# Patient Record
Sex: FEMALE | Race: WHITE | NOT HISPANIC OR LATINO | Employment: OTHER | URBAN - METROPOLITAN AREA
[De-identification: names, ages, dates, MRNs, and addresses within clinical notes are randomized per-mention and may not be internally consistent; named-entity substitution may affect disease eponyms.]

---

## 2017-02-08 ENCOUNTER — HOSPITAL ENCOUNTER (OUTPATIENT)
Dept: LAB | Facility: MEDICAL CENTER | Age: 67
End: 2017-02-08
Attending: SURGERY
Payer: COMMERCIAL

## 2017-02-08 LAB
T4 FREE SERPL-MCNC: 0.91 NG/DL (ref 0.53–1.43)
TSH SERPL DL<=0.005 MIU/L-ACNC: 5.9 UIU/ML (ref 0.3–3.7)

## 2017-02-08 PROCEDURE — 84443 ASSAY THYROID STIM HORMONE: CPT

## 2017-02-08 PROCEDURE — 84439 ASSAY OF FREE THYROXINE: CPT

## 2017-02-08 PROCEDURE — 36415 COLL VENOUS BLD VENIPUNCTURE: CPT

## 2017-04-12 ENCOUNTER — HOSPITAL ENCOUNTER (OUTPATIENT)
Dept: LAB | Facility: MEDICAL CENTER | Age: 67
End: 2017-04-12
Attending: SURGERY
Payer: COMMERCIAL

## 2017-04-12 PROCEDURE — 36415 COLL VENOUS BLD VENIPUNCTURE: CPT

## 2017-04-12 PROCEDURE — 84443 ASSAY THYROID STIM HORMONE: CPT

## 2017-04-12 PROCEDURE — 84439 ASSAY OF FREE THYROXINE: CPT

## 2017-04-13 LAB
T4 FREE SERPL-MCNC: 1.09 NG/DL (ref 0.53–1.43)
TSH SERPL DL<=0.005 MIU/L-ACNC: 2.42 UIU/ML (ref 0.3–3.7)

## 2017-04-28 ENCOUNTER — OFFICE VISIT (OUTPATIENT)
Dept: MEDICAL GROUP | Facility: LAB | Age: 67
End: 2017-04-28
Payer: COMMERCIAL

## 2017-04-28 VITALS
WEIGHT: 135 LBS | OXYGEN SATURATION: 95 % | HEIGHT: 63 IN | BODY MASS INDEX: 23.92 KG/M2 | TEMPERATURE: 97.5 F | RESPIRATION RATE: 12 BRPM | HEART RATE: 58 BPM | DIASTOLIC BLOOD PRESSURE: 80 MMHG | SYSTOLIC BLOOD PRESSURE: 132 MMHG

## 2017-04-28 DIAGNOSIS — E65 LOCALIZED DEPOSITS OF FAT: ICD-10-CM

## 2017-04-28 DIAGNOSIS — I10 ESSENTIAL HYPERTENSION: ICD-10-CM

## 2017-04-28 DIAGNOSIS — N63.10 BREAST MASS, RIGHT: ICD-10-CM

## 2017-04-28 DIAGNOSIS — Z82.49 FAMILY HISTORY OF CORONARY ARTERY DISEASE: ICD-10-CM

## 2017-04-28 DIAGNOSIS — Z80.0 FAMILY HISTORY OF PANCREATIC CANCER: ICD-10-CM

## 2017-04-28 DIAGNOSIS — K63.5 COLON POLYP: ICD-10-CM

## 2017-04-28 DIAGNOSIS — E89.0 POSTOPERATIVE HYPOTHYROIDISM: ICD-10-CM

## 2017-04-28 DIAGNOSIS — Z91.09 ENVIRONMENTAL ALLERGIES: ICD-10-CM

## 2017-04-28 DIAGNOSIS — Z87.891 HISTORY OF TOBACCO USE DISORDER: ICD-10-CM

## 2017-04-28 PROCEDURE — 99203 OFFICE O/P NEW LOW 30 MIN: CPT | Performed by: FAMILY MEDICINE

## 2017-04-28 RX ORDER — HYDROCHLOROTHIAZIDE 25 MG/1
25 TABLET ORAL DAILY
COMMUNITY
End: 2019-02-25 | Stop reason: SDUPTHER

## 2017-04-28 RX ORDER — DIPHENHYDRAMINE HCL 25 MG
25 TABLET ORAL EVERY 6 HOURS PRN
COMMUNITY
End: 2020-11-03

## 2017-04-28 RX ORDER — LEVOTHYROXINE SODIUM 112 UG/1
112 TABLET ORAL
COMMUNITY
End: 2021-03-08

## 2017-04-28 ASSESSMENT — ENCOUNTER SYMPTOMS
HEMOPTYSIS: 0
DIARRHEA: 0
LOSS OF CONSCIOUSNESS: 0
DIZZINESS: 0
WHEEZING: 0
COUGH: 0
DOUBLE VISION: 0
ROS GI COMMENTS: HEMORRHOIDS
HEADACHES: 0
HEARTBURN: 0

## 2017-04-28 ASSESSMENT — PATIENT HEALTH QUESTIONNAIRE - PHQ9: CLINICAL INTERPRETATION OF PHQ2 SCORE: 1

## 2017-04-28 NOTE — PROGRESS NOTES
Subjective:      Ailin Good is a 66 y.o. female who presents with Establish [facility]    Chief Complaint   Patient presents with   • Establish Care     right side of breast small lump with pain / lump on both sides of eyes             HPI    Hypothyroidism   This is a chronic problem. She is taking levoxyl 112 mcg daily. She had a total thyroidectomy 6/2016. She states that she had an enlarged thyroid gland.  She sees Dr Eugene     Hypertension.  His current problem. She is taking HCTZ 25 mg daily and has been on this medication for about 3 years. Does check her blood pressure at home with a wrist cuff and her blood pressure initially runs about 130/75-80. No headaches, no chest pain . Reports she had labs done not too long ago and has them at home    Right sided breast mass   She has a breast lump on the right side. Hard to find but found it when she was in the shower. Has been there for about a month .   Hurts when she pushes on it . States that she did have a normal mammogram done earlier this year.    History of tobacco use  She has smoked 1 pack per 3 days from the ages of 20 to 62. She quit about 4 years ago     Social history:   Retired      [employer] with a company    HCM:   Last gyn exam was 2 years ago and normal   Last mammo was beginning of this year at [facility]   Colon cancer screening . Colonoscopy with 2 years ago and had a polyp removed     New patient questionnaire reviewed and scanned into media     Patient Active Problem List    Diagnosis Date Noted   • Essential hypertension 04/28/2017   • Postoperative hypothyroidism 04/28/2017   • Family history of coronary artery disease 04/28/2017   • Family history of pancreatic cancer 04/28/2017   • History of tobacco use disorder 04/28/2017   • Colon polyp 04/28/2017   • Environmental allergies 04/28/2017         Family History   Problem Relation Age of Onset   • Heart Disease Mother    • Cancer Father      pancreatic cancer       Social  "History     Social History   • Marital Status: Single     Spouse Name: N/A   • Number of Children: N/A   • Years of Education: N/A     Occupational History   • Not on file.     Social History Main Topics   • Smoking status: Former Smoker -- 0.25 packs/day     Types: Cigarettes     Quit date: 01/28/2013   • Smokeless tobacco: Never Used   • Alcohol Use: 0.6 oz/week     1 Glasses of wine per week      Comment: wine nightly    • Drug Use: No   • Sexual Activity: No     Other Topics Concern   • Not on file     Social History Narrative   • No narrative on file         Current outpatient prescriptions:   •  levothyroxine (LEVOXYL) 112 MCG Tab, Take 112 mcg by mouth Every morning on an empty stomach., Disp: , Rfl:   •  hydrochlorothiazide (HYDRODIURIL) 25 MG Tab, Take 25 mg by mouth every day., Disp: , Rfl:   •  diphenhydrAMINE (BENADRYL) 25 MG Tab, Take 25 mg by mouth every 6 hours as needed for Sleep., Disp: , Rfl:         Review of Systems   HENT: Negative for nosebleeds.         Sneezing, chronic sinus trouble    Eyes: Negative for double vision.   Respiratory: Negative for cough, hemoptysis and wheezing.    Cardiovascular: Negative for chest pain and leg swelling.   Gastrointestinal: Negative for heartburn and diarrhea.        Hemorrhoids    Genitourinary:        Loss of urine   Frequent urination   Nocturia    Skin: Negative for rash.        Skin is dryer, some hair loss    Neurological: Negative for dizziness, loss of consciousness and headaches.   Endo/Heme/Allergies: Positive for environmental allergies.          Objective:     /80 mmHg  Pulse 58  Temp(Src) 36.4 °C (97.5 °F)  Resp 12  Ht 1.6 m (5' 2.99\")  Wt 61.236 kg (135 lb)  BMI 23.92 kg/m2  SpO2 95%     Physical Exam   Constitutional: She appears well-developed and well-nourished. She is active and cooperative.  Non-toxic appearance. She does not have a sickly appearance. No distress.   HENT:   Head: Normocephalic and atraumatic.   Eyes: " Conjunctivae and EOM are normal.       Small fatty deposits    Cardiovascular: Normal rate, regular rhythm and normal heart sounds.        Pulmonary/Chest: Effort normal and breath sounds normal. No tachypnea. No respiratory distress.   Right breast mass in the upper outer quadrant as documented    Neurological: She is alert. She displays no tremor. She displays no seizure activity. Coordination and gait normal.   Skin: Skin is warm and dry. She is not diaphoretic.   Psychiatric: She has a normal mood and affect. Her speech is normal and behavior is normal.               Assessment/Plan:     1. Breast mass, right  Diagnostic mammogram, ultrasound ordered urgently.  - MA-DIAGNOSTIC MAMMO W/CAD-BILAT; Future  - US-BREAST COMPLETE-RIGHT; Future    2. Essential hypertension  Controlled. No change in medication    3. Postoperative hypothyroidism  Followed by specialist    4. Colon polyp  Followed by specialist    5. History of tobacco use disorder  Patient is currently not smoking    6. Environmental allergies  Stable    7. Family history of coronary artery disease  Will check labs at next appointment    8. Family history of pancreatic cancer    9. Localized deposits of fat  Referral for removal  - REFERRAL TO OPHTHALMOLOGIC PLASTIC SURGERY      She is to follow up in 2-3 weeks after imaging  She will also bring her most recent labs and mammogram report  We will get her colonoscopy report as well

## 2017-04-28 NOTE — MR AVS SNAPSHOT
"Ailin Good   2017 10:40 AM   Office Visit   MRN: [id number]    Department:  [facility]   Dept Phone:  [phone]    Description:  Female : 1950   Provider:  Lidna Hernández M.D.           Reason for Visit     Establish Care right side of breast small lump with pain / lump on both sides of eyes      Allergies as of 2017     No Known Allergies      You were diagnosed with     Breast mass, right   [142751]       Essential hypertension   [9251365]       Postoperative hypothyroidism   [727200]       Colon polyp   [566384]       History of tobacco use disorder   [5560005]       Environmental allergies   [322897]       Family history of coronary artery disease   [682975]       Family history of pancreatic cancer   [348434]         Vital Signs     Blood Pressure Pulse Temperature Respirations Height Weight    132/80 mmHg 58 36.4 °C (97.5 °F) 12 1.6 m (5' 2.99\") 61.236 kg (135 lb)    Body Mass Index Oxygen Saturation Smoking Status             23.92 kg/m2 95% Former Smoker         Basic Information     Date Of Birth Sex Race Ethnicity Preferred Language    1950 Female White Non- English      Your appointments     May 16, 2017  9:40 AM   Established Patient with Linda Hernández M.D.   [facility] (--)    [address] [address] [id number] [phone]           You will be receiving a confirmation call a few days before your appointment from our automated call confirmation system.              Problem List              ICD-10-CM Priority Class Noted - Resolved    Essential hypertension I10   2017 - Present    Postoperative hypothyroidism E89.0   2017 - Present    Family history of coronary artery disease Z82.49   2017 - Present    Family history of pancreatic cancer Z80.0   2017 - Present    History of tobacco use disorder Z87.891   2017 - Present    Colon polyp K63.5   2017 - Present    Environmental " allergies Z91.09   4/28/2017 - Present      Health Maintenance     Patient has no pending health maintenance at this time      Current Immunizations     No immunizations on file.      Below and/or attached are the medications your provider expects you to take. Review all of your home medications and newly ordered medications with your provider and/or pharmacist. Follow medication instructions as directed by your provider and/or pharmacist. Please keep your medication list with you and share with your provider. Update the information when medications are discontinued, doses are changed, or new medications (including over-the-counter products) are added; and carry medication information at all times in the event of emergency situations     Allergies:  No Known Allergies          Medications  Valid as of: April 28, 2017 - 11:18 AM    Generic Name Brand Name Tablet Size Instructions for use    DiphenhydrAMINE HCl (Tab) BENADRYL 25 MG Take 25 mg by mouth every 6 hours as needed for Sleep.        HydroCHLOROthiazide (Tab) HYDRODIURIL 25 MG Take 25 mg by mouth every day.        Levothyroxine Sodium (Tab) SYNTHROID 112 MCG Take 112 mcg by mouth Every morning on an empty stomach.        .                 Medicines prescribed today were sent to:     None      Medication refill instructions:       If your prescription bottle indicates you have medication refills left, it is not necessary to call your provider’s office. Please contact your pharmacy and they will refill your medication.    If your prescription bottle indicates you do not have any refills left, you may request refills at any time through one of the following ways: The online [employer] system (except Urgent Care), by calling your provider’s office, or by asking your pharmacy to contact your provider’s office with a refill request. Medication refills are processed only during regular business hours and may not be available until the next business day. Your provider  may request additional information or to have a follow-up visit with you prior to refilling your medication.   *Please Note: Medication refills are assigned a new Rx number when refilled electronically. Your pharmacy may indicate that no refills were authorized even though a new prescription for the same medication is available at the pharmacy. Please request the medicine by name with the pharmacy before contacting your provider for a refill.        Your To Do List     Future Labs/Procedures Complete By Expires    MA-DIAGNOSTIC MAMMO W/CAD-BILAT  As directed 4/28/2018    US-BREAST COMPLETE-RIGHT  As directed 4/28/2018         [employer] Access Code: Activation code not generated  Current [employer] Status: Active

## 2017-05-03 ENCOUNTER — HOSPITAL ENCOUNTER (OUTPATIENT)
Dept: RADIOLOGY | Facility: MEDICAL CENTER | Age: 67
End: 2017-05-03

## 2017-05-19 ENCOUNTER — HOSPITAL ENCOUNTER (OUTPATIENT)
Dept: RADIOLOGY | Facility: MEDICAL CENTER | Age: 67
End: 2017-05-19
Attending: FAMILY MEDICINE
Payer: COMMERCIAL

## 2017-05-19 DIAGNOSIS — N63.0 BREAST MASS: ICD-10-CM

## 2017-05-19 PROCEDURE — 76642 ULTRASOUND BREAST LIMITED: CPT | Mod: RT

## 2017-05-19 PROCEDURE — G0279 TOMOSYNTHESIS, MAMMO: HCPCS | Mod: RT

## 2017-05-22 ENCOUNTER — APPOINTMENT (OUTPATIENT)
Dept: MEDICAL GROUP | Facility: LAB | Age: 67
End: 2017-05-22
Payer: COMMERCIAL

## 2018-03-08 ENCOUNTER — TELEPHONE (OUTPATIENT)
Dept: MEDICAL GROUP | Facility: LAB | Age: 68
End: 2018-03-08

## 2018-03-08 ENCOUNTER — PATIENT OUTREACH (OUTPATIENT)
Dept: HEALTH INFORMATION MANAGEMENT | Facility: OTHER | Age: 68
End: 2018-03-08

## 2018-03-08 DIAGNOSIS — Z78.0 MENOPAUSE: ICD-10-CM

## 2018-03-09 NOTE — PROGRESS NOTES
1. Attempt #: Final    2. HealthConnect Verified: yes    3. Verify PCP: yes    4. Care Team Updated:       •   DME Company (gait device, O2, CPAP, etc.): NO       •   Other Specialists (eye doctor, derm, GYN, cardiology, endo, etc): YES    5.  Reviewed/Updated the following with patient:       •   Communication Preference Obtained? YES       •   Preferred Pharmacy? YES       •   Preferred Lab? YES       •   Family History (document living status of immediate family members and if + hx of cancer, diabetes, hypertension, hyperlipidemia, heart attack, stroke) YES. Was Abstract Encounter opened and chart updated? YES    6. [employer] Activation: already active    7. [employer]: yes    8. Annual Wellness Visit Scheduling  Scheduling Status:Scheduled      9. Care Gap Scheduling (Attempt to Schedule EACH Overdue Care Gap!)     Health Maintenance Due   Topic Date Due   • IMM DTaP/Tdap/Td Vaccine (1 - Tdap) 07/15/1971   • PAP SMEAR  07/15/1973   • IMM ZOSTER VACCINE  07/15/2012   • BONE DENSITY  07/15/2017   • IMM PNEUMOCOCCAL 65+ (ADULT) LOW/MEDIUM RISK SERIES (1 of 2 - PCV13) 07/15/2017   • IMM INFLUENZA (1) 09/01/2017        Scheduled patient for Annual Wellness Visit/ Pap Smear/ Dexa    10. Patient was advised: “This is a free wellness visit. The provider will screen for medical conditions to help you stay healthy. If you have other concerns to address you may be asked to discuss these at a separate visit or there may be an additional fee.”     11. Patient was informed to arrive 15 min prior to their scheduled appointment and bring in their medication bottles.

## 2018-03-19 ENCOUNTER — TELEPHONE (OUTPATIENT)
Dept: MEDICAL GROUP | Facility: LAB | Age: 68
End: 2018-03-19

## 2018-03-23 ENCOUNTER — HOSPITAL ENCOUNTER (OUTPATIENT)
Dept: RADIOLOGY | Facility: MEDICAL CENTER | Age: 68
End: 2018-03-23
Attending: FAMILY MEDICINE
Payer: MEDICARE

## 2018-03-23 DIAGNOSIS — Z78.0 MENOPAUSE: ICD-10-CM

## 2018-03-23 PROCEDURE — 77080 DXA BONE DENSITY AXIAL: CPT

## 2018-04-06 ENCOUNTER — HOSPITAL ENCOUNTER (OUTPATIENT)
Dept: RADIOLOGY | Facility: MEDICAL CENTER | Age: 68
End: 2018-04-06
Attending: FAMILY MEDICINE
Payer: MEDICARE

## 2018-04-06 ENCOUNTER — HOSPITAL ENCOUNTER (OUTPATIENT)
Dept: LAB | Facility: MEDICAL CENTER | Age: 68
End: 2018-04-06
Attending: FAMILY MEDICINE
Payer: MEDICARE

## 2018-04-06 ENCOUNTER — OFFICE VISIT (OUTPATIENT)
Dept: MEDICAL GROUP | Facility: LAB | Age: 68
End: 2018-04-06
Payer: MEDICARE

## 2018-04-06 VITALS
SYSTOLIC BLOOD PRESSURE: 102 MMHG | TEMPERATURE: 97.9 F | OXYGEN SATURATION: 100 % | WEIGHT: 139.8 LBS | HEIGHT: 63 IN | DIASTOLIC BLOOD PRESSURE: 78 MMHG | BODY MASS INDEX: 24.77 KG/M2 | RESPIRATION RATE: 12 BRPM | HEART RATE: 54 BPM

## 2018-04-06 DIAGNOSIS — M54.2 NECK PAIN, CHRONIC: ICD-10-CM

## 2018-04-06 DIAGNOSIS — Z82.49 FAMILY HISTORY OF CORONARY ARTERY DISEASE: ICD-10-CM

## 2018-04-06 DIAGNOSIS — G89.29 NECK PAIN, CHRONIC: ICD-10-CM

## 2018-04-06 DIAGNOSIS — Z80.0 FAMILY HISTORY OF PANCREATIC CANCER: ICD-10-CM

## 2018-04-06 DIAGNOSIS — N39.41 URGE INCONTINENCE OF URINE: ICD-10-CM

## 2018-04-06 DIAGNOSIS — K63.5 POLYP OF COLON, UNSPECIFIED PART OF COLON, UNSPECIFIED TYPE: ICD-10-CM

## 2018-04-06 DIAGNOSIS — Z00.00 ROUTINE GENERAL MEDICAL EXAMINATION AT A HEALTH CARE FACILITY: ICD-10-CM

## 2018-04-06 DIAGNOSIS — E89.0 POSTOPERATIVE HYPOTHYROIDISM: ICD-10-CM

## 2018-04-06 DIAGNOSIS — Z87.891 HISTORY OF TOBACCO USE DISORDER: ICD-10-CM

## 2018-04-06 DIAGNOSIS — I10 ESSENTIAL HYPERTENSION: ICD-10-CM

## 2018-04-06 DIAGNOSIS — Z91.09 ENVIRONMENTAL ALLERGIES: ICD-10-CM

## 2018-04-06 LAB
T4 FREE SERPL-MCNC: 1.28 NG/DL (ref 0.53–1.43)
TSH SERPL DL<=0.005 MIU/L-ACNC: 0.9 UIU/ML (ref 0.38–5.33)

## 2018-04-06 PROCEDURE — 84439 ASSAY OF FREE THYROXINE: CPT

## 2018-04-06 PROCEDURE — 99999 PR NO CHARGE: CPT | Performed by: FAMILY MEDICINE

## 2018-04-06 PROCEDURE — 36415 COLL VENOUS BLD VENIPUNCTURE: CPT

## 2018-04-06 PROCEDURE — G0438 PPPS, INITIAL VISIT: HCPCS | Performed by: FAMILY MEDICINE

## 2018-04-06 PROCEDURE — 72040 X-RAY EXAM NECK SPINE 2-3 VW: CPT

## 2018-04-06 PROCEDURE — 84443 ASSAY THYROID STIM HORMONE: CPT

## 2018-04-06 RX ORDER — FLUTICASONE PROPIONATE 50 MCG
1 SPRAY, SUSPENSION (ML) NASAL 2 TIMES DAILY
Qty: 16 G | Refills: 1 | Status: SHIPPED | OUTPATIENT
Start: 2018-04-06 | End: 2018-05-01 | Stop reason: SDUPTHER

## 2018-04-06 RX ORDER — INFLUENZA A VIRUS A/MICHIGAN/45/2015 X-275 (H1N1) ANTIGEN (FORMALDEHYDE INACTIVATED), INFLUENZA A VIRUS A/SINGAPORE/INFIMH-16-0019/2016 IVR-186 (H3N2) ANTIGEN (FORMALDEHYDE INACTIVATED), AND INFLUENZA B VIRUS B/MARYLAND/15/2016 BX-69A (A B/COLORADO/6/2017-LIKE VIRUS) ANTIGEN (FORMALDEHYDE INACTIVATED) 60; 60; 60 UG/.5ML; UG/.5ML; UG/.5ML
INJECTION, SUSPENSION INTRAMUSCULAR
Refills: 0 | COMMUNITY
Start: 2018-01-16 | End: 2018-04-06

## 2018-04-06 RX ORDER — PNEUMOCOCCAL 13-VALENT CONJUGATE VACCINE 2.2; 2.2; 2.2; 2.2; 2.2; 4.4; 2.2; 2.2; 2.2; 2.2; 2.2; 2.2; 2.2 UG/.5ML; UG/.5ML; UG/.5ML; UG/.5ML; UG/.5ML; UG/.5ML; UG/.5ML; UG/.5ML; UG/.5ML; UG/.5ML; UG/.5ML; UG/.5ML; UG/.5ML
INJECTION, SUSPENSION INTRAMUSCULAR
Refills: 0 | COMMUNITY
Start: 2018-01-16 | End: 2018-04-06

## 2018-04-06 ASSESSMENT — PATIENT HEALTH QUESTIONNAIRE - PHQ9: CLINICAL INTERPRETATION OF PHQ2 SCORE: 0

## 2018-04-06 ASSESSMENT — ACTIVITIES OF DAILY LIVING (ADL): BATHING_REQUIRES_ASSISTANCE: 0

## 2018-04-06 NOTE — LETTER
[facility]  Linda Hernández M.D.  [address]  [place] [address]  Fax: [phone]   Authorization for Release/Disclosure of   Protected Health Information   Name: EWNDY AUGUSTE : 1950 SSN: [id number]   Address: [address] [place] [address]  [place] [address] Phone:    [phone] (home)    I authorize the entity listed below to release/disclose the PHI below to:   [facility]/Linda Hernández M.D. and Linda Hernández M.D.   Provider or Entity Name:  Jamila Mathews M.D.   Address   City, [facility]   Phone:      Fax:  [phone]   Reason for request: continuity of care   Information to be released:    [  ] LAST COLONOSCOPY,  including any PATH REPORT and follow-up  [  ] LAST FIT/COLOGUARD RESULT [  ] LAST DEXA  [  ] LAST MAMMOGRAM  [ XX ] LAST PAP  [  ] LAST LABS [  ] RETINA EXAM REPORT  [  ] IMMUNIZATION RECORDS  [  ] Release all info      [  ] Check here and initial the line next to each item to release ALL health information INCLUDING  _____ Care and treatment for drug and / or alcohol abuse  _____ HIV testing, infection status, or AIDS  _____ Genetic Testing    DATES OF SERVICE OR TIME PERIOD TO BE DISCLOSED: _____________  I understand and acknowledge that:  * This Authorization may be revoked at any time by you in writing, except if your health information has already been used or disclosed.  * Your health information that will be used or disclosed as a result of you signing this authorization could be re-disclosed by the recipient. If this occurs, your re-disclosed health information may no longer be protected by State or Federal laws.  * You may refuse to sign this Authorization. Your refusal will not affect your ability to obtain treatment.  * This Authorization becomes effective upon signing and will  on (date) __________.      If no date is indicated, this Authorization will  one (1) year from the signature date.    Name: Wendy Auguste    Signature:   Date:     2018            PLEASE FAX REQUESTED RECORDS BACK TO: [phone]

## 2018-04-06 NOTE — PROGRESS NOTES
Chief Complaint   Patient presents with   • Annual Wellness Visit         HPI:  Ailin is a 67 y.o. here for Medicare Annual Wellness Visit    Urinary incontinence. Happens when she coughs and sneezes and also when she walks. She was tried on medication in the past that made her urine orange and that did not help. This is really frustrating for her      Left sided neck pain. Fell last year and hit her left neck neck on concrete . She would like to have an x-ray of her cervical spine.    Patient Active Problem List    Diagnosis Date Noted   • Essential hypertension  Controlled on medication  04/28/2017   • Postoperative hypothyroidism  Patient is due for labs. Reports that her surgeon retired.  04/28/2017   • Family history of coronary artery disease  No chest pain  04/28/2017   • Family history of pancreatic cancer 04/28/2017   • History of tobacco use disorder  Still not smoking  04/28/2017   • Colon polyp  Due next year  04/28/2017   • Environmental allergies  She is takigng benadryl and this helps. Sx are dizziness, pressure in the face, in the maxillary sinuses . She's requesting a Kenalog shot.  04/28/2017   • Localized deposits of fat 04/28/2017         Current Outpatient Prescriptions:   •  levothyroxine (LEVOXYL) 112 MCG Tab, Take 112 mcg by mouth Every morning on an empty stomach., Disp: , Rfl:   •  hydrochlorothiazide (HYDRODIURIL) 25 MG Tab, Take 25 mg by mouth every day., Disp: , Rfl:   •  diphenhydrAMINE (BENADRYL) 25 MG Tab, Take 25 mg by mouth every 6 hours as needed for Sleep., Disp: , Rfl:        Patient is taking medications as noted in medication list.  Current supplements as per medication list.     Allergies: Patient has no known allergies.    Current social contact/activities: bowling,playing on computers, traveling   Patient's perception of their health: good    Is patient current with immunizations? No, due for TDAP and ZOSTAVAX (Shingles). Patient is interested in receiving NONE  today.    She  reports that she quit smoking about 5 years ago. Her smoking use included Cigarettes. She smoked 0.25 packs per day. She has never used smokeless tobacco. She reports that she drinks about 0.6 oz of alcohol per week . She reports that she does not use drugs.  Counseling given: Yes        DPA/Advanced directive: Patient has Advanced Directive, but it is not on file. Instructed to bring in a copy to scan into their chart.    ROS:    Gait: Uses no assistive device   Ostomy: no   Other tubes: no   Amputations: no   Chronic oxygen use no   Last eye exam 12/2017   Wears hearing aids: no   : Reports urinary leakage during the last 6 months that has interfered a lot with their daily activities or sleep.        Depression Screening    Little interest or pleasure in doing things?  0 - not at all  Feeling down, depressed, or hopeless? 0 - not at all  Patient Health Questionnaire Score: 0    If depressive symptoms identified deferred to follow up visit unless specifically addressed in assessment and plan.    Interpretation of PHQ-9 Total Score   Score Severity   1-4 No Depression   5-9 Mild Depression   10-14 Moderate Depression   15-19 Moderately Severe Depression   20-27 Severe Depression    Screening for Cognitive Impairment    Three Minute Recall (apple, watch, sophia)  3/3    Draw clock face with all 12 numbers set to the hand to show 10 minutes past 11 o'clock   5/5  If cognitive concerns identified, deferred for follow up unless specifically addressed in assessment and plan.    Fall Risk Assessment    Has the patient had two or more falls in the last year or any fall with injury in the last year?  No  If fall risk identified, deferred for follow up unless specifically addressed in assessment and plan.    Safety Assessment    Throw rugs on floor.  Yes  Handrails on all stairs.  Yes  Good lighting in all hallways.  Yes  Difficulty hearing.  No  Patient counseled about all safety risks that were  identified.    Functional Assessment ADLs    Are there any barriers preventing you from cooking for yourself or meeting nutritional needs?  No.    Are there any barriers preventing you from driving safely or obtaining transportation?  No.    Are there any barriers preventing you from using a telephone or calling for help?  No.    Are there any barriers preventing you from shopping?  No.    Are there any barriers preventing you from taking care of your own finances?  No.    Are there any barriers preventing you from managing your medications?  No.    Are there any barriers preventing you from showering, bathing or dressing yourself?  No.    Are you currently engaging any exercise or physical activity?  Yes.  Walking  Everyday 3 miles.    Health Maintenance Summary                Annual Wellness Visit Overdue 1950     IMM DTaP/Tdap/Td Vaccine Overdue 7/15/1971     PAP SMEAR Overdue 7/15/1973     IMM ZOSTER VACCINE Overdue 7/15/2012     MAMMOGRAM Next Due 5/19/2018      Done 5/19/2017 MA-MAMMO DIAGNOSTIC UNILAT W/TOMOSYNTHESIS W/CAD     Patient has more history with this topic...    COLONOSCOPY Next Due 6/4/2020      Done 6/4/2015 REFERRAL TO GI FOR COLONOSCOPY    IMM PNEUMOCOCCAL 65+ (ADULT) LOW/MEDIUM RISK SERIES Next Due 6/7/2021      Done 1/16/2018 Imm Admin: Pneumococcal Conjugate Vaccine (Prevnar/PCV-13)     Patient has more history with this topic...    BONE DENSITY Next Due 3/23/2023      Done 3/23/2018 DS-BONE DENSITY STUDY (DEXA)     Patient has more history with this topic...          Patient Care Team:  Linda Hernández M.D. as PCP - General (Family Medicine)  Farooq Pham M.D. as Consulting Physician (Dermatology)  Dr. Cholo Colbert DDS as Medical Home Care Manager (Dentistry)  [facility] as Consulting Physician (Optometry)    Social History   Substance Use Topics   • Smoking status: Former Smoker     Packs/day: 0.25     Types: Cigarettes     Quit date: 1/28/2013   • Smokeless tobacco:  "Never Used   • Alcohol use 0.6 oz/week     1 Glasses of wine per week      Comment: wine nightly      Family History   Problem Relation Age of Onset   • Heart Disease Mother    • Diabetes Mother    • Hypertension Mother    • Cancer Father      pancreatic cancer   • Stroke Father    • Hypertension Father    • Heart Disease Sister    • Hypertension Sister    • Hyperlipidemia Sister      She  has no past medical history on file.   No past surgical history on file.        Exam:     Blood pressure 102/78, pulse (!) 54, temperature 36.6 °C (97.9 °F), resp. rate 12, height 1.6 m (5' 3\"), weight 63.4 kg (139 lb 12.8 oz), SpO2 100 %. Body mass index is 24.76 kg/m².    Hearing good.    Dentition fair  Alert, oriented in no acute distress.  Eye contact is good, speech goal directed, affect calm      3/23/2018 10:40 AM    HISTORY/REASON FOR EXAM:  Postmenopausal, family history of osteoporosis    TECHNIQUE/EXAM DESCRIPTION AND NUMBER OF VIEWS:  Dual x-ray bone densitometry was performed from the L1 through L4 levels and from the proximal left femur utilizing the GE Prodigy unit.    COMPARISON:   Bone densitometry scan 11/2/2007    FINDINGS:  The mean bone mineral density for the lumbar spine is 1.124 g/cm2, which corresponds to a T score of -0.5 and a Z score of 1.3.    The proximal left femur has a mean bone mineral density of 0.991 g/cm2, with a T score of -0.1 and a Z score of 1.2.    When compared with the most recent study dated 11/2/2007, there has been a 1.8% increase in the bone mineral density of the lumbar spine and a 2.9% increase in the bone mineral density of the left femur.   Impression       According to the World Health Organization classification, bone mineral density of this patient is within normal limits.        10-year Probability of Fracture:  Major Osteoporotic     7.5%  Hip     0.4%  Population      [place] ()    Based on left femur neck BMD         Assessment and Plan. The following treatment and " monitoring plan is recommended:      1. Routine general medical examination at a health care facility  Reviewed depression screening, screening for cognitive impairment, timed up and go test, safety assessment and functional assessment ADLs  - WI INITIAL ANNUAL WELLNESS VISIT-INCLUDES PPPS    2. Urge incontinence of urine  Discussed with patient. Referral to gynecology  - REFERRAL TO OB/GYN  - WI INITIAL ANNUAL WELLNESS VISIT-INCLUDES PPPS    3. Essential hypertension  Controlled on medication. No change in medication  - WI INITIAL ANNUAL WELLNESS VISIT-INCLUDES PPPS    4. Postoperative hypothyroidism  Check labs  - TSH; Future  - FREE THYROXINE; Future  - WI INITIAL ANNUAL WELLNESS VISIT-INCLUDES PPPS    5. Family history of coronary artery disease  Stable  - WI INITIAL ANNUAL WELLNESS VISIT-INCLUDES PPPS    6. Family history of pancreatic cancer  Stable  - WI INITIAL ANNUAL WELLNESS VISIT-INCLUDES PPPS    7. History of tobacco use disorder  Currently not smoking  - WI INITIAL ANNUAL WELLNESS VISIT-INCLUDES PPPS    8. Polyp of colon, unspecified part of colon, unspecified type  Colonoscopy up-to-date  - WI INITIAL ANNUAL WELLNESS VISIT-INCLUDES PPPS    9. Environmental allergies  Uncontrolled. Trial of Flonase.  - fluticasone (FLONASE) 50 MCG/ACT nasal spray; Spray 1 Spray in nose 2 times a day.  Dispense: 16 g; Refill: 1  - WI INITIAL ANNUAL WELLNESS VISIT-INCLUDES PPPS    10. Neck pain, chronic  Uncontrolled. Check x-ray of the neck  - DX-CERVICAL SPINE-2 OR 3 VIEWS; Future  - WI INITIAL ANNUAL WELLNESS VISIT-INCLUDES PPPS      Services suggested: No services needed at this time  Health Care Screening: Age-appropriate preventive services recommended by USPTF and ACIP covered by Medicare were discussed today. Services ordered if indicated and agreed upon by the patient.  Referrals offered: PT/OT/Nutrition counseling/Behavioral Health/Smoking cessation as per orders if indicated.    Discussion today about general  wellness and lifestyle habits:    · Prevent falls and reduce trip hazards; Cautioned about securing or removing rugs.  · Have a working fire alarm and carbon monoxide detector;   · Engage in regular physical activity and social activities       Follow-up: No Follow-up on file.

## 2018-04-10 ENCOUNTER — TELEPHONE (OUTPATIENT)
Dept: MEDICAL GROUP | Facility: LAB | Age: 68
End: 2018-04-10

## 2018-04-10 NOTE — TELEPHONE ENCOUNTER
1. Caller Name: Ailin                                         Call Back Number: [phone]       Patient approves a detailed voicemail message: yes    Pt is questioning her labs. She would to know what normal is for her for her TSH? She was looking at her last year results and they seem to be very different. Also she would like to know if she needs to change the dose of her medication.  Please advise

## 2018-05-01 DIAGNOSIS — Z91.09 ENVIRONMENTAL ALLERGIES: ICD-10-CM

## 2018-05-01 RX ORDER — FLUTICASONE PROPIONATE 50 MCG
1 SPRAY, SUSPENSION (ML) NASAL 2 TIMES DAILY
Qty: 16 G | Refills: 1 | Status: SHIPPED | OUTPATIENT
Start: 2018-05-01 | End: 2018-05-02 | Stop reason: SDUPTHER

## 2018-05-02 DIAGNOSIS — Z91.09 ENVIRONMENTAL ALLERGIES: ICD-10-CM

## 2018-05-02 RX ORDER — FLUTICASONE PROPIONATE 50 MCG
1 SPRAY, SUSPENSION (ML) NASAL 2 TIMES DAILY
Qty: 1 BOTTLE | Refills: 1 | Status: SHIPPED | OUTPATIENT
Start: 2018-05-02 | End: 2019-10-28

## 2018-05-02 NOTE — TELEPHONE ENCOUNTER
90 day request  Was the patient seen in the last year in this department? Yes     Does patient have an active prescription for medications requested? No     Received Request Via: Pharmacy

## 2018-05-03 ENCOUNTER — HOSPITAL ENCOUNTER (OUTPATIENT)
Dept: RADIOLOGY | Facility: MEDICAL CENTER | Age: 68
End: 2018-05-03
Attending: FAMILY MEDICINE
Payer: MEDICARE

## 2018-05-03 DIAGNOSIS — Z12.31 VISIT FOR SCREENING MAMMOGRAM: ICD-10-CM

## 2018-05-03 PROCEDURE — 77063 BREAST TOMOSYNTHESIS BI: CPT

## 2018-06-13 ENCOUNTER — HOSPITAL ENCOUNTER (OUTPATIENT)
Dept: LAB | Facility: MEDICAL CENTER | Age: 68
End: 2018-06-13
Attending: INTERNAL MEDICINE
Payer: MEDICARE

## 2018-06-13 LAB
T4 FREE SERPL-MCNC: 0.93 NG/DL (ref 0.53–1.43)
TSH SERPL DL<=0.005 MIU/L-ACNC: 4.01 UIU/ML (ref 0.38–5.33)

## 2018-06-13 PROCEDURE — 84443 ASSAY THYROID STIM HORMONE: CPT

## 2018-06-13 PROCEDURE — 84439 ASSAY OF FREE THYROXINE: CPT

## 2018-06-13 PROCEDURE — 36415 COLL VENOUS BLD VENIPUNCTURE: CPT

## 2018-06-20 ENCOUNTER — GYNECOLOGY VISIT (OUTPATIENT)
Dept: OBGYN | Facility: MEDICAL CENTER | Age: 68
End: 2018-06-20
Payer: MEDICARE

## 2018-06-20 VITALS
HEIGHT: 63 IN | SYSTOLIC BLOOD PRESSURE: 105 MMHG | BODY MASS INDEX: 23.92 KG/M2 | WEIGHT: 135 LBS | DIASTOLIC BLOOD PRESSURE: 70 MMHG

## 2018-06-20 DIAGNOSIS — I10 HYPERTENSION, UNSPECIFIED TYPE: ICD-10-CM

## 2018-06-20 DIAGNOSIS — N39.46 MIXED STRESS AND URGE URINARY INCONTINENCE: ICD-10-CM

## 2018-06-20 DIAGNOSIS — Z01.419 WELL WOMAN EXAM WITH ROUTINE GYNECOLOGICAL EXAM: ICD-10-CM

## 2018-06-20 PROCEDURE — 99387 INIT PM E/M NEW PAT 65+ YRS: CPT | Performed by: OBSTETRICS & GYNECOLOGY

## 2018-06-21 NOTE — PROGRESS NOTES
Annual Gynecologic Exam        [facility] Comments:   67 year old postmenopause presents for well woman exam. No LMP recorded.  No abnormal vaginal bleeding, no pelvic pains  Not sexually active  Main problem is her incontinence of urine - both with sneezing, coughing, plain walking. (+) urgency and nocturia -to voids multiple times at night  No dysuria  No constipations  Non smoker  No breast/no ovarian cancer in the family    Review of Systems   Pertinent positives documented in HPI and all other systems reviewed & are negative    All PMH, PSH, allergies, social history and FH reviewed and updated today:  History reviewed. No pertinent past medical history.  History reviewed. No pertinent surgical history.  Patient has no known allergies.  Social History     Social History   • Marital status: Single     Spouse name: N/A   • Number of children: N/A   • Years of education: N/A     Social History Main Topics   • Smoking status: Former Smoker     Packs/day: 0.25     Types: Cigarettes     Quit date: 1/28/2013   • Smokeless tobacco: Never Used   • Alcohol use 0.6 oz/week     1 Glasses of wine per week      Comment: wine nightly    • Drug use: No   • Sexual activity: No     Other Topics Concern   • Not on file     Social History Narrative   • No narrative on file     Family History   Problem Relation Age of Onset   • Heart Disease Mother    • Diabetes Mother    • Hypertension Mother    • Cancer Father      pancreatic cancer   • Stroke Father    • Hypertension Father    • Heart Disease Sister    • Hypertension Sister    • Hyperlipidemia Sister      Medications:   Current Outpatient Prescriptions Ordered in [facility]   Medication Sig Dispense Refill   • fluticasone (FLONASE) 50 MCG/ACT nasal spray Spray 1 Spray in nose 2 times a day. 1 Bottle 1   • levothyroxine (LEVOXYL) 112 MCG Tab Take 112 mcg by mouth Every morning on an empty stomach.     • hydrochlorothiazide (HYDRODIURIL) 25 MG Tab Take 25 mg by mouth every day.     •  "diphenhydrAMINE (BENADRYL) 25 MG Tab Take 25 mg by mouth every 6 hours as needed for Sleep.       No current Epic-ordered facility-administered medications on file.           Objective:   Vital measurements:  Blood pressure 105/70, height 1.6 m (5' 3\"), weight 61.2 kg (135 lb), not currently breastfeeding.  Body mass index is 23.91 kg/m². (Goal BM I>18 <25)    Physical Exam   Nursing note and vitals reviewed.  Constitutional: She is oriented to person, place, and time. She appears well-developed and well-nourished. No distress.     HEENT:   Head: Normocephalic and atraumatic.   Right Ear: External ear normal.   Left Ear: External ear normal.   Nose: Nose normal.   Eyes: Conjunctivae and EOM are normal. Pupils are equal, round, and reactive to light. No scleral icterus.     Neck: Normal range of motion. Neck supple. No tracheal deviation present. No thyromegaly present.     Pulmonary/Chest: Effort normal and breath sounds normal. No respiratory distress. She has no wheezes. She has no rales. She exhibits no tenderness.     Cardiovascular: Regular, rate and rhythm. No JVD.    Abdominal: Soft. Bowel sounds are normal. She exhibits no distension and no mass. No tenderness. She has no rebound and no guarding.     Breast:  Symmetrical, normal consistency without masses., No dimpling or skin changes, Normal nipples without discharge, negative, No masses    Genitourinary:  Pelvic exam was performed with patient supine. Atrophic, no cystocele no rectocele  No leakage of urine with valsalva  External genitalia with no abnormal pigmentation, labial fusion,rash, tenderness, lesion or injury to the labia bilaterally.  Vagina is dry with no lesions, foul discharge, erythema, tenderness or bleeding. No foreign body around the vagina or signs of injury.   Cervix exhibits no motion tenderness, no discharge and no friability.   Uterus is not deviated, not enlarged, not fixed and not tender.  Right adnexum displays no mass, no " tenderness and no fullness. Left adnexum displays no mass, no tenderness and no fullness.     Musculoskeletal: Normal range of motion. She exhibits no edema and no tenderness.     Lymphadenopathy: She has no cervical adenopathy.     Neurological: She is alert and oriented to person, place, and time. She exhibits normal muscle tone.     Skin: Skin is warm and dry. No rash noted. She is not diaphoretic. No erythema. No pallor.     Psychiatric: She has a normal mood and affect. Her behavior is normal. Judgment and thought content normal.   Assessment:     1. Well woman exam with routine gynecological exam  REFERRAL TO FAMILY PRACTICE   2. Mixed stress and urge urinary incontinence - on diuretic  REFERRAL TO FAMILY PRACTICE   3. Hypertension, unspecified type  REFERRAL TO FAMILY PRACTICE     Plan:   Physical exam performed  Monthly SBE.  Counseling: breast self exam, mammography screening, use and side effects of HRT, menopause, osteoporosis and adequate intake of calcium and vitamin D  Pt on HCTZ - review of her meds done. She will address her UI in respect to her taking HCTZ with her PCP if it can be switched to another anti-hypertensive. She would establish with a PCP since her PCP retired.   Discussed anti-cholinergic trial use will hold until pt sees her PCP to address above. She agreed with plan  Encourage exercise and proper diet.  Mammograms annually.  See medications and orders placed in encounter report.

## 2018-07-06 ENCOUNTER — HOSPITAL ENCOUNTER (OUTPATIENT)
Dept: LAB | Facility: MEDICAL CENTER | Age: 68
End: 2018-07-06
Attending: SPECIALIST
Payer: MEDICARE

## 2018-07-06 LAB
ANION GAP SERPL CALC-SCNC: 9 MMOL/L (ref 0–11.9)
BUN SERPL-MCNC: 27 MG/DL (ref 8–22)
CALCIUM SERPL-MCNC: 9.1 MG/DL (ref 8.5–10.5)
CHLORIDE SERPL-SCNC: 103 MMOL/L (ref 96–112)
CO2 SERPL-SCNC: 27 MMOL/L (ref 20–33)
CREAT SERPL-MCNC: 0.96 MG/DL (ref 0.5–1.4)
GLUCOSE SERPL-MCNC: 94 MG/DL (ref 65–99)
POTASSIUM SERPL-SCNC: 4.1 MMOL/L (ref 3.6–5.5)
SODIUM SERPL-SCNC: 139 MMOL/L (ref 135–145)

## 2018-07-06 PROCEDURE — 36415 COLL VENOUS BLD VENIPUNCTURE: CPT

## 2018-07-06 PROCEDURE — 80048 BASIC METABOLIC PNL TOTAL CA: CPT

## 2018-08-28 ENCOUNTER — APPOINTMENT (OUTPATIENT)
Dept: MEDICAL GROUP | Facility: MEDICAL CENTER | Age: 68
End: 2018-08-28
Payer: MEDICARE

## 2018-09-14 ENCOUNTER — HOSPITAL ENCOUNTER (OUTPATIENT)
Dept: LAB | Facility: MEDICAL CENTER | Age: 68
End: 2018-09-14
Attending: PHYSICIAN ASSISTANT
Payer: MEDICARE

## 2018-09-14 PROCEDURE — 36415 COLL VENOUS BLD VENIPUNCTURE: CPT

## 2018-09-14 PROCEDURE — 84439 ASSAY OF FREE THYROXINE: CPT

## 2018-09-14 PROCEDURE — 84443 ASSAY THYROID STIM HORMONE: CPT

## 2018-09-15 LAB
T4 FREE SERPL-MCNC: 0.91 NG/DL (ref 0.53–1.43)
TSH SERPL DL<=0.005 MIU/L-ACNC: 5.35 UIU/ML (ref 0.38–5.33)

## 2018-09-24 ENCOUNTER — HOSPITAL ENCOUNTER (OUTPATIENT)
Dept: LAB | Facility: MEDICAL CENTER | Age: 68
End: 2018-09-24
Attending: OPHTHALMOLOGY
Payer: MEDICARE

## 2018-09-24 LAB
BUN SERPL-MCNC: 27 MG/DL (ref 8–22)
CREAT SERPL-MCNC: 0.79 MG/DL (ref 0.5–1.4)

## 2018-09-24 PROCEDURE — 82565 ASSAY OF CREATININE: CPT

## 2018-09-24 PROCEDURE — 84520 ASSAY OF UREA NITROGEN: CPT

## 2018-09-24 PROCEDURE — 36415 COLL VENOUS BLD VENIPUNCTURE: CPT

## 2018-09-25 ENCOUNTER — HOSPITAL ENCOUNTER (OUTPATIENT)
Dept: RADIOLOGY | Facility: MEDICAL CENTER | Age: 68
End: 2018-09-25
Attending: OPHTHALMOLOGY
Payer: MEDICARE

## 2018-09-25 DIAGNOSIS — H04.163 PROLAPSE OF LACRIMAL GLAND, BILATERAL: ICD-10-CM

## 2018-09-25 PROCEDURE — 700117 HCHG RX CONTRAST REV CODE 255: Performed by: OPHTHALMOLOGY

## 2018-09-25 PROCEDURE — 70481 CT ORBIT/EAR/FOSSA W/DYE: CPT

## 2018-09-25 RX ADMIN — IOHEXOL 100 ML: 350 INJECTION, SOLUTION INTRAVENOUS at 08:15

## 2018-10-25 ENCOUNTER — HOSPITAL ENCOUNTER (OUTPATIENT)
Dept: LAB | Facility: MEDICAL CENTER | Age: 68
End: 2018-10-25
Attending: PHYSICIAN ASSISTANT
Payer: MEDICARE

## 2018-10-25 LAB
T4 FREE SERPL-MCNC: 1.03 NG/DL (ref 0.53–1.43)
TSH SERPL DL<=0.005 MIU/L-ACNC: 4.96 UIU/ML (ref 0.38–5.33)

## 2018-10-25 PROCEDURE — 84439 ASSAY OF FREE THYROXINE: CPT

## 2018-10-25 PROCEDURE — 84443 ASSAY THYROID STIM HORMONE: CPT

## 2018-10-25 PROCEDURE — 36415 COLL VENOUS BLD VENIPUNCTURE: CPT

## 2019-01-30 ENCOUNTER — HOSPITAL ENCOUNTER (OUTPATIENT)
Dept: LAB | Facility: MEDICAL CENTER | Age: 69
End: 2019-01-30
Attending: PHYSICIAN ASSISTANT
Payer: MEDICARE

## 2019-01-30 LAB
T4 FREE SERPL-MCNC: 1.17 NG/DL (ref 0.53–1.43)
TSH SERPL DL<=0.005 MIU/L-ACNC: 0.76 UIU/ML (ref 0.38–5.33)

## 2019-01-30 PROCEDURE — 36415 COLL VENOUS BLD VENIPUNCTURE: CPT

## 2019-01-30 PROCEDURE — 84443 ASSAY THYROID STIM HORMONE: CPT

## 2019-01-30 PROCEDURE — 84439 ASSAY OF FREE THYROXINE: CPT

## 2019-02-19 ENCOUNTER — TELEPHONE (OUTPATIENT)
Dept: MEDICAL GROUP | Facility: MEDICAL CENTER | Age: 69
End: 2019-02-19

## 2019-02-20 ENCOUNTER — OFFICE VISIT (OUTPATIENT)
Dept: MEDICAL GROUP | Facility: MEDICAL CENTER | Age: 69
End: 2019-02-20
Payer: MEDICARE

## 2019-02-20 VITALS
OXYGEN SATURATION: 97 % | BODY MASS INDEX: 24.61 KG/M2 | SYSTOLIC BLOOD PRESSURE: 126 MMHG | TEMPERATURE: 97.8 F | HEART RATE: 52 BPM | DIASTOLIC BLOOD PRESSURE: 84 MMHG | WEIGHT: 138.89 LBS | HEIGHT: 63 IN

## 2019-02-20 DIAGNOSIS — M19.049 LOCALIZED OSTEOARTHRITIS OF HAND, UNSPECIFIED LATERALITY: ICD-10-CM

## 2019-02-20 DIAGNOSIS — M25.50 ARTHRALGIA, UNSPECIFIED JOINT: ICD-10-CM

## 2019-02-20 DIAGNOSIS — M54.50 LUMBOSACRAL PAIN: ICD-10-CM

## 2019-02-20 DIAGNOSIS — R32 INCONTINENCE IN FEMALE: ICD-10-CM

## 2019-02-20 DIAGNOSIS — E89.0 POSTOPERATIVE HYPOTHYROIDISM: ICD-10-CM

## 2019-02-20 DIAGNOSIS — Z00.00 HEALTH CARE MAINTENANCE: ICD-10-CM

## 2019-02-20 DIAGNOSIS — Z91.09 ENVIRONMENTAL ALLERGIES: ICD-10-CM

## 2019-02-20 DIAGNOSIS — I10 ESSENTIAL HYPERTENSION: ICD-10-CM

## 2019-02-20 PROBLEM — K63.5 COLON POLYP: Status: RESOLVED | Noted: 2017-04-28 | Resolved: 2019-02-20

## 2019-02-20 PROBLEM — K63.5 POLYP OF COLON: Status: ACTIVE | Noted: 2019-02-20

## 2019-02-20 PROCEDURE — 99214 OFFICE O/P EST MOD 30 MIN: CPT | Performed by: INTERNAL MEDICINE

## 2019-02-20 RX ORDER — TRIAMCINOLONE ACETONIDE 40 MG/ML
40 INJECTION, SUSPENSION INTRA-ARTICULAR; INTRAMUSCULAR ONCE
Status: COMPLETED | OUTPATIENT
Start: 2019-02-20 | End: 2019-02-20

## 2019-02-20 RX ADMIN — TRIAMCINOLONE ACETONIDE 40 MG: 40 INJECTION, SUSPENSION INTRA-ARTICULAR; INTRAMUSCULAR at 10:05

## 2019-02-20 ASSESSMENT — PATIENT HEALTH QUESTIONNAIRE - PHQ9: CLINICAL INTERPRETATION OF PHQ2 SCORE: 0

## 2019-02-20 NOTE — PROGRESS NOTES
Annual Health Assessment Questions:    1.  Are you currently engaging in any exercise or physical activity? Yes    2.  How would you describe your mood or emotional well-being today? good    3.  Have you had any falls in the last year? No    4.  Have you noticed any problems with your balance or had difficulty walking? No    5.  In the last six months have you experienced any leakage of urine? Yes    6. DPA/Advanced Directive: Patient does not have an Advanced Directive.  A packet and workshop information was given on Advanced Directives.

## 2019-02-20 NOTE — PROGRESS NOTES
CHIEF COMPLAINT  Chief Complaint   Patient presents with   • Establish Care     Thyroid     HPI  Patient is a 68 y.o. female patient who presents today for the following     Hypertension  Meds: HCTZ 25 mg daily, taking as prescribed.   Measuring BP at home: no.  Denies:  -  headaches, vision problems, tinnitus.                 -  chest pain/pressure, palpitations, irregular heart beats, exertional, dyspnea, peripheral edema.      - medication side effect: unusual fatigue, slow heartbeat, foot/leg swelling, cough.  Low salt diet: Advised  Diet: Healthy  Exercise: Some  BMI: 24  FH of HTN: Multiple    Postoperative hypothyroidism due to nodules  Levothyroxine, 112 mcg, taking daily early in am, before any po intake.  No temperature intolerance. No change in weight,  hair/skin quality, BMs.   No tremors, weakness.  No peripheral swelling.  No mood changes.  FH: unknown    Incontinence  The patient c/o intermittent uncontrolled urine leakage.  She does not he have stress or urge incontinence.  She was not evaluated by urology.    Environmental allergies  The patient has history of allergies throughout the year, controlled with OTC antihistamine and Flonase.   No current nasal congestion sneezing or cough.    DJD/Arthralgia, LBP  The patient has history of osteoarthritis in multiple joints, mostly in hands, and spine.  She has been treated by a chiropractor frequently, who diagnosed her with spinal enthesopathy and rheumatoid arthritis.  There are no imaging or labs for those diagnosis.  She received in the past steroid injection for joint pains that she requested today.    Reviewed PMH, PSH, FH, SH, ALL, HCM/IMM, no changes  Reviewed MEDS, no changes    Patient Active Problem List    Diagnosis Date Noted   • Polyp of colon 02/20/2019   • Essential hypertension 04/28/2017   • Postoperative hypothyroidism 04/28/2017   • Family history of coronary artery disease 04/28/2017   • Family history of pancreatic cancer 04/28/2017    • History of tobacco use disorder 2017   • Environmental allergies 2017   • Localized deposits of fat 2017     CURRENT MEDICATIONS  Current Outpatient Prescriptions   Medication Sig Dispense Refill   • levothyroxine (LEVOXYL) 112 MCG Tab Take 112 mcg by mouth Every morning on an empty stomach.     • hydrochlorothiazide (HYDRODIURIL) 25 MG Tab Take 25 mg by mouth every day.     • fluticasone (FLONASE) 50 MCG/ACT nasal spray Spray 1 Spray in nose 2 times a day. 1 Bottle 1   • diphenhydrAMINE (BENADRYL) 25 MG Tab Take 25 mg by mouth every 6 hours as needed for Sleep.       No current facility-administered medications for this visit.      ALLERGIES  Allergies: Patient has no known allergies.  PAST MEDICAL HISTORY  Past Medical History:   Diagnosis Date   • Hypertension    • Postoperative hypothyroidism      SURGICAL HISTORY  She  has no past surgical history on file.  SOCIAL HISTORY  Social History   Substance Use Topics   • Smoking status: Former Smoker     Packs/day: 0.25     Types: Cigarettes     Quit date: 2013   • Smokeless tobacco: Never Used   • Alcohol use 0.6 oz/week     1 Glasses of wine per week      Comment: wine nightly      Social History     Social History Narrative   • No narrative on file     FAMILY HISTORY  Family History   Problem Relation Age of Onset   • Heart Disease Mother    • Diabetes Mother    • Hypertension Mother    • Cancer Father         pancreatic cancer   • Stroke Father    • Hypertension Father    • Heart Disease Sister    • Hypertension Sister    • Hyperlipidemia Sister      Family Status   Relation Status   • Mo    • Fa    • Sis Alive   • MGMo    • MGFa    • PGMo    • PGFa        ROS   Constitutional: Negative for fever, chills.  HENT: Negative for congestion, sore throat.  Eyes: Negative for blurred vision.   Respiratory: Negative for cough, shortness of breath.  Cardiovascular: Negative for chest pain,  "palpitations. And per HPI.  Gastrointestinal: Negative for heartburn, abdominal pain.   Genitourinary: Negative for dysuria. And per HPI.  Musculoskeletal: As above.  Skin: Negative for rash and itching.   Neuro: Negative for dizziness, weakness and headaches.   Endo/Heme/Allergies: Does not bruise/bleed easily. And per HPI.  Psychiatric/Behavioral: Negative for depression.    PHYSICAL EXAM   Blood pressure 126/84, pulse (!) 52, temperature 36.6 °C (97.8 °F), temperature source Temporal, height 1.6 m (5' 3\"), weight 63 kg (138 lb 14.2 oz), SpO2 97 %, not currently breastfeeding. Body mass index is 24.6 kg/m².  General:  NAD, well appearing  HEENT:   NC/AT, PERRLA, EOMI, TMs are clear. Oropharyngeal mucosa is pink,  without lesions;  no cervical / supraclavicular  lymphadenopathy, no thyromegaly.    Cardiovascular: RRR.   No m/r/g. No carotid bruits .      Lungs:   CTAB, no w/r/r, no respiratory distress.  Abdomen: Soft, NT/ND + BS; no suprapubic tenderness; no masses or hepatosplenomegaly.  Extremities:  2+ DP and radial pulses bilaterally.  No c/c/e.   Skin:  Warm, dry.  No erythema. No rash.   Neurologic: Alert & oriented x 3. CN II-XII grossly intact.  No focal deficits.  Psychiatric:  Affect normal, mood normal, judgment normal.    LABS     Labs are reviewed and discussed with a patient  No results found for: CHOLSTRLTOT, LDL, HDL, TRIGLYCERIDE    Lab Results   Component Value Date/Time    SODIUM 139 07/06/2018 10:47 AM    POTASSIUM 4.1 07/06/2018 10:47 AM    CHLORIDE 103 07/06/2018 10:47 AM    CO2 27 07/06/2018 10:47 AM    GLUCOSE 94 07/06/2018 10:47 AM    BUN 27 (H) 09/24/2018 09:10 AM    CREATININE 0.79 09/24/2018 09:10 AM     Lab Results   Component Value Date/Time    FREET4 1.17 01/30/2019 09:07 AM    FREET4 1.03 10/25/2018 12:41 PM     IMAGING     None    ASSESMENT AND PLAN        1. Essential hypertension  Controlled, continue current treatment  - Comp Metabolic Panel; Future    2. Postoperative " hypothyroidism  Stable, controlled, continue current dose of levothyroxine and endocrinology follow-up    3. Incontinence in female  Referred to urology for possible urodynamics  - REFERRAL TO UROLOGY    4. Environmental allergies  Controlled, continue current treatment    5. Lumbosacral pain  Kenalog injections worked well in the past, which she requested today  - triamcinolone acetonide (KENALOG-40) injection 40 mg; 1 mL by Intramuscular route Once.    6. Localized osteoarthritis of hand, unspecified laterality  May use Aleve OTC as needed up to 3 days a week    7. Arthralgia, unspecified joint  Rule out inflammatory arthritis  - CBC WITH DIFFERENTIAL; Future  - WESTERGREN SED RATE; Future  - CRP QUANTITIVE (NON-CARDIAC); Future    8. Health care maintenance  Advised immunizations x2    Counseling:   - Smoking:  Nonsmoker    Followup: Return in about 2 months (around 4/20/2019), or if symptoms worsen or fail to improve.    All questions are answered.    Please note that this dictation was created using voice recognition software, and that there might be errors of cyrus and possibly content.

## 2019-02-20 NOTE — TELEPHONE ENCOUNTER
ESTABLISHED PATIENT PRE-VISIT PLANNING     Patient was contacted to complete PVP.     Note: Patient will not be contacted if there is no indication to call.     1.  Reviewed notes from the last few office visits within the medical group: Yes    2.  If any orders were placed at last visit or intended to be done for this visit (i.e. 6 mos follow-up), do we have Results/Consult Notes?        •  Labs - Labs were not ordered at last office visit.   Note: If patient appointment is for lab review and patient did not complete labs, check with provider if OK to reschedule patient until labs completed.       •  Imaging - Imaging was not ordered at last office visit.       •  Referrals - No referrals were ordered at last office visit.    3. Is this appointment scheduled as a Hospital Follow-Up? No    4.  Immunizations were updated in Animated Speech using WebIZ?: Yes       •  Web Iz Recommendations: TDAP and SHINGRIX (Shingles)    5.  Patient is due for the following Health Maintenance Topics:   Health Maintenance Due   Topic Date Due   • IMM DTaP/Tdap/Td Vaccine (1 - Tdap) 07/[birth year]/1971   • IMM ZOSTER VACCINES (1 of 2) 07/15/2002   • PAP SMEAR  11/10/2018       6. Orders for overdue Health Maintenance topics pended in Pre-Charting? NO    7.  AHA (MDX) form printed for Provider? YES    8.  Patient was informed to arrive 15 min prior to their scheduled appointment and bring in their medication bottles.

## 2019-02-25 RX ORDER — HYDROCHLOROTHIAZIDE 25 MG/1
25 TABLET ORAL DAILY
Qty: 90 TAB | Refills: 0 | Status: SHIPPED | OUTPATIENT
Start: 2019-02-25 | End: 2019-05-23 | Stop reason: SDUPTHER

## 2019-04-23 ENCOUNTER — APPOINTMENT (OUTPATIENT)
Dept: MEDICAL GROUP | Facility: MEDICAL CENTER | Age: 69
End: 2019-04-23
Payer: MEDICARE

## 2019-04-29 ENCOUNTER — HOSPITAL ENCOUNTER (OUTPATIENT)
Dept: LAB | Facility: MEDICAL CENTER | Age: 69
End: 2019-04-29
Attending: INTERNAL MEDICINE
Payer: MEDICARE

## 2019-04-29 PROCEDURE — 84439 ASSAY OF FREE THYROXINE: CPT

## 2019-04-29 PROCEDURE — 84443 ASSAY THYROID STIM HORMONE: CPT

## 2019-04-29 PROCEDURE — 36415 COLL VENOUS BLD VENIPUNCTURE: CPT

## 2019-04-30 LAB
T4 FREE SERPL-MCNC: 1.16 NG/DL (ref 0.53–1.43)
TSH SERPL DL<=0.005 MIU/L-ACNC: 0.81 UIU/ML (ref 0.38–5.33)

## 2019-05-06 ENCOUNTER — HOSPITAL ENCOUNTER (OUTPATIENT)
Dept: RADIOLOGY | Facility: MEDICAL CENTER | Age: 69
End: 2019-05-06
Attending: INTERNAL MEDICINE
Payer: MEDICARE

## 2019-05-06 DIAGNOSIS — Z12.31 VISIT FOR SCREENING MAMMOGRAM: ICD-10-CM

## 2019-05-06 PROCEDURE — 77063 BREAST TOMOSYNTHESIS BI: CPT

## 2019-05-23 RX ORDER — HYDROCHLOROTHIAZIDE 25 MG/1
TABLET ORAL
Qty: 90 TAB | Refills: 0 | Status: SHIPPED | OUTPATIENT
Start: 2019-05-23 | End: 2019-08-23 | Stop reason: SDUPTHER

## 2019-05-23 NOTE — TELEPHONE ENCOUNTER
Was the patient seen in the last year in this department? Yes 02/20/2019    Does patient have an active prescription for medications requested? Yes    Received Request Via: Pharmacy     HYDROCHLOROTHIAZIDE 25 MG TAB

## 2019-08-26 RX ORDER — HYDROCHLOROTHIAZIDE 25 MG/1
TABLET ORAL
Qty: 15 TAB | Refills: 0 | Status: SHIPPED | OUTPATIENT
Start: 2019-08-26 | End: 2019-10-01 | Stop reason: SDUPTHER

## 2019-09-16 ENCOUNTER — OFFICE VISIT (OUTPATIENT)
Dept: MEDICAL GROUP | Age: 69
End: 2019-09-16
Payer: MEDICARE

## 2019-09-16 VITALS
WEIGHT: 135.6 LBS | DIASTOLIC BLOOD PRESSURE: 60 MMHG | HEART RATE: 44 BPM | TEMPERATURE: 96.4 F | HEIGHT: 62 IN | SYSTOLIC BLOOD PRESSURE: 100 MMHG | BODY MASS INDEX: 24.95 KG/M2 | OXYGEN SATURATION: 96 %

## 2019-09-16 DIAGNOSIS — Z91.09 ENVIRONMENTAL ALLERGIES: ICD-10-CM

## 2019-09-16 DIAGNOSIS — Z23 NEED FOR VACCINATION: ICD-10-CM

## 2019-09-16 DIAGNOSIS — N32.81 OVERACTIVE BLADDER: ICD-10-CM

## 2019-09-16 DIAGNOSIS — L72.9 CYST OF SKIN: ICD-10-CM

## 2019-09-16 DIAGNOSIS — E89.0 POSTOPERATIVE HYPOTHYROIDISM: ICD-10-CM

## 2019-09-16 DIAGNOSIS — M19.071 ARTHRITIS OF BOTH FEET: ICD-10-CM

## 2019-09-16 DIAGNOSIS — I10 ESSENTIAL HYPERTENSION: ICD-10-CM

## 2019-09-16 DIAGNOSIS — M19.072 ARTHRITIS OF BOTH FEET: ICD-10-CM

## 2019-09-16 DIAGNOSIS — Z76.89 ESTABLISHING CARE WITH NEW DOCTOR, ENCOUNTER FOR: ICD-10-CM

## 2019-09-16 PROCEDURE — 90662 IIV NO PRSV INCREASED AG IM: CPT | Performed by: PHYSICIAN ASSISTANT

## 2019-09-16 PROCEDURE — 90715 TDAP VACCINE 7 YRS/> IM: CPT | Performed by: PHYSICIAN ASSISTANT

## 2019-09-16 PROCEDURE — 90472 IMMUNIZATION ADMIN EACH ADD: CPT | Performed by: PHYSICIAN ASSISTANT

## 2019-09-16 PROCEDURE — 99214 OFFICE O/P EST MOD 30 MIN: CPT | Mod: 25 | Performed by: PHYSICIAN ASSISTANT

## 2019-09-16 PROCEDURE — G0008 ADMIN INFLUENZA VIRUS VAC: HCPCS | Performed by: PHYSICIAN ASSISTANT

## 2019-09-16 RX ORDER — CEPHALEXIN 500 MG/1
CAPSULE ORAL
COMMUNITY
Start: 2019-07-10 | End: 2019-09-16

## 2019-09-16 RX ORDER — LEVOTHYROXINE SODIUM 0.12 MG/1
TABLET ORAL
COMMUNITY
Start: 2019-08-17 | End: 2020-01-07

## 2019-09-16 RX ORDER — OXYBUTYNIN CHLORIDE 10 MG/1
TABLET, EXTENDED RELEASE ORAL
COMMUNITY
Start: 2019-07-24 | End: 2020-11-03

## 2019-09-16 RX ORDER — MELOXICAM 7.5 MG/1
7.5 TABLET ORAL DAILY
Qty: 30 TAB | Refills: 2 | Status: SHIPPED | OUTPATIENT
Start: 2019-09-16 | End: 2019-10-16

## 2019-09-16 SDOH — HEALTH STABILITY: MENTAL HEALTH: HOW MANY STANDARD DRINKS CONTAINING ALCOHOL DO YOU HAVE ON A TYPICAL DAY?: 1 OR 2

## 2019-09-16 SDOH — HEALTH STABILITY: MENTAL HEALTH: HOW OFTEN DO YOU HAVE A DRINK CONTAINING ALCOHOL?: 4 OR MORE TIMES A WEEK

## 2019-09-16 NOTE — ASSESSMENT & PLAN NOTE
She is currently followed by endocrinology.  She is alternating between 125 MCG's and 112 MCG's every other day.

## 2019-09-16 NOTE — ASSESSMENT & PLAN NOTE
She is currently on 10 mg of oxybutynin.  She is followed by urology.  She states that the oxybutynin works fairly well for her, approximately 90% of the time, which she is pleased with.  Denies dysuria, hematuria.

## 2019-09-16 NOTE — PROGRESS NOTES
cc: Establish care, arthritis of feet, cyst.    Subjective:     DOMI Good is a 69 y.o. female presenting to [facility].  It is been about 6 months since she has been seen by primary care provider.  She is currently retired.  She walks about 6 miles a day.  She is up-to-date on her colonoscopy.    Arthritis of feet  -She has been having bilateral foot pain for the past few years.  She does have bunions.  She states that her right foot bothers her more than her left foot.  She states that it is in her right big toe, and then radiates along the plantar portion of her foot back to her heel.  She does have some left toe pain as well, but it is not as bad.  She has tried many different variations of shoes.  She does not take any over-the-counter anti-inflammatories.  She has been followed by podiatry, with recommendations of proper shoe wear.  She did have an cortisone injection once, which helped for a little bit.  She is looking for other possible treatment options.  She would like to be referred to orthopedics.  Denies joint redness, swelling, or warmth.    Cyst  -The past few years she has had a small cyst on the anterior portion of her right leg.  She states that it has gotten slightly larger in size and is starting to become painful.  She is wondering if this can be removed.  She is currently followed by dermatology for dermatitis of bilateral arms.  Denies surrounding erythema, edema, warmth, drainage.    Postoperative hypothyroidism  She is currently followed by endocrinology.  She is alternating between 125 MCG's and 112 MCG's every other day.    Essential hypertension  She is currently on 25 mg of hydrochlorthiazide.  She states that she intermittently monitors her blood pressure at home and it normally ranges in the mid 130s systolic.  States that her heart rate is usually low.  Denies dizziness, chest pain, palpitations, shortness of breath, lower extremity edema.    Environmental allergies  She has  "fairly moderate environmental allergies.  She was previously receiving allergy injections and was followed by an allergist.  However, she feels that as she is gotten older she is not nearly as sensitive.  She will take Benadryl if needed.    Overactive bladder  She is currently on 10 mg of oxybutynin.  She is followed by urology.  She states that the oxybutynin works fairly well for her, approximately 90% of the time, which she is pleased with.  Denies dysuria, hematuria.      Review of systems:  See above.       Current Outpatient Medications:   •  levothyroxine (SYNTHROID) 125 MCG Tab, , Disp: , Rfl:   •  oxybutynin SR (DITROPAN-XL) 10 MG CR tablet, , Disp: , Rfl:   •  Zoster Vac Recomb Adjuvanted (SHINGRIX) 50 MCG/0.5ML Recon Susp, 0.5 mL by Intramuscular route Once for 1 dose., Disp: 0.5 mL, Rfl: 0  •  meloxicam (MOBIC) 7.5 MG Tab, Take 1 Tab by mouth every day for 30 days., Disp: 30 Tab, Rfl: 2  •  hydroCHLOROthiazide (HYDRODIURIL) 25 MG Tab, TAKE 1 TABLET BY MOUTH EVERY DAY, Disp: 15 Tab, Rfl: 0  •  fluticasone (FLONASE) 50 MCG/ACT nasal spray, Spray 1 Spray in nose 2 times a day., Disp: 1 Bottle, Rfl: 1  •  levothyroxine (LEVOXYL) 112 MCG Tab, Take 112 mcg by mouth Every morning on an empty stomach., Disp: , Rfl:   •  diphenhydrAMINE (BENADRYL) 25 MG Tab, Take 25 mg by mouth every 6 hours as needed for Sleep., Disp: , Rfl:     Allergies, past medical history, past surgical history, family history, social history reviewed and updated    Objective:     Vitals: /60 (BP Location: Left arm, Patient Position: Sitting, BP Cuff Size: Adult)   Pulse (!) 44   Temp (!) 35.8 °C (96.4 °F) (Temporal)   Ht 1.577 m (5' 2.1\")   Wt 61.5 kg (135 lb 9.6 oz)   LMP  (LMP Unknown)   SpO2 96%   Breastfeeding? No   BMI 24.72 kg/m²   General: Alert, pleasant, NAD  HEENT: Normocephalic. Neck supple.  No thyromegaly or masses palpated. No cervical or supraclavicular lymphadenopathy. No carotid bruits   Heart: Regular rate " and rhythm.  S1 and S2 normal.  No murmurs appreciated.  Respiratory: Normal respiratory effort.  Clear to auscultation bilaterally.  Skin: Warm, dry, no rashes.  Extremities: No leg edema.  Small mobile cyst on the proximal portion of the tibia, just inferior to the knee.  No surrounding erythema or edema.  Nontender.  Radial and pedal pulses 2+ symmetric.  Foot: Bilateral bunions, nontender, no erythema.  No tenderness of the right great toe.  Mildly tender along the plantar fascia and at the insertion of the right heel.  Psych:  Affect/mood is normal, judgement is good, memory is intact, grooming is appropriate.    Assessment/Plan:     Ailin was seen today for establish care, arthritis and nodule.    Diagnoses and all orders for this visit:    Arthritis of both feet         -Discussed exam findings with patient.  Will obtain x-rays of bilateral feet.  Will place referral to orthopedics per request of patient, they may consider cortisone injections.  Did advised proper shoe wear.  Will start on trial of meloxicam that she can use intermittently.  Advised to not take any other NSAIDs while taking.   -     REFERRAL TO ORTHOPEDICS  -     DX-FOOT-2- RIGHT; Future  -     DX-FOOT-2- LEFT; Future  -     meloxicam (MOBIC) 7.5 MG Tab; Take 1 Tab by mouth every day for 30 days.    Cyst of skin  -She is already followed by dermatology.  Advised to follow-up with them for removal.  If they are unwilling to remove it we will consider referral to general surgery, as it is starting to become painful.    Essential hypertension  -Blood pressure is on the lower end of normal today.  She states at home her blood pressure is normally in the mid 130s systolic.  Advised to bring her BP monitor and to check for accuracy.  Will review blood pressure log at next office visit, if she remains low may considering tapering down hydrochlorothiazide.  -     Comp Metabolic Panel; Future  -     Lipid Profile; Future  -     CBC WITH DIFFERENTIAL;  Future    Environmental allergies  -She is doing well with Benadryl.  Continue as needed.  -     CBC WITH DIFFERENTIAL; Future    Postoperative hypothyroidism  -Currently followed by endocrine.  Follow-up as recommended    Overactive bladder  -Stable.  Continue with oxybutynin.  Follow-up with urology as recommended    Need for vaccination  -Immunization given in clinic today  -     INFLUENZA VACCINE, HIGH DOSE (65+ ONLY)  -     Tdap Vaccine =>6YO IM  -     Zoster Vac Recomb Adjuvanted (SHINGRIX) 50 MCG/0.5ML Recon Susp; 0.5 mL by Intramuscular route Once for 1 dose.    Establishing care with new doctor, encounter for  -We will request old records and review when received      Return in about 6 weeks (around 10/28/2019) for AWV/review  BP log.

## 2019-09-16 NOTE — ASSESSMENT & PLAN NOTE
She is currently on 25 mg of hydrochlorthiazide.  She states that she intermittently monitors her blood pressure at home and it normally ranges in the mid 130s systolic.  States that her heart rate is usually low.  Denies dizziness, chest pain, palpitations, shortness of breath, lower extremity edema.

## 2019-09-16 NOTE — ASSESSMENT & PLAN NOTE
She has fairly moderate environmental allergies.  She was previously receiving allergy injections and was followed by an allergist.  However, she feels that as she is gotten older she is not nearly as sensitive.  She will take Benadryl if needed.

## 2019-09-20 ENCOUNTER — HOSPITAL ENCOUNTER (OUTPATIENT)
Dept: RADIOLOGY | Facility: MEDICAL CENTER | Age: 69
End: 2019-09-20
Attending: PHYSICIAN ASSISTANT
Payer: MEDICARE

## 2019-09-20 DIAGNOSIS — M19.071 ARTHRITIS OF BOTH FEET: ICD-10-CM

## 2019-09-20 DIAGNOSIS — M19.072 ARTHRITIS OF BOTH FEET: ICD-10-CM

## 2019-09-20 PROCEDURE — 73620 X-RAY EXAM OF FOOT: CPT | Mod: RT

## 2019-10-04 ENCOUNTER — OFFICE VISIT (OUTPATIENT)
Dept: DERMATOLOGY | Facility: IMAGING CENTER | Age: 69
End: 2019-10-04
Payer: MEDICARE

## 2019-10-04 VITALS — HEIGHT: 63 IN | BODY MASS INDEX: 23.39 KG/M2 | WEIGHT: 132 LBS

## 2019-10-04 DIAGNOSIS — Z12.83 SKIN CANCER SCREENING: ICD-10-CM

## 2019-10-04 DIAGNOSIS — L82.1 SEBORRHEIC KERATOSES: ICD-10-CM

## 2019-10-04 DIAGNOSIS — L81.4 LENTIGINES: ICD-10-CM

## 2019-10-04 DIAGNOSIS — D17.20 LIPOMA OF EXTREMITY: ICD-10-CM

## 2019-10-04 DIAGNOSIS — R20.2 PARESTHESIA OF SKIN: ICD-10-CM

## 2019-10-04 PROCEDURE — 99203 OFFICE O/P NEW LOW 30 MIN: CPT | Performed by: DERMATOLOGY

## 2019-10-04 RX ORDER — MOMETASONE FUROATE 1 MG/G
OINTMENT TOPICAL DAILY
COMMUNITY
End: 2019-10-28

## 2019-10-04 NOTE — PROGRESS NOTES
DERMATOLOGY NOTE  NEW VISIT     10/04/19  Ailin Good  1950  MRN: [id number]     Chief complaint: Establish Care and Skin Lesion       Ailin Good is a 69 y.o. female who presents for evaluation of multiple skin lesions to check for skin cancer.  No previous skin exams    HPI/location:  Both forearms with bumps under the skin and itching  Time present: 3 years  Painful lesion: No  Itching lesion: Yes  Enlarging lesion: Yes  Anything make it better or worse? No, has tried mometasone oint without relief  Notes she noticed the bumps more after she had thyroid surgery and then lost a lot of weight.  Also reports hx of fall onto the neck and subsequent imaging that showed some compression of disks in the neck.    HPI/location: right lower leg with bump below the knee   Time present: several months  Painful lesion: No  Itching lesion: No  Enlarging lesion: No  Anything make it better or worse? non    History of skin cancer: No  History of precancers/actinic keratoses: No  History of biopsies:No  History of blistering/severe sunburns:No  Family history of skin cancer Yes, Sister, type unknown  Family history of atypical moles:Yes, Details:     Past Medical History:   Diagnosis Date   • Allergy    • Arthritis    • Hypertension    • Postoperative hypothyroidism         Family History   Problem Relation Age of Onset   • Heart Disease Mother    • Diabetes Mother    • Hypertension Mother    • Cancer Father         pancreatic cancer   • Stroke Father    • Hypertension Father    • Heart Disease Sister    • Hypertension Sister    • Hyperlipidemia Sister         Social History     Socioeconomic History   • Marital status: Single     Spouse name: Not on file   • Number of children: Not on file   • Years of education: Not on file   • Highest education level: Not on file   Occupational History   • Not on file   Social Needs   • Financial resource strain: Not on file   • Food insecurity:     Worry: Not on file     Inability: Not on  file   • Transportation needs:     Medical: Not on file     Non-medical: Not on file   Tobacco Use   • Smoking status: Former Smoker     Packs/day: 0.25     Types: Cigarettes     Last attempt to quit: 2013     Years since quittin.6   • Smokeless tobacco: Never Used   Substance and Sexual Activity   • Alcohol use: Yes     Alcohol/week: 0.6 oz     Types: 1 Glasses of wine per week     Frequency: 4 or more times a week     Drinks per session: 1 or 2     Comment: wine nightly    • Drug use: No   • Sexual activity: Not Currently     Partners: Male   Lifestyle   • Physical activity:     Days per week: Not on file     Minutes per session: Not on file   • Stress: Not on file   Relationships   • Social connections:     Talks on phone: Not on file     Gets together: Not on file     Attends Jain service: Not on file     Active member of club or organization: Not on file     Attends meetings of clubs or organizations: Not on file     Relationship status: Not on file   • Intimate partner violence:     Fear of current or ex partner: Not on file     Emotionally abused: Not on file     Physically abused: Not on file     Forced sexual activity: Not on file   Other Topics Concern   • Not on file   Social History Narrative   • Not on file        Allergies   Allergen Reactions   • Chocolate    • Dust Mite Extract    • Other Environmental      Sun exposure   • Pollen Extract    • Seasonal      Every tree        MEDICATIONS:  Medications relevant to specialty reviewed.    Current Outpatient Medications:   •  mometasone (ELOCON) 0.1 % Ointment, Apply  to affected area(s) every day., Disp: , Rfl:   •  hydroCHLOROthiazide (HYDRODIURIL) 25 MG Tab, Take 1 Tab by mouth every day for 30 days., Disp: 30 Tab, Rfl: 3  •  levothyroxine (SYNTHROID) 125 MCG Tab, , Disp: , Rfl:   •  oxybutynin SR (DITROPAN-XL) 10 MG CR tablet, , Disp: , Rfl:   •  levothyroxine (LEVOXYL) 112 MCG Tab, Take 112 mcg by mouth Every morning on an empty  "stomach., Disp: , Rfl:   •  diphenhydrAMINE (BENADRYL) 25 MG Tab, Take 25 mg by mouth every 6 hours as needed for Sleep., Disp: , Rfl:   •  meloxicam (MOBIC) 7.5 MG Tab, Take 1 Tab by mouth every day for 30 days., Disp: 30 Tab, Rfl: 2  •  fluticasone (FLONASE) 50 MCG/ACT nasal spray, Spray 1 Spray in nose 2 times a day. (Patient not taking: Reported on 10/4/2019), Disp: 1 Bottle, Rfl: 1     REVIEW OF SYSTEMS:   Positive for skin (see HPI)  Negative for fevers and chills  All other systems reviewed and are negative.     EXAM:    Ht 1.6 m (5' 3\")   Wt 59.9 kg (132 lb)   LMP  (LMP Unknown)   BMI 23.38 kg/m²   Constitutional: Well-developed, well-nourished, and in no distress.   HENT:   Head: normocephalic and atraumatic.  Right Ear: External ear normal.   Left Ear: External ear normal.   Nose: Nose normal.   Mouth/Throat: Oropharynx is clear and moist.   Eyes: Conjunctivae and lids are normal.   Neck: Normal range of motion. Neck supple.   Pulmonary/Chest: Effort normal.   Neurological: Alert and oriented to person, place, and time.     A total body skin exam was performed including the scalp, hair, face, eyelids, nose, lips, oral cavity, ears, neck, back, buttocks, chest, abdomen, bilateral upper and lower extremities including hands, feet, digits and nails.  The genital exam was deferred to her gynecologist.  Notable findings on exam today listed below. Remaining above-listed examined areas within normal limits / negative for rashes or lesions.  - right lower leg below knee with soft mobile subcutaneous nodule  - bilateral distal arms with sun damage, lentigines, no active inflammation or erythema  - bilateral distal arms with few small soft mobile subcutaneous nodules c/w lipomas   -sun exposed skin of trunk and b/l upper and lower extremities with scattered clinically benign light brown reticulated macules all of which were morphologically similar and none of which were suspicious for skin cancer today on " exam  -trunk and extremities with scattered brown-grey, waxy, hyperkeratotic papules and plaques     IMPRESSION / PLAN:    1. Lipoma of extremity  - right lower leg and b/l arms  -nature of the diagnosis was discussed in detail  -clinically benign today on exam, reassurance was given  -continue to monitor for any changes, pt to call if any changes occur    2. Paresthesia of skin (brachioradial pruritus)  - no active inflammation or rash of skin and hx of disk compression in neck  - educated patient about diagnosis and 2 theories that likely contribute to symptoms including solar damage to nerves and cervical spine nerve compression  - discussed management options and expectations of treatment  - start trial Sarna lotion prn itching  - if no improvement, trial of capsaicin cream, then can consider gabapentin or acupuncture    3. Lentigines  - Discussed benign nature of lesions, related to sun exposure  - Discussed sun protection    4. Seborrheic keratoses  -nature of the diagnosis was discussed in detail  -clinically benign today on exam, reassurance was given  -continue to monitor for any changes, pt to call if any changes occur    5. Skin cancer screening  Skin cancer education  - discussed importance of sun protective clothing, eyewear  - discussed importance of daily use of broad spectrum sunscreen with SPF 30 or greater, as well as need for reapplication ~every 2 hours when exposed to UVR  - discussed importance of regular self-exams, ideally once per month, every 12 months exams in clinic  - ABCDE's of melanoma discussed  - patient to bring any new or concerning lesions to my attention     Return to clinic in: Return in about 1 year (around 10/4/2020). and as needed for any new or changing skin lesions.    Haley Decker M.D.

## 2019-10-22 ENCOUNTER — HOSPITAL ENCOUNTER (OUTPATIENT)
Dept: LAB | Facility: MEDICAL CENTER | Age: 69
End: 2019-10-22
Attending: PHYSICIAN ASSISTANT
Payer: MEDICARE

## 2019-10-22 DIAGNOSIS — Z91.09 ENVIRONMENTAL ALLERGIES: ICD-10-CM

## 2019-10-22 DIAGNOSIS — I10 ESSENTIAL HYPERTENSION: ICD-10-CM

## 2019-10-22 LAB
ALBUMIN SERPL BCP-MCNC: 3.9 G/DL (ref 3.2–4.9)
ALBUMIN/GLOB SERPL: 1.3 G/DL
ALP SERPL-CCNC: 44 U/L (ref 30–99)
ALT SERPL-CCNC: 40 U/L (ref 2–50)
ANION GAP SERPL CALC-SCNC: 6 MMOL/L (ref 0–11.9)
AST SERPL-CCNC: 29 U/L (ref 12–45)
BASOPHILS # BLD AUTO: 0.6 % (ref 0–1.8)
BASOPHILS # BLD: 0.03 K/UL (ref 0–0.12)
BILIRUB SERPL-MCNC: 0.4 MG/DL (ref 0.1–1.5)
BUN SERPL-MCNC: 25 MG/DL (ref 8–22)
CALCIUM SERPL-MCNC: 8.5 MG/DL (ref 8.5–10.5)
CHLORIDE SERPL-SCNC: 107 MMOL/L (ref 96–112)
CHOLEST SERPL-MCNC: 204 MG/DL (ref 100–199)
CO2 SERPL-SCNC: 30 MMOL/L (ref 20–33)
CREAT SERPL-MCNC: 0.86 MG/DL (ref 0.5–1.4)
EOSINOPHIL # BLD AUTO: 0.12 K/UL (ref 0–0.51)
EOSINOPHIL NFR BLD: 2.4 % (ref 0–6.9)
ERYTHROCYTE [DISTWIDTH] IN BLOOD BY AUTOMATED COUNT: 49.4 FL (ref 35.9–50)
FASTING STATUS PATIENT QL REPORTED: NORMAL
GLOBULIN SER CALC-MCNC: 2.9 G/DL (ref 1.9–3.5)
GLUCOSE SERPL-MCNC: 77 MG/DL (ref 65–99)
HCT VFR BLD AUTO: 44.3 % (ref 37–47)
HDLC SERPL-MCNC: 86 MG/DL
HGB BLD-MCNC: 14.2 G/DL (ref 12–16)
IMM GRANULOCYTES # BLD AUTO: 0.01 K/UL (ref 0–0.11)
IMM GRANULOCYTES NFR BLD AUTO: 0.2 % (ref 0–0.9)
LDLC SERPL CALC-MCNC: 108 MG/DL
LYMPHOCYTES # BLD AUTO: 2.21 K/UL (ref 1–4.8)
LYMPHOCYTES NFR BLD: 43.5 % (ref 22–41)
MCH RBC QN AUTO: 30.6 PG (ref 27–33)
MCHC RBC AUTO-ENTMCNC: 32.1 G/DL (ref 33.6–35)
MCV RBC AUTO: 95.5 FL (ref 81.4–97.8)
MONOCYTES # BLD AUTO: 0.47 K/UL (ref 0–0.85)
MONOCYTES NFR BLD AUTO: 9.3 % (ref 0–13.4)
NEUTROPHILS # BLD AUTO: 2.24 K/UL (ref 2–7.15)
NEUTROPHILS NFR BLD: 44 % (ref 44–72)
NRBC # BLD AUTO: 0 K/UL
NRBC BLD-RTO: 0 /100 WBC
PLATELET # BLD AUTO: 578 K/UL (ref 164–446)
PMV BLD AUTO: 9.4 FL (ref 9–12.9)
POTASSIUM SERPL-SCNC: 4.4 MMOL/L (ref 3.6–5.5)
PROT SERPL-MCNC: 6.8 G/DL (ref 6–8.2)
RBC # BLD AUTO: 4.64 M/UL (ref 4.2–5.4)
SODIUM SERPL-SCNC: 143 MMOL/L (ref 135–145)
T4 FREE SERPL-MCNC: 1.01 NG/DL (ref 0.53–1.43)
TRIGL SERPL-MCNC: 49 MG/DL (ref 0–149)
TSH SERPL DL<=0.005 MIU/L-ACNC: 9.02 UIU/ML (ref 0.38–5.33)
WBC # BLD AUTO: 5.1 K/UL (ref 4.8–10.8)

## 2019-10-22 PROCEDURE — 80053 COMPREHEN METABOLIC PANEL: CPT

## 2019-10-22 PROCEDURE — 84439 ASSAY OF FREE THYROXINE: CPT

## 2019-10-22 PROCEDURE — 80061 LIPID PANEL: CPT

## 2019-10-22 PROCEDURE — 84443 ASSAY THYROID STIM HORMONE: CPT

## 2019-10-22 PROCEDURE — 85025 COMPLETE CBC W/AUTO DIFF WBC: CPT

## 2019-10-22 PROCEDURE — 36415 COLL VENOUS BLD VENIPUNCTURE: CPT

## 2019-10-28 ENCOUNTER — OFFICE VISIT (OUTPATIENT)
Dept: MEDICAL GROUP | Age: 69
End: 2019-10-28
Payer: MEDICARE

## 2019-10-28 VITALS
HEIGHT: 63 IN | HEART RATE: 46 BPM | BODY MASS INDEX: 24.38 KG/M2 | TEMPERATURE: 97 F | WEIGHT: 137.6 LBS | SYSTOLIC BLOOD PRESSURE: 148 MMHG | OXYGEN SATURATION: 93 % | DIASTOLIC BLOOD PRESSURE: 78 MMHG

## 2019-10-28 DIAGNOSIS — J01.00 ACUTE MAXILLARY SINUSITIS, RECURRENCE NOT SPECIFIED: ICD-10-CM

## 2019-10-28 DIAGNOSIS — R79.89 ELEVATED PLATELET COUNT: ICD-10-CM

## 2019-10-28 DIAGNOSIS — I10 ESSENTIAL HYPERTENSION: ICD-10-CM

## 2019-10-28 PROCEDURE — 99214 OFFICE O/P EST MOD 30 MIN: CPT | Performed by: PHYSICIAN ASSISTANT

## 2019-10-28 RX ORDER — AMOXICILLIN 875 MG/1
875 TABLET, COATED ORAL 2 TIMES DAILY
Qty: 14 TAB | Refills: 0 | Status: SHIPPED
Start: 2019-10-28 | End: 2019-11-04

## 2019-10-28 NOTE — PROGRESS NOTES
cc: Blood pressure review, lab review, sinus pressure    Subjective:     HPI    Essential hypertension  Ailin presents for follow-up blood pressure.  Last office visit her blood pressure was on the lower end of normal.  She has been monitoring her BP at home.  They are mostly ranging in the mid 130s systolic.  She does have her blood pressure cuff today.  In comparison to manual blood pressure reading her home blood pressure cuff  is about 20 points higher.  She denies dizziness, chest pain, palpitations, shortness of breath, lower extremity edema, or syncope.    Sinus pressure  For the past 1 to 2 weeks she has had continued sinus pressure with associated nasal congestion.  She has been using nasal sprays and saline rinses, which does help with the sinus pressure intermittently.  Denies ear pain, rhinorrhea, sore throat, cough, wheezing, fever, chills, or sweats.    Review of systems:  See above.       Current Outpatient Medications:   •  amoxicillin (AMOXIL) 875 MG tablet, Take 1 Tab by mouth 2 times a day for 7 days., Disp: 14 Tab, Rfl: 0  •  hydroCHLOROthiazide (HYDRODIURIL) 25 MG Tab, Take 1 Tab by mouth every day for 30 days., Disp: 30 Tab, Rfl: 3  •  levothyroxine (SYNTHROID) 125 MCG Tab, , Disp: , Rfl:   •  oxybutynin SR (DITROPAN-XL) 10 MG CR tablet, , Disp: , Rfl:   •  levothyroxine (LEVOXYL) 112 MCG Tab, Take 112 mcg by mouth Every morning on an empty stomach., Disp: , Rfl:   •  diphenhydrAMINE (BENADRYL) 25 MG Tab, Take 25 mg by mouth every 6 hours as needed for Sleep., Disp: , Rfl:     Allergies, past medical history, past surgical history, family history, social history reviewed and updated  Results for AILIN AUGUSTE (MRN [id number]) as of 10/28/2019 09:16   Ref. Range 10/22/2019 07:21   WBC Latest Ref Range: 4.8 - 10.8 K/uL 5.1   RBC Latest Ref Range: 4.20 - 5.40 M/uL 4.64   Hemoglobin Latest Ref Range: 12.0 - 16.0 g/dL 14.2   Hematocrit Latest Ref Range: 37.0 - 47.0 % 44.3   MCV Latest Ref Range: 81.4 -  97.8 fL 95.5   MCH Latest Ref Range: 27.0 - 33.0 pg 30.6   MCHC Latest Ref Range: 33.6 - 35.0 g/dL 32.1 (L)   RDW Latest Ref Range: 35.9 - 50.0 fL 49.4   Platelet Count Latest Ref Range: 164 - 446 K/uL 578 (H)   MPV Latest Ref Range: 9.0 - 12.9 fL 9.4   Neutrophils-Polys Latest Ref Range: 44.00 - 72.00 % 44.00   Neutrophils (Absolute) Latest Ref Range: 2.00 - 7.15 K/uL 2.24   Lymphocytes Latest Ref Range: 22.00 - 41.00 % 43.50 (H)   Lymphs (Absolute) Latest Ref Range: 1.00 - 4.80 K/uL 2.21   Monocytes Latest Ref Range: 0.00 - 13.40 % 9.30   Monos (Absolute) Latest Ref Range: 0.00 - 0.85 K/uL 0.47   Eosinophils Latest Ref Range: 0.00 - 6.90 % 2.40   Eos (Absolute) Latest Ref Range: 0.00 - 0.51 K/uL 0.12   Basophils Latest Ref Range: 0.00 - 1.80 % 0.60   Baso (Absolute) Latest Ref Range: 0.00 - 0.12 K/uL 0.03   Immature Granulocytes Latest Ref Range: 0.00 - 0.90 % 0.20   Immature Granulocytes (abs) Latest Ref Range: 0.00 - 0.11 K/uL 0.01   Nucleated RBC Latest Units: /100 WBC 0.00   NRBC (Absolute) Latest Units: K/uL 0.00   Sodium Latest Ref Range: 135 - 145 mmol/L 143   Potassium Latest Ref Range: 3.6 - 5.5 mmol/L 4.4   Chloride Latest Ref Range: 96 - 112 mmol/L 107   Co2 Latest Ref Range: 20 - 33 mmol/L 30   Anion Gap Latest Ref Range: 0.0 - 11.9  6.0   Glucose Latest Ref Range: 65 - 99 mg/dL 77   Bun Latest Ref Range: 8 - 22 mg/dL 25 (H)   Creatinine Latest Ref Range: 0.50 - 1.40 mg/dL 0.86   GFR If  Latest Ref Range: >60 mL/min/1.73 m 2 >60   GFR If Non  Latest Ref Range: >60 mL/min/1.73 m 2 >60   Calcium Latest Ref Range: 8.5 - 10.5 mg/dL 8.5   AST(SGOT) Latest Ref Range: 12 - 45 U/L 29   ALT(SGPT) Latest Ref Range: 2 - 50 U/L 40   Alkaline Phosphatase Latest Ref Range: 30 - 99 U/L 44   Total Bilirubin Latest Ref Range: 0.1 - 1.5 mg/dL 0.4   Albumin Latest Ref Range: 3.2 - 4.9 g/dL 3.9   Total Protein Latest Ref Range: 6.0 - 8.2 g/dL 6.8   Globulin Latest Ref Range: 1.9 - 3.5 g/dL  "2.9   A-G Ratio Latest Units: g/dL 1.3   Fasting Status Unknown Fasting   Cholesterol,Tot Latest Ref Range: 100 - 199 mg/dL 204 (H)   Triglycerides Latest Ref Range: 0 - 149 mg/dL 49   HDL Latest Ref Range: >=40 mg/dL 86   LDL Latest Ref Range: <100 mg/dL 108 (H)     Objective:     Vitals: /78 (BP Location: Right arm, Patient Position: Sitting, BP Cuff Size: Adult)   Pulse (!) 46   Temp 36.1 °C (97 °F) (Temporal)   Ht 1.588 m (5' 2.5\")   Wt 62.4 kg (137 lb 9.6 oz)   LMP  (LMP Unknown)   SpO2 93%   Breastfeeding? No   BMI 24.77 kg/m²   General: Alert, pleasant, NAD  HEENT: Normocephalic. Neck supple.  TMs: Gray, air-fluid level b/l.  Mildly erythematous posterior pharynx.  No tonsillar enlargement or exudate.  Uvula midline.  Mild maxillary sinus  tenderness.  No thyromegaly or masses palpated. No cervical or supraclavicular lymphadenopathy. No carotid bruits   Heart: Regular rate and rhythm.  S1 and S2 normal.  No murmurs appreciated.  Respiratory: Normal respiratory effort.  Clear to auscultation bilaterally.  Skin: Warm, dry, no rashes.  Extremities: No leg edema.  Radial pulses 2+ symmetric  Psych:  Affect/mood is normal, judgement is good, memory is intact, grooming is appropriate.    Assessment/Plan:     Ailin was seen today for follow-up, hypertension and nasal congestion.    Diagnoses and all orders for this visit:    Essential hypertension  -Blood pressure reading are mildly elevated in clinic today.  Her blood pressure cuff is not accurate.  Advised to get a new BP cuff.  Continue to monitor blood pressures at home.  We will continue her on 25 mg of hydrochlorothiazide.  We will follow-up at next office visit to review blood pressure log.  If her BP is consistently greater than 140 systolic then follow-up sooner.    Acute maxillary sinusitis, recurrence not specified  -Discussed exam findings with patient.  She does have some improvement with nasal saline sprays and nasal rinses.  Would like " her to continue to do those in addition to Claritin for the next 3 to 5 days.  Advised if she has worsening or no improvement then she can start the antibiotics.  Follow-up if no improvement.  Prescription printed and given in clinic today  -     amoxicillin (AMOXIL) 875 MG tablet; Take 1 Tab by mouth 2 times a day for 7 days.    Elevated platelet count  -Review of CBC her platelet count is mildly elevated.  She has no history of clotting disorders.  We will monitor and repeat CBC in 6 weeks.  Further treatment as needed pending result  -     CBC WITH DIFFERENTIAL; Future        Return in about 2 months (around 12/28/2019) for AWV.

## 2019-10-28 NOTE — ASSESSMENT & PLAN NOTE
Ailin presents for follow-up blood pressure.  Last office visit her blood pressure was on the lower end of normal.  She has been monitoring her BP at home.  They are mostly ranging in the mid 130s systolic.  She does have her blood pressure cuff today.  In comparison to manual blood pressure reading her home blood pressure cuff  is about 20 points higher.  She denies dizziness, chest pain, palpitations, shortness of breath, lower extremity edema, or syncope.

## 2019-12-13 NOTE — PROGRESS NOTES
1. Caller Name: Ailin Good                                           Call Back Number: [phone] (home)         Patient approves a detailed voicemail message: N\A    left message regarding AWV, asked pt to please shanae back at [phone]

## 2019-12-24 ENCOUNTER — HOSPITAL ENCOUNTER (OUTPATIENT)
Dept: LAB | Facility: MEDICAL CENTER | Age: 69
End: 2019-12-24
Attending: PHYSICIAN ASSISTANT
Payer: MEDICARE

## 2019-12-24 LAB
T4 FREE SERPL-MCNC: 1.58 NG/DL (ref 0.53–1.43)
TSH SERPL DL<=0.005 MIU/L-ACNC: 0.43 UIU/ML (ref 0.38–5.33)

## 2019-12-24 PROCEDURE — 84439 ASSAY OF FREE THYROXINE: CPT

## 2019-12-24 PROCEDURE — 36415 COLL VENOUS BLD VENIPUNCTURE: CPT

## 2019-12-24 PROCEDURE — 84443 ASSAY THYROID STIM HORMONE: CPT

## 2019-12-30 ENCOUNTER — HOSPITAL ENCOUNTER (OUTPATIENT)
Dept: LAB | Facility: MEDICAL CENTER | Age: 69
End: 2019-12-30
Attending: PHYSICIAN ASSISTANT
Payer: MEDICARE

## 2019-12-30 DIAGNOSIS — R79.89 ELEVATED PLATELET COUNT: ICD-10-CM

## 2019-12-30 LAB
BASOPHILS # BLD AUTO: 1 % (ref 0–1.8)
BASOPHILS # BLD: 0.05 K/UL (ref 0–0.12)
EOSINOPHIL # BLD AUTO: 0.09 K/UL (ref 0–0.51)
EOSINOPHIL NFR BLD: 1.8 % (ref 0–6.9)
ERYTHROCYTE [DISTWIDTH] IN BLOOD BY AUTOMATED COUNT: 48.9 FL (ref 35.9–50)
HCT VFR BLD AUTO: 42.7 % (ref 37–47)
HGB BLD-MCNC: 13.7 G/DL (ref 12–16)
IMM GRANULOCYTES # BLD AUTO: 0.01 K/UL (ref 0–0.11)
IMM GRANULOCYTES NFR BLD AUTO: 0.2 % (ref 0–0.9)
LYMPHOCYTES # BLD AUTO: 1.66 K/UL (ref 1–4.8)
LYMPHOCYTES NFR BLD: 33 % (ref 22–41)
MCH RBC QN AUTO: 30.7 PG (ref 27–33)
MCHC RBC AUTO-ENTMCNC: 32.1 G/DL (ref 33.6–35)
MCV RBC AUTO: 95.7 FL (ref 81.4–97.8)
MONOCYTES # BLD AUTO: 0.6 K/UL (ref 0–0.85)
MONOCYTES NFR BLD AUTO: 11.9 % (ref 0–13.4)
NEUTROPHILS # BLD AUTO: 2.62 K/UL (ref 2–7.15)
NEUTROPHILS NFR BLD: 52.1 % (ref 44–72)
NRBC # BLD AUTO: 0 K/UL
NRBC BLD-RTO: 0 /100 WBC
PLATELET # BLD AUTO: 548 K/UL (ref 164–446)
PMV BLD AUTO: 9.7 FL (ref 9–12.9)
RBC # BLD AUTO: 4.46 M/UL (ref 4.2–5.4)
WBC # BLD AUTO: 5 K/UL (ref 4.8–10.8)

## 2019-12-30 PROCEDURE — 36415 COLL VENOUS BLD VENIPUNCTURE: CPT

## 2019-12-30 PROCEDURE — 85025 COMPLETE CBC W/AUTO DIFF WBC: CPT

## 2020-01-07 ENCOUNTER — OFFICE VISIT (OUTPATIENT)
Dept: MEDICAL GROUP | Age: 70
End: 2020-01-07
Payer: MEDICARE

## 2020-01-07 VITALS
SYSTOLIC BLOOD PRESSURE: 128 MMHG | TEMPERATURE: 97.2 F | HEART RATE: 55 BPM | OXYGEN SATURATION: 96 % | DIASTOLIC BLOOD PRESSURE: 70 MMHG | BODY MASS INDEX: 25.23 KG/M2 | WEIGHT: 142.4 LBS | HEIGHT: 63 IN

## 2020-01-07 DIAGNOSIS — D75.839 THROMBOCYTOSIS: ICD-10-CM

## 2020-01-07 DIAGNOSIS — Z11.59 NEED FOR HEPATITIS C SCREENING TEST: ICD-10-CM

## 2020-01-07 DIAGNOSIS — I10 ESSENTIAL HYPERTENSION: ICD-10-CM

## 2020-01-07 PROBLEM — M19.90 ARTHRITIS: Status: ACTIVE | Noted: 2020-01-07

## 2020-01-07 PROCEDURE — 99213 OFFICE O/P EST LOW 20 MIN: CPT | Performed by: PHYSICIAN ASSISTANT

## 2020-01-07 PROCEDURE — 8041 PR SCP AHA: Performed by: PHYSICIAN ASSISTANT

## 2020-01-07 RX ORDER — OXYBUTYNIN CHLORIDE 10 MG/1
TABLET, EXTENDED RELEASE ORAL
COMMUNITY
End: 2020-01-07

## 2020-01-07 RX ORDER — MOMETASONE FUROATE 1 MG/G
OINTMENT TOPICAL
COMMUNITY
End: 2020-01-07

## 2020-01-07 RX ORDER — HYDROCHLOROTHIAZIDE 25 MG/1
TABLET ORAL
COMMUNITY
End: 2020-01-07

## 2020-01-07 RX ORDER — LEVOTHYROXINE SODIUM 112 UG/1
TABLET ORAL
COMMUNITY
End: 2020-01-07

## 2020-01-07 RX ORDER — HYDROCHLOROTHIAZIDE 25 MG/1
25 TABLET ORAL DAILY
Qty: 90 TAB | Refills: 3 | Status: SHIPPED | OUTPATIENT
Start: 2020-01-07 | End: 2020-11-03 | Stop reason: SDUPTHER

## 2020-01-07 RX ORDER — ONDANSETRON 4 MG/1
TABLET, ORALLY DISINTEGRATING ORAL
COMMUNITY
End: 2020-01-07

## 2020-01-07 ASSESSMENT — PATIENT HEALTH QUESTIONNAIRE - PHQ9: CLINICAL INTERPRETATION OF PHQ2 SCORE: 0

## 2020-01-07 NOTE — PROGRESS NOTES
cc: Follow-up hypertension and lab review    Subjective:     HPI    Essential hypertension  Ailin presents for follow-up blood pressure.  She has not been monitoring her blood pressures at home.  Blood pressure is well controlled today.  Denies dizziness, chest pain, palpitations, shortness of breath, lower extremity edema    -She also had labs done and would like to review this today.  We have been trending her CBC as her platelet counts have been elevated.  In further discussions he states that her mom had multiple blood clots in her lower extremities.  She is unsure if she was diagnosed with a clotting disorder.  Denies lower extremity edema, calf pain, claudication symptoms, pruritus, easy bleeding or bruising.  Results for AILIN AUGUSTE (MRN [id number]) as of 1/7/2020 11:26   Ref. Range 10/22/2019 07:21  12/30/2019 10:08   WBC Latest Ref Range: 4.8 - 10.8 K/uL 5.1  5.0   RBC Latest Ref Range: 4.20 - 5.40 M/uL 4.64  4.46   Hemoglobin Latest Ref Range: 12.0 - 16.0 g/dL 14.2  13.7   Hematocrit Latest Ref Range: 37.0 - 47.0 % 44.3  42.7   MCV Latest Ref Range: 81.4 - 97.8 fL 95.5  95.7   MCH Latest Ref Range: 27.0 - 33.0 pg 30.6  30.7   MCHC Latest Ref Range: 33.6 - 35.0 g/dL 32.1 (L)  32.1 (L)   RDW Latest Ref Range: 35.9 - 50.0 fL 49.4  48.9   Platelet Count Latest Ref Range: 164 - 446 K/uL 578 (H)  548 (H)   MPV Latest Ref Range: 9.0 - 12.9 fL 9.4  9.7   Neutrophils-Polys Latest Ref Range: 44.00 - 72.00 % 44.00  52.10   Neutrophils (Absolute) Latest Ref Range: 2.00 - 7.15 K/uL 2.24  2.62   Lymphocytes Latest Ref Range: 22.00 - 41.00 % 43.50 (H)  33.00   Lymphs (Absolute) Latest Ref Range: 1.00 - 4.80 K/uL 2.21  1.66   Monocytes Latest Ref Range: 0.00 - 13.40 % 9.30  11.90   Monos (Absolute) Latest Ref Range: 0.00 - 0.85 K/uL 0.47  0.60   Eosinophils Latest Ref Range: 0.00 - 6.90 % 2.40  1.80   Eos (Absolute) Latest Ref Range: 0.00 - 0.51 K/uL 0.12  0.09   Basophils Latest Ref Range: 0.00 - 1.80 % 0.60  1.00   Taiwo  "(Absolute) Latest Ref Range: 0.00 - 0.12 K/uL 0.03  0.05   Immature Granulocytes Latest Ref Range: 0.00 - 0.90 % 0.20  0.20   Immature Granulocytes (abs) Latest Ref Range: 0.00 - 0.11 K/uL 0.01  0.01   Nucleated RBC Latest Units: /100 WBC 0.00  0.00   NRBC (Absolute) Latest Units: K/uL 0.00  0.00     Review of systems:  See above.       Current Outpatient Medications:   •  hydroCHLOROthiazide (HYDRODIURIL) 25 MG Tab, Take 1 Tab by mouth every day for 90 days., Disp: 90 Tab, Rfl: 3  •  oxybutynin SR (DITROPAN-XL) 10 MG CR tablet, , Disp: , Rfl:   •  levothyroxine (LEVOXYL) 112 MCG Tab, Take 112 mcg by mouth Every morning on an empty stomach., Disp: , Rfl:   •  diphenhydrAMINE (BENADRYL) 25 MG Tab, Take 25 mg by mouth every 6 hours as needed for Sleep., Disp: , Rfl:     Allergies, past medical history, past surgical history, family history, social history reviewed and updated    Objective:     Vitals: /70 (BP Location: Left arm, Patient Position: Sitting, BP Cuff Size: Adult)   Pulse (!) 55   Temp 36.2 °C (97.2 °F) (Temporal)   Ht 1.588 m (5' 2.5\")   Wt 64.6 kg (142 lb 6.4 oz)   LMP  (LMP Unknown)   SpO2 96%   Breastfeeding? No   BMI 25.63 kg/m²   General: Alert, pleasant, NAD  HEENT: Normocephalic. Neck supple.  No thyromegaly or masses palpated. No cervical or supraclavicular lymphadenopathy. No carotid bruits   Heart: Regular rate and rhythm.  S1 and S2 normal.  No murmurs appreciated.  Respiratory: Normal respiratory effort.  Clear to auscultation bilaterally.  Skin: Warm, dry, no rashes.  Extremities: No leg edema.  Radial pulses 2+ symmetric  Psych:  Affect/mood is normal, judgement is good, memory is intact, grooming is appropriate.    Assessment/Plan:     Ailin was seen today for medication refill and other.    Diagnoses and all orders for this visit:    Essential hypertension  -She has not been monitoring her blood pressures at home.  Blood pressures have been well controlled in the clinic.  We " will continue her on 25 mg of hydrochlorothiazide.  Advised intermittent BP checks.  -     hydroCHLOROthiazide (HYDRODIURIL) 25 MG Tab; Take 1 Tab by mouth every day for 90 days.    Thrombocytosis (HCC)  -Her platelet count has slightly improved, but is still elevated.  With her mom's history of blood clots will place referral to hematology for further evaluation.  -     FERRITIN; Future  -     IRON/TOTAL IRON BIND; Future  -     Prothrombin Time; Future  -     CBC WITH DIFFERENTIAL; Future  -     REFERRAL TO HEMATOLOGY ONCOLOGY Referral to? [facility]    Need for hepatitis C screening test  -     HCV Scrn ( [id number] [employer]); Future    -AHA form filled out and completed during office visit    Return in about 3 months (around 2020) for AWV.

## 2020-01-07 NOTE — ASSESSMENT & PLAN NOTE
Ailin presents for follow-up blood pressure.  She has not been monitoring her blood pressures at home.  Blood pressure is well controlled today.  Denies dizziness, chest pain, palpitations, shortness of breath, lower extremity edema

## 2020-01-10 ENCOUNTER — HOSPITAL ENCOUNTER (OUTPATIENT)
Dept: LAB | Facility: MEDICAL CENTER | Age: 70
End: 2020-01-10
Attending: PHYSICIAN ASSISTANT
Payer: MEDICARE

## 2020-01-10 DIAGNOSIS — D75.839 THROMBOCYTOSIS: ICD-10-CM

## 2020-01-10 DIAGNOSIS — Z11.59 NEED FOR HEPATITIS C SCREENING TEST: ICD-10-CM

## 2020-01-10 LAB
FERRITIN SERPL-MCNC: 38 NG/ML (ref 10–291)
INR PPP: 1 (ref 0.87–1.13)
IRON SATN MFR SERPL: 31 % (ref 15–55)
IRON SERPL-MCNC: 105 UG/DL (ref 40–170)
PROTHROMBIN TIME: 13.4 SEC (ref 12–14.6)
TIBC SERPL-MCNC: 344 UG/DL (ref 250–450)

## 2020-01-10 PROCEDURE — 85610 PROTHROMBIN TIME: CPT

## 2020-01-10 PROCEDURE — 83550 IRON BINDING TEST: CPT

## 2020-01-10 PROCEDURE — 83540 ASSAY OF IRON: CPT

## 2020-01-10 PROCEDURE — 36415 COLL VENOUS BLD VENIPUNCTURE: CPT

## 2020-01-10 PROCEDURE — 82728 ASSAY OF FERRITIN: CPT

## 2020-01-10 PROCEDURE — G0472 HEP C SCREEN HIGH RISK/OTHER: HCPCS

## 2020-01-11 LAB — HCV AB S/CO SERPL IA: NEGATIVE

## 2020-01-13 ENCOUNTER — OFFICE VISIT (OUTPATIENT)
Dept: HEMATOLOGY ONCOLOGY | Facility: MEDICAL CENTER | Age: 70
End: 2020-01-13
Payer: MEDICARE

## 2020-01-13 ENCOUNTER — HOSPITAL ENCOUNTER (OUTPATIENT)
Dept: LAB | Facility: MEDICAL CENTER | Age: 70
End: 2020-01-13
Attending: INTERNAL MEDICINE
Payer: MEDICARE

## 2020-01-13 ENCOUNTER — PATIENT OUTREACH (OUTPATIENT)
Dept: HEALTH INFORMATION MANAGEMENT | Facility: OTHER | Age: 70
End: 2020-01-13

## 2020-01-13 VITALS
WEIGHT: 140.76 LBS | HEART RATE: 66 BPM | OXYGEN SATURATION: 95 % | HEIGHT: 64 IN | DIASTOLIC BLOOD PRESSURE: 72 MMHG | TEMPERATURE: 98 F | SYSTOLIC BLOOD PRESSURE: 112 MMHG | RESPIRATION RATE: 16 BRPM | BODY MASS INDEX: 24.03 KG/M2

## 2020-01-13 DIAGNOSIS — D75.839 THROMBOCYTOSIS: ICD-10-CM

## 2020-01-13 LAB — ERYTHROCYTE [SEDIMENTATION RATE] IN BLOOD BY WESTERGREN METHOD: 1 MM/HOUR (ref 0–30)

## 2020-01-13 PROCEDURE — 81402 MOPATH PROCEDURE LEVEL 3: CPT

## 2020-01-13 PROCEDURE — 81270 JAK2 GENE: CPT

## 2020-01-13 PROCEDURE — 81206 BCR/ABL1 GENE MAJOR BP: CPT

## 2020-01-13 PROCEDURE — 99204 OFFICE O/P NEW MOD 45 MIN: CPT | Performed by: INTERNAL MEDICINE

## 2020-01-13 PROCEDURE — 85652 RBC SED RATE AUTOMATED: CPT

## 2020-01-13 PROCEDURE — 36415 COLL VENOUS BLD VENIPUNCTURE: CPT

## 2020-01-13 PROCEDURE — 81219 CALR GENE COM VARIANTS: CPT

## 2020-01-13 ASSESSMENT — PAIN SCALES - GENERAL: PAINLEVEL: NO PAIN

## 2020-01-13 NOTE — PROGRESS NOTES
Consult Note    Date of consultation: 1/13/2020 9:57 AM    Referring provider: Jemma Martin P.A.-C.    Reason for consultation: Thrombocytosis.    History of presenting illness:     Dear  Jemma Martin P.A.-C.,    Thank you very much for allowing me to see Ailin Good today. As you know she  is a 69 year old woman who was noted to have persistent thrombocytosis. She has a history of essential hypertension and hypothyroidism. She currently denies constitutional symptoms and denies infections or inflammatory conditions. She denies joint aches, diarrhea or abdominal pain, rash or fevers. She had a colonoscopy over five years ago and is due for a five year repeat testing. She denies melena or bleeding. She denies blood donation. She states she does not eat meat and her diet is mostly vegetarian. She was recently evaluated for iron deficiency and was not found to have significant evidence of this.      Past Medical History:    Past Medical History:   Diagnosis Date   • Allergy    • Arthritis    • Hypertension    • Postoperative hypothyroidism        Allergies:    Chocolate; Dust mite extract; Other environmental; Pollen extract; and Seasonal    Medications:    Current Outpatient Medications   Medication Sig Dispense Refill   • hydroCHLOROthiazide (HYDRODIURIL) 25 MG Tab Take 1 Tab by mouth every day for 90 days. 90 Tab 3   • oxybutynin SR (DITROPAN-XL) 10 MG CR tablet      • levothyroxine (LEVOXYL) 112 MCG Tab Take 112 mcg by mouth Every morning on an empty stomach.     • diphenhydrAMINE (BENADRYL) 25 MG Tab Take 25 mg by mouth every 6 hours as needed for Sleep.       No current facility-administered medications for this visit.        Social History:     Social History     Socioeconomic History   • Marital status: Single     Spouse name: Not on file   • Number of children: Not on file   • Years of education: Not on file   • Highest education level: Not on file   Occupational History   • Not on file   Social Needs    • Financial resource strain: Not on file   • Food insecurity:     Worry: Not on file     Inability: Not on file   • Transportation needs:     Medical: Not on file     Non-medical: Not on file   Tobacco Use   • Smoking status: Former Smoker     Packs/day: 0.25     Types: Cigarettes     Last attempt to quit: 2013     Years since quittin.9   • Smokeless tobacco: Never Used   Substance and Sexual Activity   • Alcohol use: Yes     Alcohol/week: 0.6 oz     Types: 1 Glasses of wine per week     Frequency: 4 or more times a week     Drinks per session: 1 or 2     Comment: wine nightly    • Drug use: No   • Sexual activity: Not Currently     Partners: Male   Lifestyle   • Physical activity:     Days per week: Not on file     Minutes per session: Not on file   • Stress: Not on file   Relationships   • Social connections:     Talks on phone: Not on file     Gets together: Not on file     Attends [employer] service: Not on file     Active member of club or organization: Not on file     Attends meetings of clubs or organizations: Not on file     Relationship status: Not on file   • Intimate partner violence:     Fear of current or ex partner: Not on file     Emotionally abused: Not on file     Physically abused: Not on file     Forced sexual activity: Not on file   Other Topics Concern   • Not on file   Social History Narrative   • Not on file       Family History:     Family History   Problem Relation Age of Onset   • Heart Disease Mother    • Diabetes Mother    • Hypertension Mother    • Cancer Father         pancreatic cancer   • Stroke Father    • Hypertension Father    • Heart Disease Sister    • Hypertension Sister    • Hyperlipidemia Sister        Review of Systems:  Constitutional: No fever, chills, weight loss ,malaise/fatigue.    HEENT: No new auditory or visual complaints. No sore throat and neck pain.     Respiratory:No new cough, sputum production, shortness of breath and wheezing.    Cardiovascular: No  "new chest pain, palpitations, orthopnea and leg swelling.    Gastrointestinal: No heartburn, nausea, vomiting ,abdominal pain, hematochezia or melena     Genitourinary: Negative for dysuria, hematuria    Musculoskeletal: No new arthralgias or myalgias   Skin: Negative for rash and itching.    Neurological: Negative for focal weakness or headaches.    Endo/Heme/Allergies: No abnormal bleed/bruise.    Psychiatric/Behavioral: No new depression/anxiety.    Physical Exam:  Vitals:   /72 (BP Location: Right arm, Patient Position: Sitting, BP Cuff Size: Adult)   Pulse 66   Temp 36.7 °C (98 °F) (Temporal)   Resp 16   Ht 1.62 m (5' 3.78\")   Wt 63.9 kg (140 lb 12.2 oz)   LMP  (LMP Unknown)   SpO2 95%   BMI 24.33 kg/m²   General: No acute distress.  Eyes: Normal conjuctiva and lids. No icterus.  HEENT: Oropharynx clear.   Neck: Supple with no palpable masses.  Lymph nodes: No palpable cervical, supraclavicular or axillary lymphadenopathy.    CVS: regular rate and rhythm, no rubs or gallops.   RESP: Clear to auscultation bilaterally with normal respiratory effort.   ABD: Soft, non tender, non distended, normal bowel sounds, no palpable organomegaly  EXT: No edema or cyanosis.  CNS: Alert and oriented x3, No focal deficits.  Skin: No rash on visible skin.    Labs:   Recent Labs     01/10/20  1115   PROTHROMBTM 13.4   INR 1.00   IRON 105   FERRITIN 38.0   TOTIRONBC 344     Lab Results   Component Value Date/Time    SODIUM 143 10/22/2019 07:21 AM    POTASSIUM 4.4 10/22/2019 07:21 AM    CHLORIDE 107 10/22/2019 07:21 AM    CO2 30 10/22/2019 07:21 AM    GLUCOSE 77 10/22/2019 07:21 AM    BUN 25 (H) 10/22/2019 07:21 AM    CREATININE 0.86 10/22/2019 07:21 AM        Assessment and Plan:  Ailin Good is a 69 year old woman with mild persistent thrombocytosis with platelets counts of 548,000 and 578,000 on the last two checks over the past two months, in the absence of erythrocytosis or leukocytosis. I discussed potential " causes of thrombocytosis with the patient today including inflammatory or infectious causes and iron deficiency anemia. Patient has no evidence of infection or inflammation. Her iron studies show potential mild degree of iron deficiency. I advised her to start oral iron supplement which over time may improve her iron indices and possibly improve her thrombocytosis. Goal ferritin for her should be between 50 and 100.  She will also be tested today for myeloproliferative neoplasm with a JAK2 testing with reflex mutations as well as BCR/ABL testing as chronic myelogenous leukemia can present with slowly rising isolated thrombocytosis over the years. Patient will return to [facility] in 1 - 2 weeks to review test results and discuss other steps in evaluation. If peripheral blood testing is negative and there is no other explanation for her thrombocytosis, a bone marrow biopsy may be needed for evaluation.       SIGNATURES:  Louis Oshea    CC:  ELIANE Peña Katelyn, P.A.-C.

## 2020-01-13 NOTE — PROGRESS NOTES
1. [facility] Verified: yes    2. Verify PCP: yes    3. Review and add  to Care Team: yes      5. Reviewed/Updated the following with patient:       •   Communication Preference Obtained? YES  • MyChart Activation: already active       •   E-Mail Address Obtained? YES       •   Appointment Day and Time Preferences? YES       •   Preferred Pharmacy? YES       •   Preferred Lab? YES

## 2020-01-16 LAB
PATHOLOGY STUDY: NORMAL
SPECIMEN SOURCE: NORMAL
T(ABL1,BCR)P210 BLD/T QL: NOT DETECTED
T(ABL1,BCR)P210/CONTROL (IS) BLD/T: 0 %

## 2020-01-19 LAB — JAK2 P.V617F BLD/T QL: NOT DETECTED

## 2020-01-21 ENCOUNTER — HOSPITAL ENCOUNTER (OUTPATIENT)
Dept: LAB | Facility: MEDICAL CENTER | Age: 70
End: 2020-01-21
Attending: ORTHOPAEDIC SURGERY
Payer: MEDICARE

## 2020-01-21 LAB
BASOPHILS # BLD AUTO: 0.6 % (ref 0–1.8)
BASOPHILS # BLD: 0.04 K/UL (ref 0–0.12)
CRP SERPL HS-MCNC: 0.11 MG/DL (ref 0–0.75)
EOSINOPHIL # BLD AUTO: 0.02 K/UL (ref 0–0.51)
EOSINOPHIL NFR BLD: 0.3 % (ref 0–6.9)
ERYTHROCYTE [DISTWIDTH] IN BLOOD BY AUTOMATED COUNT: 45.6 FL (ref 35.9–50)
ERYTHROCYTE [SEDIMENTATION RATE] IN BLOOD BY WESTERGREN METHOD: 5 MM/HOUR (ref 0–30)
GENE XXX MUT ANL BLD/T: NOT DETECTED
HCT VFR BLD AUTO: 45.4 % (ref 37–47)
HGB BLD-MCNC: 14.7 G/DL (ref 12–16)
IMM GRANULOCYTES # BLD AUTO: 0.02 K/UL (ref 0–0.11)
IMM GRANULOCYTES NFR BLD AUTO: 0.3 % (ref 0–0.9)
LYMPHOCYTES # BLD AUTO: 1.7 K/UL (ref 1–4.8)
LYMPHOCYTES NFR BLD: 26.9 % (ref 22–41)
MCH RBC QN AUTO: 30.6 PG (ref 27–33)
MCHC RBC AUTO-ENTMCNC: 32.4 G/DL (ref 33.6–35)
MCV RBC AUTO: 94.4 FL (ref 81.4–97.8)
MONOCYTES # BLD AUTO: 0.59 K/UL (ref 0–0.85)
MONOCYTES NFR BLD AUTO: 9.4 % (ref 0–13.4)
NEUTROPHILS # BLD AUTO: 3.94 K/UL (ref 2–7.15)
NEUTROPHILS NFR BLD: 62.5 % (ref 44–72)
NRBC # BLD AUTO: 0 K/UL
NRBC BLD-RTO: 0 /100 WBC
PLATELET # BLD AUTO: 652 K/UL (ref 164–446)
PMV BLD AUTO: 9.6 FL (ref 9–12.9)
RBC # BLD AUTO: 4.81 M/UL (ref 4.2–5.4)
URATE SERPL-MCNC: 5.9 MG/DL (ref 1.9–8.2)
WBC # BLD AUTO: 6.3 K/UL (ref 4.8–10.8)

## 2020-01-21 PROCEDURE — 86431 RHEUMATOID FACTOR QUANT: CPT

## 2020-01-21 PROCEDURE — 86140 C-REACTIVE PROTEIN: CPT

## 2020-01-21 PROCEDURE — 85652 RBC SED RATE AUTOMATED: CPT

## 2020-01-21 PROCEDURE — 36415 COLL VENOUS BLD VENIPUNCTURE: CPT

## 2020-01-21 PROCEDURE — 85025 COMPLETE CBC W/AUTO DIFF WBC: CPT

## 2020-01-21 PROCEDURE — 84550 ASSAY OF BLOOD/URIC ACID: CPT

## 2020-01-21 PROCEDURE — 86038 ANTINUCLEAR ANTIBODIES: CPT

## 2020-01-21 PROCEDURE — 86812 HLA TYPING A B OR C: CPT

## 2020-01-22 ENCOUNTER — OFFICE VISIT (OUTPATIENT)
Dept: HEMATOLOGY ONCOLOGY | Facility: MEDICAL CENTER | Age: 70
End: 2020-01-22
Payer: MEDICARE

## 2020-01-22 VITALS
BODY MASS INDEX: 24.41 KG/M2 | TEMPERATURE: 97.4 F | RESPIRATION RATE: 16 BRPM | HEIGHT: 63 IN | WEIGHT: 137.79 LBS | DIASTOLIC BLOOD PRESSURE: 80 MMHG | SYSTOLIC BLOOD PRESSURE: 122 MMHG | HEART RATE: 56 BPM | OXYGEN SATURATION: 97 %

## 2020-01-22 DIAGNOSIS — D75.839 THROMBOCYTOSIS: ICD-10-CM

## 2020-01-22 LAB — RHEUMATOID FACT SER IA-ACNC: <10 IU/ML (ref 0–14)

## 2020-01-22 PROCEDURE — 99213 OFFICE O/P EST LOW 20 MIN: CPT | Performed by: INTERNAL MEDICINE

## 2020-01-22 ASSESSMENT — PAIN SCALES - GENERAL: PAINLEVEL: NO PAIN

## 2020-01-22 NOTE — PROGRESS NOTES
Date of visit: 2020  9:09 AM    Chief Complaint:   Thrombocytosis.    Interim history  Patient returns for a follow up and to discuss recent labs. She continues to feel well with no fevers, evidence of infection, joint pain or swelling, fatigue or weakness. She denies pain. She has not had recent treatment with vitamin B12. She denies heavy alcohol use. She denies history of splenectomy. Iron was recommended last time for mild or borderline iron deficiency.    Past Medical History:      Past Medical History:   Diagnosis Date   • Allergy    • Arthritis    • Hypertension    • Postoperative hypothyroidism        Allergies:         Chocolate; Dust mite extract; Other environmental; Pollen extract; and Seasonal    Medications:         Current Outpatient Medications   Medication Sig Dispense Refill   • hydroCHLOROthiazide (HYDRODIURIL) 25 MG Tab Take 1 Tab by mouth every day for 90 days. 90 Tab 3   • oxybutynin SR (DITROPAN-XL) 10 MG CR tablet      • levothyroxine (LEVOXYL) 112 MCG Tab Take 112 mcg by mouth Every morning on an empty stomach.     • diphenhydrAMINE (BENADRYL) 25 MG Tab Take 25 mg by mouth every 6 hours as needed for Sleep.       No current facility-administered medications for this visit.        Social History:     Social History     Socioeconomic History   • Marital status: Single     Spouse name: Not on file   • Number of children: Not on file   • Years of education: Not on file   • Highest education level: Not on file   Occupational History   • Not on file   Social Needs   • Financial resource strain: Not on file   • Food insecurity:     Worry: Not on file     Inability: Not on file   • Transportation needs:     Medical: Not on file     Non-medical: Not on file   Tobacco Use   • Smoking status: Former Smoker     Packs/day: 0.25     Types: Cigarettes     Last attempt to quit: 2013     Years since quittin.9   • Smokeless tobacco: Never Used   Substance and Sexual Activity   • Alcohol use: Yes      Alcohol/week: 0.6 oz     Types: 1 Glasses of wine per week     Frequency: 4 or more times a week     Drinks per session: 1 or 2     Comment: wine nightly    • Drug use: No   • Sexual activity: Not Currently     Partners: Male   Lifestyle   • Physical activity:     Days per week: Not on file     Minutes per session: Not on file   • Stress: Not on file   Relationships   • Social connections:     Talks on phone: Not on file     Gets together: Not on file     Attends [employer] service: Not on file     Active member of club or organization: Not on file     Attends meetings of clubs or organizations: Not on file     Relationship status: Not on file   • Intimate partner violence:     Fear of current or ex partner: Not on file     Emotionally abused: Not on file     Physically abused: Not on file     Forced sexual activity: Not on file   Other Topics Concern   • Not on file   Social History Narrative   • Not on file       Family History:      Family History   Problem Relation Age of Onset   • Heart Disease Mother    • Diabetes Mother    • Hypertension Mother    • Cancer Father         pancreatic cancer   • Stroke Father    • Hypertension Father    • Heart Disease Sister    • Hypertension Sister    • Hyperlipidemia Sister        Review of Systems:  Constitutional: Negative for fever, chills, weight loss and malaise/fatigue.    HEENT: No new auditory or visual complaints. No sore throat and neck pain.     Respiratory: Negative for cough, sputum production, shortness of breath and wheezing.    Cardiovascular: Negative for chest pain, palpitations, orthopnea and leg swelling.    Gastrointestinal: Negative for heartburn, nausea, vomiting and abdominal pain.    Genitourinary: Negative for dysuria, hematuria    Musculoskeletal: No new arthralgias or myalgias   Skin: Negative for rash and itching.    Neurological: Negative for focal weakness and headaches.    Endo/Heme/Allergies: No abnormal bleed/bruise.   "  Psychiatric/Behavioral: No new depression/anxiety.    Physical Exam:  Vitals: /80 (BP Location: Right arm, Patient Position: Sitting, BP Cuff Size: Adult)   Pulse (!) 56   Temp 36.3 °C (97.4 °F) (Temporal)   Resp 16   Ht 1.6 m (5' 3\")   Wt 62.5 kg (137 lb 12.6 oz)   LMP  (LMP Unknown)   SpO2 97%   BMI 24.41 kg/m²   General: No acute distress.  Eyes: Normal conjuctiva and lids. No icterus.  HEENT: Oropharynx clear.   RESP:normal respiratory effort.   ABD:non distended  EXT: No edema or cyanosis.  CNS: Alert and oriented x3, No focal deficits.  Skin: No rash on visible skin.      Labs:   Hospital Outpatient Visit on 01/21/2020   Component Date Value Ref Range Status   • Stat C-Reactive Protein 01/21/2020 0.11  0.00 - 0.75 mg/dL Final   • WBC 01/21/2020 6.3  4.8 - 10.8 K/uL Final   • RBC 01/21/2020 4.81  4.20 - 5.40 M/uL Final   • Hemoglobin 01/21/2020 14.7  12.0 - 16.0 g/dL Final   • Hematocrit 01/21/2020 45.4  37.0 - 47.0 % Final   • MCV 01/21/2020 94.4  81.4 - 97.8 fL Final   • MCH 01/21/2020 30.6  27.0 - 33.0 pg Final   • MCHC 01/21/2020 32.4* 33.6 - 35.0 g/dL Final   • RDW 01/21/2020 45.6  35.9 - 50.0 fL Final   • Platelet Count 01/21/2020 652* 164 - 446 K/uL Final   • MPV 01/21/2020 9.6  9.0 - 12.9 fL Final   • Neutrophils-Polys 01/21/2020 62.50  44.00 - 72.00 % Final   • Lymphocytes 01/21/2020 26.90  22.00 - 41.00 % Final   • Monocytes 01/21/2020 9.40  0.00 - 13.40 % Final   • Eosinophils 01/21/2020 0.30  0.00 - 6.90 % Final   • Basophils 01/21/2020 0.60  0.00 - 1.80 % Final   • Immature Granulocytes 01/21/2020 0.30  0.00 - 0.90 % Final   • Nucleated RBC 01/21/2020 0.00  /100 WBC Final   • Neutrophils (Absolute) 01/21/2020 3.94  2.00 - 7.15 K/uL Final    Includes immature neutrophils, if present.   • Lymphs (Absolute) 01/21/2020 1.70  1.00 - 4.80 K/uL Final   • Monos (Absolute) 01/21/2020 0.59  0.00 - 0.85 K/uL Final   • Eos (Absolute) 01/21/2020 0.02  0.00 - 0.51 K/uL Final   • Baso (Absolute) " 01/21/2020 0.04  0.00 - 0.12 K/uL Final   • Immature Granulocytes (abs) 01/21/2020 0.02  0.00 - 0.11 K/uL Final   • NRBC (Absolute) 01/21/2020 0.00  K/uL Final   • Sed Rate Westergren 01/21/2020 5  0 - 30 mm/hour Final   • Uric Acid 01/21/2020 5.9  1.9 - 8.2 mg/dL Final   • Rheumatoid Factor -Neph- 01/21/2020 <10  0 - 14 IU/mL Final           Assessment and Plan:  Ailin Good  is a 69 year old woman who is here for follow-up of incidental thrombocytosis with negative work up so far including negative peripheral blood JAK2 and MPL mutations, negative inflammatory lab work up and no signs or symptoms of inflammation. Her ferritin may indicate mild or borderline iron deficiency and she was recommended supplemental oral iron which she is taking as of last week. Currently her platelet count has increased from last visit. We discussed proceeding with a bone marrow biopsy as negative peripheral blood work up does not rule out myeloproliferative neoplasm or other hematological malignancy. Patient is reluctant at this time and a short period of observation is not unreasonable. I discussed with her potential risks of myeloproliferative neoplasms with increased risk of thrombosis and stroke. She was recommended low dose aspirin at current time. She will return to clinic in around 3 weeks for re-evaluation and repeat labs.     She agreed and verbalized understanding of the current plan.      SIGNATURES:  Louis Oshea M.D.    CC:  Jemma Martin P.A.-C.  No ref. provider found

## 2020-01-23 LAB
MPL P.W515 BLD/T QL: NOT DETECTED
NUCLEAR IGG SER QL IA: NORMAL

## 2020-01-24 LAB — HLA-B27 QL FC: POSITIVE

## 2020-02-07 ENCOUNTER — TELEPHONE (OUTPATIENT)
Dept: MEDICAL GROUP | Age: 70
End: 2020-02-07

## 2020-02-07 ENCOUNTER — TELEPHONE (OUTPATIENT)
Dept: HEMATOLOGY ONCOLOGY | Facility: MEDICAL CENTER | Age: 70
End: 2020-02-07

## 2020-02-07 NOTE — TELEPHONE ENCOUNTER
Patient called in and stated that she has been having diarrhea for the past 2weeks and now has pain in her Gall bladder area.   Transferred call to PONCHO Lee

## 2020-02-07 NOTE — TELEPHONE ENCOUNTER
Phone Number Called: [phone] (home)       Call outcome: Spoke to patient regarding message below.    Message: Spoke to pt and made appt for 2- at 11am.

## 2020-02-07 NOTE — TELEPHONE ENCOUNTER
"Rec'vd a transferred call from Mera RICHARDSON from pt with c/o diarrhea x2 weeks (in the AM only) & new onset of \"pinpoint pain\" to upper right side of abdominal region (9/10 pain scale, \"comes & goes\").  Denies n/v nor fever, but reports \"lots of indigestion with belching a lot.\"  Pt states concerns of pancreatic CA d/t her father dying from it & he had similar symptoms when he was 1st diagnosed.     Discussed pt's active symptoms with Ileana CALHOUN & rec'vd instructions to direct pt to her PCP.  Per Ileana CALHOUN, pt's current symptoms appear to be more associated with a potential gall bladder issue vs her potential hematological condition that Dr. Oshea is working her up for here out of our specialty office.      Called pt back with recommendations stated above & pt verbalized understanding & is agreeable with plan of care.  Pt states she will call her PCP right away for further instruction & will update our office at next visit of any medical changes.  "

## 2020-02-07 NOTE — TELEPHONE ENCOUNTER
If she has been having diarrhea for over 2 weeks she needs to be seen in the clinic.  Please have her schedule an appointment to follow-up

## 2020-02-07 NOTE — TELEPHONE ENCOUNTER
VOICEMAIL  1. Caller Name: Ailin Good                      Call Back Number: [phone] (home)       2. Message: Pt left vm stating that she has had diarrhea for 2 weeks pt spoke to her oncologist and they stated that it could be a gall bladder issues and she should speak to her pcp. Please advise.    3. Patient approves office to leave a detailed voicemail/MyChart message: yes

## 2020-02-10 ENCOUNTER — HOSPITAL ENCOUNTER (OUTPATIENT)
Dept: LAB | Facility: MEDICAL CENTER | Age: 70
End: 2020-02-10
Attending: INTERNAL MEDICINE
Payer: MEDICARE

## 2020-02-10 ENCOUNTER — OFFICE VISIT (OUTPATIENT)
Dept: MEDICAL GROUP | Age: 70
End: 2020-02-10
Payer: MEDICARE

## 2020-02-10 ENCOUNTER — HOSPITAL ENCOUNTER (OUTPATIENT)
Dept: LAB | Facility: MEDICAL CENTER | Age: 70
End: 2020-02-10
Attending: PHYSICIAN ASSISTANT
Payer: MEDICARE

## 2020-02-10 VITALS
DIASTOLIC BLOOD PRESSURE: 80 MMHG | BODY MASS INDEX: 24.8 KG/M2 | WEIGHT: 140 LBS | TEMPERATURE: 97.7 F | HEART RATE: 49 BPM | OXYGEN SATURATION: 98 % | HEIGHT: 63 IN | SYSTOLIC BLOOD PRESSURE: 140 MMHG

## 2020-02-10 DIAGNOSIS — E89.0 POSTOPERATIVE HYPOTHYROIDISM: ICD-10-CM

## 2020-02-10 DIAGNOSIS — D75.839 THROMBOCYTOSIS: ICD-10-CM

## 2020-02-10 DIAGNOSIS — D17.22 LIPOMA OF LEFT UPPER EXTREMITY: ICD-10-CM

## 2020-02-10 DIAGNOSIS — R19.7 DIARRHEA, UNSPECIFIED TYPE: ICD-10-CM

## 2020-02-10 LAB
ALBUMIN SERPL BCP-MCNC: 4.1 G/DL (ref 3.2–4.9)
ALBUMIN/GLOB SERPL: 1.5 G/DL
ALP SERPL-CCNC: 38 U/L (ref 30–99)
ALT SERPL-CCNC: 41 U/L (ref 2–50)
ANION GAP SERPL CALC-SCNC: 7 MMOL/L (ref 0–11.9)
AST SERPL-CCNC: 27 U/L (ref 12–45)
BASOPHILS # BLD AUTO: 0.3 % (ref 0–1.8)
BASOPHILS # BLD: 0.02 K/UL (ref 0–0.12)
BILIRUB SERPL-MCNC: 0.4 MG/DL (ref 0.1–1.5)
BUN SERPL-MCNC: 21 MG/DL (ref 8–22)
CALCIUM SERPL-MCNC: 9.2 MG/DL (ref 8.5–10.5)
CHLORIDE SERPL-SCNC: 102 MMOL/L (ref 96–112)
CO2 SERPL-SCNC: 30 MMOL/L (ref 20–33)
CREAT SERPL-MCNC: 0.88 MG/DL (ref 0.5–1.4)
EOSINOPHIL # BLD AUTO: 0.03 K/UL (ref 0–0.51)
EOSINOPHIL NFR BLD: 0.5 % (ref 0–6.9)
ERYTHROCYTE [DISTWIDTH] IN BLOOD BY AUTOMATED COUNT: 45.1 FL (ref 35.9–50)
GLOBULIN SER CALC-MCNC: 2.7 G/DL (ref 1.9–3.5)
GLUCOSE SERPL-MCNC: 93 MG/DL (ref 65–99)
HCT VFR BLD AUTO: 45.1 % (ref 37–47)
HGB BLD-MCNC: 14.8 G/DL (ref 12–16)
IMM GRANULOCYTES # BLD AUTO: 0.02 K/UL (ref 0–0.11)
IMM GRANULOCYTES NFR BLD AUTO: 0.3 % (ref 0–0.9)
LYMPHOCYTES # BLD AUTO: 1.6 K/UL (ref 1–4.8)
LYMPHOCYTES NFR BLD: 25.4 % (ref 22–41)
MCH RBC QN AUTO: 30.8 PG (ref 27–33)
MCHC RBC AUTO-ENTMCNC: 32.8 G/DL (ref 33.6–35)
MCV RBC AUTO: 94 FL (ref 81.4–97.8)
MONOCYTES # BLD AUTO: 0.5 K/UL (ref 0–0.85)
MONOCYTES NFR BLD AUTO: 7.9 % (ref 0–13.4)
NEUTROPHILS # BLD AUTO: 4.14 K/UL (ref 2–7.15)
NEUTROPHILS NFR BLD: 65.6 % (ref 44–72)
NRBC # BLD AUTO: 0 K/UL
NRBC BLD-RTO: 0 /100 WBC
PLATELET # BLD AUTO: 650 K/UL (ref 164–446)
PMV BLD AUTO: 9.3 FL (ref 9–12.9)
POTASSIUM SERPL-SCNC: 3.7 MMOL/L (ref 3.6–5.5)
PROT SERPL-MCNC: 6.8 G/DL (ref 6–8.2)
RBC # BLD AUTO: 4.8 M/UL (ref 4.2–5.4)
SODIUM SERPL-SCNC: 139 MMOL/L (ref 135–145)
TSH SERPL DL<=0.005 MIU/L-ACNC: 0.47 UIU/ML (ref 0.38–5.33)
WBC # BLD AUTO: 6.3 K/UL (ref 4.8–10.8)

## 2020-02-10 PROCEDURE — 99214 OFFICE O/P EST MOD 30 MIN: CPT | Performed by: PHYSICIAN ASSISTANT

## 2020-02-10 PROCEDURE — 84443 ASSAY THYROID STIM HORMONE: CPT

## 2020-02-10 PROCEDURE — 86803 HEPATITIS C AB TEST: CPT

## 2020-02-10 PROCEDURE — 85025 COMPLETE CBC W/AUTO DIFF WBC: CPT

## 2020-02-10 PROCEDURE — 36415 COLL VENOUS BLD VENIPUNCTURE: CPT

## 2020-02-10 PROCEDURE — 80053 COMPREHEN METABOLIC PANEL: CPT

## 2020-02-10 RX ORDER — LEVOTHYROXINE SODIUM 0.12 MG/1
TABLET ORAL
COMMUNITY
End: 2020-02-10

## 2020-02-10 RX ORDER — HYDROCHLOROTHIAZIDE 25 MG/1
TABLET ORAL
COMMUNITY
End: 2020-02-10

## 2020-02-10 NOTE — PROGRESS NOTES
"cc: Diarrhea and lump on elbow  Subjective:     HPI  Diarrhea  Ailin presents with concerns of diarrhea that has been ongoing for the past 2 weeks.  She states that when she has a bowel movement in the morning, it is diarrhea.  She only has a bowel movement once a day, which is diarrhea.  She states that it is more soft.  Non-watery.  She does feel slightly crampy right before BM, otherwise she does not have any abdominal pain.  She has cut out eating spicy foods, which has helped slightly.  She has not had any recent travel outside of the country, no recent antibiotic use.  Denies melena, hematochezia, nausea, vomiting, fever, chills, sweats    Nodule  A few weeks ago she noticed a small nodule on the outside of her left elbow.  States it is not painful.  Fairly mobile.  She does have history of lipomas.  Denies surrounding erythema, edema, warmth, pain, or drainage.    Review of systems:  See above.       Current Outpatient Medications:   •  TURMERIC PO, , Disp: , Rfl:   •  ASPIRIN 81 PO, , Disp: , Rfl:   •  hydroCHLOROthiazide (HYDRODIURIL) 25 MG Tab, Take 1 Tab by mouth every day for 90 days., Disp: 90 Tab, Rfl: 3  •  oxybutynin SR (DITROPAN-XL) 10 MG CR tablet, , Disp: , Rfl:   •  levothyroxine (LEVOXYL) 112 MCG Tab, Take 112 mcg by mouth Every morning on an empty stomach., Disp: , Rfl:   •  diphenhydrAMINE (BENADRYL) 25 MG Tab, Take 25 mg by mouth every 6 hours as needed for Sleep., Disp: , Rfl:     Allergies, past medical history, past surgical history, family history, social history reviewed and updated    Objective:     Vitals: /80 (BP Location: Left arm, Patient Position: Sitting, BP Cuff Size: Adult)   Pulse (!) 49   Temp 36.5 °C (97.7 °F) (Temporal)   Ht 1.6 m (5' 3\")   Wt 63.5 kg (140 lb)   LMP  (LMP Unknown)   SpO2 98%   Breastfeeding? No   BMI 24.80 kg/m²   General: Alert, pleasant, NAD  HEENT: Normocephalic. Neck supple.  No thyromegaly or masses palpated. No cervical or " supraclavicular lymphadenopathy. No carotid bruits   Heart: Regular rate and rhythm.  S1 and S2 normal.  No murmurs appreciated.  Respiratory: Normal respiratory effort.  Clear to auscultation bilaterally.  Abdomen:  Normoactive bowel sounds.  Non-distended, soft, non-tender, no guarding/rebound. No hepatosplenomegaly.  No hernias.  Negative Lakhani's sign. Negative McBurney's    Skin: Warm, dry, no rashes.  Left elbow: Small, round, smooth, mobile, soft nodule just inferior to the lateral epicondyle.  Nontender.  No erythema, edema, warmth,  Extremities: No leg edema.  Radial pulses 2+ symmetric  Psych:  Affect/mood is normal, judgement is good, memory is intact, grooming is appropriate.    Assessment/Plan:     Diagnoses and all orders for this visit:    Diarrhea, unspecified type  -She has had diarrhea for the past 2 weeks, only in the a.m.  Did not seem that this is infectious as she is only having 1 bowel movement a day.  However, will check stool cultures.  If these are negative then would advise to take Imodium at night.  Recommended brat diet advance as tolerated.  Also check CBC to rule out infection, as well as CMP for electrolyte maladies.  Advised to hydrate.  If she has worsening no improvement in the next 10 to 14 days then follow-up.  -     CBC WITH DIFFERENTIAL; Future  -     Comp Metabolic Panel; Future  -     OCCULT BLOOD FECES IMMUNOASSAY; Future  -     Complete O&P; Future  -     CULTURE STOOL; Future    Postoperative hypothyroidism  -Previous TSH levels she was being overtreated.  Her levothyroxine was decreased.  Her TSH levels have not been repeated since the initial decrease.  We will check thyroid level and adjust medication if needed pending results.  -     TSH WITH REFLEX TO FT4; Future    Lipoma of left upper extremity  -Reassured.  Advised if starts to increase in size or cause pain then to follow-up.      Return if symptoms worsen or fail to improve.

## 2020-02-11 LAB — HCV AB SER QL: NEGATIVE

## 2020-02-12 ENCOUNTER — HOSPITAL ENCOUNTER (OUTPATIENT)
Facility: MEDICAL CENTER | Age: 70
End: 2020-02-12
Attending: PHYSICIAN ASSISTANT
Payer: MEDICARE

## 2020-02-12 DIAGNOSIS — R19.7 DIARRHEA, UNSPECIFIED TYPE: ICD-10-CM

## 2020-02-12 PROCEDURE — 87899 AGENT NOS ASSAY W/OPTIC: CPT

## 2020-02-12 PROCEDURE — 87045 FECES CULTURE AEROBIC BACT: CPT

## 2020-02-12 PROCEDURE — 87209 SMEAR COMPLEX STAIN: CPT

## 2020-02-12 PROCEDURE — 87177 OVA AND PARASITES SMEARS: CPT

## 2020-02-12 PROCEDURE — 82274 ASSAY TEST FOR BLOOD FECAL: CPT

## 2020-02-12 PROCEDURE — 87046 STOOL CULTR AEROBIC BACT EA: CPT

## 2020-02-13 LAB
E COLI SXT1+2 STL IA: NORMAL
SIGNIFICANT IND 70042: NORMAL
SITE SITE: NORMAL
SOURCE SOURCE: NORMAL

## 2020-02-14 ENCOUNTER — HOSPITAL ENCOUNTER (OUTPATIENT)
Facility: MEDICAL CENTER | Age: 70
End: 2020-02-14
Attending: PHYSICIAN ASSISTANT
Payer: MEDICARE

## 2020-02-14 PROCEDURE — 82274 ASSAY TEST FOR BLOOD FECAL: CPT

## 2020-02-15 LAB
BACTERIA STL CULT: NORMAL
E COLI SXT1+2 STL IA: NORMAL
SIGNIFICANT IND 70042: NORMAL
SITE SITE: NORMAL
SOURCE SOURCE: NORMAL

## 2020-02-16 LAB
O+P STL MICRO: NEGATIVE
O+P STL TRI STN: NEGATIVE

## 2020-02-17 LAB — HEMOCCULT STL QL IA: NEGATIVE

## 2020-02-18 ENCOUNTER — OFFICE VISIT (OUTPATIENT)
Dept: HEMATOLOGY ONCOLOGY | Facility: MEDICAL CENTER | Age: 70
End: 2020-02-18
Payer: MEDICARE

## 2020-02-18 VITALS
TEMPERATURE: 98.4 F | OXYGEN SATURATION: 96 % | DIASTOLIC BLOOD PRESSURE: 72 MMHG | WEIGHT: 138.01 LBS | SYSTOLIC BLOOD PRESSURE: 130 MMHG | RESPIRATION RATE: 16 BRPM | HEART RATE: 66 BPM | BODY MASS INDEX: 24.45 KG/M2

## 2020-02-18 DIAGNOSIS — D75.839 THROMBOCYTOSIS: ICD-10-CM

## 2020-02-18 DIAGNOSIS — E61.1 IRON DEFICIENCY: ICD-10-CM

## 2020-02-18 PROCEDURE — 99213 OFFICE O/P EST LOW 20 MIN: CPT | Performed by: INTERNAL MEDICINE

## 2020-02-18 ASSESSMENT — PAIN SCALES - GENERAL: PAINLEVEL: NO PAIN

## 2020-02-18 NOTE — PROGRESS NOTES
Date of visit: 2020  8:10 AM    Chief Complaint:   Thrombocytosis.    Interim history  Patient reports taking iron since last visit with no difficulty. She has had diarrhea lately with negative work up thorough her primary care. She reports taking steroid injections to her foot and wonders if this could cause her thrombocytosis. She denies vision change, headaches, clots or stroke symptoms or other concerns.     Past Medical History:      Past Medical History:   Diagnosis Date   • Allergy    • Arthritis    • Hypertension    • Postoperative hypothyroidism        Allergies:         Chocolate; Dust mite extract; Other environmental; Pollen extract; and Seasonal    Medications:         Current Outpatient Medications   Medication Sig Dispense Refill   • ASPIRIN 81 PO      • hydroCHLOROthiazide (HYDRODIURIL) 25 MG Tab Take 1 Tab by mouth every day for 90 days. 90 Tab 3   • oxybutynin SR (DITROPAN-XL) 10 MG CR tablet      • levothyroxine (LEVOXYL) 112 MCG Tab Take 112 mcg by mouth Every morning on an empty stomach.     • diphenhydrAMINE (BENADRYL) 25 MG Tab Take 25 mg by mouth every 6 hours as needed for Sleep.     • TURMERIC PO        No current facility-administered medications for this visit.        Social History:     Social History     Socioeconomic History   • Marital status: Single     Spouse name: Not on file   • Number of children: Not on file   • Years of education: Not on file   • Highest education level: Not on file   Occupational History   • Not on file   Social Needs   • Financial resource strain: Not on file   • Food insecurity     Worry: Not on file     Inability: Not on file   • Transportation needs     Medical: Not on file     Non-medical: Not on file   Tobacco Use   • Smoking status: Former Smoker     Packs/day: 0.25     Types: Cigarettes     Last attempt to quit: 2013     Years since quittin.0   • Smokeless tobacco: Never Used   Substance and Sexual Activity   • Alcohol use: Yes      Alcohol/week: 0.6 oz     Types: 1 Glasses of wine per week     Frequency: 4 or more times a week     Drinks per session: 1 or 2     Comment: wine nightly    • Drug use: No   • Sexual activity: Not Currently     Partners: Male   Lifestyle   • Physical activity     Days per week: Not on file     Minutes per session: Not on file   • Stress: Not on file   Relationships   • Social connections     Talks on phone: Not on file     Gets together: Not on file     Attends Latter day service: Not on file     Active member of club or organization: Not on file     Attends meetings of clubs or organizations: Not on file     Relationship status: Not on file   • Intimate partner violence     Fear of current or ex partner: Not on file     Emotionally abused: Not on file     Physically abused: Not on file     Forced sexual activity: Not on file   Other Topics Concern   • Not on file   Social History Narrative   • Not on file       Family History:      Family History   Problem Relation Age of Onset   • Heart Disease Mother    • Diabetes Mother    • Hypertension Mother    • Cancer Father         pancreatic cancer   • Stroke Father    • Hypertension Father    • Heart Disease Sister    • Hypertension Sister    • Hyperlipidemia Sister        Review of Systems:  Constitutional: Negative for fever, chills, weight loss and malaise/fatigue.    HEENT: No new auditory or visual complaints. No sore throat and neck pain.     Respiratory: Negative for cough, sputum production, shortness of breath and wheezing.    Cardiovascular: Negative for chest pain, palpitations, orthopnea and leg swelling.    Gastrointestinal: Negative for heartburn, nausea, vomiting and abdominal pain.    Genitourinary: Negative for dysuria, hematuria    Musculoskeletal: some joint aches, receiving steroid injections into her foot   Skin: Negative for rash and itching.    Neurological: Negative for focal weakness and headaches.    Endo/Heme/Allergies: No abnormal bleed/bruise.     Psychiatric/Behavioral: No new depression/anxiety.    Physical Exam:  Vitals: /72 (BP Location: Right arm, Patient Position: Sitting, BP Cuff Size: Adult)   Pulse 66   Temp 36.9 °C (98.4 °F) (Temporal)   Resp 16   Wt 62.6 kg (138 lb 0.1 oz)   LMP  (LMP Unknown)   SpO2 96%   BMI 24.45 kg/m²   General: No acute distress.  Eyes: Normal conjuctiva and lids. No icterus.  HEENT: Oropharynx clear.   RESP:normal respiratory effort.   ABD:non distended  EXT: No edema or cyanosis.  CNS: Alert and oriented x3, No focal deficits.  Skin: No rash on visible skin.      Labs:   Hospital Outpatient Visit on 02/14/2020   Component Date Value Ref Range Status   • Occult Blood, IA 02/14/2020 Negative  Negative Final   Hospital Outpatient Visit on 02/12/2020   Component Date Value Ref Range Status   • Significant Indicator 02/12/2020 NEG   Final   • Source 02/12/2020 STL   Final   • Site 02/12/2020 STOOL   Final   • Culture Result 02/12/2020    Final                    Value:No enteric pathogens isolated.  NOTE:  Stool cultures are screened for Shiga Toxins 1 and 2,  Salmonella, Shigella, Campylobacter, Aeromonas,  Plesiomonas, and Vibrio.     • EHEC 02/12/2020 Negative for Shiga Toxin 1 and 2.   Final   • OP, Wet Mount 02/12/2020 Negative  Negative Final   • OP, Trichchrome Stain 02/12/2020 Negative  Negative Final    Comment: TEST INFORMATION: Ova and Parasite Exam, Fecal  (Immunocompromised or Travel History)  Method for identification of Ova and Parasites includes wet mount  and trichromes stain.  Due to the various shedding cycles of many parasites, three  separate stool specimens collected over a 5-7-day period are  recommended for ova and parasite examination. A single negative  result does not rule out the possibility of a parasitic infection.  Stool antigen testing is the optimal test method for determining  the parasitic presence of Giardia, Cryptosporidium spp, or  Entamoeba histolytica. The ova and parasite  exam does not  specifically detect Cryptosporidium, Cyclospora, Cystoisospora,  and Microsporidia. For Cryptosporidium, refer to Cryptosporidium  Antigen by EIA ([facility] test code 3114116). For Cyclospora and  Cystoisospora, refer to Parasitology Stain by Modified Acid-Fast  ([facility] test code 9642404). For Microsporidia, refer to  Microsporidia Stain ([facility] test code 7305445).  Performed by [facility]                           [facility],  [address] [place] [address] [phone]  [email or web address], Gonsalo Gilliam MD, Lab. Director     • Significant Indicator 02/12/2020 NEG   Final   • Source 02/12/2020 STL   Final   • Site 02/12/2020 STOOL   Final   • EHEC 02/12/2020 Negative for Shiga Toxin 1 and 2.   Final           Assessment and Plan:  Ailin Good  is a 69 year old female who is here for follow-up of thrombocytosis. Her work up has been negative so far including negative peripheral blood JAK2 and MPL mutations, negative inflammatory lab work up and no signs or symptoms of inflammation. Her ferritin may indicate mild or borderline iron deficiency and she was recommended supplemental oral iron which she is taking as of last week. Currently her platelet count is unchanged at 650,000 since last visti. We discussed proceeding with a bone marrow biopsy as negative peripheral blood work up does not rule out myeloproliferative neoplasm or other hematological malignancy. Patient continues to be reluctant to proceed with a bone marrow biopsy. I told her that if her thrombocytosis is not caused by a reactive process there may be a risk of thrombosis or stroke with underlying myeloproliferative neoplasm. At current time she will continue oral iron, continue aspirin and return to clinic in 2 - 3 months for re-evaluation with labs prior to visit.     She agreed and verbalized understanding of the current plan.      SIGNATURES:  Louis Oshea M.D.    CC:  Jemma Martin P.A.-C.  No ref. provider found

## 2020-02-25 RX ORDER — LEVOTHYROXINE SODIUM 0.12 MG/1
TABLET ORAL
COMMUNITY
Start: 2020-02-12 | End: 2020-11-03

## 2020-03-13 ENCOUNTER — TELEPHONE (OUTPATIENT)
Dept: MEDICAL GROUP | Age: 70
End: 2020-03-13

## 2020-03-13 RX ORDER — FOLIC ACID 0.8 MG
TABLET ORAL
COMMUNITY
End: 2020-11-03

## 2020-03-13 RX ORDER — ASCORBIC ACID 500 MG
500 TABLET ORAL DAILY
COMMUNITY
End: 2020-11-03

## 2020-03-13 RX ORDER — FERROUS SULFATE 325(65) MG
325 TABLET ORAL DAILY
COMMUNITY
End: 2020-11-03

## 2020-03-13 RX ORDER — VIT C/B6/B5/MAGNESIUM/HERB 173 50-5-6-5MG
CAPSULE ORAL
COMMUNITY
End: 2020-11-03

## 2020-03-13 NOTE — PROGRESS NOTES
ANNUAL WELLNESS VISIT PRE-VISIT PLANNING WITHOUT OUTREACH    1.  Reviewed note from last office visit with PCP: YES    2.  If any orders were placed at last visit, do we have Results/Consult Notes?        •  Labs - Labs ordered, completed on 2/14/2020 and results are in chart.  Note: If patient appointment is for lab review and patient did not complete labs, check with provider if OK to reschedule patient until labs completed.       •  Imaging - Imaging was not ordered at last office visit.       •  Referrals - No referrals were ordered at last office visit.    3.  Immunizations were updated in Epic using WebIZ?: Epic matches WebIZ       •  WebIZ Recommendations: SHINGRIX (Shingles)        •  Is patient due for Tdap? NO       •  Is patient due for Shingrix? YES. Patient was not notified of copay/out of pocket cost.     4.  Patient is due for the following Health Maintenance Topics:   Health Maintenance Due   Topic Date Due   • IMM ZOSTER VACCINES (1 of 2) 07/15/2002   • Annual Wellness Visit  04/07/2019           5.  Reviewed/Updated the following with patient:       •   Preferred Pharmacy? YES       •   Preferred Lab? YES       •   Preferred Communication? YES       •   Allergies? YES       •   Medications? YES. Was Abstract Encounter opened and chart updated? YES       •   Social History? YES. Was Abstract Encounter opened and chart updated? YES       •   Family History (document living status of immediate family members and if + hx of  cancer, diabetes, hypertension, hyperlipidemia, heart attack, stroke) YES. Was Abstract Encounter opened and chart updated? YES    6.  Care Team Updated:       •   DME Company (gait device, O2, CPAP, etc.): N\A       •   Other Specialists (eye doctor, derm, GYN, cardiology, endo, etc): YES    7. Orders for overdue Health Maintenance topics pended in Pre-Charting? N\A    8.  Patient has the following Care Path diagnoses on Problem List:  NONE    9.  Patient was advised: “This is a  free wellness visit. The provider will screen for medical conditions to help you stay healthy. If you have other concerns to address you may be asked to discuss these at a separate visit or there may be an additional fee.”     10.  Patient was informed to arrive 15 min prior to their scheduled appointment and bring in their medication bottles.

## 2020-03-13 NOTE — TELEPHONE ENCOUNTER
ESTABLISHED PATIENT PRE-VISIT PLANNING     Patient was contacted to complete PVP.     Note: Patient will not be contacted if there is no indication to call.     1.  Reviewed notes from the last few office visits within the medical group: Yes    2.  If any orders were placed at last visit or intended to be done for this visit (i.e. 6 mos follow-up), do we have Results/Consult Notes?        •  Labs - Labs ordered, completed on 2/14/2020 and results are in chart.   Note: If patient appointment is for lab review and patient did not complete labs, check with provider if OK to reschedule patient until labs completed.       •  Imaging - Imaging was not ordered at last office visit.       •  Referrals - No referrals were ordered at last office visit.    3. Is this appointment scheduled as a Hospital Follow-Up? No    4.  Immunizations were updated in Epic using WebIZ?: Epic matches WebIZ       •  Web Iz Recommendations: SHINGRIX (Shingles)    5.  Patient is due for the following Health Maintenance Topics:   Health Maintenance Due   Topic Date Due   • IMM ZOSTER VACCINES (1 of 2) 07/15/2002   • Annual Wellness Visit  04/07/2019           6. Orders for overdue Health Maintenance topics pended in Pre-Charting? N\A    7.  AHA (MDX) form printed for Provider? No, already completed    8.  Patient was informed to arrive 15 min prior to their scheduled appointment and bring in their medication bottles.

## 2020-03-30 ENCOUNTER — HOSPITAL ENCOUNTER (OUTPATIENT)
Dept: LAB | Facility: MEDICAL CENTER | Age: 70
End: 2020-03-30
Attending: PHYSICIAN ASSISTANT
Payer: MEDICARE

## 2020-03-30 LAB
T4 FREE SERPL-MCNC: 1.64 NG/DL (ref 0.53–1.43)
TSH SERPL DL<=0.005 MIU/L-ACNC: 0.88 UIU/ML (ref 0.38–5.33)

## 2020-03-30 PROCEDURE — 36415 COLL VENOUS BLD VENIPUNCTURE: CPT

## 2020-03-30 PROCEDURE — 84439 ASSAY OF FREE THYROXINE: CPT

## 2020-03-30 PROCEDURE — 84443 ASSAY THYROID STIM HORMONE: CPT

## 2020-05-08 ENCOUNTER — PATIENT OUTREACH (OUTPATIENT)
Dept: HEALTH INFORMATION MANAGEMENT | Facility: OTHER | Age: 70
End: 2020-05-08

## 2020-05-08 NOTE — PROGRESS NOTES
Outcome: Left voicemail/ message    Please transfer to [facility]  [phone] when patient returns call.     [facility] Verified: yes     Attempt # 1

## 2020-05-12 ENCOUNTER — HOSPITAL ENCOUNTER (OUTPATIENT)
Dept: LAB | Facility: MEDICAL CENTER | Age: 70
End: 2020-05-12
Attending: INTERNAL MEDICINE
Payer: MEDICARE

## 2020-05-12 DIAGNOSIS — E61.1 IRON DEFICIENCY: ICD-10-CM

## 2020-05-12 DIAGNOSIS — D75.839 THROMBOCYTOSIS: ICD-10-CM

## 2020-05-12 LAB
BASOPHILS # BLD AUTO: 0.8 % (ref 0–1.8)
BASOPHILS # BLD: 0.04 K/UL (ref 0–0.12)
EOSINOPHIL # BLD AUTO: 0.09 K/UL (ref 0–0.51)
EOSINOPHIL NFR BLD: 1.7 % (ref 0–6.9)
ERYTHROCYTE [DISTWIDTH] IN BLOOD BY AUTOMATED COUNT: 49.2 FL (ref 35.9–50)
FERRITIN SERPL-MCNC: 66.5 NG/ML (ref 10–291)
HCT VFR BLD AUTO: 43.9 % (ref 37–47)
HGB BLD-MCNC: 14.2 G/DL (ref 12–16)
IMM GRANULOCYTES # BLD AUTO: 0.01 K/UL (ref 0–0.11)
IMM GRANULOCYTES NFR BLD AUTO: 0.2 % (ref 0–0.9)
IRON SATN MFR SERPL: 36 % (ref 15–55)
IRON SERPL-MCNC: 100 UG/DL (ref 40–170)
LYMPHOCYTES # BLD AUTO: 1.86 K/UL (ref 1–4.8)
LYMPHOCYTES NFR BLD: 35.2 % (ref 22–41)
MCH RBC QN AUTO: 30.1 PG (ref 27–33)
MCHC RBC AUTO-ENTMCNC: 32.3 G/DL (ref 33.6–35)
MCV RBC AUTO: 93 FL (ref 81.4–97.8)
MONOCYTES # BLD AUTO: 0.52 K/UL (ref 0–0.85)
MONOCYTES NFR BLD AUTO: 9.8 % (ref 0–13.4)
NEUTROPHILS # BLD AUTO: 2.77 K/UL (ref 2–7.15)
NEUTROPHILS NFR BLD: 52.3 % (ref 44–72)
NRBC # BLD AUTO: 0 K/UL
NRBC BLD-RTO: 0 /100 WBC
PLATELET # BLD AUTO: 644 K/UL (ref 164–446)
PMV BLD AUTO: 9.5 FL (ref 9–12.9)
RBC # BLD AUTO: 4.72 M/UL (ref 4.2–5.4)
TIBC SERPL-MCNC: 281 UG/DL (ref 250–450)
UIBC SERPL-MCNC: 181 UG/DL (ref 110–370)
WBC # BLD AUTO: 5.3 K/UL (ref 4.8–10.8)

## 2020-05-12 PROCEDURE — 82728 ASSAY OF FERRITIN: CPT

## 2020-05-12 PROCEDURE — 85025 COMPLETE CBC W/AUTO DIFF WBC: CPT

## 2020-05-12 PROCEDURE — 36415 COLL VENOUS BLD VENIPUNCTURE: CPT

## 2020-05-12 PROCEDURE — 83540 ASSAY OF IRON: CPT

## 2020-05-12 PROCEDURE — 83550 IRON BINDING TEST: CPT

## 2020-05-18 ENCOUNTER — OFFICE VISIT (OUTPATIENT)
Dept: HEMATOLOGY ONCOLOGY | Facility: MEDICAL CENTER | Age: 70
End: 2020-05-18
Payer: MEDICARE

## 2020-05-18 VITALS
SYSTOLIC BLOOD PRESSURE: 124 MMHG | DIASTOLIC BLOOD PRESSURE: 82 MMHG | HEIGHT: 62 IN | RESPIRATION RATE: 16 BRPM | WEIGHT: 140.87 LBS | HEART RATE: 57 BPM | TEMPERATURE: 97.5 F | OXYGEN SATURATION: 95 % | BODY MASS INDEX: 25.92 KG/M2

## 2020-05-18 DIAGNOSIS — D69.6 THROMBOCYTOPENIA (HCC): ICD-10-CM

## 2020-05-18 DIAGNOSIS — D75.839 THROMBOCYTOSIS: ICD-10-CM

## 2020-05-18 DIAGNOSIS — D75.1 ERYTHROCYTOSIS: ICD-10-CM

## 2020-05-18 PROCEDURE — 99213 OFFICE O/P EST LOW 20 MIN: CPT | Performed by: INTERNAL MEDICINE

## 2020-05-18 RX ORDER — ASPIRIN 81 MG/1
81 TABLET ORAL EVERY MORNING
Status: ON HOLD | COMMUNITY
End: 2023-09-08

## 2020-05-18 ASSESSMENT — FIBROSIS 4 INDEX: FIB4 SCORE: 0.44

## 2020-05-18 ASSESSMENT — PAIN SCALES - GENERAL: PAINLEVEL: NO PAIN

## 2020-05-18 NOTE — PROGRESS NOTES
Date of visit: 5/18/2020  9:37 AM    Chief Complaint:   Thrombocytosis    Interim history:  Patient returns for her three month follow up. She has been on oral iron which she has tolerated well. Complaints of some worsening bruising on her forearms since she started baby aspirin. Denies visual changes, dizziness, falls, chest pain, palpitations, leg swelling or pain or constitutional symptoms.     Past Medical History:      Past Medical History:   Diagnosis Date   • Allergy    • Arthritis    • Hypertension    • Postoperative hypothyroidism        Allergies:         Chocolate; Dust mite extract; Other environmental; Pollen extract; and Seasonal    Medications:         Current Outpatient Medications   Medication Sig Dispense Refill   • ferrous sulfate 325 (65 Fe) MG tablet Take 325 mg by mouth every day.     • ascorbic acid (ASCORBIC ACID) 500 MG Tab Take 500 mg by mouth every day.     • ASPIRIN 81 PO      • diphenhydrAMINE (BENADRYL) 25 MG Tab Take 25 mg by mouth every 6 hours as needed for Sleep.     • ASPIRIN 81 PO      • Turmeric 500 MG Cap Take  by mouth.     • Magnesium 500 MG Cap Take  by mouth.     • Potassium 99 MG Tab Take  by mouth.     • levothyroxine (SYNTHROID) 125 MCG Tab      • TURMERIC PO      • hydroCHLOROthiazide (HYDRODIURIL) 25 MG Tab Take 1 Tab by mouth every day for 90 days. 90 Tab 3   • oxybutynin SR (DITROPAN-XL) 10 MG CR tablet      • levothyroxine (LEVOXYL) 112 MCG Tab Take 112 mcg by mouth Every morning on an empty stomach.       No current facility-administered medications for this visit.        Social History:     Social History     Socioeconomic History   • Marital status: Single     Spouse name: Not on file   • Number of children: Not on file   • Years of education: Not on file   • Highest education level: Not on file   Occupational History   • Not on file   Social Needs   • Financial resource strain: Not on file   • Food insecurity     Worry: Not on file     Inability: Not on file   •  Transportation needs     Medical: Not on file     Non-medical: Not on file   Tobacco Use   • Smoking status: Former Smoker     Packs/day: 0.25     Types: Cigarettes     Last attempt to quit: 2013     Years since quittin.3   • Smokeless tobacco: Never Used   Substance and Sexual Activity   • Alcohol use: Yes     Alcohol/week: 0.6 oz     Types: 1 Glasses of wine per week     Frequency: 4 or more times a week     Drinks per session: 1 or 2     Comment: wine nightly    • Drug use: No   • Sexual activity: Not Currently     Partners: Male   Lifestyle   • Physical activity     Days per week: Not on file     Minutes per session: Not on file   • Stress: Not on file   Relationships   • Social connections     Talks on phone: Not on file     Gets together: Not on file     Attends Jew service: Not on file     Active member of club or organization: Not on file     Attends meetings of clubs or organizations: Not on file     Relationship status: Not on file   • Intimate partner violence     Fear of current or ex partner: Not on file     Emotionally abused: Not on file     Physically abused: Not on file     Forced sexual activity: Not on file   Other Topics Concern   • Not on file   Social History Narrative   • Not on file       Family History:      Family History   Problem Relation Age of Onset   • Heart Disease Mother    • Diabetes Mother    • Hypertension Mother    • Cancer Father         pancreatic cancer   • Stroke Father    • Hypertension Father    • Heart Disease Sister    • Hypertension Sister    • Hyperlipidemia Sister        Review of Systems:  Constitutional: Negative for fever, chills, weight loss and malaise/fatigue.    HEENT: No new auditory or visual complaints. No sore throat and neck pain.     Respiratory: Negative for cough, sputum production, shortness of breath and wheezing.    Cardiovascular: Negative for chest pain, palpitations, orthopnea and leg swelling.    Gastrointestinal: Negative for  "heartburn, nausea, vomiting and abdominal pain.    Genitourinary: Negative for dysuria, hematuria    Musculoskeletal: No new arthralgias or myalgias   Skin: Negative for rash and itching.    Neurological: Negative for focal weakness and headaches.    Endo/Heme/Allergies: No abnormal bleed/bruise.    Psychiatric/Behavioral: No new depression/anxiety.    Physical Exam:  Vitals: /82 (BP Location: Left arm, Patient Position: Sitting, BP Cuff Size: Adult)   Pulse (!) 57   Temp 36.4 °C (97.5 °F) (Temporal)   Resp 16   Ht 1.575 m (5' 2\")   Wt 63.9 kg (140 lb 14 oz)   LMP  (LMP Unknown)   SpO2 95%   BMI 25.77 kg/m²   General: No acute distress.  Eyes: Normal conjuctiva and lids. No icterus.  HEENT: Oropharynx clear.   RESP:  normal respiratory effort.   ABD: Snon distended  EXT: No edema or cyanosis.  CNS: Alert and oriented x3, No focal deficits.  Skin: No rash on visible skin.      Labs:   Hospital Outpatient Visit on 05/12/2020   Component Date Value Ref Range Status   • WBC 05/12/2020 5.3  4.8 - 10.8 K/uL Final   • RBC 05/12/2020 4.72  4.20 - 5.40 M/uL Final   • Hemoglobin 05/12/2020 14.2  12.0 - 16.0 g/dL Final   • Hematocrit 05/12/2020 43.9  37.0 - 47.0 % Final   • MCV 05/12/2020 93.0  81.4 - 97.8 fL Final   • MCH 05/12/2020 30.1  27.0 - 33.0 pg Final   • MCHC 05/12/2020 32.3* 33.6 - 35.0 g/dL Final   • RDW 05/12/2020 49.2  35.9 - 50.0 fL Final   • Platelet Count 05/12/2020 644* 164 - 446 K/uL Final   • MPV 05/12/2020 9.5  9.0 - 12.9 fL Final   • Neutrophils-Polys 05/12/2020 52.30  44.00 - 72.00 % Final   • Lymphocytes 05/12/2020 35.20  22.00 - 41.00 % Final   • Monocytes 05/12/2020 9.80  0.00 - 13.40 % Final   • Eosinophils 05/12/2020 1.70  0.00 - 6.90 % Final   • Basophils 05/12/2020 0.80  0.00 - 1.80 % Final   • Immature Granulocytes 05/12/2020 0.20  0.00 - 0.90 % Final   • Nucleated RBC 05/12/2020 0.00  /100 WBC Final   • Neutrophils (Absolute) 05/12/2020 2.77  2.00 - 7.15 K/uL Final    Includes " immature neutrophils, if present.   • Lymphs (Absolute) 05/12/2020 1.86  1.00 - 4.80 K/uL Final   • Monos (Absolute) 05/12/2020 0.52  0.00 - 0.85 K/uL Final   • Eos (Absolute) 05/12/2020 0.09  0.00 - 0.51 K/uL Final   • Baso (Absolute) 05/12/2020 0.04  0.00 - 0.12 K/uL Final   • Immature Granulocytes (abs) 05/12/2020 0.01  0.00 - 0.11 K/uL Final   • NRBC (Absolute) 05/12/2020 0.00  K/uL Final   • Ferritin 05/12/2020 66.5  10.0 - 291.0 ng/mL Final   • Iron 05/12/2020 100  40 - 170 ug/dL Final   • Total Iron Binding 05/12/2020 281  250 - 450 ug/dL Final   • Unsat Iron Binding 05/12/2020 181  110 - 370 ug/dL Final   • % Saturation 05/12/2020 36  15 - 55 % Final           Assessment and Plan:  Ailin Good  is a 69 year old woman who is here for follow-up of thrombocytosis. Her work up has been negative so far including negative peripheral blood BCR/ABL, JAK2 and MPL mutations, negative inflammatory lab work up and no signs or symptoms of infection. Ferritin was slightly low and with oral iron she increased her ferritin to normal range. Currently with stable elevated platelet count of around 650,000. We discussed proceeding with a bone marrow biopsy as negative peripheral blood work up does not rule out myeloproliferative neoplasm or other hematological malignancy. She was advised she may discontinue oral iron however she should continue low dose aspirin. Return to clinic in 3 months or sooner if needed depending on when the bone marrow can be done and what the results reveal.    She agreed and verbalized understanding of the current plan.      SIGNATURES:  Louis Oshea M.D.    CC:  Jemma Martin P.A.-C.  No ref. provider found

## 2020-05-29 ENCOUNTER — APPOINTMENT (OUTPATIENT)
Dept: RADIOLOGY | Facility: MEDICAL CENTER | Age: 70
End: 2020-05-29
Attending: PHYSICIAN ASSISTANT
Payer: MEDICARE

## 2020-06-01 NOTE — PROGRESS NOTES
Outcome: Left voicemail/ message    Please transfer to [facility]  [phone] when patient returns call.     [facility] Verified: yes     Attempt # 2

## 2020-06-09 NOTE — PROGRESS NOTES
Outcome: Left voicemail/ message    Please transfer to [facility]  [phone] when patient returns call.     [facility] Verified: yes     Attempt # 3

## 2020-06-24 ENCOUNTER — TELEPHONE (OUTPATIENT)
Dept: HEMATOLOGY ONCOLOGY | Facility: MEDICAL CENTER | Age: 70
End: 2020-06-24

## 2020-06-24 NOTE — TELEPHONE ENCOUNTER
Patient called back. Pt prefers to see Dr. Hernandez in office first before she schedules the Bone Marrow Biopsy. Pt is scheduled for 8/17/2020 at 10:00am

## 2020-06-24 NOTE — TELEPHONE ENCOUNTER
Called pt and LVM informing her to call back to schedule Bone Marrow Biopsy.     Per patient request at appointment with Dr. Oshea in May, patient wanted to wait until closer to her appointment with Dr. Hernandez to schedule BX.     LVM for pt to call back so we can get it scheduled for 1-2 weeks prior to her appt with Dr. Hernandez.   *Transfer to [facility] when pt calls back.

## 2020-09-30 ENCOUNTER — HOSPITAL ENCOUNTER (OUTPATIENT)
Dept: LAB | Facility: MEDICAL CENTER | Age: 70
End: 2020-09-30
Attending: PHYSICIAN ASSISTANT
Payer: MEDICARE

## 2020-09-30 ENCOUNTER — HOSPITAL ENCOUNTER (OUTPATIENT)
Dept: LAB | Facility: MEDICAL CENTER | Age: 70
End: 2020-09-30
Attending: INTERNAL MEDICINE
Payer: MEDICARE

## 2020-09-30 DIAGNOSIS — D75.839 THROMBOCYTOSIS: ICD-10-CM

## 2020-09-30 LAB
BASOPHILS # BLD AUTO: 0.8 % (ref 0–1.8)
BASOPHILS # BLD: 0.04 K/UL (ref 0–0.12)
EOSINOPHIL # BLD AUTO: 0.08 K/UL (ref 0–0.51)
EOSINOPHIL NFR BLD: 1.5 % (ref 0–6.9)
ERYTHROCYTE [DISTWIDTH] IN BLOOD BY AUTOMATED COUNT: 51.7 FL (ref 35.9–50)
HCT VFR BLD AUTO: 44.4 % (ref 37–47)
HGB BLD-MCNC: 14.5 G/DL (ref 12–16)
IMM GRANULOCYTES # BLD AUTO: 0.01 K/UL (ref 0–0.11)
IMM GRANULOCYTES NFR BLD AUTO: 0.2 % (ref 0–0.9)
LYMPHOCYTES # BLD AUTO: 1.79 K/UL (ref 1–4.8)
LYMPHOCYTES NFR BLD: 33.6 % (ref 22–41)
MCH RBC QN AUTO: 31.2 PG (ref 27–33)
MCHC RBC AUTO-ENTMCNC: 32.7 G/DL (ref 33.6–35)
MCV RBC AUTO: 95.5 FL (ref 81.4–97.8)
MONOCYTES # BLD AUTO: 0.57 K/UL (ref 0–0.85)
MONOCYTES NFR BLD AUTO: 10.7 % (ref 0–13.4)
NEUTROPHILS # BLD AUTO: 2.83 K/UL (ref 2–7.15)
NEUTROPHILS NFR BLD: 53.2 % (ref 44–72)
NRBC # BLD AUTO: 0 K/UL
NRBC BLD-RTO: 0 /100 WBC
PLATELET # BLD AUTO: 687 K/UL (ref 164–446)
PMV BLD AUTO: 10.4 FL (ref 9–12.9)
RBC # BLD AUTO: 4.65 M/UL (ref 4.2–5.4)
T4 FREE SERPL-MCNC: 1.77 NG/DL (ref 0.93–1.7)
TSH SERPL DL<=0.005 MIU/L-ACNC: 1.06 UIU/ML (ref 0.38–5.33)
WBC # BLD AUTO: 5.3 K/UL (ref 4.8–10.8)

## 2020-09-30 PROCEDURE — 84439 ASSAY OF FREE THYROXINE: CPT

## 2020-09-30 PROCEDURE — 84443 ASSAY THYROID STIM HORMONE: CPT

## 2020-09-30 PROCEDURE — 85025 COMPLETE CBC W/AUTO DIFF WBC: CPT

## 2020-09-30 PROCEDURE — 36415 COLL VENOUS BLD VENIPUNCTURE: CPT

## 2020-10-05 ENCOUNTER — OFFICE VISIT (OUTPATIENT)
Dept: HEMATOLOGY ONCOLOGY | Facility: MEDICAL CENTER | Age: 70
End: 2020-10-05
Payer: MEDICARE

## 2020-10-05 VITALS
HEART RATE: 64 BPM | WEIGHT: 128.64 LBS | SYSTOLIC BLOOD PRESSURE: 126 MMHG | HEIGHT: 62 IN | DIASTOLIC BLOOD PRESSURE: 78 MMHG | OXYGEN SATURATION: 98 % | BODY MASS INDEX: 23.67 KG/M2 | RESPIRATION RATE: 16 BRPM | TEMPERATURE: 96.8 F

## 2020-10-05 DIAGNOSIS — D75.839 THROMBOCYTOSIS: ICD-10-CM

## 2020-10-05 PROCEDURE — 99213 OFFICE O/P EST LOW 20 MIN: CPT | Performed by: INTERNAL MEDICINE

## 2020-10-05 RX ORDER — HYDROCHLOROTHIAZIDE 25 MG/1
25 TABLET ORAL DAILY
COMMUNITY
End: 2020-11-03

## 2020-10-05 ASSESSMENT — ENCOUNTER SYMPTOMS
BRUISES/BLEEDS EASILY: 0
GASTROINTESTINAL NEGATIVE: 1
PSYCHIATRIC NEGATIVE: 1
NEUROLOGICAL NEGATIVE: 1
CARDIOVASCULAR NEGATIVE: 1
RESPIRATORY NEGATIVE: 1
CONSTITUTIONAL NEGATIVE: 1

## 2020-10-05 ASSESSMENT — FIBROSIS 4 INDEX: FIB4 SCORE: 0.42

## 2020-10-05 NOTE — PROGRESS NOTES
"Subjective:   Ailin Good is a 70 y.o. female who presents for evaluation of thrombocytosis.      HPI  This is a 70-year-old woman who was referred to hematology clinic for evaluation of persistent thrombocytosis.  Her evaluation included testing for JAK2 that was negative.  She had mild iron deficiency that was treated with oral iron.  She was recommended to undergo bone marrow aspirate and biopsy to rule out ET.  She has been undecided about that.  Today, she comes in for follow-up.  She feels well.  She takes 1 baby aspirin daily.    Review of Systems   Constitutional: Negative.    Respiratory: Negative.    Cardiovascular: Negative.    Gastrointestinal: Negative.    Musculoskeletal: Positive for joint pain.   Skin: Negative.    Neurological: Negative.    Endo/Heme/Allergies: Negative.  Does not bruise/bleed easily.   Psychiatric/Behavioral: Negative.       Objective:   /78 (BP Location: Right arm, Patient Position: Sitting, BP Cuff Size: Adult)   Pulse 64   Temp 36 °C (96.8 °F) (Temporal)   Resp 16   Ht 1.575 m (5' 2\")   Wt 58.3 kg (128 lb 10.2 oz)   LMP  (LMP Unknown)   SpO2 98%   BMI 23.53 kg/m²      Physical Exam  Vitals signs and nursing note reviewed.   Constitutional:       Appearance: Normal appearance. She is normal weight.   Eyes:      General: No scleral icterus.     Conjunctiva/sclera: Conjunctivae normal.   Cardiovascular:      Rate and Rhythm: Normal rate and regular rhythm.      Heart sounds: No murmur. No friction rub. No gallop.    Pulmonary:      Effort: Pulmonary effort is normal.      Breath sounds: Normal breath sounds. No rhonchi or rales.   Abdominal:      General: Abdomen is flat.      Palpations: Abdomen is soft. There is no mass.   Lymphadenopathy:      Upper Body:      Right upper body: No supraclavicular or axillary adenopathy.      Left upper body: No supraclavicular or axillary adenopathy.      Lower Body: No right inguinal adenopathy. No left inguinal adenopathy. "   Skin:     General: Skin is warm and dry.      Coloration: Skin is not pale.   Neurological:      General: No focal deficit present.      Mental Status: She is alert and oriented to person, place, and time.   Psychiatric:         Mood and Affect: Mood normal.       Lab Results   Component Value Date/Time    WBC 5.3 09/30/2020 08:17 AM    RBC 4.65 09/30/2020 08:17 AM    HEMOGLOBIN 14.5 09/30/2020 08:17 AM    HEMATOCRIT 44.4 09/30/2020 08:17 AM    MCV 95.5 09/30/2020 08:17 AM    MCH 31.2 09/30/2020 08:17 AM    MCHC 32.7 (L) 09/30/2020 08:17 AM    MPV 10.4 09/30/2020 08:17 AM    NEUTSPOLYS 53.20 09/30/2020 08:17 AM    LYMPHOCYTES 33.60 09/30/2020 08:17 AM    MONOCYTES 10.70 09/30/2020 08:17 AM    EOSINOPHILS 1.50 09/30/2020 08:17 AM    BASOPHILS 0.80 09/30/2020 08:17 AM      Lab Results   Component Value Date/Time    SODIUM 139 02/10/2020 12:23 PM    POTASSIUM 3.7 02/10/2020 12:23 PM    CHLORIDE 102 02/10/2020 12:23 PM    CO2 30 02/10/2020 12:23 PM    GLUCOSE 93 02/10/2020 12:23 PM    BUN 21 02/10/2020 12:23 PM    CREATININE 0.88 02/10/2020 12:23 PM      Assessment/Plan:   1. Thrombocytosis (HCC).  Differential diagnosis of persistent thrombocytosis was discussed with the patient.  I agree with recommendation of previous hematologist to proceed with a bone marrow aspirate and biopsy.  The patient has been deferring that.  Discussed the rationale for a bone marrow biopsy to exclude an underlying myeloproliferative neoplasm.  The patient would like to hold off on that.  We will recheck a CBC in 4 months from today and decide whether to do a bone marrow biopsy then.  For now, continue with aspirin 81 mg daily.  Return to clinic in 4 months with labs.    - CBC WITH DIFFERENTIAL; Future  - LDH; Future  - Sed Rate; Future

## 2020-10-22 ENCOUNTER — APPOINTMENT (OUTPATIENT)
Dept: RADIOLOGY | Facility: MEDICAL CENTER | Age: 70
End: 2020-10-22
Attending: PHYSICIAN ASSISTANT
Payer: MEDICARE

## 2020-10-22 DIAGNOSIS — Z12.31 VISIT FOR SCREENING MAMMOGRAM: ICD-10-CM

## 2020-10-29 SDOH — HEALTH STABILITY: PHYSICAL HEALTH: ON AVERAGE, HOW MANY DAYS PER WEEK DO YOU ENGAGE IN MODERATE TO STRENUOUS EXERCISE (LIKE A BRISK WALK)?: 7 DAYS

## 2020-10-29 SDOH — ECONOMIC STABILITY: TRANSPORTATION INSECURITY
IN THE PAST 12 MONTHS, HAS THE LACK OF TRANSPORTATION KEPT YOU FROM MEDICAL APPOINTMENTS OR FROM GETTING MEDICATIONS?: NO

## 2020-10-29 SDOH — ECONOMIC STABILITY: HOUSING INSECURITY
IN THE LAST 12 MONTHS, WAS THERE A TIME WHEN YOU DID NOT HAVE A STEADY PLACE TO SLEEP OR SLEPT IN A SHELTER (INCLUDING NOW)?: NO

## 2020-10-29 SDOH — ECONOMIC STABILITY: INCOME INSECURITY: IN THE LAST 12 MONTHS, WAS THERE A TIME WHEN YOU WERE NOT ABLE TO PAY THE MORTGAGE OR RENT ON TIME?: NO

## 2020-10-29 SDOH — HEALTH STABILITY: MENTAL HEALTH
STRESS IS WHEN SOMEONE FEELS TENSE, NERVOUS, ANXIOUS, OR CAN'T SLEEP AT NIGHT BECAUSE THEIR MIND IS TROUBLED. HOW STRESSED ARE YOU?: ONLY A LITTLE

## 2020-10-29 SDOH — ECONOMIC STABILITY: TRANSPORTATION INSECURITY
IN THE PAST 12 MONTHS, HAS LACK OF RELIABLE TRANSPORTATION KEPT YOU FROM MEDICAL APPOINTMENTS, MEETINGS, WORK OR FROM GETTING THINGS NEEDED FOR DAILY LIVING?: NO

## 2020-10-29 SDOH — HEALTH STABILITY: PHYSICAL HEALTH: ON AVERAGE, HOW MANY MINUTES DO YOU ENGAGE IN EXERCISE AT THIS LEVEL?: 70 MINUTES

## 2020-10-29 SDOH — ECONOMIC STABILITY: HOUSING INSECURITY: IN THE LAST 12 MONTHS, HOW MANY PLACES HAVE YOU LIVED?: 1

## 2020-10-29 ASSESSMENT — SOCIAL DETERMINANTS OF HEALTH (SDOH)
HOW HARD IS IT FOR YOU TO PAY FOR THE VERY BASICS LIKE FOOD, HOUSING, MEDICAL CARE, AND HEATING?: NOT HARD AT ALL
HOW OFTEN DO YOU GET TOGETHER WITH FRIENDS OR RELATIVES?: MORE THAN THREE TIMES A WEEK
HOW MANY DRINKS CONTAINING ALCOHOL DO YOU HAVE ON A TYPICAL DAY WHEN YOU ARE DRINKING: 1 OR 2
HOW OFTEN DO YOU HAVE A DRINK CONTAINING ALCOHOL: 4 OR MORE TIMES A WEEK
IN A TYPICAL WEEK, HOW MANY TIMES DO YOU TALK ON THE PHONE WITH FAMILY, FRIENDS, OR NEIGHBORS?: MORE THAN THREE TIMES A WEEK
DO YOU BELONG TO ANY CLUBS OR ORGANIZATIONS SUCH AS CHURCH GROUPS UNIONS, FRATERNAL OR ATHLETIC GROUPS, OR SCHOOL GROUPS?: YES
HOW OFTEN DO YOU HAVE SIX OR MORE DRINKS ON ONE OCCASION: NEVER
HOW OFTEN DO YOU ATTENT MEETINGS OF THE CLUB OR ORGANIZATION YOU BELONG TO?: MORE THAN 4 TIMES PER YEAR
WITHIN THE PAST 12 MONTHS, THE FOOD YOU BOUGHT JUST DIDN'T LAST AND YOU DIDN'T HAVE MONEY TO GET MORE: NEVER TRUE
WITHIN THE PAST 12 MONTHS, YOU WORRIED THAT YOUR FOOD WOULD RUN OUT BEFORE YOU GOT THE MONEY TO BUY MORE: NEVER TRUE
HOW OFTEN DO YOU ATTEND CHURCH OR RELIGIOUS SERVICES?: NEVER

## 2020-11-02 NOTE — PROGRESS NOTES
ANNUAL WELLNESS VISIT PRE-VISIT PLANNING WITHOUT OUTREACH    1.  Reviewed note from last office visit with PCP: YES    2.  If any orders were placed at last visit, do we have Results/Consult Notes?        •  Labs - Labs were not ordered at last office visit.  Note: If patient appointment is for lab review and patient did not complete labs, check with provider if OK to reschedule patient until labs completed.       •  Imaging - Imaging ordered, NOT completed. Patient advised to complete prior to next appointment.       •  Referrals - Referral ordered, patient has NOT been seen.    3.  Immunizations were updated in Epic using WebIZ?: Epic matches WebIZ       •  WebIZ Recommendations: FLU and SHINGRIX (Shingles)        •  Is patient due for Tdap? NO       •  Is patient due for Shingrix? YES. Patient was not notified of copay/out of pocket cost.     4.  Patient is due for the following Health Maintenance Topics:   Health Maintenance Due   Topic Date Due   • IMM ZOSTER VACCINES (1 of 2) 07/15/2002   • Annual Wellness Visit  04/07/2019   • MAMMOGRAM  05/06/2020   • COLONOSCOPY  06/04/2020   • IMM INFLUENZA (1) 09/01/2020           5.  Reviewed/Updated the following with patient:       •   Preferred Pharmacy? YES       •   Preferred Lab? YES       •   Preferred Communication? YES       •   Allergies? YES       •   Medications? YES. Was Abstract Encounter opened and chart updated? YES       •   Social History? YES. Was Abstract Encounter opened and chart updated? YES       •   Family History (document living status of immediate family members and if + hx of  cancer, diabetes, hypertension, hyperlipidemia, heart attack, stroke) YES. Was Abstract Encounter opened and chart updated? YES    6.  Care Team Updated:       •   DME Company (gait device, O2, CPAP, etc.): N\A       •   Other Specialists (eye doctor, derm, GYN, cardiology, endo, etc): YES    7. Orders for overdue Health Maintenance topics pended in Pre-Charting?  N\A    8.  Patient has the following Care Path diagnoses on Problem List:  NONE    9.  Patient was advised: “This is a free wellness visit. The provider will screen for medical conditions to help you stay healthy. If you have other concerns to address you may be asked to discuss these at a separate visit or there may be an additional fee.”     10.  Patient was informed to arrive 15 min prior to their scheduled appointment and bring in their medication bottles.

## 2020-11-03 ENCOUNTER — OFFICE VISIT (OUTPATIENT)
Dept: MEDICAL GROUP | Age: 70
End: 2020-11-03
Payer: MEDICARE

## 2020-11-03 VITALS
TEMPERATURE: 97.4 F | BODY MASS INDEX: 23.32 KG/M2 | SYSTOLIC BLOOD PRESSURE: 126 MMHG | DIASTOLIC BLOOD PRESSURE: 74 MMHG | HEART RATE: 57 BPM | HEIGHT: 63 IN | WEIGHT: 131.6 LBS | OXYGEN SATURATION: 97 %

## 2020-11-03 DIAGNOSIS — I10 ESSENTIAL HYPERTENSION: ICD-10-CM

## 2020-11-03 DIAGNOSIS — Z00.00 MEDICARE ANNUAL WELLNESS VISIT, SUBSEQUENT: Primary | ICD-10-CM

## 2020-11-03 DIAGNOSIS — D75.839 THROMBOCYTOSIS: ICD-10-CM

## 2020-11-03 DIAGNOSIS — Z23 NEED FOR VACCINATION: ICD-10-CM

## 2020-11-03 DIAGNOSIS — E89.0 POSTOPERATIVE HYPOTHYROIDISM: ICD-10-CM

## 2020-11-03 DIAGNOSIS — Z12.11 COLON CANCER SCREENING: ICD-10-CM

## 2020-11-03 PROBLEM — K63.5 POLYP OF COLON: Status: RESOLVED | Noted: 2019-02-20 | Resolved: 2020-11-03

## 2020-11-03 PROCEDURE — G0439 PPPS, SUBSEQ VISIT: HCPCS | Performed by: PHYSICIAN ASSISTANT

## 2020-11-03 PROCEDURE — G0008 ADMIN INFLUENZA VIRUS VAC: HCPCS | Performed by: PHYSICIAN ASSISTANT

## 2020-11-03 PROCEDURE — 90662 IIV NO PRSV INCREASED AG IM: CPT | Performed by: PHYSICIAN ASSISTANT

## 2020-11-03 RX ORDER — HYDROCHLOROTHIAZIDE 25 MG/1
25 TABLET ORAL DAILY
Qty: 90 TAB | Refills: 3 | Status: SHIPPED | OUTPATIENT
Start: 2020-11-03 | End: 2021-05-20

## 2020-11-03 ASSESSMENT — FIBROSIS 4 INDEX: FIB4 SCORE: 0.42

## 2020-11-03 ASSESSMENT — PATIENT HEALTH QUESTIONNAIRE - PHQ9: CLINICAL INTERPRETATION OF PHQ2 SCORE: 0

## 2020-11-03 ASSESSMENT — ACTIVITIES OF DAILY LIVING (ADL): BATHING_REQUIRES_ASSISTANCE: 0

## 2020-11-03 ASSESSMENT — ENCOUNTER SYMPTOMS: GENERAL WELL-BEING: GOOD

## 2020-11-03 NOTE — PROGRESS NOTES
Chief Complaint   Patient presents with   • Annual Wellness Visit     SCP         HPI:  Ailin is a 70 y.o. here for Medicare Annual Wellness Visit        Patient Active Problem List    Diagnosis Date Noted   • Thrombocytosis (HCC) 01/13/2020   • Arthritis 01/07/2020   • Overactive bladder 09/16/2019   • Essential hypertension 04/28/2017   • Postoperative hypothyroidism 04/28/2017   • Family history of coronary artery disease 04/28/2017   • Family history of pancreatic cancer 04/28/2017   • History of tobacco use disorder 04/28/2017   • Environmental allergies 04/28/2017   • Localized deposits of fat 04/28/2017       Current Outpatient Medications   Medication Sig Dispense Refill   • hydroCHLOROthiazide (HYDRODIURIL) 25 MG Tab Take 1 Tab by mouth every day for 90 days. 90 Tab 3   • ASPIRIN 81 PO      • levothyroxine (LEVOXYL) 112 MCG Tab Take 112 mcg by mouth Every morning on an empty stomach.       No current facility-administered medications for this visit.         Patient is taking medications as noted in medication list.  Current supplements as per medication list.     Allergies: Chocolate, Dust mite extract, Other environmental, Pollen extract, and Seasonal    Current social contact/activities: bowling and coffee and lunch with friends     Is patient current with immunizations? No, due for FLU. Patient is interested in receiving FLU today.    She  reports that she quit smoking about 7 years ago. Her smoking use included cigarettes. She smoked 0.25 packs per day. She has never used smokeless tobacco. She reports current alcohol use of about 0.6 oz of alcohol per week. She reports that she does not use drugs.  Counseling given: Not Answered        DPA/Advanced directive: Patient does not have an Advanced Directive.  A packet and workshop information was given on Advanced Directives.    ROS:    Gait: Uses no assistive device   Ostomy: No   Other tubes: No   Amputations: No   Chronic oxygen use No   Last eye exam  2018   Wears hearing aids: No   : Reports urinary leakage during the last 6 months that has somewhat interfered with their daily activities or sleep.      Screening:        Depression Screening    Little interest or pleasure in doing things?  0 - not at all  Feeling down, depressed, or hopeless? 0 - not at all  Patient Health Questionnaire Score: 0    If depressive symptoms identified deferred to follow up visit unless specifically addressed in assessment and plan.    Interpretation of PHQ-9 Total Score   Score Severity   1-4 No Depression   5-9 Mild Depression   10-14 Moderate Depression   15-19 Moderately Severe Depression   20-27 Severe Depression    Screening for Cognitive Impairment    Three Minute Recall (river, carla, finger)  2/3    Best clock face with all 12 numbers and set the hands to show 10 past 11.  Yes    If cognitive concerns identified, deferred for follow up unless specifically addressed in assessment and plan.    Fall Risk Assessment    Has the patient had two or more falls in the last year or any fall with injury in the last year?  Yes  If fall risk identified, deferred for follow up unless specifically addressed in assessment and plan.    Safety Assessment    Throw rugs on floor.  No  Handrails on all stairs.  Yes  Good lighting in all hallways.  Yes  Difficulty hearing.  No  Patient counseled about all safety risks that were identified.    Functional Assessment ADLs    Are there any barriers preventing you from cooking for yourself or meeting nutritional needs?  No.    Are there any barriers preventing you from driving safely or obtaining transportation?  No.    Are there any barriers preventing you from using a telephone or calling for help?  No.    Are there any barriers preventing you from shopping?  No.    Are there any barriers preventing you from taking care of your own finances?  No.    Are there any barriers preventing you from managing your medications?  No.    Are there any  barriers preventing you from showering, bathing or dressing yourself?  No.    Are you currently engaging in any exercise or physical activity?  Yes.  Walks 3 miles a day.  What is your perception of your health?  Good.    Health Maintenance Summary                IMM ZOSTER VACCINES Overdue 7/15/2002     MAMMOGRAM Overdue 2020      Done 2019 MA-SCREENING MAMMO BILAT W/TOMOSYNTHESIS W/CAD     Patient has more history with this topic...    COLONOSCOPY Overdue 2020      Done 2015 REFERRAL TO GI FOR COLONOSCOPY    IMM PNEUMOCOCCAL VACCINE: 65+ Years Next Due 2021      Done 2018 Imm Admin: Pneumococcal Conjugate Vaccine (Prevnar/PCV-13)     Patient has more history with this topic...    Annual Wellness Visit Next Due 2021      Done 11/3/2020 Visit Dx: Medicare annual wellness visit, subsequent     Patient has more history with this topic...    BONE DENSITY Next Due 3/23/2023      Done 3/23/2018 DS-BONE DENSITY STUDY (DEXA)     Patient has more history with this topic...    IMM DTaP/Tdap/Td Vaccine Next Due 2029      Done 2019 Imm Admin: Tdap Vaccine     Patient has more history with this topic...          Patient Care Team:  Jemma Martin P.A.-C. as PCP - General (Family Medicine)  Dr. Cholo Colbert DDS as Medical Home Care Manager (Dentistry)  [facility] as Consulting Physician (Optometry)  Haley Decker M.D. as Consulting Physician (Dermatology)    Social History     Tobacco Use   • Smoking status: Former Smoker     Packs/day: 0.25     Types: Cigarettes     Quit date: 2013     Years since quittin.7   • Smokeless tobacco: Never Used   Substance Use Topics   • Alcohol use: Yes     Alcohol/week: 0.6 oz     Types: 1 Glasses of wine per week     Frequency: 4 or more times a week     Drinks per session: 1 or 2     Binge frequency: Never     Comment: wine nightly    • Drug use: No     Family History   Problem Relation Age of Onset   • Heart Disease Mother    • Diabetes  "Mother    • Hypertension Mother    • Cancer Father         pancreatic cancer   • Stroke Father    • Hypertension Father    • Heart Disease Sister    • Hypertension Sister    • Hyperlipidemia Sister      She  has a past medical history of Allergy, Arthritis, Hypertension, Polyp of colon (2/20/2019), and Postoperative hypothyroidism.   Past Surgical History:   Procedure Laterality Date   • THYROIDECTOMY  2016   • TONSILLECTOMY  1969   • TUBAL COAGULATION LAPAROSCOPIC BILATERAL             Exam:     /74 (BP Location: Left arm, Patient Position: Sitting, BP Cuff Size: Adult)   Pulse (!) 57   Temp 36.3 °C (97.4 °F)   Ht 1.6 m (5' 3\")   Wt 59.7 kg (131 lb 9.6 oz)   SpO2 97%  Body mass index is 23.31 kg/m².    Hearing good.    Dentition good  Alert, oriented in no acute distress.  Eye contact is good, speech goal directed, affect calm      Assessment and Plan. The following treatment and monitoring plan is recommended:      1. Medicare annual wellness visit, subsequent  -Advised to continue with regular physical activity and reviewed diet control.    2. Thrombocytosis (HCC)  -Currently followed by hematology.  Platelets have been consistently elevated.  They have recommended bone marrow biopsy.  She is hesitant on proceeding with bone marrow biopsy.  She has follow-up appointment scheduled in 2 months, with repeat CBC.  Will rediscuss bone marrow biopsy with hematologist at that time.    3. Postoperative hypothyroidism  -Controlled.  Followed by endocrine.  Continue 112 MCG's of levothyroxine.      4. Essential hypertension  -Controlled.  Continue current medication.  Advised intermittent BP checks  - hydroCHLOROthiazide (HYDRODIURIL) 25 MG Tab; Take 1 Tab by mouth every day for 90 days.  Dispense: 90 Tab; Refill: 3  - Lipid Profile; Future    5. Colon cancer screening  -Due for screening.  Referral placed  - REFERRAL TO GI FOR COLONOSCOPY    6. Need for vaccination  -Immunization given in clinic today  - " Influenza Vaccine, High Dose (65+ Only)          Services suggested: No services needed at this time  Health Care Screening recommendations as per orders if indicated.  Referrals offered: PT/OT/Nutrition counseling/Behavioral Health/Smoking cessation as per orders if indicated.    Discussion today about general wellness and lifestyle habits:    · Prevent falls and reduce trip hazards; Cautioned about securing or removing rugs.  · Have a working fire alarm and carbon monoxide detector;   · Engage in regular physical activity and social activities       Follow-up: Return in about 1 year (around 11/3/2021) for AWV.

## 2020-11-18 ENCOUNTER — HOSPITAL ENCOUNTER (OUTPATIENT)
Dept: RADIOLOGY | Facility: MEDICAL CENTER | Age: 70
End: 2020-11-18
Attending: PHYSICIAN ASSISTANT
Payer: MEDICARE

## 2020-11-18 DIAGNOSIS — Z12.31 VISIT FOR SCREENING MAMMOGRAM: ICD-10-CM

## 2020-11-18 PROCEDURE — 77067 SCR MAMMO BI INCL CAD: CPT

## 2020-12-01 ENCOUNTER — APPOINTMENT (OUTPATIENT)
Dept: RADIOLOGY | Facility: MEDICAL CENTER | Age: 70
End: 2020-12-01
Attending: CHIROPRACTOR
Payer: MEDICARE

## 2020-12-01 DIAGNOSIS — M54.2 CERVICALGIA: ICD-10-CM

## 2020-12-01 PROCEDURE — 72141 MRI NECK SPINE W/O DYE: CPT

## 2020-12-02 ENCOUNTER — PATIENT MESSAGE (OUTPATIENT)
Dept: MEDICAL GROUP | Age: 70
End: 2020-12-02

## 2020-12-02 NOTE — PATIENT COMMUNICATION
1. Caller Name: Ailin Good                          Call Back Number: [phone] (home)         How would the patient prefer to be contacted with a response: [employer] message    2. SPECIFIC Action To Be Taken: Requesting MRI of brain d/t mass found on MRI of back. Did not know with MRI to pend d/t contrast    3. Diagnosis/Clinical Reason for Request: Mass     4. Specialty & Provider Name/Lab/Imaging Location: MRI of head    5. Is appointment scheduled for requested order/referral: no    Patient was not informed they will receive a return phone call from the office ONLY if there are any questions before processing their request. Advised to call back if they haven't received a call from the referral department in 5 days.

## 2020-12-03 DIAGNOSIS — G93.89 CEREBELLAR MASS: ICD-10-CM

## 2020-12-04 ENCOUNTER — HOSPITAL ENCOUNTER (OUTPATIENT)
Dept: LAB | Facility: MEDICAL CENTER | Age: 70
End: 2020-12-04
Attending: PHYSICIAN ASSISTANT
Payer: MEDICARE

## 2020-12-04 DIAGNOSIS — G93.89 CEREBELLAR MASS: ICD-10-CM

## 2020-12-04 LAB
ANION GAP SERPL CALC-SCNC: 8 MMOL/L (ref 7–16)
BUN SERPL-MCNC: 33 MG/DL (ref 8–22)
CALCIUM SERPL-MCNC: 9.1 MG/DL (ref 8.5–10.5)
CHLORIDE SERPL-SCNC: 104 MMOL/L (ref 96–112)
CO2 SERPL-SCNC: 28 MMOL/L (ref 20–33)
CREAT SERPL-MCNC: 0.82 MG/DL (ref 0.5–1.4)
GLUCOSE SERPL-MCNC: 101 MG/DL (ref 65–99)
POTASSIUM SERPL-SCNC: 3.9 MMOL/L (ref 3.6–5.5)
SODIUM SERPL-SCNC: 140 MMOL/L (ref 135–145)

## 2020-12-04 PROCEDURE — 36415 COLL VENOUS BLD VENIPUNCTURE: CPT

## 2020-12-04 PROCEDURE — 80048 BASIC METABOLIC PNL TOTAL CA: CPT

## 2020-12-04 NOTE — PROGRESS NOTES
Patient had MRI done by chiropractor for cervical pain.  Incidentally there was a partial mass noted of the cerebellum.  Recommended follow-up with MRI with and without contrast

## 2020-12-12 ENCOUNTER — HOSPITAL ENCOUNTER (OUTPATIENT)
Dept: RADIOLOGY | Facility: MEDICAL CENTER | Age: 70
End: 2020-12-12
Attending: PHYSICIAN ASSISTANT
Payer: MEDICARE

## 2020-12-12 DIAGNOSIS — G93.89 CEREBELLAR MASS: ICD-10-CM

## 2020-12-12 PROCEDURE — A9576 INJ PROHANCE MULTIPACK: HCPCS

## 2020-12-12 PROCEDURE — 70553 MRI BRAIN STEM W/O & W/DYE: CPT

## 2020-12-12 PROCEDURE — 700117 HCHG RX CONTRAST REV CODE 255

## 2020-12-12 RX ADMIN — GADOTERIDOL 10 ML: 279.3 INJECTION, SOLUTION INTRAVENOUS at 18:45

## 2020-12-14 DIAGNOSIS — D32.9 MENINGIOMA (HCC): ICD-10-CM

## 2020-12-16 ENCOUNTER — PATIENT MESSAGE (OUTPATIENT)
Dept: MEDICAL GROUP | Age: 70
End: 2020-12-16

## 2020-12-16 DIAGNOSIS — D32.9 MENINGIOMA (HCC): ICD-10-CM

## 2020-12-16 NOTE — PATIENT COMMUNICATION
1. Caller Name: Ailin Good                          Call Back Number: [phone] (home)         How would the patient prefer to be contacted with a response: [employer] message    2. SPECIFIC Action To Be Taken: Referral pending, please sign.    3. Diagnosis/Clinical Reason for Request: Meningioma    4. Specialty & Provider Name/Lab/Imaging Location: [facility] Dr Erwin (unable to get through for appt at [facility], pt requesting a new location)    5. Is appointment scheduled for requested order/referral: no    Patient was not informed they will receive a return phone call from the office ONLY if there are any questions before processing their request. Advised to call back if they haven't received a call from the referral department in 5 days.

## 2020-12-21 ENCOUNTER — HOSPITAL ENCOUNTER (OUTPATIENT)
Dept: RADIOLOGY | Facility: MEDICAL CENTER | Age: 70
End: 2020-12-21
Attending: PHYSICIAN ASSISTANT
Payer: MEDICARE

## 2020-12-21 DIAGNOSIS — M50.00 CERVICAL DISC DISORDER WITH MYELOPATHY, UNSPECIFIED CERVICAL REGION: ICD-10-CM

## 2020-12-21 PROCEDURE — 72050 X-RAY EXAM NECK SPINE 4/5VWS: CPT

## 2020-12-28 NOTE — TELEPHONE ENCOUNTER
From: Ailin Good  To: Jemma Martin P.A.-C.  Sent: 12/16/2020 11:34 AM PST  Subject: Non-Urgent Medical Question    Hi,    I would not hurt to have a referral sent over to [facility]. I understand Dr. Odalys Erwin is very good neurosurgeon.

## 2020-12-31 ENCOUNTER — APPOINTMENT (OUTPATIENT)
Dept: MEDICAL GROUP | Age: 70
End: 2020-12-31
Payer: MEDICARE

## 2021-01-20 ENCOUNTER — OFFICE VISIT (OUTPATIENT)
Dept: URGENT CARE | Facility: CLINIC | Age: 71
End: 2021-01-20
Payer: MEDICARE

## 2021-01-20 VITALS
SYSTOLIC BLOOD PRESSURE: 142 MMHG | OXYGEN SATURATION: 95 % | BODY MASS INDEX: 23.21 KG/M2 | WEIGHT: 131 LBS | TEMPERATURE: 97.9 F | HEART RATE: 54 BPM | HEIGHT: 63 IN | DIASTOLIC BLOOD PRESSURE: 64 MMHG | RESPIRATION RATE: 20 BRPM

## 2021-01-20 DIAGNOSIS — H61.23 BILATERAL IMPACTED CERUMEN: ICD-10-CM

## 2021-01-20 DIAGNOSIS — J01.40 SUBACUTE PANSINUSITIS: ICD-10-CM

## 2021-01-20 DIAGNOSIS — H92.03 OTALGIA OF BOTH EARS: ICD-10-CM

## 2021-01-20 DIAGNOSIS — R51.9 SINUS HEADACHE: ICD-10-CM

## 2021-01-20 PROCEDURE — 99214 OFFICE O/P EST MOD 30 MIN: CPT | Mod: 25 | Performed by: NURSE PRACTITIONER

## 2021-01-20 PROCEDURE — 69210 REMOVE IMPACTED EAR WAX UNI: CPT | Performed by: NURSE PRACTITIONER

## 2021-01-20 RX ORDER — LEVOTHYROXINE SODIUM 0.12 MG/1
TABLET ORAL
COMMUNITY
End: 2021-04-19

## 2021-01-20 RX ORDER — LEVOTHYROXINE SODIUM 0.12 MG/1
TABLET ORAL
COMMUNITY
Start: 2020-12-09 | End: 2023-02-14

## 2021-01-20 RX ORDER — HYDROCHLOROTHIAZIDE 25 MG/1
TABLET ORAL
COMMUNITY
End: 2021-03-08

## 2021-01-20 ASSESSMENT — FIBROSIS 4 INDEX: FIB4 SCORE: 0.42

## 2021-01-21 ASSESSMENT — ENCOUNTER SYMPTOMS
DIZZINESS: 0
CHILLS: 0
SHORTNESS OF BREATH: 0
VOMITING: 0
SINUS PAIN: 1
HEADACHES: 1
SORE THROAT: 1
FEVER: 0
NAUSEA: 0
COUGH: 0

## 2021-01-21 NOTE — PROGRESS NOTES
Ailin Good is a 70 y.o. female who presents for Other (diminshed hearing R ear, x 2 weeks) and Sinus Pain (x 1 week, congestion)      HPI this is a new problem.  Ailin is 70-year-old female presents with diminished hearing in both of her ears for 2 weeks with some sinus pain and pressure.  Her sinus pain and pressure is typical for her at this time of year.  She reports she gets a pressure every year.  She has been treating her sinus pain with over-the-counter medications with mild relief.  She feels that her face is congested.  Her ears are causing her some discomfort and feel like they are plugged like she cannot hear anything.  She has tried using Q-tips and that made it worse today.  That is prompting her urgent care visit today primarily.  Her symptoms are slowly but progressively getting worse every day.  No other aggravating or alleviating factors.    Review of Systems   Constitutional: Negative for chills, fever and malaise/fatigue.   HENT: Positive for congestion, sinus pain and sore throat (scratchy).         Facial pain      Respiratory: Negative for cough and shortness of breath.    Cardiovascular: Negative for chest pain.   Gastrointestinal: Negative for nausea and vomiting.   Neurological: Positive for headaches (dull , frontal ache). Negative for dizziness.       Allergies   Allergen Reactions   • Chocolate    • Dust Mite Extract    • Other Environmental      Sun exposure   • Pollen Extract    • Seasonal      Every tree     Past Medical History:   Diagnosis Date   • Allergy    • Arthritis    • Hypertension    • Polyp of colon 2/20/2019   • Postoperative hypothyroidism      Past Surgical History:   Procedure Laterality Date   • THYROIDECTOMY  2016   • TONSILLECTOMY  1969   • TUBAL COAGULATION LAPAROSCOPIC BILATERAL       Family History   Problem Relation Age of Onset   • Heart Disease Mother    • Diabetes Mother    • Hypertension Mother    • Cancer Father         pancreatic cancer   • Stroke Father   "  • Hypertension Father    • Heart Disease Sister    • Hypertension Sister    • Hyperlipidemia Sister      Social History     Tobacco Use   • Smoking status: Former Smoker     Packs/day: 0.25     Types: Cigarettes     Quit date: 2013     Years since quittin.9   • Smokeless tobacco: Never Used   Substance Use Topics   • Alcohol use: Yes     Alcohol/week: 0.6 oz     Types: 1 Glasses of wine per week     Frequency: 4 or more times a week     Drinks per session: 1 or 2     Binge frequency: Never     Comment: wine nightly      Patient Active Problem List   Diagnosis   • Essential hypertension   • Postoperative hypothyroidism   • Family history of coronary artery disease   • Family history of pancreatic cancer   • History of tobacco use disorder   • Environmental allergies   • Localized deposits of fat   • Overactive bladder   • Arthritis   • Thrombocytosis (HCC)     Current Outpatient Medications on File Prior to Visit   Medication Sig Dispense Refill   • hydroCHLOROthiazide (HYDRODIURIL) 25 MG Tab Take 1 Tab by mouth every day for 90 days. 90 Tab 3   • ASPIRIN 81 PO      • levothyroxine (LEVOXYL) 112 MCG Tab Take 112 mcg by mouth Every morning on an empty stomach.     • hydroCHLOROthiazide (HYDRODIURIL) 25 MG Tab TAKE 1 TABLET BY MOUTH EVERY DAY     • levothyroxine (SYNTHROID) 125 MCG Tab 1 tab(s) 6 days, 1 1/2 tab 1 day     • levothyroxine (SYNTHROID) 125 MCG Tab TAKE 1 TAB BY MOUTH 6 DAYS A WEEK AND 1 AND 1/2 TAB 1 DAY A WEEK       No current facility-administered medications on file prior to visit.           Objective:     /64 (BP Location: Right arm, Patient Position: Sitting, BP Cuff Size: Adult)   Pulse (!) 54   Temp 36.6 °C (97.9 °F) (Temporal)   Resp 20   Ht 1.6 m (5' 3\")   Wt 59.4 kg (131 lb)   LMP  (LMP Unknown)   SpO2 95%   BMI 23.21 kg/m²     Physical Exam  Vitals signs and nursing note reviewed.   Constitutional:       General: She is not in acute distress.     Appearance: Normal " appearance. She is well-developed and normal weight.   HENT:      Head: Normocephalic.      Comments: Bilateral cerumen Impaction   Removal Procedure:    I have discussed the potential risks of this procedure including but not limited to mild discomfort with a sensation of ear fullness and wetness, dizziness, ear canal inflammation, ear canal abrasion with bleeding, and/or ear drum perforation.Patient is aware and consents to the procedure.  Cerumen removed easily with flushing, curettage (by provider)   Pt tolerated well. No adverse effects.   Reassessment revealed: Unable to remove cerumen from her ears.  Cerumen is very hard.  Multiple attempts were made including soaking followed by flushing and then curettage by provider       Right Ear: External ear normal. Decreased hearing noted. There is impacted cerumen.      Left Ear: External ear normal. Decreased hearing noted. There is impacted cerumen.      Ears:      Comments: Hard impaction in both ears.  Unable to visualize tympanic membrane     Nose: No mucosal edema or rhinorrhea.      Right Sinus: Frontal sinus tenderness present. No maxillary sinus tenderness.      Left Sinus: Frontal sinus tenderness present. No maxillary sinus tenderness.      Mouth/Throat:      Mouth: Mucous membranes are moist.      Pharynx: Uvula midline.   Eyes:      Conjunctiva/sclera:      Right eye: Right conjunctiva is injected.      Left eye: Left conjunctiva is injected.   Neck:      Musculoskeletal: Full passive range of motion without pain, normal range of motion and neck supple.      Trachea: Trachea and phonation normal.   Cardiovascular:      Rate and Rhythm: Normal rate and regular rhythm.      Chest Wall: PMI is not displaced.      Pulses: Normal pulses.      Heart sounds: Normal heart sounds.   Pulmonary:      Effort: Pulmonary effort is normal.      Breath sounds: Normal breath sounds.   Lymphadenopathy:      Cervical: No cervical adenopathy.      Upper Body:      Right  upper body: No supraclavicular adenopathy.      Left upper body: No supraclavicular adenopathy.   Skin:     General: Skin is warm and dry.      Capillary Refill: Capillary refill takes less than 2 seconds.   Neurological:      Mental Status: She is alert and oriented to person, place, and time.   Psychiatric:         Mood and Affect: Mood normal.         Speech: Speech normal.         Behavior: Behavior normal.         Thought Content: Thought content normal.         Assessment /Associated Orders:      1. Bilateral impacted cerumen     2. Subacute pansinusitis     3. Otalgia of both ears     4. Sinus headache           Medical Decision Making:    Pt is clinically stable at today's acute urgent care visit.  No acute distress noted. Appropriate for outpatient management at this time.     Over-the-counter earwax removal medication such as Cerumenex or Debrox daily for the next 2 to 3 days.  That she should return to urgent care for reevaluation and possible further removal of earwax.  There is no signs and symptoms of bacterial sinus infection on exam today.  OTC antihistamine of choice. Follow manufactures dosing and safety guidelines.  OTC Saline irrigations or Neti pot rinse. Follow manufacturers recommendations.    Humidifier at night prn    OTC  analgesic/ antipyretic of choice ( acetaminophen or NSAID). Follow manufactures dosing and safety precautions.   OTC medications for symptomatic relief of symptoms  Keep well hydrated  Increase rest    I personally reviewed prior external notes and test results pertinent to today's visit.  I have independently reviewed and interpreted all diagnostics ordered during this urgent care acute visit.   Discussed management options (risks,benefits, and alternatives to treatment). Pt expresses understanding and the treatment plan was agreed upon. Questions were encouraged and answered to pt's satisfaction. Time spent evaluating this patient was at least 30 minutes and includes  preparing for visit, counseling/education, exam and evaluation, obtaining history, independent interpretation and ordering lab/test/procedures.  Red flags discussed and indications to immediately call 911 or present to the Emergency Department.     Advised the patient to follow-up with the primary care physician for recheck, reevaluation, and consideration of further management if necessary.   Return to urgent care prn if new or worsening sx or if there is no improvement in condition in 2-3 days.    Please note that this dictation was created using voice recognition software. I have made a reasonable attempt to correct obvious errors, but I expect that there are errors of grammar and possibly content that I did not discover before finalizing the note.    This note was electronically signed by Talita CALHOUN, Urgent Care

## 2021-01-22 ENCOUNTER — OFFICE VISIT (OUTPATIENT)
Dept: URGENT CARE | Facility: CLINIC | Age: 71
End: 2021-01-22
Payer: MEDICARE

## 2021-01-22 VITALS
DIASTOLIC BLOOD PRESSURE: 86 MMHG | OXYGEN SATURATION: 96 % | BODY MASS INDEX: 23.21 KG/M2 | RESPIRATION RATE: 16 BRPM | SYSTOLIC BLOOD PRESSURE: 132 MMHG | WEIGHT: 131 LBS | TEMPERATURE: 96.7 F | HEART RATE: 56 BPM | HEIGHT: 63 IN

## 2021-01-22 DIAGNOSIS — H61.23 IMPACTED CERUMEN, BILATERAL: ICD-10-CM

## 2021-01-22 PROCEDURE — 99213 OFFICE O/P EST LOW 20 MIN: CPT | Performed by: NURSE PRACTITIONER

## 2021-01-22 ASSESSMENT — FIBROSIS 4 INDEX: FIB4 SCORE: 0.42

## 2021-01-23 NOTE — PROGRESS NOTES
Subjective:      Ailin Good is a 70 y.o. female who presents with Cerumen Impaction (seen in UC on , told to try Debrox and return)    Past Medical History:   Diagnosis Date   • Allergy    • Arthritis    • Hypertension    • Polyp of colon 2019   • Postoperative hypothyroidism      Social History     Socioeconomic History   • Marital status: Single     Spouse name: Not on file   • Number of children: Not on file   • Years of education: Not on file   • Highest education level: Associate degree: occupational, technical, or vocational program   Occupational History   • Not on file   Social Needs   • Financial resource strain: Not hard at all   • Food insecurity     Worry: Never true     Inability: Never true   • Transportation needs     Medical: No     Non-medical: No   Tobacco Use   • Smoking status: Former Smoker     Packs/day: 0.25     Types: Cigarettes     Quit date: 2013     Years since quittin.9   • Smokeless tobacco: Never Used   Substance and Sexual Activity   • Alcohol use: Yes     Alcohol/week: 0.6 oz     Types: 1 Glasses of wine per week     Frequency: 4 or more times a week     Drinks per session: 1 or 2     Binge frequency: Never     Comment: wine nightly    • Drug use: No   • Sexual activity: Not Currently     Partners: Male   Lifestyle   • Physical activity     Days per week: 7 days     Minutes per session: 70 min   • Stress: Only a little   Relationships   • Social connections     Talks on phone: More than three times a week     Gets together: More than three times a week     Attends Anabaptism service: Never     Active member of club or organization: Yes     Attends meetings of clubs or organizations: More than 4 times per year     Relationship status:    • Intimate partner violence     Fear of current or ex partner: Not on file     Emotionally abused: Not on file     Physically abused: Not on file     Forced sexual activity: Not on file   Other Topics Concern   • Not on file  "  Social History Narrative   • Not on file     Family History   Problem Relation Age of Onset   • Heart Disease Mother    • Diabetes Mother    • Hypertension Mother    • Cancer Father         pancreatic cancer   • Stroke Father    • Hypertension Father    • Heart Disease Sister    • Hypertension Sister    • Hyperlipidemia Sister        Allergies: Chocolate, Dust mite extract, Other environmental, Pollen extract, and Seasonal        Patient is a 70-year-old female who presents with bilateral cerumen impaction.  She was seen in urgent care 2 days ago and attempt to lavage was unsuccessful.  Patient has been using Debrox in her ears and is back requesting reattempt to lavage her ears.  Endorses fullness and decreased hearing especially on the right side.    Cerumen Impaction  This is a new problem. The current episode started 1 to 4 weeks ago. The problem occurs constantly. The problem has been unchanged. Nothing aggravates the symptoms. Treatments tried: prior ear lavage, use of debrox. The treatment provided no relief.       Review of Systems   HENT:        Ear fullness and decreased hearing   All other systems reviewed and are negative.         Objective:     /86   Pulse (!) 56   Temp 35.9 °C (96.7 °F) (Temporal)   Resp 16   Ht 1.6 m (5' 3\")   Wt 59.4 kg (131 lb)   LMP  (LMP Unknown)   SpO2 96%   BMI 23.21 kg/m²      Physical Exam  Vitals signs reviewed.   Constitutional:       Appearance: Normal appearance.   HENT:      Head: Normocephalic and atraumatic.      Right Ear: There is impacted cerumen.      Left Ear: There is impacted cerumen.   Neurological:      Mental Status: She is alert.       Post lavage:   Large amount of cerumen expectorated from the right EAC.  EAC is clear completely after lavage and tympanic membrane is visible.  Patient endorses significant subjective improvement.  We are unable to completely lavage the left EAC of all wax.  Patient states she does still have some fullness in " the left EAC.  At this time, she will continue to use Debrox on the left side for several more days and states she will return for another attempt on the left EAC.  I did advise patient that if we are unable to expectorate cerumen after a reasonable attempt that we may also consider referral to ENT for further evaluation.  Patient verbalized understanding and agreement with plan of care.          Assessment/Plan:        1. Impacted cerumen, bilateral    See note above  Return for a third attempt on the left EAC  Consider referral to ENT if cerumen continues to be resistant

## 2021-01-26 ENCOUNTER — HOSPITAL ENCOUNTER (OUTPATIENT)
Dept: LAB | Facility: MEDICAL CENTER | Age: 71
End: 2021-01-26
Attending: PHYSICIAN ASSISTANT
Payer: MEDICARE

## 2021-01-26 ENCOUNTER — HOSPITAL ENCOUNTER (OUTPATIENT)
Dept: LAB | Facility: MEDICAL CENTER | Age: 71
End: 2021-01-26
Attending: INTERNAL MEDICINE
Payer: MEDICARE

## 2021-01-26 DIAGNOSIS — I10 ESSENTIAL HYPERTENSION: ICD-10-CM

## 2021-01-26 DIAGNOSIS — D75.839 THROMBOCYTOSIS: ICD-10-CM

## 2021-01-26 LAB
BASOPHILS # BLD AUTO: 0.6 % (ref 0–1.8)
BASOPHILS # BLD: 0.03 K/UL (ref 0–0.12)
CHOLEST SERPL-MCNC: 209 MG/DL (ref 100–199)
EOSINOPHIL # BLD AUTO: 0.04 K/UL (ref 0–0.51)
EOSINOPHIL NFR BLD: 0.8 % (ref 0–6.9)
ERYTHROCYTE [DISTWIDTH] IN BLOOD BY AUTOMATED COUNT: 45.5 FL (ref 35.9–50)
ERYTHROCYTE [SEDIMENTATION RATE] IN BLOOD BY WESTERGREN METHOD: 1 MM/HOUR (ref 0–30)
FASTING STATUS PATIENT QL REPORTED: NORMAL
HCT VFR BLD AUTO: 46.7 % (ref 37–47)
HDLC SERPL-MCNC: 95 MG/DL
HGB BLD-MCNC: 15.2 G/DL (ref 12–16)
IMM GRANULOCYTES # BLD AUTO: 0.01 K/UL (ref 0–0.11)
IMM GRANULOCYTES NFR BLD AUTO: 0.2 % (ref 0–0.9)
LDH SERPL L TO P-CCNC: 239 U/L (ref 107–266)
LDLC SERPL CALC-MCNC: 102 MG/DL
LYMPHOCYTES # BLD AUTO: 1.93 K/UL (ref 1–4.8)
LYMPHOCYTES NFR BLD: 37.6 % (ref 22–41)
MCH RBC QN AUTO: 30.5 PG (ref 27–33)
MCHC RBC AUTO-ENTMCNC: 32.5 G/DL (ref 33.6–35)
MCV RBC AUTO: 93.6 FL (ref 81.4–97.8)
MONOCYTES # BLD AUTO: 0.45 K/UL (ref 0–0.85)
MONOCYTES NFR BLD AUTO: 8.8 % (ref 0–13.4)
NEUTROPHILS # BLD AUTO: 2.67 K/UL (ref 2–7.15)
NEUTROPHILS NFR BLD: 52 % (ref 44–72)
NRBC # BLD AUTO: 0 K/UL
NRBC BLD-RTO: 0 /100 WBC
PLATELET # BLD AUTO: 694 K/UL (ref 164–446)
PMV BLD AUTO: 10.1 FL (ref 9–12.9)
RBC # BLD AUTO: 4.99 M/UL (ref 4.2–5.4)
TRIGL SERPL-MCNC: 58 MG/DL (ref 0–149)
WBC # BLD AUTO: 5.1 K/UL (ref 4.8–10.8)

## 2021-01-26 PROCEDURE — 83615 LACTATE (LD) (LDH) ENZYME: CPT

## 2021-01-26 PROCEDURE — 85652 RBC SED RATE AUTOMATED: CPT

## 2021-01-26 PROCEDURE — 80061 LIPID PANEL: CPT

## 2021-01-26 PROCEDURE — 85025 COMPLETE CBC W/AUTO DIFF WBC: CPT

## 2021-01-28 ENCOUNTER — TELEPHONE (OUTPATIENT)
Dept: HEMATOLOGY ONCOLOGY | Facility: MEDICAL CENTER | Age: 71
End: 2021-01-28

## 2021-01-28 NOTE — TELEPHONE ENCOUNTER
Ileana Lopez A.P.R.N.  You; Gt Smith 1 hour ago (1:07 PM)     I think it's a good idea but should probably see David or Jacques first - instead of me     That way if there is anything else that they recommend it can be done at the same time & not piece mealed

## 2021-01-28 NOTE — TELEPHONE ENCOUNTER
Pt called into the clinic in regards to their upcoming appointment. Pt states that they completed their lab orders and that their platelet levels are high. Pt mentioned that there had been discussions about getting a BMB during their last visit and they were holding off for the time being. Patient states they want to proceed with getting a BMB. Pt stated they want to cancel their upcoming appt and perform a BMB before coming into the clinic. Informed Pt I would relay a message to LJ Lopez and DYLAN Novak in regards to the information. Patient decided to keep upcoming appointment for the time being. Patient would like a call back in regards to the next steps.

## 2021-02-09 ENCOUNTER — TELEMEDICINE (OUTPATIENT)
Dept: HEMATOLOGY ONCOLOGY | Facility: MEDICAL CENTER | Age: 71
End: 2021-02-09
Payer: MEDICARE

## 2021-02-09 DIAGNOSIS — D32.9 MENINGIOMA (HCC): ICD-10-CM

## 2021-02-09 DIAGNOSIS — I10 ESSENTIAL HYPERTENSION: ICD-10-CM

## 2021-02-09 DIAGNOSIS — D75.839 THROMBOCYTOSIS: ICD-10-CM

## 2021-02-09 PROCEDURE — 99214 OFFICE O/P EST MOD 30 MIN: CPT | Mod: 95 | Performed by: INTERNAL MEDICINE

## 2021-02-09 ASSESSMENT — ENCOUNTER SYMPTOMS
CHILLS: 0
NAUSEA: 0
DEPRESSION: 0
DIAPHORESIS: 0
WHEEZING: 0
PHOTOPHOBIA: 0
BRUISES/BLEEDS EASILY: 0
SENSORY CHANGE: 0
DIZZINESS: 0
SPUTUM PRODUCTION: 0
HEADACHES: 0
NERVOUS/ANXIOUS: 0
DOUBLE VISION: 0
DIARRHEA: 0
BLURRED VISION: 0
SHORTNESS OF BREATH: 0
COUGH: 0
TINGLING: 0
INSOMNIA: 0
VOMITING: 0
FEVER: 0
CONSTIPATION: 0
MYALGIAS: 0
WEIGHT LOSS: 0

## 2021-02-09 NOTE — PROGRESS NOTES
Follow Up Note:  Hematology/Oncology      Primary Care:  Jemma Martin P.A.-C.    Diagnosis: Thrombocytosis    Chief Complaint: High platelet count    History of Presenting Illness:  Ailin Good is a 70 y.o. female  who was referred to hematology clinic for evaluation of persistent thrombocytosis.  Her evaluation included testing for JAK2 that was negative.  She had mild iron deficiency that was treated with oral iron.  She was recommended to undergo bone marrow aspirate and biopsy to rule out ET.  She has been undecided about that.  Today, she comes in for follow-up.  She feels well.  She takes 1 baby aspirin daily.       Interval History:  Patient is here for follow up visit.  Interval increase of the platelet count to 694,000  The patient denies any symptoms which could be referred to a TIA or stroke    Past Medical History:   Diagnosis Date   • Allergy    • Arthritis    • Hypertension    • Polyp of colon 2/20/2019   • Postoperative hypothyroidism        Allergies as of 02/09/2021 - Reviewed 02/09/2021   Allergen Reaction Noted   • Chocolate  09/16/2019   • Dust mite extract  09/16/2019   • Other environmental  09/16/2019   • Pollen extract  09/16/2019   • Seasonal  09/16/2019         Current Outpatient Medications:   •  hydroCHLOROthiazide (HYDRODIURIL) 25 MG Tab, TAKE 1 TABLET BY MOUTH EVERY DAY, Disp: , Rfl:   •  ASPIRIN 81 PO, , Disp: , Rfl:   •  levothyroxine (LEVOXYL) 112 MCG Tab, Take 112 mcg by mouth Every morning on an empty stomach., Disp: , Rfl:   •  levothyroxine (SYNTHROID) 125 MCG Tab, 1 tab(s) 6 days, 1 1/2 tab 1 day, Disp: , Rfl:   •  levothyroxine (SYNTHROID) 125 MCG Tab, TAKE 1 TAB BY MOUTH 6 DAYS A WEEK AND 1 AND 1/2 TAB 1 DAY A WEEK, Disp: , Rfl:   •  hydroCHLOROthiazide (HYDRODIURIL) 25 MG Tab, Take 1 Tab by mouth every day for 90 days., Disp: 90 Tab, Rfl: 3      Review of Systems:  Review of Systems   Constitutional: Negative for chills, diaphoresis, fever, malaise/fatigue and weight loss.    HENT: Negative for nosebleeds and tinnitus.    Eyes: Negative for blurred vision, double vision and photophobia.   Respiratory: Negative for cough, sputum production, shortness of breath and wheezing.    Cardiovascular: Negative for chest pain and leg swelling.   Gastrointestinal: Negative for constipation, diarrhea, nausea and vomiting.   Genitourinary: Negative for dysuria.   Musculoskeletal: Negative for joint pain and myalgias.   Skin: Negative for rash.   Neurological: Negative for dizziness, tingling, sensory change and headaches.   Endo/Heme/Allergies: Does not bruise/bleed easily.   Psychiatric/Behavioral: Negative for depression. The patient is not nervous/anxious and does not have insomnia.          Physical Exam:  There were no vitals filed for this visit.    Physical Exam      Labs:  No visits with results within 1 Day(s) from this visit.   Latest known visit with results is:   Hospital Outpatient Visit on 01/26/2021   Component Date Value Ref Range Status   • Sed Rate Stanren 01/26/2021 1  0 - 30 mm/hour Final   • LDH Total 01/26/2021 239  107 - 266 U/L Final   • WBC 01/26/2021 5.1  4.8 - 10.8 K/uL Final   • RBC 01/26/2021 4.99  4.20 - 5.40 M/uL Final   • Hemoglobin 01/26/2021 15.2  12.0 - 16.0 g/dL Final   • Hematocrit 01/26/2021 46.7  37.0 - 47.0 % Final   • MCV 01/26/2021 93.6  81.4 - 97.8 fL Final   • MCH 01/26/2021 30.5  27.0 - 33.0 pg Final   • MCHC 01/26/2021 32.5* 33.6 - 35.0 g/dL Final   • RDW 01/26/2021 45.5  35.9 - 50.0 fL Final   • Platelet Count 01/26/2021 694* 164 - 446 K/uL Final   • MPV 01/26/2021 10.1  9.0 - 12.9 fL Final   • Neutrophils-Polys 01/26/2021 52.00  44.00 - 72.00 % Final   • Lymphocytes 01/26/2021 37.60  22.00 - 41.00 % Final   • Monocytes 01/26/2021 8.80  0.00 - 13.40 % Final   • Eosinophils 01/26/2021 0.80  0.00 - 6.90 % Final   • Basophils 01/26/2021 0.60  0.00 - 1.80 % Final   • Immature Granulocytes 01/26/2021 0.20  0.00 - 0.90 % Final   • Nucleated RBC 01/26/2021  0.00  /100 WBC Final   • Neutrophils (Absolute) 01/26/2021 2.67  2.00 - 7.15 K/uL Final    Includes immature neutrophils, if present.   • Lymphs (Absolute) 01/26/2021 1.93  1.00 - 4.80 K/uL Final   • Monos (Absolute) 01/26/2021 0.45  0.00 - 0.85 K/uL Final   • Eos (Absolute) 01/26/2021 0.04  0.00 - 0.51 K/uL Final   • Baso (Absolute) 01/26/2021 0.03  0.00 - 0.12 K/uL Final   • Immature Granulocytes (abs) 01/26/2021 0.01  0.00 - 0.11 K/uL Final   • NRBC (Absolute) 01/26/2021 0.00  K/uL Final       Imaging:   No results found.      Assessment & Plan:    70-year-old female with moderate progressive thrombocytosis negative JAK2 and negative PCR for BCR ABL  Not JAK2 reflex testing were obtained  The plan is to obtain a bone marrow aspiration biopsy and complete the work-up with MPL and calretinin and JAK2 mutation for exon 12 from 14  I went over risk-benefit on doing a bone marrow aspiration biopsy potential complication  We explain that the results of the bone marrow may lead to change in management  We also went generally over the prognosis of essential thrombocythemia indicating that a diagnosis may potentially not change her longevity  The patient is indicating that Dr. Rizo is following up on her meningioma  At that additional testing are planned    Return in 4 weeks    she agreed and verbalized her agreement and understanding with the current plan. I answered all questions and concerns she has at this time and advised her to call at any time in the interim with questions or concerns in regards to her care      This evaluation was conducted via Zoom using secure and encrypted videoconferencing technology. The patient was in a private location in the state [facility].    The patient's identity was confirmed and verbal consent was obtained for this virtual visit.    Please note that this dictation was created using voice recognition software. I have made every reasonable attempt to correct obvious errors, but I  expect that there are errors of grammar and possibly content that I did not discover before finalizing the note.      Thank you for allowing me to participate in his care, I will continue to follow.

## 2021-03-02 ENCOUNTER — TELEPHONE (OUTPATIENT)
Dept: HEMATOLOGY ONCOLOGY | Facility: MEDICAL CENTER | Age: 71
End: 2021-03-02

## 2021-03-02 ENCOUNTER — HOSPITAL ENCOUNTER (OUTPATIENT)
Dept: RADIOLOGY | Facility: MEDICAL CENTER | Age: 71
End: 2021-03-02
Attending: PHYSICIAN ASSISTANT
Payer: MEDICARE

## 2021-03-02 DIAGNOSIS — M50.00 CERVICAL DISC DISORDER WITH MYELOPATHY, UNSPECIFIED CERVICAL REGION: ICD-10-CM

## 2021-03-02 PROCEDURE — 72050 X-RAY EXAM NECK SPINE 4/5VWS: CPT

## 2021-03-02 NOTE — TELEPHONE ENCOUNTER
Patient called in regards to a BMB that has not been done. The patient stated that it was never scheduled and she just did not know what was going on. She states that she would like to get the procedure done. I informed the patient that I would get it scheduled and that I would move her follow up appointment accordingly to ensure that the results were in. The patient agreed and verbalized understanding. The patient stated that I could send the details in a [employer] message.

## 2021-03-03 DIAGNOSIS — Z23 NEED FOR VACCINATION: ICD-10-CM

## 2021-03-08 ENCOUNTER — PRE-ADMISSION TESTING (OUTPATIENT)
Dept: ADMISSIONS | Facility: MEDICAL CENTER | Age: 71
End: 2021-03-08
Attending: INTERNAL MEDICINE
Payer: MEDICARE

## 2021-03-08 RX ORDER — DIPHENHYDRAMINE HCL 25 MG
50 TABLET ORAL EVERY 6 HOURS PRN
COMMUNITY
End: 2023-06-10

## 2021-03-08 NOTE — OR NURSING
Left message with Endo Dept to call patient if she needs to stop her ASA for procedure on 3/10/21 and she is not sure of her check in time.

## 2021-03-10 ENCOUNTER — HOSPITAL ENCOUNTER (OUTPATIENT)
Facility: MEDICAL CENTER | Age: 71
End: 2021-03-10
Attending: INTERNAL MEDICINE | Admitting: INTERNAL MEDICINE
Payer: MEDICARE

## 2021-03-10 VITALS
OXYGEN SATURATION: 98 % | DIASTOLIC BLOOD PRESSURE: 82 MMHG | TEMPERATURE: 98.5 F | SYSTOLIC BLOOD PRESSURE: 159 MMHG | HEIGHT: 63 IN | RESPIRATION RATE: 16 BRPM | WEIGHT: 132.28 LBS | BODY MASS INDEX: 23.44 KG/M2 | HEART RATE: 54 BPM

## 2021-03-10 DIAGNOSIS — D75.839 THROMBOCYTOSIS: ICD-10-CM

## 2021-03-10 LAB
BASOPHILS # BLD AUTO: 0.8 % (ref 0–1.8)
BASOPHILS # BLD: 0.04 K/UL (ref 0–0.12)
EOSINOPHIL # BLD AUTO: 0.07 K/UL (ref 0–0.51)
EOSINOPHIL NFR BLD: 1.4 % (ref 0–6.9)
ERYTHROCYTE [DISTWIDTH] IN BLOOD BY AUTOMATED COUNT: 47.4 FL (ref 35.9–50)
HCT VFR BLD AUTO: 46.3 % (ref 37–47)
HGB BLD-MCNC: 15.6 G/DL (ref 12–16)
HGB RETIC QN AUTO: 33.1 PG/CELL (ref 29–35)
IMM GRANULOCYTES # BLD AUTO: 0.02 K/UL (ref 0–0.11)
IMM GRANULOCYTES NFR BLD AUTO: 0.4 % (ref 0–0.9)
IMM RETICS NFR: 8.4 % (ref 9.3–17.4)
LYMPHOCYTES # BLD AUTO: 1.64 K/UL (ref 1–4.8)
LYMPHOCYTES NFR BLD: 32.5 % (ref 22–41)
MCH RBC QN AUTO: 31.3 PG (ref 27–33)
MCHC RBC AUTO-ENTMCNC: 33.7 G/DL (ref 33.6–35)
MCV RBC AUTO: 92.8 FL (ref 81.4–97.8)
MONOCYTES # BLD AUTO: 0.42 K/UL (ref 0–0.85)
MONOCYTES NFR BLD AUTO: 8.3 % (ref 0–13.4)
NEUTROPHILS # BLD AUTO: 2.86 K/UL (ref 2–7.15)
NEUTROPHILS NFR BLD: 56.6 % (ref 44–72)
NRBC # BLD AUTO: 0 K/UL
NRBC BLD-RTO: 0 /100 WBC
PATHOLOGY CONSULT NOTE: NORMAL
PLATELET # BLD AUTO: 581 K/UL (ref 164–446)
PMV BLD AUTO: 9.9 FL (ref 9–12.9)
RBC # BLD AUTO: 4.99 M/UL (ref 4.2–5.4)
RETICS # AUTO: 0.08 M/UL (ref 0.04–0.06)
RETICS/RBC NFR: 1.6 % (ref 0.8–2.1)
WBC # BLD AUTO: 5.1 K/UL (ref 4.8–10.8)

## 2021-03-10 PROCEDURE — 88185 FLOWCYTOMETRY/TC ADD-ON: CPT | Mod: 91

## 2021-03-10 PROCEDURE — 85046 RETICYTE/HGB CONCENTRATE: CPT

## 2021-03-10 PROCEDURE — 160048 HCHG OR STATISTICAL LEVEL 1-5: Performed by: HOSPITALIST

## 2021-03-10 PROCEDURE — 160025 RECOVERY II MINUTES (STATS): Performed by: HOSPITALIST

## 2021-03-10 PROCEDURE — 160027 HCHG SURGERY MINUTES - 1ST 30 MINS LEVEL 2: Performed by: HOSPITALIST

## 2021-03-10 PROCEDURE — 88305 TISSUE EXAM BY PATHOLOGIST: CPT | Mod: 59

## 2021-03-10 PROCEDURE — 160046 HCHG PACU - 1ST 60 MINS PHASE II: Performed by: HOSPITALIST

## 2021-03-10 PROCEDURE — 38222 DX BONE MARROW BX & ASPIR: CPT | Performed by: HOSPITALIST

## 2021-03-10 PROCEDURE — 88184 FLOWCYTOMETRY/ TC 1 MARKER: CPT

## 2021-03-10 PROCEDURE — 700111 HCHG RX REV CODE 636 W/ 250 OVERRIDE (IP)

## 2021-03-10 PROCEDURE — 85025 COMPLETE CBC W/AUTO DIFF WBC: CPT

## 2021-03-10 PROCEDURE — 88237 TISSUE CULTURE BONE MARROW: CPT

## 2021-03-10 PROCEDURE — 88264 CHROMOSOME ANALYSIS 20-25: CPT

## 2021-03-10 PROCEDURE — 88313 SPECIAL STAINS GROUP 2: CPT | Mod: 91

## 2021-03-10 PROCEDURE — 160047 HCHG PACU  - EA ADDL 30 MINS PHASE II: Performed by: HOSPITALIST

## 2021-03-10 PROCEDURE — 88311 DECALCIFY TISSUE: CPT

## 2021-03-10 RX ORDER — MIDAZOLAM HYDROCHLORIDE 1 MG/ML
INJECTION INTRAMUSCULAR; INTRAVENOUS
Status: DISCONTINUED
Start: 2021-03-10 | End: 2021-03-10 | Stop reason: HOSPADM

## 2021-03-10 RX ORDER — SODIUM CHLORIDE 9 MG/ML
500 INJECTION, SOLUTION INTRAVENOUS
Status: CANCELLED | OUTPATIENT
Start: 2021-03-10 | End: 2021-03-10

## 2021-03-10 RX ORDER — MIDAZOLAM HYDROCHLORIDE 1 MG/ML
.5-2 INJECTION INTRAMUSCULAR; INTRAVENOUS PRN
Status: CANCELLED | OUTPATIENT
Start: 2021-03-10 | End: 2021-03-10

## 2021-03-10 ASSESSMENT — FIBROSIS 4 INDEX: FIB4 SCORE: 0.41

## 2021-03-10 NOTE — OR NURSING
1110- Pt arrives s/p BMBx, pt awake and alert, denies pain, pt received no sedation.    1115- Pt spoke with pt's sister, who is her ride, sts she will be here at 12pm noon.    1117- Report to Dannielle.

## 2021-03-10 NOTE — OR NURSING
1222 resumed patient care from Dannielle GARCIA RN for break.     1226 pt ride out front. Patient brought down by wheelchair with CHIARA Pacheco. D/c instructions signed by friend.

## 2021-03-10 NOTE — PROCEDURES
Bone Marrow Biopsy/Aspiration    Date/Time: 3/10/2021 10:48 AM  Performed by: Joe Black M.D.  Authorized by: Joe Black M.D.     Consent:     Consent obtained:  Written    Consent given by:  Patient    Risks discussed:  Bleeding, pain and repeat procedure    Alternatives discussed:  No treatment  Universal protocol:     Procedure explained and questions answered to patient or proxy's satisfaction: yes      Relevant documents present and verified: yes      Test results available and properly labeled: yes      Imaging studies available: yes      Required blood products, implants, devices, and special equipment available: yes      Immediately prior to procedure a time out was called: yes      Site/side marked: yes      Patient identity confirmed:  Verbally with patient and hospital-assigned identification number  Pre-procedure details:     Procedure type:  Aspiration and biopsy    Requesting physician:  Jacques     Indications:  Thrombocytosis     Position:  Prone    Buttock laterality:  Left    Local anesthetic:  1% Lidocaine    Subcutaneous volume:  1 mL    Periosteum anesthetic volume:  4 mL    Preparation: Patient was prepped and draped in usual sterile fashion    Sedation:     Patient Sedated: No    Procedure details:     Biopsy performed:  1 core    Number of attempts:  1  Post-procedure:     Puncture site:  Adhesive bandage applied    Patient tolerance of procedure:  Tolerated with difficulty

## 2021-03-10 NOTE — OR NURSING
1117  RECEIVED REPORT FROM BUCK ISAAC.  ASSUMED CARE OF PATIENT.  PATIENT RESTING COMFORTABLY.    1222  REPORT TO SHAWN ISAAC.

## 2021-03-10 NOTE — PROGRESS NOTES
Patient preferred no sedation due to anesthesia side effects. Educated patient that she will be going under moderate sedation as her referred doctor recommended. Patient agreed to start with local (lidocaine) and if she need medication due to pain she will take it. Patient tolerated the procedure with no discomfort and didn't needed any sedation.

## 2021-03-12 ENCOUNTER — TELEPHONE (OUTPATIENT)
Dept: HEMATOLOGY ONCOLOGY | Facility: MEDICAL CENTER | Age: 71
End: 2021-03-12

## 2021-03-12 NOTE — TELEPHONE ENCOUNTER
Called pt and LVM to call back. When pt calls back, please inform her that we need to reschedule her appt due to not having her biopsy results back. Please schedule pt for 3/17 at the soonest.

## 2021-03-12 NOTE — TELEPHONE ENCOUNTER
Patient called the clinic and relayed information to patient per DYLAN Tesfaye. Patient verbally acknowledged and moved appt to 03/22/2021

## 2021-03-16 ENCOUNTER — APPOINTMENT (OUTPATIENT)
Dept: HEMATOLOGY ONCOLOGY | Facility: MEDICAL CENTER | Age: 71
End: 2021-03-16
Payer: MEDICARE

## 2021-03-22 ENCOUNTER — TELEMEDICINE (OUTPATIENT)
Dept: HEMATOLOGY ONCOLOGY | Facility: MEDICAL CENTER | Age: 71
End: 2021-03-22
Payer: MEDICARE

## 2021-03-22 DIAGNOSIS — I10 ESSENTIAL HYPERTENSION: ICD-10-CM

## 2021-03-22 DIAGNOSIS — D47.3 ESSENTIAL THROMBOCYTHEMIA (HCC): ICD-10-CM

## 2021-03-22 PROCEDURE — 99999 PR NO CHARGE: CPT | Mod: 95 | Performed by: INTERNAL MEDICINE

## 2021-03-22 RX ORDER — HYDROXYUREA 500 MG/1
CAPSULE ORAL
Qty: 30 CAPSULE | Refills: 3 | Status: SHIPPED | OUTPATIENT
Start: 2021-03-22 | End: 2021-03-24 | Stop reason: SDUPTHER

## 2021-03-22 RX ORDER — HYDROCHLOROTHIAZIDE 25 MG/1
25 TABLET ORAL DAILY
COMMUNITY
End: 2021-11-30

## 2021-03-22 ASSESSMENT — ENCOUNTER SYMPTOMS
DEPRESSION: 0
DOUBLE VISION: 0
COUGH: 0
HEADACHES: 0
MYALGIAS: 0
WEIGHT LOSS: 0
DIARRHEA: 0
DIZZINESS: 0
CHILLS: 0
TINGLING: 0
SHORTNESS OF BREATH: 0
NAUSEA: 0
SPUTUM PRODUCTION: 0
SENSORY CHANGE: 0
VOMITING: 0
WHEEZING: 0
FEVER: 0
BLURRED VISION: 0
PHOTOPHOBIA: 0
BRUISES/BLEEDS EASILY: 0
INSOMNIA: 0
NERVOUS/ANXIOUS: 0
CONSTIPATION: 0
DIAPHORESIS: 0

## 2021-03-22 ASSESSMENT — PATIENT HEALTH QUESTIONNAIRE - PHQ9: CLINICAL INTERPRETATION OF PHQ2 SCORE: 0

## 2021-03-22 NOTE — PROGRESS NOTES
Follow Up Note:  Hematology/Oncology      Primary Care:  Jemma Martin P.A.-C.    Diagnosis: Thrombocytosis    Chief Complaint:    History of Presenting Illness:  Ailin Good is a 70 y.o. female Ailin Good is a 70 y.o. female  who was referred to hematology clinic for evaluation of persistent thrombocytosis.  Her evaluation included testing for JAK2 that was negative.  She had mild iron deficiency that was treated with oral iron.  She was recommended to undergo bone marrow aspirate and biopsy to rule out ET.  She has been undecided about that.  Today, she comes in for follow-up.  She feels well.  She takes 1 baby aspirin daily.      Interval History:  Patient is here for follow up visit.      Past Medical History:   Diagnosis Date   • Allergy    • Anesthesia     PONV   • Arthritis    • Hypertension    • Polyp of colon 2/20/2019   • PONV (postoperative nausea and vomiting)    • Postoperative hypothyroidism     thyroidectomy   • Psychiatric problem 03/2021    situational depression ( loss of dog)   • Urinary incontinence     minor       Allergies as of 03/22/2021 - Reviewed 03/10/2021   Allergen Reaction Noted   • Chocolate  09/16/2019   • Dust mite extract  09/16/2019   • Other environmental  09/16/2019   • Penicillins Hives 03/08/2021   • Pollen extract  09/16/2019   • Seasonal  09/16/2019         Current Outpatient Medications:   •  diphenhydrAMINE (BENADRYL) 25 MG Tab, Take 50 mg by mouth every 6 hours as needed for Sleep. allergy, Disp: , Rfl:   •  levothyroxine (SYNTHROID) 125 MCG Tab, 1 tab(s) 6 days, 1 1/2 tab 1 day, Disp: , Rfl:   •  levothyroxine (SYNTHROID) 125 MCG Tab, TAKE 1 TAB BY MOUTH 6 DAYS A WEEK AND 1 AND 1/2 TAB 1 DAY A WEEK, Disp: , Rfl:   •  hydroCHLOROthiazide (HYDRODIURIL) 25 MG Tab, Take 1 Tab by mouth every day for 90 days. (Patient taking differently: Take 25 mg by mouth every morning.), Disp: 90 Tab, Rfl: 3  •  ASPIRIN 81 PO, every day., Disp: , Rfl:       Review of Systems:  Review of  Systems   Constitutional: Negative for chills, diaphoresis, fever, malaise/fatigue and weight loss.   HENT: Negative for nosebleeds and tinnitus.    Eyes: Negative for blurred vision, double vision and photophobia.   Respiratory: Negative for cough, sputum production, shortness of breath and wheezing.    Cardiovascular: Negative for chest pain and leg swelling.   Gastrointestinal: Negative for constipation, diarrhea, nausea and vomiting.   Genitourinary: Negative for dysuria.   Musculoskeletal: Negative for joint pain and myalgias.   Skin: Negative for rash.   Neurological: Negative for dizziness, tingling, sensory change and headaches.   Endo/Heme/Allergies: Does not bruise/bleed easily.   Psychiatric/Behavioral: Negative for depression. The patient is not nervous/anxious and does not have insomnia.          Physical Exam:  There were no vitals filed for this visit.    Physical Exam      Labs:  No visits with results within 1 Day(s) from this visit.   Latest known visit with results is:   Admission on 03/10/2021, Discharged on 03/10/2021   Component Date Value Ref Range Status   • WBC 03/10/2021 5.1  4.8 - 10.8 K/uL Final   • RBC 03/10/2021 4.99  4.20 - 5.40 M/uL Final   • Hemoglobin 03/10/2021 15.6  12.0 - 16.0 g/dL Final   • Hematocrit 03/10/2021 46.3  37.0 - 47.0 % Final   • MCV 03/10/2021 92.8  81.4 - 97.8 fL Final   • MCH 03/10/2021 31.3  27.0 - 33.0 pg Final   • MCHC 03/10/2021 33.7  33.6 - 35.0 g/dL Final   • RDW 03/10/2021 47.4  35.9 - 50.0 fL Final   • Platelet Count 03/10/2021 581* 164 - 446 K/uL Final   • MPV 03/10/2021 9.9  9.0 - 12.9 fL Final   • Neutrophils-Polys 03/10/2021 56.60  44.00 - 72.00 % Final   • Lymphocytes 03/10/2021 32.50  22.00 - 41.00 % Final   • Monocytes 03/10/2021 8.30  0.00 - 13.40 % Final   • Eosinophils 03/10/2021 1.40  0.00 - 6.90 % Final   • Basophils 03/10/2021 0.80  0.00 - 1.80 % Final   • Immature Granulocytes 03/10/2021 0.40  0.00 - 0.90 % Final   • Nucleated RBC 03/10/2021  0.00  /100 WBC Final   • Neutrophils (Absolute) 03/10/2021 2.86  2.00 - 7.15 K/uL Final    Includes immature neutrophils, if present.   • Lymphs (Absolute) 03/10/2021 1.64  1.00 - 4.80 K/uL Final   • Monos (Absolute) 03/10/2021 0.42  0.00 - 0.85 K/uL Final   • Eos (Absolute) 03/10/2021 0.07  0.00 - 0.51 K/uL Final   • Baso (Absolute) 03/10/2021 0.04  0.00 - 0.12 K/uL Final   • Immature Granulocytes (abs) 03/10/2021 0.02  0.00 - 0.11 K/uL Final   • NRBC (Absolute) 03/10/2021 0.00  K/uL Final   • Reticulocyte Count 03/10/2021 1.6  0.8 - 2.1 % Final   • Retic, Absolute 03/10/2021 0.08* 0.04 - 0.06 M/uL Final   • Imm. Reticulocyte Fraction 03/10/2021 8.4* 9.3 - 17.4 % Final   • Retic Hgb Equivalent 03/10/2021 33.1  29.0 - 35.0 pg/cell Final   • Pathology Request 03/10/2021 Sent to Histo   Final       Imaging:   DX-CERVICAL SPINE-4+ VIEWS    Result Date: 3/2/2021  3/2/2021 5:29 PM HISTORY/REASON FOR EXAM:  Pain in cervical spine history of degenerative disc disease. TECHNIQUE/EXAM DESCRIPTION AND NUMBER OF VIEWS: Cervical spine series, 4 views. COMPARISON:  12/21/2020 FINDINGS: There is no evidence of acute fracture in the cervical spine. No focal compression fractures are noted. No focal malalignment within the cervical spine is identified. There is mild degenerative disc disease and facet arthropathy. No abnormal focal displacement is noted between flexion and extension views.     1.  No compression deformity or acute fracture. 2.  Mild degenerative disc disease and facet arthropathy again appear to be present. 3.  No focal instability is noted on flexion extension views.        Assessment & Plan:  70-year-old female with moderate progressive thrombocytosis negative JAK2 and negative PCR for BCR ABL  Not JAK2 reflex testing were obtained  The plan is to obtain a bone marrow aspiration biopsy and complete the work-up with MPL and calretinin and JAK2 mutation for exon 12 from 14  I went over risk-benefit on doing a bone  marrow aspiration biopsy potential complication  We explain that the results of the bone marrow may lead to change in management  We also went generally over the prognosis of essential thrombocythemia indicating that a diagnosis may potentially not change her longevity  The patient is indicating that Dr. Rizo is following up on her meningioma  At that additional testing are planned    Peripheral blood smear and CBC:  Mild thrombocytosis.  Bone marrow aspirate, clot, and core biopsy:  Normocellular marrow with trilineage hematopoiesis, some large atypical  megakaryocytes, and a few small loose megakaryocyte clusters.  No reticulin or collagen fibrosis.  No increased blasts.  Comment: Although not diagnostic, the above findings in a patient with persistent  thrombocytosis is suggestive of a myeloproliferative neoplasm, particularly  essential thrombocythemia. Testing for MPL, JAK2, and CALR have been ordered. 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 I answered all questions and concerns she has at this time and advised her to call at any time in the interim with questions or concerns in regards to her care.     This evaluation was conducted via Zoom using secure and encrypted videoconferencing technology. The patient was in a private location in the [facility].    The patient's identity was confirmed and verbal consent was obtained for this virtual visit.  Please note that this dictation was created using voice recognition software. I have made every reasonable attempt to correct obvious errors, but I expect that there are errors of grammar and possibly content that I  did not discover before finalizing the note.      Thank you for allowing me to participate in his care, I will continue to follow.

## 2021-03-24 DIAGNOSIS — D47.3 ESSENTIAL THROMBOCYTHEMIA (HCC): ICD-10-CM

## 2021-03-24 RX ORDER — HYDROXYUREA 500 MG/1
CAPSULE ORAL
Qty: 30 CAPSULE | Refills: 3 | Status: SHIPPED | OUTPATIENT
Start: 2021-03-24 | End: 2021-04-19 | Stop reason: DRUGHIGH

## 2021-03-31 ENCOUNTER — HOSPITAL ENCOUNTER (OUTPATIENT)
Dept: LAB | Facility: MEDICAL CENTER | Age: 71
End: 2021-03-31
Attending: PHYSICIAN ASSISTANT
Payer: MEDICARE

## 2021-03-31 LAB
T4 FREE SERPL-MCNC: 1.64 NG/DL (ref 0.93–1.7)
TSH SERPL DL<=0.005 MIU/L-ACNC: 0.86 UIU/ML (ref 0.38–5.33)

## 2021-03-31 PROCEDURE — 84439 ASSAY OF FREE THYROXINE: CPT

## 2021-03-31 PROCEDURE — 84443 ASSAY THYROID STIM HORMONE: CPT

## 2021-03-31 PROCEDURE — 36415 COLL VENOUS BLD VENIPUNCTURE: CPT

## 2021-04-16 ENCOUNTER — HOSPITAL ENCOUNTER (OUTPATIENT)
Dept: LAB | Facility: MEDICAL CENTER | Age: 71
End: 2021-04-16
Attending: INTERNAL MEDICINE
Payer: MEDICARE

## 2021-04-16 ENCOUNTER — TELEPHONE (OUTPATIENT)
Dept: HEMATOLOGY ONCOLOGY | Facility: MEDICAL CENTER | Age: 71
End: 2021-04-16

## 2021-04-16 DIAGNOSIS — D47.3 ESSENTIAL THROMBOCYTHEMIA (HCC): ICD-10-CM

## 2021-04-16 LAB
BASOPHILS # BLD AUTO: 0.8 % (ref 0–1.8)
BASOPHILS # BLD: 0.04 K/UL (ref 0–0.12)
EOSINOPHIL # BLD AUTO: 0.13 K/UL (ref 0–0.51)
EOSINOPHIL NFR BLD: 2.5 % (ref 0–6.9)
ERYTHROCYTE [DISTWIDTH] IN BLOOD BY AUTOMATED COUNT: 52.8 FL (ref 35.9–50)
HCT VFR BLD AUTO: 41.9 % (ref 37–47)
HGB BLD-MCNC: 13.7 G/DL (ref 12–16)
IMM GRANULOCYTES # BLD AUTO: 0.01 K/UL (ref 0–0.11)
IMM GRANULOCYTES NFR BLD AUTO: 0.2 % (ref 0–0.9)
LYMPHOCYTES # BLD AUTO: 2.06 K/UL (ref 1–4.8)
LYMPHOCYTES NFR BLD: 39.3 % (ref 22–41)
MCH RBC QN AUTO: 31.1 PG (ref 27–33)
MCHC RBC AUTO-ENTMCNC: 32.7 G/DL (ref 33.6–35)
MCV RBC AUTO: 95.2 FL (ref 81.4–97.8)
MONOCYTES # BLD AUTO: 0.56 K/UL (ref 0–0.85)
MONOCYTES NFR BLD AUTO: 10.7 % (ref 0–13.4)
NEUTROPHILS # BLD AUTO: 2.44 K/UL (ref 2–7.15)
NEUTROPHILS NFR BLD: 46.5 % (ref 44–72)
NRBC # BLD AUTO: 0 K/UL
NRBC BLD-RTO: 0 /100 WBC
PLATELET # BLD AUTO: 589 K/UL (ref 164–446)
PMV BLD AUTO: 10.1 FL (ref 9–12.9)
RBC # BLD AUTO: 4.4 M/UL (ref 4.2–5.4)
WBC # BLD AUTO: 5.2 K/UL (ref 4.8–10.8)

## 2021-04-16 PROCEDURE — 36415 COLL VENOUS BLD VENIPUNCTURE: CPT

## 2021-04-16 PROCEDURE — 85025 COMPLETE CBC W/AUTO DIFF WBC: CPT

## 2021-04-16 NOTE — TELEPHONE ENCOUNTER
LVM for patient to return call. Patient needs labs done by tomorrow the latest or will need to reschedule her appointment on Monday.

## 2021-04-16 NOTE — TELEPHONE ENCOUNTER
Patient called into the clinic returning DYLAN Mckeon's call. Relayed information to patient to get labs done no later then tomorrow. Patient verbally acknowledged and stated that they will complete them today.

## 2021-04-17 ENCOUNTER — HOSPITAL ENCOUNTER (OUTPATIENT)
Dept: LAB | Facility: MEDICAL CENTER | Age: 71
End: 2021-04-17
Attending: INTERNAL MEDICINE
Payer: MEDICARE

## 2021-04-17 DIAGNOSIS — D47.3 ESSENTIAL THROMBOCYTHEMIA (HCC): ICD-10-CM

## 2021-04-17 LAB
ALBUMIN SERPL BCP-MCNC: 4.1 G/DL (ref 3.2–4.9)
ALBUMIN/GLOB SERPL: 1.4 G/DL
ALP SERPL-CCNC: 63 U/L (ref 30–99)
ALT SERPL-CCNC: 46 U/L (ref 2–50)
ANION GAP SERPL CALC-SCNC: 11 MMOL/L (ref 7–16)
AST SERPL-CCNC: 45 U/L (ref 12–45)
BILIRUB SERPL-MCNC: 0.2 MG/DL (ref 0.1–1.5)
BUN SERPL-MCNC: 41 MG/DL (ref 8–22)
CALCIUM SERPL-MCNC: 8.7 MG/DL (ref 8.5–10.5)
CHLORIDE SERPL-SCNC: 104 MMOL/L (ref 96–112)
CO2 SERPL-SCNC: 25 MMOL/L (ref 20–33)
CREAT SERPL-MCNC: 0.77 MG/DL (ref 0.5–1.4)
GLOBULIN SER CALC-MCNC: 2.9 G/DL (ref 1.9–3.5)
GLUCOSE SERPL-MCNC: 99 MG/DL (ref 65–99)
POTASSIUM SERPL-SCNC: 5 MMOL/L (ref 3.6–5.5)
PROT SERPL-MCNC: 7 G/DL (ref 6–8.2)
SODIUM SERPL-SCNC: 140 MMOL/L (ref 135–145)

## 2021-04-17 PROCEDURE — 80053 COMPREHEN METABOLIC PANEL: CPT

## 2021-04-17 PROCEDURE — 36415 COLL VENOUS BLD VENIPUNCTURE: CPT

## 2021-04-19 ENCOUNTER — TELEMEDICINE (OUTPATIENT)
Dept: HEMATOLOGY ONCOLOGY | Facility: MEDICAL CENTER | Age: 71
End: 2021-04-19
Payer: MEDICARE

## 2021-04-19 ENCOUNTER — TELEPHONE (OUTPATIENT)
Dept: HEMATOLOGY ONCOLOGY | Facility: MEDICAL CENTER | Age: 71
End: 2021-04-19

## 2021-04-19 VITALS — DIASTOLIC BLOOD PRESSURE: 73 MMHG | SYSTOLIC BLOOD PRESSURE: 125 MMHG

## 2021-04-19 DIAGNOSIS — I10 ESSENTIAL HYPERTENSION: ICD-10-CM

## 2021-04-19 DIAGNOSIS — D75.1 ERYTHROCYTOSIS: ICD-10-CM

## 2021-04-19 DIAGNOSIS — D47.3 ESSENTIAL THROMBOCYTHEMIA (HCC): Primary | ICD-10-CM

## 2021-04-19 DIAGNOSIS — D47.3 ESSENTIAL THROMBOCYTHEMIA (HCC): ICD-10-CM

## 2021-04-19 PROCEDURE — 99214 OFFICE O/P EST MOD 30 MIN: CPT | Mod: 95 | Performed by: INTERNAL MEDICINE

## 2021-04-19 RX ORDER — HYDROXYUREA 500 MG/1
500 CAPSULE ORAL DAILY
Qty: 60 CAPSULE | Refills: 1 | Status: SHIPPED | OUTPATIENT
Start: 2021-04-19 | End: 2021-04-19 | Stop reason: DRUGHIGH

## 2021-04-19 RX ORDER — HYDROXYUREA 500 MG/1
500 CAPSULE ORAL
Qty: 60 CAPSULE | Refills: 1 | Status: SHIPPED | OUTPATIENT
Start: 2021-04-19 | End: 2021-05-24 | Stop reason: SDUPTHER

## 2021-04-19 ASSESSMENT — ENCOUNTER SYMPTOMS
VOMITING: 0
BLURRED VISION: 0
DIARRHEA: 0
DIAPHORESIS: 0
BRUISES/BLEEDS EASILY: 0
PHOTOPHOBIA: 0
MYALGIAS: 0
INSOMNIA: 0
CHILLS: 0
NERVOUS/ANXIOUS: 0
SPUTUM PRODUCTION: 0
WEIGHT LOSS: 0
DIZZINESS: 0
NAUSEA: 0
SENSORY CHANGE: 0
TINGLING: 0
CONSTIPATION: 0
DEPRESSION: 0
FEVER: 0
SHORTNESS OF BREATH: 0
HEADACHES: 0
WHEEZING: 0
DOUBLE VISION: 0
COUGH: 0

## 2021-04-19 NOTE — PROGRESS NOTES
Follow Up Note:  Hematology/Oncology      Primary Care:  Jemma Martin P.A.-C.    Diagnosis: Thrombocytosis    Chief Complaint:     History of Presenting Illness:  Ailin Good is a 70 y.o. female  who was referred to hematology clinic for evaluation of persistent thrombocytosis.  Her evaluation included testing for JAK2 that was negative.  She had mild iron deficiency that was treated with oral iron.  She was recommended to undergo bone marrow aspirate and biopsy to rule out ET.  She has been undecided about that.  Today, she comes in for follow-up.  She feels well.  She takes 1 baby aspirin daily.      Interval History:  Patient is here to review her CBC and adjust the dose of hydroxyurea in accord to the platelet count, WBC the neutrophil count and hemoglobin.  The patient feels well, is perfectly asymptomatic  And is tolerating hydroxyurea without any side effect and adverse event.    Past Medical History:   Diagnosis Date   • Allergy    • Anesthesia     PONV   • Arthritis    • Hypertension    • Polyp of colon 2/20/2019   • PONV (postoperative nausea and vomiting)    • Postoperative hypothyroidism     thyroidectomy   • Psychiatric problem 03/2021    situational depression ( loss of dog)   • Urinary incontinence     minor       Allergies as of 04/19/2021 - Reviewed 04/19/2021   Allergen Reaction Noted   • Chocolate  09/16/2019   • Dust mite extract  09/16/2019   • Other environmental  09/16/2019   • Penicillins Hives 03/08/2021   • Pollen extract  09/16/2019   • Seasonal  09/16/2019         Current Outpatient Medications:   •  hydroxyurea (HYDREA) 500 MG Cap, 1 po daily, Disp: 30 capsule, Rfl: 3  •  hydroCHLOROthiazide (HYDRODIURIL) 25 MG Tab, Take 25 mg by mouth every day., Disp: , Rfl:   •  diphenhydrAMINE (BENADRYL) 25 MG Tab, Take 50 mg by mouth every 6 hours as needed for Sleep. allergy, Disp: , Rfl:   •  levothyroxine (SYNTHROID) 125 MCG Tab, TAKE 1 TAB BY MOUTH 6 DAYS A WEEK AND 1 AND 1/2 TAB 1 DAY A  WEEK, Disp: , Rfl:   •  ASPIRIN 81 PO, every day., Disp: , Rfl:   •  levothyroxine (SYNTHROID) 125 MCG Tab, 1 tab(s) 6 days, 1 1/2 tab 1 day, Disp: , Rfl:   •  hydroCHLOROthiazide (HYDRODIURIL) 25 MG Tab, Take 1 Tab by mouth every day for 90 days. (Patient taking differently: Take 25 mg by mouth every morning.), Disp: 90 Tab, Rfl: 3      Review of Systems:  Review of Systems   Constitutional: Negative for chills, diaphoresis, fever, malaise/fatigue and weight loss.   HENT: Negative for nosebleeds and tinnitus.    Eyes: Negative for blurred vision, double vision and photophobia.   Respiratory: Negative for cough, sputum production, shortness of breath and wheezing.    Cardiovascular: Negative for chest pain and leg swelling.   Gastrointestinal: Negative for constipation, diarrhea, nausea and vomiting.   Genitourinary: Negative for dysuria.   Musculoskeletal: Negative for joint pain and myalgias.   Skin: Negative for rash.   Neurological: Negative for dizziness, tingling, sensory change and headaches.   Endo/Heme/Allergies: Does not bruise/bleed easily.   Psychiatric/Behavioral: Negative for depression. The patient is not nervous/anxious and does not have insomnia.          Physical Exam:  Vitals:    04/19/21 0838   BP: 125/73   ECOG 0    Physical Exam    Remote visit  Labs:  No visits with results within 1 Day(s) from this visit.   Latest known visit with results is:   Hospital Outpatient Visit on 04/17/2021   Component Date Value Ref Range Status   • Sodium 04/17/2021 140  135 - 145 mmol/L Final   • Potassium 04/17/2021 5.0  3.6 - 5.5 mmol/L Final   • Chloride 04/17/2021 104  96 - 112 mmol/L Final   • Co2 04/17/2021 25  20 - 33 mmol/L Final   • Anion Gap 04/17/2021 11.0  7.0 - 16.0 Final   • Glucose 04/17/2021 99  65 - 99 mg/dL Final   • Bun 04/17/2021 41* 8 - 22 mg/dL Final   • Creatinine 04/17/2021 0.77  0.50 - 1.40 mg/dL Final   • Calcium 04/17/2021 8.7  8.5 - 10.5 mg/dL Final   • AST(SGOT) 04/17/2021 45  12 - 45  U/L Final    Comment: The hemolysis index of the specimen exceeds the allowed tolerance for the  test.  Result may be affected.  Specimen recollection is recommended to  confirm the result.     • ALT(SGPT) 04/17/2021 46  2 - 50 U/L Final   • Alkaline Phosphatase 04/17/2021 63  30 - 99 U/L Final   • Total Bilirubin 04/17/2021 0.2  0.1 - 1.5 mg/dL Final   • Albumin 04/17/2021 4.1  3.2 - 4.9 g/dL Final   • Total Protein 04/17/2021 7.0  6.0 - 8.2 g/dL Final   • Globulin 04/17/2021 2.9  1.9 - 3.5 g/dL Final   • A-G Ratio 04/17/2021 1.4  g/dL Final   • GFR If  04/17/2021 >60  >60 mL/min/1.73 m 2 Final   • GFR If Non  04/17/2021 >60  >60 mL/min/1.73 m 2 Final       Imaging:   No results found.      Assessment & Plan:  1. Essential thrombocythemia (HCC)     2. Essential hypertension     3. Erythrocytosis     Prerenal renal failure with BUN/creatinine ratio over 30  Erythrocytosis resolved  Adequate WBC  PCP to be aware  Consider sitter to change and taper tensive regimen  Patient to avoid NSAID because of prerenal status  Potential increased risk of nephrolithiasis  Platelet count 589,000  Hemoglobin 13.7  White count 5.2  Hu 500 mg day  increase to 500 mg po bid  Rt 4 weeks  Goal platelet count below 400,000  she agreed and verbalized her agreement and understanding with the current plan. I answered all questions and concerns she has at this time and advised her to call at any time in the interim with questions or concerns in regards to her care.              This evaluation was conducted via Zoom using secure and encrypted videoconferencing technology. The patient was in a private location in the [facility].    The patient's identity was confirmed and verbal consent was obtained for this virtual visit.    Please note that this dictation was created using voice recognition software. I have made every reasonable attempt to correct obvious errors, but I expect that there are errors of  grammar and possibly content that I did not discover before finalizing the note.      Thank you for allowing me to participate in his care, I will continue to follow.

## 2021-05-13 ENCOUNTER — HOSPITAL ENCOUNTER (OUTPATIENT)
Dept: LAB | Facility: MEDICAL CENTER | Age: 71
End: 2021-05-13
Attending: INTERNAL MEDICINE
Payer: MEDICARE

## 2021-05-13 DIAGNOSIS — D47.3 ESSENTIAL THROMBOCYTHEMIA (HCC): ICD-10-CM

## 2021-05-13 LAB
BASOPHILS # BLD AUTO: 0.8 % (ref 0–1.8)
BASOPHILS # BLD: 0.03 K/UL (ref 0–0.12)
EOSINOPHIL # BLD AUTO: 0.06 K/UL (ref 0–0.51)
EOSINOPHIL NFR BLD: 1.7 % (ref 0–6.9)
ERYTHROCYTE [DISTWIDTH] IN BLOOD BY AUTOMATED COUNT: 64.9 FL (ref 35.9–50)
HCT VFR BLD AUTO: 40.2 % (ref 37–47)
HGB BLD-MCNC: 13.9 G/DL (ref 12–16)
IMM GRANULOCYTES # BLD AUTO: 0 K/UL (ref 0–0.11)
IMM GRANULOCYTES NFR BLD AUTO: 0 % (ref 0–0.9)
LYMPHOCYTES # BLD AUTO: 1.92 K/UL (ref 1–4.8)
LYMPHOCYTES NFR BLD: 54.1 % (ref 22–41)
MCH RBC QN AUTO: 33.2 PG (ref 27–33)
MCHC RBC AUTO-ENTMCNC: 34.6 G/DL (ref 33.6–35)
MCV RBC AUTO: 95.9 FL (ref 81.4–97.8)
MONOCYTES # BLD AUTO: 0.41 K/UL (ref 0–0.85)
MONOCYTES NFR BLD AUTO: 11.5 % (ref 0–13.4)
NEUTROPHILS # BLD AUTO: 1.13 K/UL (ref 2–7.15)
NEUTROPHILS NFR BLD: 31.9 % (ref 44–72)
NRBC # BLD AUTO: 0 K/UL
NRBC BLD-RTO: 0 /100 WBC
PLATELET # BLD AUTO: 473 K/UL (ref 164–446)
PMV BLD AUTO: 9.3 FL (ref 9–12.9)
RBC # BLD AUTO: 4.19 M/UL (ref 4.2–5.4)
WBC # BLD AUTO: 3.6 K/UL (ref 4.8–10.8)

## 2021-05-13 PROCEDURE — 36415 COLL VENOUS BLD VENIPUNCTURE: CPT

## 2021-05-13 PROCEDURE — 85025 COMPLETE CBC W/AUTO DIFF WBC: CPT

## 2021-05-17 ENCOUNTER — APPOINTMENT (OUTPATIENT)
Dept: HEMATOLOGY ONCOLOGY | Facility: MEDICAL CENTER | Age: 71
End: 2021-05-17
Payer: MEDICARE

## 2021-05-20 ENCOUNTER — TELEMEDICINE (OUTPATIENT)
Dept: HEMATOLOGY ONCOLOGY | Facility: MEDICAL CENTER | Age: 71
End: 2021-05-20
Payer: MEDICARE

## 2021-05-20 DIAGNOSIS — Z79.899 ENCOUNTER FOR LONG-TERM CURRENT USE OF HIGH RISK MEDICATION: ICD-10-CM

## 2021-05-20 DIAGNOSIS — D47.3 ESSENTIAL THROMBOCYTHEMIA (HCC): ICD-10-CM

## 2021-05-20 PROCEDURE — 99213 OFFICE O/P EST LOW 20 MIN: CPT | Mod: 95 | Performed by: NURSE PRACTITIONER

## 2021-05-20 ASSESSMENT — ENCOUNTER SYMPTOMS
DIARRHEA: 0
BRUISES/BLEEDS EASILY: 1
PALPITATIONS: 0
TINGLING: 0
FEVER: 0
MYALGIAS: 0
CHILLS: 0
COUGH: 0
DIZZINESS: 0
VOMITING: 0
CONSTIPATION: 0
WHEEZING: 0
NAUSEA: 0
SHORTNESS OF BREATH: 0
INSOMNIA: 0
HEADACHES: 1
WEIGHT LOSS: 0

## 2021-05-20 NOTE — PROGRESS NOTES
Subjective:      Ailin Good is a 70 y.o. female who presents with Other (Thrombocytosis, Hydrea)        HPI   Ms. Good presents for evaluation of Hydrea for treatment of thrombocytosis. Visit is conducted as an on demand video visit using Zoom and patient is unaccompanied today.    Patient initially evaluated in 1/2020, referred per PCP for thrombocytosis in the absence of erythrocytosis or leukocytosis. Patient negative for JAK2, MPL, CALR, BCR-ABL1 mutations; negative inflammatory symptoms as well as work-up (ROSALINA). Patient had opted to defer bone marrow biopsy which was ultimately completed 3/10/21 and showed: normal female karyotype; essential thrombocythemia. She initiated 500 mg daily Hydrea and gradually increased to BID, continues with daily ASA 81 mg.    She continues to follow up with Dr. Rizo, Neurology, regarding meningioma.    Patient notes mild increase in fatigue since starting Hydrea. Arthralgias consistent with baseline. WBC is noted to be low (3.6), ANC 1.13 with BID dosing. Patient denies fevers, chills, or recurrent infections. She is otherwise essentially asymptomatic.      Review of Systems   Constitutional: Positive for malaise/fatigue (more tired since starting medication). Negative for chills, fever and weight loss.   HENT: Negative for nosebleeds.    Respiratory: Negative for cough, shortness of breath and wheezing.    Cardiovascular: Negative for chest pain, palpitations and leg swelling.   Gastrointestinal: Negative for constipation (Last BM this am), diarrhea, nausea and vomiting.   Genitourinary: Negative for dysuria.   Musculoskeletal: Positive for joint pain (consistent with arthritis hx). Negative for myalgias.   Neurological: Positive for headaches (allergy related). Negative for dizziness and tingling.        S/p neuro visit today for meningioma - may need surgery with repeat MRI in June, if larger then surgery will need pre-op hydrea recommnedations   Endo/Heme/Allergies: Positive  for environmental allergies. Bruises/bleeds easily (consistent with asa).   Psychiatric/Behavioral: The patient does not have insomnia.        Allergies   Allergen Reactions   • Chocolate    • Dust Mite Extract    • Other Environmental      Sun exposure   • Penicillins Hives     As a child thru testing   • Pollen Extract    • Seasonal      Every tree, grass           Current Outpatient Medications on File Prior to Visit   Medication Sig Dispense Refill   • hydroCHLOROthiazide (HYDRODIURIL) 25 MG Tab Take 25 mg by mouth every day.     • diphenhydrAMINE (BENADRYL) 25 MG Tab Take 50 mg by mouth every 6 hours as needed for Sleep. allergy     • levothyroxine (SYNTHROID) 125 MCG Tab TAKE 1 TAB BY MOUTH 6 DAYS A WEEK AND 1 AND 1/2 TAB 1 DAY A WEEK     • ASPIRIN 81 PO every day.       No current facility-administered medications on file prior to visit.          Objective:     LMP  (LMP Unknown)    /73; HR 53 - per home device      Physical Exam  Constitutional:       General: She is not in acute distress.     Appearance: Normal appearance. She is not ill-appearing.   Pulmonary:      Effort: Pulmonary effort is normal.   Skin:     Coloration: Skin is not jaundiced or pale.   Neurological:      Mental Status: She is alert and oriented to person, place, and time. Mental status is at baseline.   Psychiatric:         Mood and Affect: Mood normal.             No visits with results within 1 Day(s) from this visit.   Latest known visit with results is:   Hospital Outpatient Visit on 05/13/2021   Component Date Value Ref Range Status   • WBC 05/13/2021 3.6* 4.8 - 10.8 K/uL Final   • RBC 05/13/2021 4.19* 4.20 - 5.40 M/uL Final   • Hemoglobin 05/13/2021 13.9  12.0 - 16.0 g/dL Final   • Hematocrit 05/13/2021 40.2  37.0 - 47.0 % Final   • MCV 05/13/2021 95.9  81.4 - 97.8 fL Final   • MCH 05/13/2021 33.2* 27.0 - 33.0 pg Final   • MCHC 05/13/2021 34.6  33.6 - 35.0 g/dL Final   • RDW 05/13/2021 64.9* 35.9 - 50.0 fL Final   •  Platelet Count 05/13/2021 473* 164 - 446 K/uL Final   • MPV 05/13/2021 9.3  9.0 - 12.9 fL Final   • Neutrophils-Polys 05/13/2021 31.90* 44.00 - 72.00 % Final   • Lymphocytes 05/13/2021 54.10* 22.00 - 41.00 % Final   • Monocytes 05/13/2021 11.50  0.00 - 13.40 % Final   • Eosinophils 05/13/2021 1.70  0.00 - 6.90 % Final   • Basophils 05/13/2021 0.80  0.00 - 1.80 % Final   • Immature Granulocytes 05/13/2021 0.00  0.00 - 0.90 % Final   • Nucleated RBC 05/13/2021 0.00  /100 WBC Final   • Neutrophils (Absolute) 05/13/2021 1.13* 2.00 - 7.15 K/uL Final    Includes immature neutrophils, if present.   • Lymphs (Absolute) 05/13/2021 1.92  1.00 - 4.80 K/uL Final   • Monos (Absolute) 05/13/2021 0.41  0.00 - 0.85 K/uL Final   • Eos (Absolute) 05/13/2021 0.06  0.00 - 0.51 K/uL Final   • Baso (Absolute) 05/13/2021 0.03  0.00 - 0.12 K/uL Final   • Immature Granulocytes (abs) 05/13/2021 0.00  0.00 - 0.11 K/uL Final   • NRBC (Absolute) 05/13/2021 0.00  K/uL Final                                              Assessment/Plan:        1. Essential thrombocythemia (HCC)  CBC WITH DIFFERENTIAL   2. Encounter for long-term current use of high risk medication       1.  Essential thrombocythemia: Patient diagnosed 3/2021 and initiated Hydrea 500 mg daily. She has increased dosing to 500 mg BID with noted decrease in WBC, ANC; although platelets are improved to 473k.  Patient will adjust Hydrea dosing to alternating 500 mg daily/500 mg BID. She will continue with monthly labs and return for re-evaluation in 3 months, sooner as needed.        This evaluation was conducted via Zoom using secure and encrypted videoconferencing technology. The patient was in a private location in the [facility].    The patient's identity was confirmed and verbal consent was obtained for this virtual visit.      The patient verbalized agreement and understanding of current plan. All questions and concerns were addressed at time of visit.    Please note that  this dictation was created using voice recognition software. I have made every reasonable attempt to correct obvious errors, but I expect that there are errors of grammar and possibly content that I did not discover before finalizing the note.

## 2021-05-20 NOTE — Clinical Note
Monthly cbc - labs in  RTO in 3 months - virtual ok    She said letting her know via [employer] was OK

## 2021-05-20 NOTE — Clinical Note
Hector Martinezea patient....      She has been taking 500 bid  Using her own [facility] - she would like to continue this    Will change to alternating 500 daily / 500 BID and recheck labs in 1 months    I have labs available every 2 weeks if needed but she knows to do monthly and rto in 3 months

## 2021-05-24 ENCOUNTER — TELEPHONE (OUTPATIENT)
Dept: HEMATOLOGY ONCOLOGY | Facility: MEDICAL CENTER | Age: 71
End: 2021-05-24

## 2021-05-24 DIAGNOSIS — D47.3 ESSENTIAL THROMBOCYTHEMIA (HCC): ICD-10-CM

## 2021-05-24 RX ORDER — HYDROXYUREA 500 MG/1
CAPSULE ORAL
Qty: 135 CAPSULE | Refills: 1 | Status: SHIPPED | OUTPATIENT
Start: 2021-05-24 | End: 2021-06-15 | Stop reason: SDUPTHER

## 2021-05-24 NOTE — TELEPHONE ENCOUNTER
Oral Chemo Compliance review for lenalidomide (Revlimid)    Current dose:  500 mg / 1000 mg PO alternating Daily

## 2021-06-10 ENCOUNTER — PATIENT OUTREACH (OUTPATIENT)
Dept: HEALTH INFORMATION MANAGEMENT | Facility: OTHER | Age: 71
End: 2021-06-10

## 2021-06-10 NOTE — PROGRESS NOTES
Outcome: Scheduled in-home Comprehensive Health Assessment: 29th at 9:00am-12:00pm.    Mbr educated on referral process for out of network providers. scheduled in-home Comprehensive Geriatric Assessment appointment. Mbr educated on [employer] website, OTC benefits, and reviewed care gaps. No further needs at this time.     Attempt # 1

## 2021-06-15 ENCOUNTER — HOSPITAL ENCOUNTER (OUTPATIENT)
Dept: LAB | Facility: MEDICAL CENTER | Age: 71
End: 2021-06-15
Attending: NURSE PRACTITIONER
Payer: MEDICARE

## 2021-06-15 DIAGNOSIS — D47.3 ESSENTIAL THROMBOCYTHEMIA (HCC): Primary | ICD-10-CM

## 2021-06-15 DIAGNOSIS — D47.3 ESSENTIAL THROMBOCYTHEMIA (HCC): ICD-10-CM

## 2021-06-15 LAB
ANISOCYTOSIS BLD QL SMEAR: ABNORMAL
BASOPHILS # BLD AUTO: 0.8 % (ref 0–1.8)
BASOPHILS # BLD: 0.04 K/UL (ref 0–0.12)
COMMENT 1642: NORMAL
EOSINOPHIL # BLD AUTO: 0.1 K/UL (ref 0–0.51)
EOSINOPHIL NFR BLD: 2 % (ref 0–6.9)
ERYTHROCYTE [DISTWIDTH] IN BLOOD BY AUTOMATED COUNT: 73 FL (ref 35.9–50)
HCT VFR BLD AUTO: 42.4 % (ref 37–47)
HGB BLD-MCNC: 13.8 G/DL (ref 12–16)
IMM GRANULOCYTES # BLD AUTO: 0.01 K/UL (ref 0–0.11)
IMM GRANULOCYTES NFR BLD AUTO: 0.2 % (ref 0–0.9)
LYMPHOCYTES # BLD AUTO: 2.48 K/UL (ref 1–4.8)
LYMPHOCYTES NFR BLD: 50.2 % (ref 22–41)
MACROCYTES BLD QL SMEAR: ABNORMAL
MCH RBC QN AUTO: 34.5 PG (ref 27–33)
MCHC RBC AUTO-ENTMCNC: 32.5 G/DL (ref 33.6–35)
MCV RBC AUTO: 106 FL (ref 81.4–97.8)
MONOCYTES # BLD AUTO: 0.48 K/UL (ref 0–0.85)
MONOCYTES NFR BLD AUTO: 9.7 % (ref 0–13.4)
MORPHOLOGY BLD-IMP: NORMAL
NEUTROPHILS # BLD AUTO: 1.83 K/UL (ref 2–7.15)
NEUTROPHILS NFR BLD: 37.1 % (ref 44–72)
NRBC # BLD AUTO: 0 K/UL
NRBC BLD-RTO: 0 /100 WBC
PLATELET # BLD AUTO: 530 K/UL (ref 164–446)
PLATELET BLD QL SMEAR: NORMAL
PMV BLD AUTO: 10.2 FL (ref 9–12.9)
RBC # BLD AUTO: 4 M/UL (ref 4.2–5.4)
RBC BLD AUTO: PRESENT
WBC # BLD AUTO: 4.9 K/UL (ref 4.8–10.8)

## 2021-06-15 PROCEDURE — 36415 COLL VENOUS BLD VENIPUNCTURE: CPT

## 2021-06-15 PROCEDURE — 85025 COMPLETE CBC W/AUTO DIFF WBC: CPT

## 2021-06-15 RX ORDER — HYDROXYUREA 500 MG/1
CAPSULE ORAL
Status: CANCELLED | OUTPATIENT
Start: 2021-06-15

## 2021-06-15 RX ORDER — HYDROXYUREA 500 MG/1
CAPSULE ORAL
Qty: 135 CAPSULE | Refills: 3 | Status: SHIPPED | OUTPATIENT
Start: 2021-06-15 | End: 2021-06-16

## 2021-06-15 NOTE — TELEPHONE ENCOUNTER
Called pt to remind her that monthly labs are now due.  Pt stated she was confused when they were due, but will go today to the lab to have the blood work drawn.  Confirmed preferred pharmacy to send Hydrea refill to & informed pt office will call her if any changes to Hydrea dosing need to be made once lab results rec'vd.  Pt voiced understanding & agreeable.  Pt reports feeling increased fatigue once starting on Hydrea & has noted a blackened area to multiple cuticles on her hands, but not sure if r/t Hydrea or not.

## 2021-06-16 ENCOUNTER — PATIENT MESSAGE (OUTPATIENT)
Dept: MEDICAL GROUP | Age: 71
End: 2021-06-16

## 2021-06-16 ENCOUNTER — TELEPHONE (OUTPATIENT)
Dept: HEMATOLOGY ONCOLOGY | Facility: MEDICAL CENTER | Age: 71
End: 2021-06-16

## 2021-06-16 DIAGNOSIS — D47.3 ESSENTIAL THROMBOCYTHEMIA (HCC): ICD-10-CM

## 2021-06-16 DIAGNOSIS — D47.3 ESSENTIAL THROMBOCYTHEMIA (HCC): Primary | ICD-10-CM

## 2021-06-16 RX ORDER — HYDROXYUREA 500 MG/1
500 CAPSULE ORAL 2 TIMES DAILY
Qty: 180 CAPSULE | Refills: 0 | Status: SHIPPED | OUTPATIENT
Start: 2021-06-16 | End: 2022-01-25

## 2021-06-16 NOTE — TELEPHONE ENCOUNTER
Rec'vd call from pt with concerns re: pharmacy not releasing her Hydrea Rx to her & she is currently all out of meds.  Reviewed recent lab results with Ileana CALHOUN whom increased pt's dose of Hydrea to 500 mg PO BID d/t elevated platelet count & to instruct pt to recheck CBC in 2 wks.  RN called pt's pharmacy to inform pharmacist of increased dose change & pt in need of Hydrea today.  Pharmacy staff stated the appropriate changes were made in the system & insurance will cover the new dose & pt may p/u Rx today after 12n.  Called pt back & made her aware of availability of Hydrea thru local pharmacy & reviewed new dosing instructions & to repeat labs in 2 wks (approx by 6/30).  Pt voiced understanding & stated all questions had been answered at this time.

## 2021-06-17 ENCOUNTER — TELEPHONE (OUTPATIENT)
Dept: HEMATOLOGY ONCOLOGY | Facility: MEDICAL CENTER | Age: 71
End: 2021-06-17

## 2021-06-17 NOTE — TELEPHONE ENCOUNTER
Called pt to inform her of US appt that was made for her on her behalf.  Provided date/location/time & instructions prior to exam.  Pt voiced understanding & stated no further questions.

## 2021-06-17 NOTE — PATIENT COMMUNICATION
1. Caller Name: Ailin Good                        Call Back Number: [phone] (home)       How would the patient prefer to be contacted with a response: Bionaturishart message    2. SPECIFIC Action To Be Taken: Referral pending, please sign.    3. Diagnosis/Clinical Reason for Request: platelet issue     4. Specialty & Provider Name/Lab/Imaging Location: Hematology oncology Dr. Fletcher Marquez     5. Is appointment scheduled for requested order/referral: no    Patient was not informed they will receive a return phone call from the office ONLY if there are any questions before processing their request. Advised to call back if they haven't received a call from the referral department in 5 days.

## 2021-06-29 PROBLEM — D32.9 MENINGIOMA (HCC): Status: ACTIVE | Noted: 2021-06-29

## 2021-07-02 ENCOUNTER — HOSPITAL ENCOUNTER (OUTPATIENT)
Dept: LAB | Facility: MEDICAL CENTER | Age: 71
End: 2021-07-02
Attending: NURSE PRACTITIONER
Payer: MEDICARE

## 2021-07-02 ENCOUNTER — HOSPITAL ENCOUNTER (OUTPATIENT)
Dept: LAB | Facility: MEDICAL CENTER | Age: 71
End: 2021-07-02
Attending: INTERNAL MEDICINE
Payer: MEDICARE

## 2021-07-02 DIAGNOSIS — D47.3 ESSENTIAL THROMBOCYTHEMIA (HCC): ICD-10-CM

## 2021-07-02 LAB
BASOPHILS # BLD AUTO: 0.8 % (ref 0–1.8)
BASOPHILS # BLD: 0.03 K/UL (ref 0–0.12)
EOSINOPHIL # BLD AUTO: 0.05 K/UL (ref 0–0.51)
EOSINOPHIL NFR BLD: 1.4 % (ref 0–6.9)
ERYTHROCYTE [DISTWIDTH] IN BLOOD BY AUTOMATED COUNT: 64 FL (ref 35.9–50)
HCT VFR BLD AUTO: 41.4 % (ref 37–47)
HGB BLD-MCNC: 13.5 G/DL (ref 12–16)
IMM GRANULOCYTES # BLD AUTO: 0.01 K/UL (ref 0–0.11)
IMM GRANULOCYTES NFR BLD AUTO: 0.3 % (ref 0–0.9)
LYMPHOCYTES # BLD AUTO: 1.92 K/UL (ref 1–4.8)
LYMPHOCYTES NFR BLD: 53.2 % (ref 22–41)
MCH RBC QN AUTO: 34.9 PG (ref 27–33)
MCHC RBC AUTO-ENTMCNC: 32.6 G/DL (ref 33.6–35)
MCV RBC AUTO: 107 FL (ref 81.4–97.8)
MONOCYTES # BLD AUTO: 0.28 K/UL (ref 0–0.85)
MONOCYTES NFR BLD AUTO: 7.8 % (ref 0–13.4)
NEUTROPHILS # BLD AUTO: 1.32 K/UL (ref 2–7.15)
NEUTROPHILS NFR BLD: 36.5 % (ref 44–72)
NRBC # BLD AUTO: 0 K/UL
NRBC BLD-RTO: 0 /100 WBC
PLATELET # BLD AUTO: 471 K/UL (ref 164–446)
PMV BLD AUTO: 10 FL (ref 9–12.9)
RBC # BLD AUTO: 3.87 M/UL (ref 4.2–5.4)
T4 FREE SERPL-MCNC: 1.91 NG/DL (ref 0.93–1.7)
TSH SERPL DL<=0.005 MIU/L-ACNC: 0.35 UIU/ML (ref 0.38–5.33)
WBC # BLD AUTO: 3.6 K/UL (ref 4.8–10.8)

## 2021-07-02 PROCEDURE — 36415 COLL VENOUS BLD VENIPUNCTURE: CPT

## 2021-07-02 PROCEDURE — 84439 ASSAY OF FREE THYROXINE: CPT

## 2021-07-02 PROCEDURE — 85025 COMPLETE CBC W/AUTO DIFF WBC: CPT

## 2021-07-02 PROCEDURE — 84443 ASSAY THYROID STIM HORMONE: CPT

## 2021-07-15 ENCOUNTER — HOSPITAL ENCOUNTER (OUTPATIENT)
Dept: RADIOLOGY | Facility: MEDICAL CENTER | Age: 71
End: 2021-07-15
Attending: NURSE PRACTITIONER
Payer: MEDICARE

## 2021-07-15 DIAGNOSIS — D47.3 ESSENTIAL THROMBOCYTHEMIA (HCC): ICD-10-CM

## 2021-07-15 PROCEDURE — 76700 US EXAM ABDOM COMPLETE: CPT

## 2021-07-16 ENCOUNTER — HOSPITAL ENCOUNTER (OUTPATIENT)
Dept: LAB | Facility: MEDICAL CENTER | Age: 71
End: 2021-07-16
Attending: NURSE PRACTITIONER
Payer: MEDICARE

## 2021-07-16 DIAGNOSIS — D47.3 ESSENTIAL THROMBOCYTHEMIA (HCC): ICD-10-CM

## 2021-07-16 LAB
BASOPHILS # BLD AUTO: 0.6 % (ref 0–1.8)
BASOPHILS # BLD: 0.02 K/UL (ref 0–0.12)
EOSINOPHIL # BLD AUTO: 0.05 K/UL (ref 0–0.51)
EOSINOPHIL NFR BLD: 1.5 % (ref 0–6.9)
ERYTHROCYTE [DISTWIDTH] IN BLOOD BY AUTOMATED COUNT: 56 FL (ref 35.9–50)
HCT VFR BLD AUTO: 42.9 % (ref 37–47)
HGB BLD-MCNC: 14.5 G/DL (ref 12–16)
IMM GRANULOCYTES # BLD AUTO: 0 K/UL (ref 0–0.11)
IMM GRANULOCYTES NFR BLD AUTO: 0 % (ref 0–0.9)
LYMPHOCYTES # BLD AUTO: 1.68 K/UL (ref 1–4.8)
LYMPHOCYTES NFR BLD: 48.8 % (ref 22–41)
MCH RBC QN AUTO: 35.8 PG (ref 27–33)
MCHC RBC AUTO-ENTMCNC: 33.8 G/DL (ref 33.6–35)
MCV RBC AUTO: 105.9 FL (ref 81.4–97.8)
MONOCYTES # BLD AUTO: 0.34 K/UL (ref 0–0.85)
MONOCYTES NFR BLD AUTO: 9.9 % (ref 0–13.4)
NEUTROPHILS # BLD AUTO: 1.35 K/UL (ref 2–7.15)
NEUTROPHILS NFR BLD: 39.2 % (ref 44–72)
NRBC # BLD AUTO: 0 K/UL
NRBC BLD-RTO: 0 /100 WBC
PLATELET # BLD AUTO: 327 K/UL (ref 164–446)
PMV BLD AUTO: 9.6 FL (ref 9–12.9)
RBC # BLD AUTO: 4.05 M/UL (ref 4.2–5.4)
WBC # BLD AUTO: 3.4 K/UL (ref 4.8–10.8)

## 2021-07-16 PROCEDURE — 85025 COMPLETE CBC W/AUTO DIFF WBC: CPT

## 2021-07-16 PROCEDURE — 36415 COLL VENOUS BLD VENIPUNCTURE: CPT

## 2021-08-10 ENCOUNTER — TELEPHONE (OUTPATIENT)
Dept: HEMATOLOGY ONCOLOGY | Facility: MEDICAL CENTER | Age: 71
End: 2021-08-10

## 2021-08-10 NOTE — TELEPHONE ENCOUNTER
Pt called and cancelled future FV. Patient states they are now established with Dr. Marquez and no longer need to be seen here.

## 2021-08-23 ENCOUNTER — HOSPITAL ENCOUNTER (OUTPATIENT)
Dept: RADIOLOGY | Facility: MEDICAL CENTER | Age: 71
End: 2021-08-23
Attending: INTERNAL MEDICINE
Payer: MEDICARE

## 2021-08-23 DIAGNOSIS — D47.3 THROMBOCYTHEMIA, ESSENTIAL (HCC): ICD-10-CM

## 2021-08-23 DIAGNOSIS — D13.4: ICD-10-CM

## 2021-08-23 DIAGNOSIS — C64.2 MALIGNANT NEOPLASM OF LEFT KIDNEY, EXCEPT RENAL PELVIS (HCC): ICD-10-CM

## 2021-08-23 PROCEDURE — 700117 HCHG RX CONTRAST REV CODE 255: Performed by: INTERNAL MEDICINE

## 2021-08-23 PROCEDURE — 74183 MRI ABD W/O CNTR FLWD CNTR: CPT | Mod: MH

## 2021-08-23 PROCEDURE — A9576 INJ PROHANCE MULTIPACK: HCPCS | Performed by: INTERNAL MEDICINE

## 2021-08-23 RX ADMIN — GADOTERIDOL 15 ML: 279.3 INJECTION, SOLUTION INTRAVENOUS at 17:21

## 2021-08-26 ENCOUNTER — HOSPITAL ENCOUNTER (OUTPATIENT)
Dept: LAB | Facility: MEDICAL CENTER | Age: 71
End: 2021-08-26
Attending: NURSE PRACTITIONER
Payer: MEDICARE

## 2021-08-26 ENCOUNTER — HOSPITAL ENCOUNTER (OUTPATIENT)
Dept: LAB | Facility: MEDICAL CENTER | Age: 71
End: 2021-08-26
Attending: INTERNAL MEDICINE
Payer: MEDICARE

## 2021-08-26 ENCOUNTER — APPOINTMENT (OUTPATIENT)
Dept: HEMATOLOGY ONCOLOGY | Facility: MEDICAL CENTER | Age: 71
End: 2021-08-26
Payer: MEDICARE

## 2021-08-26 DIAGNOSIS — D47.3 ESSENTIAL THROMBOCYTHEMIA (HCC): ICD-10-CM

## 2021-08-26 LAB
ALBUMIN SERPL BCP-MCNC: 4.2 G/DL (ref 3.2–4.9)
ALBUMIN/GLOB SERPL: 1.6 G/DL
ALP SERPL-CCNC: 53 U/L (ref 30–99)
ALT SERPL-CCNC: 29 U/L (ref 2–50)
ANION GAP SERPL CALC-SCNC: 12 MMOL/L (ref 7–16)
AST SERPL-CCNC: 25 U/L (ref 12–45)
BASOPHILS # BLD AUTO: 0.9 % (ref 0–1.8)
BASOPHILS # BLD: 0.03 K/UL (ref 0–0.12)
BILIRUB SERPL-MCNC: 0.3 MG/DL (ref 0.1–1.5)
BUN SERPL-MCNC: 29 MG/DL (ref 8–22)
CALCIUM SERPL-MCNC: 9 MG/DL (ref 8.5–10.5)
CHLORIDE SERPL-SCNC: 102 MMOL/L (ref 96–112)
CO2 SERPL-SCNC: 24 MMOL/L (ref 20–33)
CREAT SERPL-MCNC: 0.78 MG/DL (ref 0.5–1.4)
EOSINOPHIL # BLD AUTO: 0.03 K/UL (ref 0–0.51)
EOSINOPHIL NFR BLD: 0.9 % (ref 0–6.9)
ERYTHROCYTE [DISTWIDTH] IN BLOOD BY AUTOMATED COUNT: 50.3 FL (ref 35.9–50)
FERRITIN SERPL-MCNC: 77 NG/ML (ref 10–291)
GLOBULIN SER CALC-MCNC: 2.6 G/DL (ref 1.9–3.5)
GLUCOSE SERPL-MCNC: 83 MG/DL (ref 65–99)
HCT VFR BLD AUTO: 41.9 % (ref 37–47)
HGB BLD-MCNC: 13.9 G/DL (ref 12–16)
IMM GRANULOCYTES # BLD AUTO: 0 K/UL (ref 0–0.11)
IMM GRANULOCYTES NFR BLD AUTO: 0 % (ref 0–0.9)
IRON SATN MFR SERPL: 32 % (ref 15–55)
IRON SERPL-MCNC: 92 UG/DL (ref 40–170)
LYMPHOCYTES # BLD AUTO: 1.91 K/UL (ref 1–4.8)
LYMPHOCYTES NFR BLD: 59.1 % (ref 22–41)
MCH RBC QN AUTO: 36.3 PG (ref 27–33)
MCHC RBC AUTO-ENTMCNC: 33.2 G/DL (ref 33.6–35)
MCV RBC AUTO: 109.4 FL (ref 81.4–97.8)
MONOCYTES # BLD AUTO: 0.39 K/UL (ref 0–0.85)
MONOCYTES NFR BLD AUTO: 12.1 % (ref 0–13.4)
NEUTROPHILS # BLD AUTO: 0.87 K/UL (ref 2–7.15)
NEUTROPHILS NFR BLD: 27 % (ref 44–72)
NRBC # BLD AUTO: 0 K/UL
NRBC BLD-RTO: 0 /100 WBC
PLATELET # BLD AUTO: 497 K/UL (ref 164–446)
PMV BLD AUTO: 9.7 FL (ref 9–12.9)
POTASSIUM SERPL-SCNC: 4.1 MMOL/L (ref 3.6–5.5)
PROT SERPL-MCNC: 6.8 G/DL (ref 6–8.2)
RBC # BLD AUTO: 3.83 M/UL (ref 4.2–5.4)
SODIUM SERPL-SCNC: 138 MMOL/L (ref 135–145)
TIBC SERPL-MCNC: 291 UG/DL (ref 250–450)
UIBC SERPL-MCNC: 199 UG/DL (ref 110–370)
WBC # BLD AUTO: 3.2 K/UL (ref 4.8–10.8)

## 2021-08-26 PROCEDURE — 83540 ASSAY OF IRON: CPT

## 2021-08-26 PROCEDURE — 36415 COLL VENOUS BLD VENIPUNCTURE: CPT

## 2021-08-26 PROCEDURE — 85025 COMPLETE CBC W/AUTO DIFF WBC: CPT

## 2021-08-26 PROCEDURE — 83550 IRON BINDING TEST: CPT

## 2021-08-26 PROCEDURE — 80053 COMPREHEN METABOLIC PANEL: CPT

## 2021-08-26 PROCEDURE — 82728 ASSAY OF FERRITIN: CPT

## 2021-10-06 ENCOUNTER — HOSPITAL ENCOUNTER (OUTPATIENT)
Dept: LAB | Facility: MEDICAL CENTER | Age: 71
End: 2021-10-06
Attending: PHYSICIAN ASSISTANT
Payer: MEDICARE

## 2021-10-06 ENCOUNTER — HOSPITAL ENCOUNTER (OUTPATIENT)
Dept: LAB | Facility: MEDICAL CENTER | Age: 71
End: 2021-10-06
Attending: NURSE PRACTITIONER
Payer: MEDICARE

## 2021-10-06 LAB
BASOPHILS # BLD AUTO: 0.6 % (ref 0–1.8)
BASOPHILS # BLD: 0.03 K/UL (ref 0–0.12)
EOSINOPHIL # BLD AUTO: 0.19 K/UL (ref 0–0.51)
EOSINOPHIL NFR BLD: 4 % (ref 0–6.9)
ERYTHROCYTE [DISTWIDTH] IN BLOOD BY AUTOMATED COUNT: 46.7 FL (ref 35.9–50)
HCT VFR BLD AUTO: 42 % (ref 37–47)
HGB BLD-MCNC: 13.9 G/DL (ref 12–16)
IMM GRANULOCYTES # BLD AUTO: 0.01 K/UL (ref 0–0.11)
IMM GRANULOCYTES NFR BLD AUTO: 0.2 % (ref 0–0.9)
LYMPHOCYTES # BLD AUTO: 2.02 K/UL (ref 1–4.8)
LYMPHOCYTES NFR BLD: 42.4 % (ref 22–41)
MCH RBC QN AUTO: 34.7 PG (ref 27–33)
MCHC RBC AUTO-ENTMCNC: 33.1 G/DL (ref 33.6–35)
MCV RBC AUTO: 104.7 FL (ref 81.4–97.8)
MONOCYTES # BLD AUTO: 0.59 K/UL (ref 0–0.85)
MONOCYTES NFR BLD AUTO: 12.4 % (ref 0–13.4)
NEUTROPHILS # BLD AUTO: 1.92 K/UL (ref 2–7.15)
NEUTROPHILS NFR BLD: 40.4 % (ref 44–72)
NRBC # BLD AUTO: 0 K/UL
NRBC BLD-RTO: 0 /100 WBC
PLATELET # BLD AUTO: 510 K/UL (ref 164–446)
PMV BLD AUTO: 10.4 FL (ref 9–12.9)
RBC # BLD AUTO: 4.01 M/UL (ref 4.2–5.4)
T4 FREE SERPL-MCNC: 1.72 NG/DL (ref 0.93–1.7)
TSH SERPL DL<=0.005 MIU/L-ACNC: 0.94 UIU/ML (ref 0.38–5.33)
WBC # BLD AUTO: 4.8 K/UL (ref 4.8–10.8)

## 2021-10-06 PROCEDURE — 85025 COMPLETE CBC W/AUTO DIFF WBC: CPT

## 2021-10-06 PROCEDURE — 36415 COLL VENOUS BLD VENIPUNCTURE: CPT

## 2021-10-06 PROCEDURE — 84443 ASSAY THYROID STIM HORMONE: CPT

## 2021-10-06 PROCEDURE — 84439 ASSAY OF FREE THYROXINE: CPT

## 2021-12-10 ENCOUNTER — HOSPITAL ENCOUNTER (OUTPATIENT)
Dept: RADIOLOGY | Facility: MEDICAL CENTER | Age: 71
End: 2021-12-10
Attending: PHYSICIAN ASSISTANT
Payer: MEDICARE

## 2021-12-10 DIAGNOSIS — Z12.31 VISIT FOR SCREENING MAMMOGRAM: ICD-10-CM

## 2021-12-10 PROCEDURE — 77063 BREAST TOMOSYNTHESIS BI: CPT

## 2022-01-04 SDOH — ECONOMIC STABILITY: FOOD INSECURITY: WITHIN THE PAST 12 MONTHS, THE FOOD YOU BOUGHT JUST DIDN'T LAST AND YOU DIDN'T HAVE MONEY TO GET MORE.: NEVER TRUE

## 2022-01-04 SDOH — ECONOMIC STABILITY: INCOME INSECURITY: IN THE LAST 12 MONTHS, WAS THERE A TIME WHEN YOU WERE NOT ABLE TO PAY THE MORTGAGE OR RENT ON TIME?: NO

## 2022-01-04 SDOH — ECONOMIC STABILITY: HOUSING INSECURITY: IN THE LAST 12 MONTHS, HOW MANY PLACES HAVE YOU LIVED?: 1

## 2022-01-04 SDOH — ECONOMIC STABILITY: INCOME INSECURITY: HOW HARD IS IT FOR YOU TO PAY FOR THE VERY BASICS LIKE FOOD, HOUSING, MEDICAL CARE, AND HEATING?: NOT VERY HARD

## 2022-01-04 SDOH — HEALTH STABILITY: MENTAL HEALTH
STRESS IS WHEN SOMEONE FEELS TENSE, NERVOUS, ANXIOUS, OR CAN'T SLEEP AT NIGHT BECAUSE THEIR MIND IS TROUBLED. HOW STRESSED ARE YOU?: TO SOME EXTENT

## 2022-01-04 SDOH — HEALTH STABILITY: PHYSICAL HEALTH: ON AVERAGE, HOW MANY MINUTES DO YOU ENGAGE IN EXERCISE AT THIS LEVEL?: 120 MIN

## 2022-01-04 SDOH — ECONOMIC STABILITY: FOOD INSECURITY: WITHIN THE PAST 12 MONTHS, YOU WORRIED THAT YOUR FOOD WOULD RUN OUT BEFORE YOU GOT MONEY TO BUY MORE.: NEVER TRUE

## 2022-01-04 SDOH — HEALTH STABILITY: PHYSICAL HEALTH: ON AVERAGE, HOW MANY DAYS PER WEEK DO YOU ENGAGE IN MODERATE TO STRENUOUS EXERCISE (LIKE A BRISK WALK)?: 6 DAYS

## 2022-01-04 SDOH — ECONOMIC STABILITY: TRANSPORTATION INSECURITY
IN THE PAST 12 MONTHS, HAS LACK OF TRANSPORTATION KEPT YOU FROM MEETINGS, WORK, OR FROM GETTING THINGS NEEDED FOR DAILY LIVING?: NO

## 2022-01-04 ASSESSMENT — SOCIAL DETERMINANTS OF HEALTH (SDOH)
HOW OFTEN DO YOU ATTENT MEETINGS OF THE CLUB OR ORGANIZATION YOU BELONG TO?: NEVER
IN A TYPICAL WEEK, HOW MANY TIMES DO YOU TALK ON THE PHONE WITH FAMILY, FRIENDS, OR NEIGHBORS?: MORE THAN THREE TIMES A WEEK
HOW OFTEN DO YOU GET TOGETHER WITH FRIENDS OR RELATIVES?: THREE TIMES A WEEK
IN A TYPICAL WEEK, HOW MANY TIMES DO YOU TALK ON THE PHONE WITH FAMILY, FRIENDS, OR NEIGHBORS?: MORE THAN THREE TIMES A WEEK
HOW OFTEN DO YOU ATTEND CHURCH OR RELIGIOUS SERVICES?: NEVER
DO YOU BELONG TO ANY CLUBS OR ORGANIZATIONS SUCH AS CHURCH GROUPS UNIONS, FRATERNAL OR ATHLETIC GROUPS, OR SCHOOL GROUPS?: NO
HOW OFTEN DO YOU ATTEND CHURCH OR RELIGIOUS SERVICES?: NEVER
HOW HARD IS IT FOR YOU TO PAY FOR THE VERY BASICS LIKE FOOD, HOUSING, MEDICAL CARE, AND HEATING?: NOT VERY HARD
HOW OFTEN DO YOU HAVE SIX OR MORE DRINKS ON ONE OCCASION: NEVER
HOW OFTEN DO YOU ATTENT MEETINGS OF THE CLUB OR ORGANIZATION YOU BELONG TO?: NEVER
WITHIN THE PAST 12 MONTHS, YOU WORRIED THAT YOUR FOOD WOULD RUN OUT BEFORE YOU GOT THE MONEY TO BUY MORE: NEVER TRUE
DO YOU BELONG TO ANY CLUBS OR ORGANIZATIONS SUCH AS CHURCH GROUPS UNIONS, FRATERNAL OR ATHLETIC GROUPS, OR SCHOOL GROUPS?: NO
HOW OFTEN DO YOU HAVE A DRINK CONTAINING ALCOHOL: PATIENT DECLINED
HOW MANY DRINKS CONTAINING ALCOHOL DO YOU HAVE ON A TYPICAL DAY WHEN YOU ARE DRINKING: PATIENT DECLINED
HOW OFTEN DO YOU GET TOGETHER WITH FRIENDS OR RELATIVES?: THREE TIMES A WEEK

## 2022-01-04 ASSESSMENT — LIFESTYLE VARIABLES
HOW OFTEN DO YOU HAVE SIX OR MORE DRINKS ON ONE OCCASION: NEVER
HOW OFTEN DO YOU HAVE A DRINK CONTAINING ALCOHOL: PATIENT DECLINED
HOW MANY STANDARD DRINKS CONTAINING ALCOHOL DO YOU HAVE ON A TYPICAL DAY: PATIENT DECLINED

## 2022-01-07 ENCOUNTER — HOSPITAL ENCOUNTER (OUTPATIENT)
Dept: LAB | Facility: MEDICAL CENTER | Age: 72
End: 2022-01-07
Attending: INTERNAL MEDICINE
Payer: MEDICARE

## 2022-01-07 LAB
T4 FREE SERPL-MCNC: 1.54 NG/DL (ref 0.93–1.7)
TSH SERPL DL<=0.005 MIU/L-ACNC: 1.18 UIU/ML (ref 0.38–5.33)

## 2022-01-07 PROCEDURE — 36415 COLL VENOUS BLD VENIPUNCTURE: CPT

## 2022-01-07 PROCEDURE — 84439 ASSAY OF FREE THYROXINE: CPT

## 2022-01-07 PROCEDURE — 84443 ASSAY THYROID STIM HORMONE: CPT

## 2022-01-25 ENCOUNTER — OFFICE VISIT (OUTPATIENT)
Dept: MEDICAL GROUP | Age: 72
End: 2022-01-25
Payer: MEDICARE

## 2022-01-25 VITALS
HEART RATE: 54 BPM | DIASTOLIC BLOOD PRESSURE: 90 MMHG | BODY MASS INDEX: 24.95 KG/M2 | SYSTOLIC BLOOD PRESSURE: 160 MMHG | WEIGHT: 140.8 LBS | OXYGEN SATURATION: 95 % | HEIGHT: 63 IN | TEMPERATURE: 97.3 F

## 2022-01-25 DIAGNOSIS — Z23 NEED FOR VACCINATION: ICD-10-CM

## 2022-01-25 DIAGNOSIS — D32.9 MENINGIOMA (HCC): ICD-10-CM

## 2022-01-25 DIAGNOSIS — I10 ESSENTIAL HYPERTENSION: ICD-10-CM

## 2022-01-25 DIAGNOSIS — D75.839 THROMBOCYTOSIS: ICD-10-CM

## 2022-01-25 DIAGNOSIS — L29.9 ITCHING: ICD-10-CM

## 2022-01-25 PROCEDURE — 90732 PPSV23 VACC 2 YRS+ SUBQ/IM: CPT | Performed by: PHYSICIAN ASSISTANT

## 2022-01-25 PROCEDURE — 99213 OFFICE O/P EST LOW 20 MIN: CPT | Mod: 25 | Performed by: PHYSICIAN ASSISTANT

## 2022-01-25 PROCEDURE — G0009 ADMIN PNEUMOCOCCAL VACCINE: HCPCS | Performed by: PHYSICIAN ASSISTANT

## 2022-01-25 RX ORDER — HYDROCHLOROTHIAZIDE 25 MG/1
1 TABLET ORAL
COMMUNITY
End: 2022-01-25

## 2022-01-25 RX ORDER — HYDROXYZINE HYDROCHLORIDE 25 MG/1
25 TABLET, FILM COATED ORAL 3 TIMES DAILY PRN
Qty: 30 TABLET | Refills: 0 | Status: SHIPPED | OUTPATIENT
Start: 2022-01-25 | End: 2022-07-25

## 2022-01-25 ASSESSMENT — FIBROSIS 4 INDEX: FIB4 SCORE: 0.63

## 2022-01-25 ASSESSMENT — PATIENT HEALTH QUESTIONNAIRE - PHQ9: CLINICAL INTERPRETATION OF PHQ2 SCORE: 0

## 2022-01-25 NOTE — PROGRESS NOTES
"cc: Medication review and itching of left arm    Subjective:     HPI    Ailin Good is a 71 y.o. female presenting for medication review.  She is currently taking 25 mg of hydrochlorothiazide for hypertension.  Blood pressures mildly elevated clinic today.  She states she checks her blood pressure at home consistently ranges around 120 systolic.  Has not had any readings above 140 systolic.    She is also followed by hematology for thrombocytosis.  Was a initially on Hydrea which is causing issues with her thyroid, she was not tolerating medication.  She did switch to different hematologist who is now just monitoring her levels.    For the past few years she has had fairly consistent itching on her left forearm.  She does not have any overt rash, cannot identify any allergens.  It does keep her up at night.  She has tried Benadryl with minimal improvement.  She also has been using an eczema lotion and with minimal improvement.  Denies swelling, rash, erythema, warmth.    Review of systems:  See above.       Current Outpatient Medications:   •  hydrOXYzine HCl (ATARAX) 25 MG Tab, Take 1 Tablet by mouth 3 times a day as needed for Itching., Disp: 30 Tablet, Rfl: 0  •  hydroCHLOROthiazide (HYDRODIURIL) 25 MG Tab, TAKE 1 TABLET BY MOUTH EVERY DAY, Disp: 90 Tablet, Rfl: 3  •  diphenhydrAMINE (BENADRYL) 25 MG Tab, Take 50 mg by mouth every 6 hours as needed for Sleep. allergy, Disp: , Rfl:   •  levothyroxine (SYNTHROID) 125 MCG Tab, TAKE 1 TAB BY MOUTH 6 DAYS A WEEK AND 1 AND 1/2 TAB 1 DAY A WEEK, Disp: , Rfl:   •  ASPIRIN 81 PO, every day., Disp: , Rfl:     Allergies, past medical history, past surgical history, family history, social history reviewed and updated    Objective:     Vitals: /90 (BP Location: Left arm, Patient Position: Sitting, BP Cuff Size: Adult)   Pulse (!) 54   Temp 36.3 °C (97.3 °F) (Temporal)   Ht 1.6 m (5' 3\")   Wt 63.9 kg (140 lb 12.8 oz)   LMP  (LMP Unknown)   SpO2 95%   Breastfeeding " No   BMI 24.94 kg/m²   General: Alert, pleasant, NAD  HEENT: Normocephalic. Neck supple.  No thyromegaly or masses palpated. No cervical or supraclavicular lymphadenopathy. No carotid bruits   Heart: Regular rate and rhythm.  S1 and S2 normal.  No murmurs appreciated.  Respiratory: Normal respiratory effort.  Clear to auscultation bilaterally.  Skin: Warm, dry, no rashes.  Extremities: No leg edema.  Radial pulses 2+ symmetric  Psych:  Affect/mood is normal, judgement is good, memory is intact, grooming is appropriate.    Assessment/Plan:     Ailin was seen today for bump and medication refill.    Diagnoses and all orders for this visit:    Essential hypertension  -Blood pressure elevated in clinic today, she does monitor blood pressure at home and it is well controlled.  Advised to continue monitor if it is consistently greater than 140 systolic then follow-up.  -     Lipid Profile; Future    Meningioma (HCC)  -Stable.  Followed by neurosurgery.    Itching  -Unsure of exact etiology, it is only on her left forearm, no rashes seen.  Will trial hydroxyzine Aquaphor for good hydration of the skin.  -     hydrOXYzine HCl (ATARAX) 25 MG Tab; Take 1 Tablet by mouth 3 times a day as needed for Itching.    Thrombocytosis  -Currently not on any medications.  Followed by hematology.  Follow-up as recommended    Need for vaccination  -Immunization given in clinic today  -     Pneumovax Vaccine (PPSV23)        Return in about 6 months (around 7/25/2022) for AWV, Lab Review.

## 2022-04-06 ENCOUNTER — HOSPITAL ENCOUNTER (OUTPATIENT)
Dept: LAB | Facility: MEDICAL CENTER | Age: 72
End: 2022-04-06
Attending: INTERNAL MEDICINE
Payer: MEDICARE

## 2022-04-06 ENCOUNTER — HOSPITAL ENCOUNTER (OUTPATIENT)
Dept: LAB | Facility: MEDICAL CENTER | Age: 72
End: 2022-04-06
Attending: PHYSICIAN ASSISTANT
Payer: MEDICARE

## 2022-04-06 DIAGNOSIS — I10 ESSENTIAL HYPERTENSION: ICD-10-CM

## 2022-04-06 LAB
CHOLEST SERPL-MCNC: 173 MG/DL (ref 100–199)
FASTING STATUS PATIENT QL REPORTED: NORMAL
HDLC SERPL-MCNC: 91 MG/DL
LDLC SERPL CALC-MCNC: 74 MG/DL
T4 FREE SERPL-MCNC: 1.77 NG/DL (ref 0.93–1.7)
TRIGL SERPL-MCNC: 38 MG/DL (ref 0–149)
TSH SERPL DL<=0.005 MIU/L-ACNC: 1.33 UIU/ML (ref 0.38–5.33)

## 2022-04-06 PROCEDURE — 36415 COLL VENOUS BLD VENIPUNCTURE: CPT

## 2022-04-06 PROCEDURE — 84443 ASSAY THYROID STIM HORMONE: CPT

## 2022-04-06 PROCEDURE — 84439 ASSAY OF FREE THYROXINE: CPT

## 2022-04-06 PROCEDURE — 80061 LIPID PANEL: CPT

## 2022-05-27 PROBLEM — M18.11 PRIMARY OSTEOARTHRITIS OF FIRST CARPOMETACARPAL JOINT OF RIGHT HAND: Status: ACTIVE | Noted: 2022-05-27

## 2022-05-27 PROBLEM — M65.351 TRIGGER LITTLE FINGER OF RIGHT HAND: Status: ACTIVE | Noted: 2022-05-27

## 2022-05-27 PROBLEM — M67.432 GANGLION CYST OF DORSUM OF LEFT WRIST: Status: ACTIVE | Noted: 2022-05-27

## 2022-05-27 PROBLEM — M18.12 PRIMARY OSTEOARTHRITIS OF FIRST CARPOMETACARPAL JOINT OF LEFT HAND: Status: ACTIVE | Noted: 2022-05-27

## 2022-07-20 ENCOUNTER — OFFICE VISIT (OUTPATIENT)
Dept: URGENT CARE | Facility: CLINIC | Age: 72
End: 2022-07-20
Payer: MEDICARE

## 2022-07-20 VITALS
BODY MASS INDEX: 23.04 KG/M2 | RESPIRATION RATE: 12 BRPM | OXYGEN SATURATION: 98 % | DIASTOLIC BLOOD PRESSURE: 80 MMHG | WEIGHT: 130 LBS | SYSTOLIC BLOOD PRESSURE: 138 MMHG | HEIGHT: 63 IN | HEART RATE: 58 BPM | TEMPERATURE: 98 F

## 2022-07-20 DIAGNOSIS — L08.9 INFECTED CYST OF SKIN: ICD-10-CM

## 2022-07-20 DIAGNOSIS — L72.9 INFECTED CYST OF SKIN: ICD-10-CM

## 2022-07-20 DIAGNOSIS — M67.40 GANGLION CYST: ICD-10-CM

## 2022-07-20 PROCEDURE — 99214 OFFICE O/P EST MOD 30 MIN: CPT | Performed by: NURSE PRACTITIONER

## 2022-07-20 RX ORDER — CEPHALEXIN 500 MG/1
500 CAPSULE ORAL 4 TIMES DAILY
Qty: 20 CAPSULE | Refills: 0 | Status: SHIPPED | OUTPATIENT
Start: 2022-07-20 | End: 2022-07-25

## 2022-07-20 ASSESSMENT — FIBROSIS 4 INDEX: FIB4 SCORE: 0.64

## 2022-07-20 ASSESSMENT — ENCOUNTER SYMPTOMS
CHILLS: 0
FEVER: 0
NAUSEA: 0

## 2022-07-20 NOTE — PROGRESS NOTES
Ailin Good is a 72 y.o. female who presents for Bump (Bump on RT forearm, redness, swelling, hot to touch, painful)      HPI  Ailin is a 72-year-old female with complaint of a bump on right forearm that she noticed a few days ago as a tender area on her forearm.  Then 2 days ago it bumped up on her.  She tried to put a compression dressing on it with Coban dressing.  She now has increased redness, swelling and some bruising around the forearm.  It is painful for her to touch.  She feels that her skin is hotter in that area than the rest of her arm.  She is a history of ganglion cysts as well as other dermal cysts.  Treatments tried: Over-the-counter triple antibiotic ointment and a pressure dressing.    Review of Systems   Constitutional: Negative for chills and fever.   Gastrointestinal: Negative for nausea.       Allergies:       Allergies   Allergen Reactions   • Chocolate    • Dust Mite Extract    • Other Environmental      Sun exposure   • Penicillins Hives     As a child thru testing   • Pollen Extract    • Seasonal      Every tree, grass       PMSFS Hx:  Past Medical History:   Diagnosis Date   • Allergy    • Anesthesia     PONV   • Arthritis    • Hypertension    • Polyp of colon 2/20/2019   • PONV (postoperative nausea and vomiting)    • Postoperative hypothyroidism     thyroidectomy   • Psychiatric problem 03/2021    situational depression ( loss of dog)   • Urinary incontinence     minor     Past Surgical History:   Procedure Laterality Date   • ME DX BONE MARROW ASPIRATIONS Left 3/10/2021    Procedure: ASPIRATION, BONE MARROW -;  Surgeon: Joe Black M.D.;  Location: ENDOSCOPY [facility];  Service: Orthopedics   • ME DX BONE MARROW BIOPSIES Left 3/10/2021    Procedure: BIOPSY, BONE MARROW, USING NEEDLE OR TROCAR-DR. PERRY;  Surgeon: Joe Black M.D.;  Location: ENDOSCOPY [facility];  Service: Orthopedics   • THYROIDECTOMY  2016   • TONSILLECTOMY  1969   • TUBAL COAGULATION LAPAROSCOPIC  BILATERAL       Family History   Problem Relation Age of Onset   • Heart Disease Mother    • Diabetes Mother    • Hypertension Mother    • Cancer Father         pancreatic cancer   • Stroke Father    • Hypertension Father    • Heart Disease Sister    • Hypertension Sister    • Hyperlipidemia Sister      Social History     Tobacco Use   • Smoking status: Former Smoker     Packs/day: 0.25     Types: Cigarettes     Quit date: 2013     Years since quittin.4   • Smokeless tobacco: Never Used   Substance Use Topics   • Alcohol use: Yes     Alcohol/week: 0.6 oz     Types: 1 Glasses of wine per week     Comment: wine nightly        Problems:   Patient Active Problem List   Diagnosis   • Essential hypertension   • Postoperative hypothyroidism   • Family history of coronary artery disease   • Family history of pancreatic cancer   • History of tobacco use disorder   • Environmental allergies   • Localized deposits of fat   • Overactive bladder   • Arthritis   • Thrombocytosis   • Meningioma (HCC)   • Primary osteoarthritis of first carpometacarpal joint of right hand   • Primary osteoarthritis of first carpometacarpal joint of left hand   • Trigger little finger of right hand   • Ganglion cyst of dorsum of left wrist       Medications:   Current Outpatient Medications on File Prior to Visit   Medication Sig Dispense Refill   • hydroCHLOROthiazide (HYDRODIURIL) 25 MG Tab TAKE 1 TABLET BY MOUTH EVERY DAY 90 Tablet 3   • diphenhydrAMINE (BENADRYL) 25 MG Tab Take 50 mg by mouth every 6 hours as needed for Sleep. allergy     • levothyroxine (SYNTHROID) 125 MCG Tab TAKE 1 TAB BY MOUTH 6 DAYS A WEEK AND 1 AND 1/2 TAB 1 DAY A WEEK     • ASPIRIN 81 PO every day.     • hydrOXYzine HCl (ATARAX) 25 MG Tab Take 1 Tablet by mouth 3 times a day as needed for Itching. (Patient not taking: Reported on 2022) 30 Tablet 0     No current facility-administered medications on file prior to visit.          Objective:     /80    "Pulse (!) 58   Temp 36.7 °C (98 °F) (Temporal)   Resp 12   Ht 1.6 m (5' 3\")   Wt 59 kg (130 lb)   LMP  (LMP Unknown)   SpO2 98%   Breastfeeding No   BMI 23.03 kg/m²     Physical Exam  Vitals reviewed.   Constitutional:       General: She is not in acute distress.     Appearance: Normal appearance. She is normal weight.   Cardiovascular:      Rate and Rhythm: Normal rate and regular rhythm.      Pulses: Normal pulses.      Heart sounds: Normal heart sounds.   Pulmonary:      Effort: Pulmonary effort is normal.      Breath sounds: Normal breath sounds.   Skin:     General: Skin is warm and dry.      Capillary Refill: Capillary refill takes less than 2 seconds.      Findings: Erythema (Right forearm) present.          Neurological:      Mental Status: She is alert and oriented to person, place, and time.   Psychiatric:         Mood and Affect: Mood normal.         Behavior: Behavior normal.           Assessment /Associated Orders:      1. Infected cyst of skin  cephALEXin (KEFLEX) 500 MG Cap   2. Ganglion cyst         Medical Decision Making:    Pt is clinically stable at today's acute urgent care visit.  No acute distress noted. Appropriate for outpatient care at this time.   Acute problem today .   Warm compresses BID x 15 min x 3-4 days then prn   Cold compresses prn pain   Educated in proper administration of medication(s) ordered today including safety, possible SE, risks, benefits, rationale and alternatives to therapy.  Has tolerated amoxicillin and Keflex in the past without problems. No recent antibiotic use.   OTC  analgesic of choice (acetaminophen or NSAID) prn pain. Follow manufactures dosing and safety precautions.     • Discussed DDx, management options (risks,benefits, and alternatives to planned treatment), natural progression and supportive care.  Expressed understanding and the treatment plan was agreed upon. Questions were encouraged and answered   • Return to urgent care prn if new or " worsening sx or if there is no improvement in condition prn.    • Educated in Red flags and indications to immediately call 911 or present to the Emergency Department.       Time spent evaluating Ailin  in urgent care today was at least 33 minutes. This time includes preparing for visit, reviewing any pertinent external notes or test results, counseling/education, exam and evaluation, obtaining history, independent interpretation, ordering lab/test/procedures, medication management and documentation as indicated above.Time does not include separately billable procedures noted .       Please note that this dictation was created using voice recognition software. I have worked with consultants from the vendor as well as technical experts from [facility] to optimize the interface. I have made every reasonable attempt to correct obvious errors, but I expect that there are errors of grammar and possibly content that I did not discover before finalizing the note.  This note was electronically signed by provider

## 2022-07-24 ENCOUNTER — HOSPITAL ENCOUNTER (EMERGENCY)
Facility: MEDICAL CENTER | Age: 72
End: 2022-07-24
Attending: EMERGENCY MEDICINE
Payer: MEDICARE

## 2022-07-24 ENCOUNTER — APPOINTMENT (OUTPATIENT)
Dept: RADIOLOGY | Facility: MEDICAL CENTER | Age: 72
End: 2022-07-24
Attending: EMERGENCY MEDICINE
Payer: MEDICARE

## 2022-07-24 VITALS
DIASTOLIC BLOOD PRESSURE: 99 MMHG | HEIGHT: 63 IN | TEMPERATURE: 97.9 F | RESPIRATION RATE: 16 BRPM | WEIGHT: 139.77 LBS | BODY MASS INDEX: 24.77 KG/M2 | OXYGEN SATURATION: 96 % | SYSTOLIC BLOOD PRESSURE: 157 MMHG | HEART RATE: 50 BPM

## 2022-07-24 DIAGNOSIS — T14.8XXA HEMATOMA: ICD-10-CM

## 2022-07-24 LAB
ALBUMIN SERPL BCP-MCNC: 4.2 G/DL (ref 3.2–4.9)
ALBUMIN/GLOB SERPL: 1.4 G/DL
ALP SERPL-CCNC: 60 U/L (ref 30–99)
ALT SERPL-CCNC: 39 U/L (ref 2–50)
ANION GAP SERPL CALC-SCNC: 11 MMOL/L (ref 7–16)
APTT PPP: 29.5 SEC (ref 24.7–36)
AST SERPL-CCNC: 38 U/L (ref 12–45)
BASOPHILS # BLD AUTO: 0.8 % (ref 0–1.8)
BASOPHILS # BLD: 0.04 K/UL (ref 0–0.12)
BILIRUB SERPL-MCNC: 0.5 MG/DL (ref 0.1–1.5)
BUN SERPL-MCNC: 23 MG/DL (ref 8–22)
CALCIUM SERPL-MCNC: 9 MG/DL (ref 8.4–10.2)
CHLORIDE SERPL-SCNC: 102 MMOL/L (ref 96–112)
CO2 SERPL-SCNC: 27 MMOL/L (ref 20–33)
CREAT SERPL-MCNC: 0.76 MG/DL (ref 0.5–1.4)
EOSINOPHIL # BLD AUTO: 0.14 K/UL (ref 0–0.51)
EOSINOPHIL NFR BLD: 2.7 % (ref 0–6.9)
ERYTHROCYTE [DISTWIDTH] IN BLOOD BY AUTOMATED COUNT: 47.3 FL (ref 35.9–50)
GFR SERPLBLD CREATININE-BSD FMLA CKD-EPI: 84 ML/MIN/1.73 M 2
GLOBULIN SER CALC-MCNC: 2.9 G/DL (ref 1.9–3.5)
GLUCOSE SERPL-MCNC: 100 MG/DL (ref 65–99)
HCT VFR BLD AUTO: 42.4 % (ref 37–47)
HGB BLD-MCNC: 14 G/DL (ref 12–16)
IMM GRANULOCYTES # BLD AUTO: 0.01 K/UL (ref 0–0.11)
IMM GRANULOCYTES NFR BLD AUTO: 0.2 % (ref 0–0.9)
INR PPP: 1 (ref 0.87–1.13)
LYMPHOCYTES # BLD AUTO: 2.28 K/UL (ref 1–4.8)
LYMPHOCYTES NFR BLD: 44.2 % (ref 22–41)
MCH RBC QN AUTO: 31.1 PG (ref 27–33)
MCHC RBC AUTO-ENTMCNC: 33 G/DL (ref 33.6–35)
MCV RBC AUTO: 94.2 FL (ref 81.4–97.8)
MONOCYTES # BLD AUTO: 0.52 K/UL (ref 0–0.85)
MONOCYTES NFR BLD AUTO: 10.1 % (ref 0–13.4)
NEUTROPHILS # BLD AUTO: 2.17 K/UL (ref 2–7.15)
NEUTROPHILS NFR BLD: 42 % (ref 44–72)
NRBC # BLD AUTO: 0 K/UL
NRBC BLD-RTO: 0 /100 WBC
PLATELET # BLD AUTO: 600 K/UL (ref 164–446)
PMV BLD AUTO: 9.3 FL (ref 9–12.9)
POTASSIUM SERPL-SCNC: 4.2 MMOL/L (ref 3.6–5.5)
PROT SERPL-MCNC: 7.1 G/DL (ref 6–8.2)
PROTHROMBIN TIME: 12.8 SEC (ref 12–14.6)
RBC # BLD AUTO: 4.5 M/UL (ref 4.2–5.4)
SODIUM SERPL-SCNC: 140 MMOL/L (ref 135–145)
WBC # BLD AUTO: 5.2 K/UL (ref 4.8–10.8)

## 2022-07-24 PROCEDURE — 85730 THROMBOPLASTIN TIME PARTIAL: CPT

## 2022-07-24 PROCEDURE — 85025 COMPLETE CBC W/AUTO DIFF WBC: CPT

## 2022-07-24 PROCEDURE — 36415 COLL VENOUS BLD VENIPUNCTURE: CPT

## 2022-07-24 PROCEDURE — 99284 EMERGENCY DEPT VISIT MOD MDM: CPT

## 2022-07-24 PROCEDURE — 85610 PROTHROMBIN TIME: CPT

## 2022-07-24 PROCEDURE — 93971 EXTREMITY STUDY: CPT | Mod: RT

## 2022-07-24 PROCEDURE — 80053 COMPREHEN METABOLIC PANEL: CPT

## 2022-07-24 ASSESSMENT — FIBROSIS 4 INDEX: FIB4 SCORE: 0.64

## 2022-07-24 NOTE — ED PROVIDER NOTES
ED Provider Note    CHIEF COMPLAINT  Chief Complaint   Patient presents with   • Arm Pain     RUE Pt awoke with RFA swelling and bruising  Denies trauma  Went to [facility]/C and referred to ED R/O DVT       HPI  Ailin Good is a 72 y.o. female who presents for evaluation of right upper extremity swelling.  The patient states over the last 3 days she has had swelling to the right forearm area.  The patient was seen at urgent care couple days ago and started on antibiotics for potential infection.  She presented today with worsening of symptoms and was sent to the ED for evaluation as they were concerned about a DVT.  Patient does have thrombocytosis and is on aspirin for this and under the care of Dr. Boo, hematology/oncology.  There is been no associated: Fever, URI symptoms, cardiorespiratory symptoms, gastrointestinal symptoms.  She denies a history of trauma.    REVIEW OF SYSTEMS  See HPI for further details. All other systems negative.    PAST MEDICAL HISTORY  Past Medical History:   Diagnosis Date   • Allergy    • Anesthesia     PONV   • Arthritis    • Hypertension    • Polyp of colon 2/20/2019   • PONV (postoperative nausea and vomiting)    • Postoperative hypothyroidism     thyroidectomy   • Psychiatric problem 03/2021    situational depression ( loss of dog)   • Urinary incontinence     minor       FAMILY HISTORY  Family History   Problem Relation Age of Onset   • Heart Disease Mother    • Diabetes Mother    • Hypertension Mother    • Cancer Father         pancreatic cancer   • Stroke Father    • Hypertension Father    • Heart Disease Sister    • Hypertension Sister    • Hyperlipidemia Sister        SOCIAL HISTORY  Resides locally;    SURGICAL HISTORY  Past Surgical History:   Procedure Laterality Date   • IN DX BONE MARROW ASPIRATIONS Left 3/10/2021    Procedure: ASPIRATION, BONE MARROW -;  Surgeon: Joe Black M.D.;  Location: [facility];  Service: Orthopedics   • IN DX BONE MARROW BIOPSIES Left  "3/10/2021    Procedure: BIOPSY, BONE MARROW, USING NEEDLE OR TROCAR-DR. PERRY;  Surgeon: Joe Black M.D.;  Location: [facility];  Service: Orthopedics   • THYROIDECTOMY  2016   • TONSILLECTOMY  1969   • TUBAL COAGULATION LAPAROSCOPIC BILATERAL         CURRENT MEDICATIONS  See nurses notes    ALLERGIES  Allergies   Allergen Reactions   • Chocolate    • Dust Mite Extract    • Other Environmental      Sun exposure   • Penicillins Hives     As a child thru testing   • Pollen Extract    • Seasonal      Every tree, grass       PHYSICAL EXAM  VITAL SIGNS: BP (!) 168/94   Pulse (!) 56   Temp 36.6 °C (97.9 °F) (Temporal)   Resp 16   Ht 1.6 m (5' 3\")   Wt 63.4 kg (139 lb 12.4 oz)   LMP  (LMP Unknown)   SpO2 95%   BMI 24.76 kg/m²    Constitutional: 72-year-old female, awake, oriented x3  HENT: Normocephalic, Atraumatic, Nares:Clear, Oropharynx: moist, well hydrated, posterior pharynx:clear   Eyes: PERRL, EOMI, Conjunctiva normal, No discharge.   Neck: Normal range of motion, No tenderness, Supple, No stridor.   Lymphatic: No lymphadenopathy noted.   Cardiovascular: Regular rate and rhythm without mumurs, gallups, rubs   Thorax & Lungs: Normal Equal breath sounds, No respiratory distress, No wheezing, no stridor, no rales. No chest tenderness.   Abdomen: Soft, nontender, nondistended, no organomegaly, positive bowel sounds normal in quality. No guarding or rebound.  Skin: Good skin turgor, pink, warm, dry. No rashes, petechiae, purpura. Normal capillary refill.   Back: No tenderness, No CVA tenderness.   Extremities: Intact distal pulses, No edema, No tenderness, No cyanosis,  Vascular: Pulses are 2+, symmetric in the upper and lower extremities; right upper extremity reveals discoloration suggesting venous bleeding over the dorsal aspect of the forearm with a small localized area of swelling;  Musculoskeletal: Good range of motion in all major joints. No tenderness to palpation or major deformities " noted.   Neurologic: Alert & oriented x 3,  No gross focal deficits noted.   Psychiatric: Affect normal, Judgment normal, Mood normal.       RADIOLOGY/PROCEDURES  Ultrasound of right upper extremity:  FINDINGS:   Right upper extremity-   All veins demonstrate complete color filling and compressibility with    normal venous flow dynamics including spontaneous flow and respiratory    phasicity.   No superficial or deep venous thrombosis.    COURSE & MEDICAL DECISION MAKING  Pertinent Labs & Imaging studies reviewed. (See chart for details)  1.  Saline lock    Laboratory studies: CBC shows white count 5.2, 42% neutrophils, 44% lymphocytes, 10% monocytes, hemoglobin 14.0, crit 42.4; platelet count 600,000; CMP shows a BUN of 23 otherwise within normal limits; coags within normal;    Discussion: At this time, the patient was sent by urgent care for evaluation of possible DVT.  Ultrasonography showed no evidence of DVT.  Clinically the patient has a localized area of swelling and ecchymosis which at this time, seems to be possible hematoma because of developed because of her aspirin use and thrombocytosis.  Clinically, the patient does not have evidence of abscess formation.  There is no erythema or warmth or surrounding cellulitis.  The patient has been on antibiotic therapy.  Clinical findings tend to suggest hematoma formation.  Based on the findings I will have her continue the antibiotics elevate her arm apply warm compresses.  If changes or worsens she can get rechecked and reevaluated for possible I&D if clinically indicated.  I discussed the findings treat plan with the patient.  She indicates she is comfortable this explanation disposition.        FINAL IMPRESSION  1. Hematoma           PLAN  1.  Appropriate discharge instructions given  2.  Elevate arm with warm compresses applied  3.  Continue antibiotics  4.  Recheck if increased swelling redness or pain    Electronically signed by: Guy G Gansert, M.D.,  7/24/2022 1:18 PM

## 2022-07-25 ENCOUNTER — OFFICE VISIT (OUTPATIENT)
Dept: MEDICAL GROUP | Age: 72
End: 2022-07-25
Payer: MEDICARE

## 2022-07-25 VITALS
TEMPERATURE: 97.4 F | WEIGHT: 140 LBS | HEIGHT: 63 IN | OXYGEN SATURATION: 99 % | BODY MASS INDEX: 24.8 KG/M2 | RESPIRATION RATE: 16 BRPM | HEART RATE: 56 BPM | SYSTOLIC BLOOD PRESSURE: 118 MMHG | DIASTOLIC BLOOD PRESSURE: 80 MMHG

## 2022-07-25 DIAGNOSIS — I10 ESSENTIAL HYPERTENSION: ICD-10-CM

## 2022-07-25 DIAGNOSIS — Z12.11 COLON CANCER SCREENING: ICD-10-CM

## 2022-07-25 DIAGNOSIS — D47.3 ESSENTIAL (HEMORRHAGIC) THROMBOCYTHEMIA (HCC): ICD-10-CM

## 2022-07-25 DIAGNOSIS — D32.9 MENINGIOMA (HCC): ICD-10-CM

## 2022-07-25 DIAGNOSIS — Z00.00 MEDICARE ANNUAL WELLNESS VISIT, SUBSEQUENT: Primary | ICD-10-CM

## 2022-07-25 PROCEDURE — G0439 PPPS, SUBSEQ VISIT: HCPCS | Performed by: PHYSICIAN ASSISTANT

## 2022-07-25 ASSESSMENT — PATIENT HEALTH QUESTIONNAIRE - PHQ9: CLINICAL INTERPRETATION OF PHQ2 SCORE: 0

## 2022-07-25 ASSESSMENT — FIBROSIS 4 INDEX: FIB4 SCORE: 0.71

## 2022-07-25 ASSESSMENT — ENCOUNTER SYMPTOMS: GENERAL WELL-BEING: GOOD

## 2022-07-25 ASSESSMENT — ACTIVITIES OF DAILY LIVING (ADL): BATHING_REQUIRES_ASSISTANCE: 0

## 2022-07-25 NOTE — PROGRESS NOTES
Chief Complaint   Patient presents with   • Medicare Annual Wellness         HPI:  Ailin is a 72 y.o. here for Medicare Annual Wellness Visit        Patient Active Problem List    Diagnosis Date Noted   • Primary osteoarthritis of first carpometacarpal joint of right hand 05/27/2022   • Primary osteoarthritis of first carpometacarpal joint of left hand 05/27/2022   • Trigger little finger of right hand 05/27/2022   • Ganglion cyst of dorsum of left wrist 05/27/2022   • Meningioma (HCC) 06/29/2021   • Thrombocytosis 01/13/2020   • Arthritis 01/07/2020   • Overactive bladder 09/16/2019   • Essential hypertension 04/28/2017   • Postoperative hypothyroidism 04/28/2017   • Family history of coronary artery disease 04/28/2017   • Family history of pancreatic cancer 04/28/2017   • History of tobacco use disorder 04/28/2017   • Environmental allergies 04/28/2017   • Localized deposits of fat 04/28/2017       Current Outpatient Medications   Medication Sig Dispense Refill   • cephALEXin (KEFLEX) 500 MG Cap Take 1 Capsule by mouth 4 times a day for 5 days. 20 Capsule 0   • hydroCHLOROthiazide (HYDRODIURIL) 25 MG Tab TAKE 1 TABLET BY MOUTH EVERY DAY 90 Tablet 3   • diphenhydrAMINE (BENADRYL) 25 MG Tab Take 50 mg by mouth every 6 hours as needed for Sleep. allergy     • levothyroxine (SYNTHROID) 125 MCG Tab TAKE 1 TAB BY MOUTH 6 DAYS A WEEK AND 1 AND 1/2 TAB 1 DAY A WEEK     • ASPIRIN 81 PO every day.       No current facility-administered medications for this visit.        Patient is taking medications as noted in medication list.  Current supplements as per medication list.     Allergies: Chocolate, Dust mite extract, Other environmental, Penicillins, Pollen extract, and Seasonal    Current social contact/activities: yes     Is patient current with immunizations? Yes.    She  reports that she quit smoking about 9 years ago. Her smoking use included cigarettes. She smoked 0.25 packs per day. She has never used smokeless  tobacco. She reports current alcohol use of about 0.6 oz of alcohol per week. She reports that she does not use drugs.  Counseling given: Not Answered        DPA/Advanced directive: Patient has Advanced Directive, but it is not on file. Instructed to bring in a copy to scan into their chart.    ROS:    Gait: Uses no assistive device   Ostomy: No   Other tubes: No   Amputations: No   Chronic oxygen use No   Last eye exam 2021   Wears hearing aids: No   : Reports urinary leakage during the last 6 months that has interfered a lot with their daily activities or sleep.      Screening:        Depression Screening  Little interest or pleasure in doing things?  0 - not at all  Feeling down, depressed, or hopeless? 0 - not at all  Patient Health Questionnaire Score: 0    If depressive symptoms identified deferred to follow up visit unless specifically addressed in assessment and plan.    Interpretation of PHQ-9 Total Score   Score Severity   1-4 No Depression   5-9 Mild Depression   10-14 Moderate Depression   15-19 Moderately Severe Depression   20-27 Severe Depression    Screening for Cognitive Impairment  Three Minute Recall (daughter, heaven, mountain)  3/3    Best clock face with all 12 numbers and set the hands to show 10 past 11.  Yes    If cognitive concerns identified, deferred for follow up unless specifically addressed in assessment and plan.    Fall Risk Assessment  Has the patient had two or more falls in the last year or any fall with injury in the last year?  No  If fall risk identified, deferred for follow up unless specifically addressed in assessment and plan.    Safety Assessment  Throw rugs on floor.  No  Handrails on all stairs.  No  Good lighting in all hallways.  Yes  Difficulty hearing.  No  Patient counseled about all safety risks that were identified.    Functional Assessment ADLs  Are there any barriers preventing you from cooking for yourself or meeting nutritional needs?  No.    Are there any  barriers preventing you from driving safely or obtaining transportation?  No.    Are there any barriers preventing you from using a telephone or calling for help?  No.    Are there any barriers preventing you from shopping?  No.    Are there any barriers preventing you from taking care of your own finances?  No.    Are there any barriers preventing you from managing your medications?  No.    Are there any barriers preventing you from showering, bathing or dressing yourself?  No.    Are you currently engaging in any exercise or physical activity?  Yes.     What is your perception of your health?  Good.    Health Maintenance Summary          Overdue - COVID-19 Vaccine (1) Overdue - never done    No completion history exists for this topic.          Overdue - IMM ZOSTER VACCINES (1 of 2) Overdue - never done    No completion history exists for this topic.          IMM INFLUENZA (1) Next due on 9/1/2022    10/19/2021  Imm Admin: Influenza Vaccine Adult HD    11/03/2020  Imm Admin: Influenza Vaccine Adult HD    09/16/2019  Imm Admin: Influenza Vaccine Adult HD    09/23/2018  Imm Admin: Influenza Vaccine Adult HD    01/16/2018  Imm Admin: Influenza Vaccine Adult HD    Only the first 5 history entries have been loaded, but more history exists.          MAMMOGRAM (Yearly) Next due on 12/10/2022    12/10/2021  MA-SCREENING MAMMO BILAT W/TOMOSYNTHESIS W/CAD    11/18/2020  MA-SCREENING MAMMO BILAT W/TOMOSYNTHESIS W/CAD    05/06/2019  MA-SCREENING MAMMO BILAT W/TOMOSYNTHESIS W/CAD    05/03/2018  MA-MAMMO SCREENING BILAT W/ODALIS W/CAD    05/19/2017  MA-MAMMO DIAGNOSTIC UNILAT W/ODALIS W/CAD RIGHT    Only the first 5 history entries have been loaded, but more history exists.          BONE DENSITY (Every 5 Years) Next due on 3/23/2023    03/23/2018  DS-BONE DENSITY STUDY (DEXA)    11/02/2007  DS-BONE DENSITY STUDY (DEXA)          Annual Wellness Visit (Every 366 Days) Next due on 7/26/2023 07/25/2022  Level of Service: ANNUAL  WELLNESS VISIT-INCLUDES PPPS SUBSEQUE*    2021  Level of Service: RI ANNUAL WELLNESS VISIT-INCLUDES PPPS SUBSEQUE*    2018  Done          Ordered - COLORECTAL CANCER SCREENING (COLONOSCOPY - Every 10 Years) Ordered on 2020  OCCULT BLOOD FECES IMMUNOASSAY    2015  REFERRAL TO GI FOR COLONOSCOPY          IMM DTaP/Tdap/Td Vaccine (2 - Td or Tdap) Next due on 2019  Imm Admin: Tdap Vaccine    02/15/1996  Imm Admin: TD Vaccine          HEPATITIS C SCREENING  Completed    02/10/2020  HEP C VIRUS ANTIBODY    01/10/2020  HCV Scrn ( [id number] 1xLife)          IMM PNEUMOCOCCAL VACCINE: 65+ Years (Series Information) Completed    2022  Imm Admin: Pneumococcal polysaccharide vaccine (PPSV-23)    2018  Imm Admin: Pneumococcal Conjugate Vaccine (Prevnar/PCV-13)    2016  Imm Admin: Pneumococcal polysaccharide vaccine (PPSV-23)          IMM HEP B VACCINE (Series Information) Aged Out    No completion history exists for this topic.          IMM MENINGOCOCCAL ACWY VACCINE (Series Information) Aged Out    No completion history exists for this topic.          Discontinued - PAP SMEAR  Discontinued    11/10/2015  PAP IG (IMAGE GUIDED)                Patient Care Team:  Jemma Martin P.A.-C. as PCP - General (Family Medicine)  Una Brito M.D. as PCP - [facility] Paneled  Dr. Cholo Coblert DDS as Medical Home Care Manager (Dentistry)  [facility] as Consulting Physician (Optometry)  Haley Decker M.D. as Consulting Physician (Dermatology)    Social History     Tobacco Use   • Smoking status: Former Smoker     Packs/day: 0.25     Types: Cigarettes     Quit date: 2013     Years since quittin.4   • Smokeless tobacco: Never Used   Vaping Use   • Vaping Use: Never used   Substance Use Topics   • Alcohol use: Yes     Alcohol/week: 0.6 oz     Types: 1 Glasses of wine per week     Comment: wine nightly    • Drug use: No     Family History   Problem  "Relation Age of Onset   • Heart Disease Mother    • Diabetes Mother    • Hypertension Mother    • Cancer Father         pancreatic cancer   • Stroke Father    • Hypertension Father    • Heart Disease Sister    • Hypertension Sister    • Hyperlipidemia Sister      She  has a past medical history of Allergy, Anesthesia, Arthritis, Blood dyscrasia, Hypertension, Polyp of colon (02/20/2019), PONV (postoperative nausea and vomiting), Postoperative hypothyroidism, Psychiatric problem (03/2021), and Urinary incontinence.   Past Surgical History:   Procedure Laterality Date   • MO DX BONE MARROW ASPIRATIONS Left 3/10/2021    Procedure: ASPIRATION, BONE MARROW -;  Surgeon: Joe Black M.D.;  Location: ENDOSCOPY [facility];  Service: Orthopedics   • MO DX BONE MARROW BIOPSIES Left 3/10/2021    Procedure: BIOPSY, BONE MARROW, USING NEEDLE OR TROCAR-DR. PERRY;  Surgeon: Joe Black M.D.;  Location: ENDOSCOPY [facility];  Service: Orthopedics   • THYROIDECTOMY  2016   • TONSILLECTOMY  1969   • TUBAL COAGULATION LAPAROSCOPIC BILATERAL             Exam:     /80 (BP Location: Left arm, Patient Position: Sitting, BP Cuff Size: Adult)   Pulse (!) 56   Temp 36.3 °C (97.4 °F) (Temporal)   Resp 16   Ht 1.6 m (5' 3\")   Wt 63.5 kg (140 lb)   SpO2 99%  Body mass index is 24.8 kg/m².    Hearing good.    Dentition good  Alert, oriented in no acute distress.  Eye contact is good, speech goal directed, affect calm      Assessment and Plan. The following treatment and monitoring plan is recommended:      1. Medicare annual wellness visit, subsequent  -Continue with regular physical activity and reviewed diet control.    2. Essential (hemorrhagic) thrombocythemia (HCC)  -Stable.  Currently followed by hematology.  On aspirin.    3. Meningioma (HCC)  -Has been stable.  Recently had MRI completed by her neurosurgeon Dr. Dyer, has follow-up appointment next week.    4. Colon cancer screening  -She is due for colon cancer " screening.  Has history of colon polyps.  She adamantly declines colonoscopy.  Did discuss that we could do Cologuard screening, but again this is not the recommended screening tool for her with her history of colon polyps.  She is more than aware and is understanding of this.  Order placed for Cologuard.  - COLOGUARD COLON CANCER SCREENING    5. Essential hypertension  -Stable.  Continue HCTZ    Of note she was seen in the ER yesterday, for hematoma over the right forearm.  DVT ruled out.  She has noted good improvement, still small hematoma.  Continue with elevation, warm compresses.  If hematoma does not resolve follow-up for reevaluation.    Services suggested: No services needed at this time  Health Care Screening recommendations as per orders if indicated.  Referrals offered: PT/OT/Nutrition counseling/Behavioral Health/Smoking cessation as per orders if indicated.    Discussion today about general wellness and lifestyle habits:    · Prevent falls and reduce trip hazards; Cautioned about securing or removing rugs.  · Have a working fire alarm and carbon monoxide detector;   · Engage in regular physical activity and social activities       Follow-up: Return in about 6 months (around 1/25/2023) for Medication Check.

## 2022-07-26 ENCOUNTER — TELEPHONE (OUTPATIENT)
Dept: SOCIAL WORK | Facility: CLINIC | Age: 72
End: 2022-07-26
Payer: MEDICARE

## 2022-07-26 NOTE — TELEPHONE ENCOUNTER
ED Follow-up  Call Attempts: 1  Date of ED visit: 07/24/22  Call Outcome: Reviewed ED visit with patient  Since you've been home, how have you been feeling?: Better  Were you prescribed any medications?: No  Do you have any questions about your discharge instructions?: No  RN Recommendations: Other  Additional details: Had f/u with PCP on 7/25   Total time spent (mins): 5

## 2022-07-27 DIAGNOSIS — I10 ESSENTIAL HYPERTENSION: ICD-10-CM

## 2022-07-28 RX ORDER — HYDROCHLOROTHIAZIDE 25 MG/1
25 TABLET ORAL
Qty: 90 TABLET | Refills: 3 | Status: SHIPPED | OUTPATIENT
Start: 2022-07-28 | End: 2023-09-18

## 2022-10-04 ENCOUNTER — HOSPITAL ENCOUNTER (OUTPATIENT)
Dept: LAB | Facility: MEDICAL CENTER | Age: 72
End: 2022-10-04
Attending: INTERNAL MEDICINE
Payer: MEDICARE

## 2022-10-04 LAB
T4 FREE SERPL-MCNC: 1.9 NG/DL (ref 0.93–1.7)
TSH SERPL DL<=0.005 MIU/L-ACNC: 0.18 UIU/ML (ref 0.38–5.33)

## 2022-10-04 PROCEDURE — 36415 COLL VENOUS BLD VENIPUNCTURE: CPT

## 2022-10-04 PROCEDURE — 84443 ASSAY THYROID STIM HORMONE: CPT

## 2022-10-04 PROCEDURE — 84439 ASSAY OF FREE THYROXINE: CPT

## 2022-10-27 NOTE — TELEPHONE ENCOUNTER
From: Ailin Good  To: Physician Assistant Jemma Martin  Sent: 6/16/2021 3:15 PM PDT  Subject: Procedure Question    Hi,     Could you send a referral for me to see Fletcher Marquez MD oncology for a second opinion regarding my platelet issue. Dr Bynum is with [facility] and has been highly recommended.     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 Weight stable

## 2022-11-21 ENCOUNTER — DOCUMENTATION (OUTPATIENT)
Dept: HEALTH INFORMATION MANAGEMENT | Facility: OTHER | Age: 72
End: 2022-11-21
Payer: MEDICARE

## 2022-11-30 ENCOUNTER — OFFICE VISIT (OUTPATIENT)
Dept: DERMATOLOGY | Facility: IMAGING CENTER | Age: 72
End: 2022-11-30
Payer: MEDICARE

## 2022-11-30 DIAGNOSIS — L81.4 LENTIGO: ICD-10-CM

## 2022-11-30 PROCEDURE — 99212 OFFICE O/P EST SF 10 MIN: CPT | Performed by: NURSE PRACTITIONER

## 2022-11-30 NOTE — PROGRESS NOTES
DERMATOLOGY NOTE  NEW VISIT       Chief complaint: Establish Care (Skin lesion)     HPI/location: forehead lesion   Time present:   Painful lesion: No  Itching lesion: No  Enlarging lesion: No  Anything make it better or worse?      History of skin cancer: No  History of precancers/actinic keratoses: No  History of biopsies:No  History of blistering/severe sunburns:No  Family history of skin cancer Yes, Sister, type unknown  Family history of atypical moles:Yes, Details:        Allergies   Allergen Reactions    Chocolate     Dust Mite Extract     Other Environmental      Sun exposure    Penicillins Hives     As a child thru testing    Pollen Extract     Seasonal      Every tree, grass        MEDICATIONS:  Medications relevant to specialty reviewed.     REVIEW OF SYSTEMS:   Positive for skin (see HPI)  Negative for fevers and chills       EXAM:  LMP  (LMP Unknown)   Constitutional: Well-developed, well-nourished, and in no distress.     A focused skin exam was performed including the affected areas of the forehead. Notable findings on exam today listed below and/or in assessment/plan.     Medium brown macule to L temple--with benign-appearing pigment network patterns on dermoscopy      IMPRESSION / PLAN:    1. Lentigo  - Benign-appearing nature of lesions discussed during exam.   - Advised to continue to monitor for any return to clinic for new or concerning changes.          Please note that this dictation was created using voice recognition software. I have made every reasonable attempt to correct obvious errors, but I expect that there are errors of grammar and possibly content that I did not discover before finalizing the note.      Return to clinic in: Return for ANSELMO at pt's convenience. and as needed for any new or changing skin lesions.

## 2022-12-19 ENCOUNTER — OFFICE VISIT (OUTPATIENT)
Dept: DERMATOLOGY | Facility: IMAGING CENTER | Age: 72
End: 2022-12-19
Payer: MEDICARE

## 2022-12-19 DIAGNOSIS — D22.9 MULTIPLE NEVI: ICD-10-CM

## 2022-12-19 DIAGNOSIS — L81.4 LENTIGINES: ICD-10-CM

## 2022-12-19 DIAGNOSIS — Z12.83 SKIN CANCER SCREENING: ICD-10-CM

## 2022-12-19 DIAGNOSIS — D18.01 CHERRY ANGIOMA: ICD-10-CM

## 2022-12-19 DIAGNOSIS — L82.1 SK (SEBORRHEIC KERATOSIS): ICD-10-CM

## 2022-12-19 DIAGNOSIS — D17.23 LIPOMA OF RIGHT LOWER EXTREMITY: ICD-10-CM

## 2022-12-19 PROCEDURE — 99212 OFFICE O/P EST SF 10 MIN: CPT | Performed by: NURSE PRACTITIONER

## 2022-12-19 NOTE — PROGRESS NOTES
DERMATOLOGY NOTE  NEW VISIT       Chief complaint: Establish Care (Annual zoë)    Denies new, growing, changing, itching or bleeding skin lesions today.    History of skin cancer: No  History of precancers/actinic keratoses: No  History of biopsies:No  History of blistering/severe sunburns:No  Family history of skin cancer Yes, Sister, type unknown  Family history of atypical moles:Yes, Details:        Allergies   Allergen Reactions    Chocolate     Dust Mite Extract     Other Environmental      Sun exposure    Penicillins Hives     As a child thru testing    Pollen Extract     Seasonal      Every tree, grass     MEDICATIONS:  Medications relevant to specialty reviewed.     REVIEW OF SYSTEMS:   Positive for skin (see HPI)  Negative for fevers and chills    EXAM:  LMP  (LMP Unknown)   Constitutional: Well-developed, well-nourished, and in no distress.     A total body skin exam was performed excluding the genitals per patient preference and including the following areas: head (including face), neck, chest, abdomen, groin/buttocks, back, bilateral upper extremities, and bilateral lower extremities with the following pertinent findings listed below and/or in assessment/plan.     -sun exposed skin of trunk and b/l upper, lower extremities and face with scattered clinically benign light brown reticulated macules all of which were morphologically similar and none of which were suspicious for skin cancer today on exam  -Several scattered 1-3mm bright red macules and thin papules on the trunk and extremities  Multiple tan light brown medium brown macules papules scattered over the trunk >> extremities--All with benign-appearing pigment network patterns on dermoscopy    -Several tan stuck-on waxy papules scattered on the trunk and extremities  -Lipoma on rt lower ext distal to patella     IMPRESSION / PLAN:    1. Lentigines  - Benign-appearing nature of lesions discussed during exam.   - Advised to continue to monitor for  any return to clinic for new or concerning changes.      2. Cherry angioma  - Benign-appearing nature of lesions discussed during exam.   - Advised to continue to monitor for any return to clinic for new or concerning changes.      3. Multiple nevi  - Benign-appearing nature of lesions discussed during exam.   - Advised to continue to monitor for any return to clinic for new or concerning changes.  - ABCDE's of melanoma discussed      4. SK (seborrheic keratosis)  - Benign-appearing nature of lesions discussed during exam.   - Advised to continue to monitor for any return to clinic for new or concerning changes.      5. Lipoma of right lower extremity  - Benign-appearing nature of lesions discussed during exam.   - Discussed tx option including surgical excision, declines referral at this time  - Advised to continue to monitor for any return to clinic for new or concerning changes.      6. Skin cancer screening  Skin cancer education  discussed importance of sun protective clothing, eyewear in addition to the use of broad spectrum sunscreen with SPF 30 or greater, as well as need for reapplication ~every 2 hours when exposed to UVR  discussed importance following up for any new or changing lesions as noted in handout given, but every 12 months exams in clinic in the setting of dermatologic history  ABCDE's of melanoma discussed/handout given        Please note that this dictation was created using voice recognition software. I have made every reasonable attempt to correct obvious errors, but I expect that there are errors of grammar and possibly content that I did not discover before finalizing the note.      Return to clinic in: Return in about 1 year (around 12/19/2023) for ANSELMO. and as needed for any new or changing skin lesions.

## 2023-01-09 ENCOUNTER — HOSPITAL ENCOUNTER (OUTPATIENT)
Dept: LAB | Facility: MEDICAL CENTER | Age: 73
End: 2023-01-09
Attending: INTERNAL MEDICINE
Payer: MEDICARE

## 2023-01-09 LAB — TSH SERPL DL<=0.005 MIU/L-ACNC: 2.03 UIU/ML (ref 0.38–5.33)

## 2023-01-09 PROCEDURE — 36415 COLL VENOUS BLD VENIPUNCTURE: CPT

## 2023-01-09 PROCEDURE — 84443 ASSAY THYROID STIM HORMONE: CPT

## 2023-01-13 ENCOUNTER — HOSPITAL ENCOUNTER (OUTPATIENT)
Dept: RADIOLOGY | Facility: MEDICAL CENTER | Age: 73
End: 2023-01-13
Attending: PHYSICIAN ASSISTANT
Payer: MEDICARE

## 2023-01-13 DIAGNOSIS — Z12.31 VISIT FOR SCREENING MAMMOGRAM: ICD-10-CM

## 2023-01-13 PROCEDURE — 77063 BREAST TOMOSYNTHESIS BI: CPT

## 2023-01-28 DIAGNOSIS — R73.01 ELEVATED FASTING GLUCOSE: ICD-10-CM

## 2023-01-28 DIAGNOSIS — D75.839 THROMBOCYTOSIS: ICD-10-CM

## 2023-01-28 DIAGNOSIS — I10 ESSENTIAL HYPERTENSION: ICD-10-CM

## 2023-01-28 DIAGNOSIS — Z00.00 BLOOD TESTS FOR ROUTINE GENERAL PHYSICAL EXAMINATION: ICD-10-CM

## 2023-01-30 ENCOUNTER — APPOINTMENT (OUTPATIENT)
Dept: MEDICAL GROUP | Age: 73
End: 2023-01-30
Payer: MEDICARE

## 2023-02-13 ENCOUNTER — HOSPITAL ENCOUNTER (OUTPATIENT)
Dept: LAB | Facility: MEDICAL CENTER | Age: 73
End: 2023-02-13
Attending: PHYSICIAN ASSISTANT
Payer: MEDICARE

## 2023-02-13 DIAGNOSIS — I10 ESSENTIAL HYPERTENSION: ICD-10-CM

## 2023-02-13 DIAGNOSIS — D75.839 THROMBOCYTOSIS: ICD-10-CM

## 2023-02-13 DIAGNOSIS — R73.01 ELEVATED FASTING GLUCOSE: ICD-10-CM

## 2023-02-13 DIAGNOSIS — Z00.00 BLOOD TESTS FOR ROUTINE GENERAL PHYSICAL EXAMINATION: ICD-10-CM

## 2023-02-13 LAB
ALBUMIN SERPL BCP-MCNC: 4.6 G/DL (ref 3.2–4.9)
ALBUMIN/GLOB SERPL: 1.6 G/DL
ALP SERPL-CCNC: 47 U/L (ref 30–99)
ALT SERPL-CCNC: 45 U/L (ref 2–50)
ANION GAP SERPL CALC-SCNC: 10 MMOL/L (ref 7–16)
AST SERPL-CCNC: 25 U/L (ref 12–45)
BASOPHILS # BLD AUTO: 0.3 % (ref 0–1.8)
BASOPHILS # BLD: 0.02 K/UL (ref 0–0.12)
BILIRUB SERPL-MCNC: 0.4 MG/DL (ref 0.1–1.5)
BUN SERPL-MCNC: 25 MG/DL (ref 8–22)
CALCIUM ALBUM COR SERPL-MCNC: 8.8 MG/DL (ref 8.5–10.5)
CALCIUM SERPL-MCNC: 9.3 MG/DL (ref 8.5–10.5)
CHLORIDE SERPL-SCNC: 99 MMOL/L (ref 96–112)
CHOLEST SERPL-MCNC: 216 MG/DL (ref 100–199)
CO2 SERPL-SCNC: 28 MMOL/L (ref 20–33)
CREAT SERPL-MCNC: 0.83 MG/DL (ref 0.5–1.4)
EOSINOPHIL # BLD AUTO: 0.02 K/UL (ref 0–0.51)
EOSINOPHIL NFR BLD: 0.3 % (ref 0–6.9)
ERYTHROCYTE [DISTWIDTH] IN BLOOD BY AUTOMATED COUNT: 48.2 FL (ref 35.9–50)
GFR SERPLBLD CREATININE-BSD FMLA CKD-EPI: 76 ML/MIN/1.73 M 2
GLOBULIN SER CALC-MCNC: 2.9 G/DL (ref 1.9–3.5)
GLUCOSE SERPL-MCNC: 110 MG/DL (ref 65–99)
HCT VFR BLD AUTO: 47.9 % (ref 37–47)
HDLC SERPL-MCNC: 108 MG/DL
HGB BLD-MCNC: 15.8 G/DL (ref 12–16)
IMM GRANULOCYTES # BLD AUTO: 0.03 K/UL (ref 0–0.11)
IMM GRANULOCYTES NFR BLD AUTO: 0.5 % (ref 0–0.9)
LDLC SERPL CALC-MCNC: 97 MG/DL
LYMPHOCYTES # BLD AUTO: 2.38 K/UL (ref 1–4.8)
LYMPHOCYTES NFR BLD: 36.2 % (ref 22–41)
MCH RBC QN AUTO: 31.7 PG (ref 27–33)
MCHC RBC AUTO-ENTMCNC: 33 G/DL (ref 33.6–35)
MCV RBC AUTO: 96 FL (ref 81.4–97.8)
MONOCYTES # BLD AUTO: 0.72 K/UL (ref 0–0.85)
MONOCYTES NFR BLD AUTO: 10.9 % (ref 0–13.4)
NEUTROPHILS # BLD AUTO: 3.41 K/UL (ref 2–7.15)
NEUTROPHILS NFR BLD: 51.8 % (ref 44–72)
NRBC # BLD AUTO: 0 K/UL
NRBC BLD-RTO: 0 /100 WBC
PLATELET # BLD AUTO: 810 K/UL (ref 164–446)
PMV BLD AUTO: 9.5 FL (ref 9–12.9)
POTASSIUM SERPL-SCNC: 4.4 MMOL/L (ref 3.6–5.5)
PROT SERPL-MCNC: 7.5 G/DL (ref 6–8.2)
RBC # BLD AUTO: 4.99 M/UL (ref 4.2–5.4)
SODIUM SERPL-SCNC: 137 MMOL/L (ref 135–145)
TRIGL SERPL-MCNC: 57 MG/DL (ref 0–149)
WBC # BLD AUTO: 6.6 K/UL (ref 4.8–10.8)

## 2023-02-13 PROCEDURE — 85025 COMPLETE CBC W/AUTO DIFF WBC: CPT

## 2023-02-13 PROCEDURE — 36415 COLL VENOUS BLD VENIPUNCTURE: CPT

## 2023-02-13 PROCEDURE — 80061 LIPID PANEL: CPT

## 2023-02-13 PROCEDURE — 80053 COMPREHEN METABOLIC PANEL: CPT

## 2023-02-14 ENCOUNTER — OFFICE VISIT (OUTPATIENT)
Dept: MEDICAL GROUP | Age: 73
End: 2023-02-14
Payer: MEDICARE

## 2023-02-14 VITALS
DIASTOLIC BLOOD PRESSURE: 90 MMHG | TEMPERATURE: 97.6 F | RESPIRATION RATE: 16 BRPM | BODY MASS INDEX: 23.57 KG/M2 | SYSTOLIC BLOOD PRESSURE: 138 MMHG | WEIGHT: 133 LBS | OXYGEN SATURATION: 96 % | HEIGHT: 63 IN | HEART RATE: 61 BPM

## 2023-02-14 DIAGNOSIS — D47.3 ESSENTIAL (HEMORRHAGIC) THROMBOCYTHEMIA (HCC): ICD-10-CM

## 2023-02-14 DIAGNOSIS — R73.01 ELEVATED FASTING GLUCOSE: ICD-10-CM

## 2023-02-14 DIAGNOSIS — D32.9 MENINGIOMA (HCC): ICD-10-CM

## 2023-02-14 DIAGNOSIS — I10 ESSENTIAL HYPERTENSION: ICD-10-CM

## 2023-02-14 PROCEDURE — 99214 OFFICE O/P EST MOD 30 MIN: CPT | Performed by: PHYSICIAN ASSISTANT

## 2023-02-14 RX ORDER — LEVOTHYROXINE SODIUM 0.12 MG/1
100 TABLET ORAL
COMMUNITY
End: 2023-10-09

## 2023-02-14 ASSESSMENT — FIBROSIS 4 INDEX: FIB4 SCORE: 0.32

## 2023-02-14 ASSESSMENT — PATIENT HEALTH QUESTIONNAIRE - PHQ9: CLINICAL INTERPRETATION OF PHQ2 SCORE: 0

## 2023-02-14 NOTE — PROGRESS NOTES
cc: Medication check and lab review    Subjective:     DOMI Good is a 72 y.o. female presenting for medication check and lab review.  Blood pressure is slightly elevated in clinic today.  She states that she just did get a steroid injection in her left big toe for her arthritis.  She has noted that since then her blood pressures have run and running mildly elevated, in the 150s systolic, this has however started to trend down.  Has been getting normal readings the past 2 days.  Denies chest pain, palpitations, shortness of breath, lower extremity edema.    Her platelet count is also elevated.  She is currently followed by hematology.  Platelet count has been stable, but this is a new elevation on recent lab work.  She does have an appointment scheduled with the hematologist in the next few weeks.  She is currently taking 81 mg of aspirin        Review of systems:  See above.      Latest Reference Range & Units 02/13/23 07:34   WBC 4.8 - 10.8 K/uL 6.6   RBC 4.20 - 5.40 M/uL 4.99   Hemoglobin 12.0 - 16.0 g/dL 15.8   Hematocrit 37.0 - 47.0 % 47.9 (H)   MCV 81.4 - 97.8 fL 96.0   MCH 27.0 - 33.0 pg 31.7   MCHC 33.6 - 35.0 g/dL 33.0 (L)   RDW 35.9 - 50.0 fL 48.2   Platelet Count 164 - 446 K/uL 810 (H)   MPV 9.0 - 12.9 fL 9.5   Neutrophils-Polys 44.00 - 72.00 % 51.80   Neutrophils (Absolute) 2.00 - 7.15 K/uL 3.41   Lymphocytes 22.00 - 41.00 % 36.20   Lymphs (Absolute) 1.00 - 4.80 K/uL 2.38   Monocytes 0.00 - 13.40 % 10.90   Monos (Absolute) 0.00 - 0.85 K/uL 0.72   Eosinophils 0.00 - 6.90 % 0.30   Eos (Absolute) 0.00 - 0.51 K/uL 0.02   Basophils 0.00 - 1.80 % 0.30   Baso (Absolute) 0.00 - 0.12 K/uL 0.02   Immature Granulocytes 0.00 - 0.90 % 0.50   Immature Granulocytes (abs) 0.00 - 0.11 K/uL 0.03   Nucleated RBC /100 WBC 0.00   NRBC (Absolute) K/uL 0.00   Sodium 135 - 145 mmol/L 137   Potassium 3.6 - 5.5 mmol/L 4.4   Chloride 96 - 112 mmol/L 99   Co2 20 - 33 mmol/L 28   Anion Gap 7.0 - 16.0  10.0   Glucose 65 - 99  "mg/dL 110 (H)   Bun 8 - 22 mg/dL 25 (H)   Creatinine 0.50 - 1.40 mg/dL 0.83   GFR (CKD-EPI) >60 mL/min/1.73 m 2 76   Calcium 8.5 - 10.5 mg/dL 9.3   Correct Calcium 8.5 - 10.5 mg/dL 8.8   AST(SGOT) 12 - 45 U/L 25   ALT(SGPT) 2 - 50 U/L 45   Alkaline Phosphatase 30 - 99 U/L 47   Total Bilirubin 0.1 - 1.5 mg/dL 0.4   Albumin 3.2 - 4.9 g/dL 4.6   Total Protein 6.0 - 8.2 g/dL 7.5   Globulin 1.9 - 3.5 g/dL 2.9   A-G Ratio g/dL 1.6   Cholesterol,Tot 100 - 199 mg/dL 216 (H)   Triglycerides 0 - 149 mg/dL 57   HDL >=40 mg/dL 108   LDL <100 mg/dL 97   (H): Data is abnormally high  (L): Data is abnormally low      Current Outpatient Medications:     hydroCHLOROthiazide (HYDRODIURIL) 25 MG Tab, Take 1 Tablet by mouth every day., Disp: 90 Tablet, Rfl: 3    diphenhydrAMINE (BENADRYL) 25 MG Tab, Take 50 mg by mouth every 6 hours as needed for Sleep. allergy, Disp: , Rfl:     ASPIRIN 81 PO, every day., Disp: , Rfl:     levothyroxine (SYNTHROID) 125 MCG Tab, 1 tab(s) Orally every day for 90 days, Disp: , Rfl:     Allergies, past medical history, past surgical history, family history, social history reviewed and updated    Objective:     Vitals: BP (!) 138/90   Pulse 61   Temp 36.4 °C (97.6 °F) (Temporal)   Resp 16   Ht 1.6 m (5' 3\")   Wt 60.3 kg (133 lb)   LMP  (LMP Unknown)   SpO2 96%   BMI 23.56 kg/m²   General: Alert, pleasant, NAD  HEENT: Normocephalic. Neck supple.  No thyromegaly or masses palpated. No cervical or supraclavicular lymphadenopathy. No carotid bruits   Heart: Regular rate and rhythm.  S1 and S2 normal.  No murmurs appreciated.  Respiratory: Normal respiratory effort.  Clear to auscultation bilaterally.  Skin: Warm, dry, no rashes.  Extremities: No leg edema.  Radial pulses 2+ symmetric  Psych:  Affect/mood is normal, judgement is good, memory is intact, grooming is appropriate.    Assessment/Plan:     Ailin was seen today for patient question.    Diagnoses and all orders for this visit:    Essential " (hemorrhagic) thrombocythemia (HCC)  -Platelet count previously has been stable, is now elevated.  She does have an appointment with her hematologist in the next few weeks.  Continue with aspirin.    Essential hypertension  -Blood pressures are slightly elevated, most likely attributed to recent steroid injection.  Continue 25 mg of hydrochlorothiazide.  Advised intermittent BP checks, blood pressures consistently greater than 140 systolic follow-up sooner.      Elevated fasting glucose  -Working on lifestyle modifications.  We will monitor repeat labs again in 6 months  -     HEMOGLOBIN A1C; Future  -     Comp Metabolic Panel; Future    Meningioma (HCC)  -Stable.  Currently followed by neurology.  Up-to-date on MRI.  Monitoring for now.      Return in about 6 months (around 8/14/2023) for AWV, Lab Review.

## 2023-02-24 ENCOUNTER — APPOINTMENT (OUTPATIENT)
Dept: MEDICAL GROUP | Age: 73
End: 2023-02-24
Payer: MEDICARE

## 2023-03-03 ENCOUNTER — HOSPITAL ENCOUNTER (OUTPATIENT)
Facility: MEDICAL CENTER | Age: 73
End: 2023-03-03
Attending: INTERNAL MEDICINE
Payer: MEDICARE

## 2023-03-03 LAB
ALBUMIN SERPL BCP-MCNC: 4.3 G/DL (ref 3.2–4.9)
ALBUMIN/GLOB SERPL: 1.7 G/DL
ALP SERPL-CCNC: 45 U/L (ref 30–99)
ALT SERPL-CCNC: 40 U/L (ref 2–50)
ANION GAP SERPL CALC-SCNC: 12 MMOL/L (ref 7–16)
AST SERPL-CCNC: 27 U/L (ref 12–45)
BILIRUB SERPL-MCNC: 0.3 MG/DL (ref 0.1–1.5)
BUN SERPL-MCNC: 19 MG/DL (ref 8–22)
CALCIUM ALBUM COR SERPL-MCNC: 9.3 MG/DL (ref 8.5–10.5)
CALCIUM SERPL-MCNC: 9.5 MG/DL (ref 8.5–10.5)
CHLORIDE SERPL-SCNC: 105 MMOL/L (ref 96–112)
CO2 SERPL-SCNC: 23 MMOL/L (ref 20–33)
CREAT SERPL-MCNC: 0.64 MG/DL (ref 0.5–1.4)
FERRITIN SERPL-MCNC: 64.6 NG/ML (ref 10–291)
GFR SERPLBLD CREATININE-BSD FMLA CKD-EPI: 95 ML/MIN/1.73 M 2
GLOBULIN SER CALC-MCNC: 2.6 G/DL (ref 1.9–3.5)
GLUCOSE SERPL-MCNC: 110 MG/DL (ref 65–99)
POTASSIUM SERPL-SCNC: 4.6 MMOL/L (ref 3.6–5.5)
PROT SERPL-MCNC: 6.9 G/DL (ref 6–8.2)
SODIUM SERPL-SCNC: 140 MMOL/L (ref 135–145)

## 2023-03-03 PROCEDURE — 82728 ASSAY OF FERRITIN: CPT

## 2023-03-03 PROCEDURE — 80053 COMPREHEN METABOLIC PANEL: CPT

## 2023-04-17 ENCOUNTER — OFFICE VISIT (OUTPATIENT)
Dept: URGENT CARE | Facility: CLINIC | Age: 73
End: 2023-04-17
Payer: MEDICARE

## 2023-04-17 VITALS
BODY MASS INDEX: 23.21 KG/M2 | DIASTOLIC BLOOD PRESSURE: 64 MMHG | HEIGHT: 63 IN | RESPIRATION RATE: 20 BRPM | TEMPERATURE: 97.4 F | WEIGHT: 131 LBS | OXYGEN SATURATION: 96 % | SYSTOLIC BLOOD PRESSURE: 142 MMHG | HEART RATE: 56 BPM

## 2023-04-17 DIAGNOSIS — M53.3 SACROILIAC PAIN: ICD-10-CM

## 2023-04-17 PROCEDURE — 99213 OFFICE O/P EST LOW 20 MIN: CPT | Mod: 25 | Performed by: FAMILY MEDICINE

## 2023-04-17 RX ORDER — CYCLOBENZAPRINE HCL 10 MG
10 TABLET ORAL 3 TIMES DAILY PRN
Qty: 30 TABLET | Refills: 0 | Status: SHIPPED | OUTPATIENT
Start: 2023-04-17 | End: 2023-05-09

## 2023-04-17 RX ORDER — IBUPROFEN 200 MG
400 TABLET ORAL EVERY 8 HOURS PRN
Status: ON HOLD | COMMUNITY
End: 2023-09-08

## 2023-04-17 RX ORDER — KETOROLAC TROMETHAMINE 30 MG/ML
30 INJECTION, SOLUTION INTRAMUSCULAR; INTRAVENOUS ONCE
Status: COMPLETED | OUTPATIENT
Start: 2023-04-17 | End: 2023-04-17

## 2023-04-17 RX ADMIN — KETOROLAC TROMETHAMINE 30 MG: 30 INJECTION, SOLUTION INTRAMUSCULAR; INTRAVENOUS at 11:24

## 2023-04-17 ASSESSMENT — ENCOUNTER SYMPTOMS: CONSTITUTIONAL NEGATIVE: 1

## 2023-04-17 ASSESSMENT — FIBROSIS 4 INDEX: FIB4 SCORE: 0.37

## 2023-04-17 NOTE — PROGRESS NOTES
"Subjective:   Ailin Good is a 72 y.o. female who presents for Other (Lt side of gluteal pain x 2 days, going to Chiropractor: not helping, worsening, cramping muscles)      Other      Review of Systems   Constitutional: Negative.    Musculoskeletal:  Positive for joint pain.     Medications, Allergies, and current problem list reviewed today in Epic.     Objective:     BP (!) 142/64 (BP Location: Left arm, Patient Position: Sitting, BP Cuff Size: Adult)   Pulse (!) 56   Temp 36.3 °C (97.4 °F) (Temporal)   Resp 20   Ht 1.6 m (5' 3\")   Wt 59.4 kg (131 lb)   SpO2 96%     Physical Exam  Vitals and nursing note reviewed.   Constitutional:       Appearance: Normal appearance.   HENT:      Head: Normocephalic and atraumatic.   Cardiovascular:      Rate and Rhythm: Normal rate and regular rhythm.      Pulses: Normal pulses.      Heart sounds: Normal heart sounds.   Pulmonary:      Effort: Pulmonary effort is normal.      Breath sounds: Normal breath sounds.   Musculoskeletal:      Comments: Left sacroiliac joint pain   Neurological:      Mental Status: She is alert.       Assessment/Plan:     Diagnosis and associated orders:     1. Sacroiliac pain  cyclobenzaprine (FLEXERIL) 10 mg Tab         Comments/MDM:     Will see her chiropractor today         Differential diagnosis, natural history, supportive care, and indications for immediate follow-up discussed.    Advised the patient to follow-up with the primary care physician for recheck, reevaluation, and consideration of further management.    Please note that this dictation was created using voice recognition software. I have made a reasonable attempt to correct obvious errors, but I expect that there are errors of grammar and possibly content that I did not discover before finalizing the note.    This note was electronically signed by Alexandre Lowery M.D.  "

## 2023-04-27 ENCOUNTER — PATIENT MESSAGE (OUTPATIENT)
Dept: MEDICAL GROUP | Age: 73
End: 2023-04-27

## 2023-05-08 ENCOUNTER — TELEPHONE (OUTPATIENT)
Dept: MEDICAL GROUP | Age: 73
End: 2023-05-08
Payer: MEDICARE

## 2023-05-08 NOTE — TELEPHONE ENCOUNTER
Phone Number Called: [phone]      Call outcome: Left detailed message for patient. Informed to call back with any additional questions.    Message: called and left voicemail stating that she needs to make an appointment in order to get an medications or additional imaging. I stated that she can make an appointment through her mychart or she can give us a call back.

## 2023-05-09 ENCOUNTER — OFFICE VISIT (OUTPATIENT)
Dept: MEDICAL GROUP | Age: 73
End: 2023-05-09
Payer: MEDICARE

## 2023-05-09 VITALS
SYSTOLIC BLOOD PRESSURE: 170 MMHG | HEART RATE: 56 BPM | WEIGHT: 142 LBS | RESPIRATION RATE: 16 BRPM | DIASTOLIC BLOOD PRESSURE: 92 MMHG | HEIGHT: 63 IN | BODY MASS INDEX: 25.16 KG/M2 | OXYGEN SATURATION: 96 %

## 2023-05-09 DIAGNOSIS — M54.50 BACK PAIN, LUMBOSACRAL: ICD-10-CM

## 2023-05-09 DIAGNOSIS — R29.898 WEAKNESS OF LEFT LOWER EXTREMITY: ICD-10-CM

## 2023-05-09 DIAGNOSIS — M51.36 DDD (DEGENERATIVE DISC DISEASE), LUMBAR: ICD-10-CM

## 2023-05-09 PROCEDURE — 99214 OFFICE O/P EST MOD 30 MIN: CPT | Performed by: PHYSICIAN ASSISTANT

## 2023-05-09 RX ORDER — LEVOTHYROXINE SODIUM 0.12 MG/1
TABLET ORAL
COMMUNITY
End: 2023-06-10

## 2023-05-09 RX ORDER — METHYLPREDNISOLONE 4 MG/1
TABLET ORAL
Qty: 21 TABLET | Refills: 0 | Status: SHIPPED | OUTPATIENT
Start: 2023-05-09 | End: 2023-08-11

## 2023-05-09 RX ORDER — HYDROCODONE BITARTRATE AND ACETAMINOPHEN 5; 325 MG/1; MG/1
1 TABLET ORAL EVERY 4 HOURS PRN
Qty: 15 TABLET | Refills: 0 | Status: SHIPPED | OUTPATIENT
Start: 2023-05-09 | End: 2023-05-19

## 2023-05-09 RX ORDER — HYDROCODONE BITARTRATE AND ACETAMINOPHEN 5; 325 MG/1; MG/1
1 TABLET ORAL EVERY 4 HOURS PRN
Qty: 15 TABLET | Refills: 0 | Status: SHIPPED | OUTPATIENT
Start: 2023-05-09 | End: 2023-05-09 | Stop reason: SDUPTHER

## 2023-05-09 ASSESSMENT — FIBROSIS 4 INDEX: FIB4 SCORE: 0.37

## 2023-05-09 NOTE — PROGRESS NOTES
cc: Back pain    Subjective:     HPI  Ailin Good is a 72 y.o. female presenting with concerns of lower back pain that onset about 4 weeks ago.  She was getting a massage, she woke up the next morning and had moderate/severe left lower lumbar pain.  She was seen in urgent care 4/17.  Given Toradol injection and Flexeril.  Did not note any improvement with either of these treatments.  She has subsequently followed up with a chiropractor, is doing decompressions, will have improvement for about a day, then pain represents.  She did obtain lumbar x-ray there is moderate arthritis of L4-L5.  She is able to sleep at night due to the pain, can only walk for about a block without having severe pain.  She is noting some weakness in bilateral lower extremities, worse on the left than the right.  Does have pain that radiates down the back of the leg stopping at the knee.  Denies numbness or tingling, loss of bowel or bladder function, saddle anesthesia.      Review of systems:  See above.       Current Outpatient Medications:     levothyroxine (SYNTHROID) 125 MCG Tab, levothyroxine 125 mcg tablet  Take 1 tablet every day by oral route., Disp: , Rfl:     methylPREDNISolone (MEDROL DOSEPAK) 4 MG Tablet Therapy Pack, As directed on the packaging label., Disp: 21 Tablet, Rfl: 0    HYDROcodone-acetaminophen (NORCO) 5-325 MG Tab per tablet, Take 1 Tablet by mouth every four hours as needed (pain) for up to 10 days., Disp: 15 Tablet, Rfl: 0    ibuprofen (MOTRIN) 200 MG Tab, Take 400 mg by mouth every 6 hours as needed., Disp: , Rfl:     levothyroxine (SYNTHROID) 125 MCG Tab, 1 tab(s) Orally every day for 90 days, Disp: , Rfl:     hydroCHLOROthiazide (HYDRODIURIL) 25 MG Tab, Take 1 Tablet by mouth every day., Disp: 90 Tablet, Rfl: 3    diphenhydrAMINE (BENADRYL) 25 MG Tab, Take 50 mg by mouth every 6 hours as needed for Sleep. allergy, Disp: , Rfl:     ASPIRIN 81 PO, every day., Disp: , Rfl:     Allergies, past medical history, past  "surgical history, family history, social history reviewed and updated    Objective:     Vitals: BP (!) 170/92 (BP Location: Left arm, Patient Position: Sitting, BP Cuff Size: Adult)   Pulse (!) 56   Resp 16   Ht 1.6 m (5' 3\")   Wt 64.4 kg (142 lb)   LMP  (LMP Unknown)   SpO2 96%   BMI 25.15 kg/m²   General: Alert, pleasant, in moderate amount of pain, looks uncomfortable  HEENT: Normocephalic. Neck supple.  No thyromegaly or masses palpated. No cervical or supraclavicular lymphadenopathy. No carotid bruits   Heart: Regular rate and rhythm.  S1 and S2 normal.  No murmurs appreciated.  Respiratory: Normal respiratory effort.  Clear to auscultation bilaterally.  Back: Decreased range of motion with lateral rotation and flexion, normal curvature of spine, moderate lumbar spinous process tenderness at approximately L4-L5.   Minimal left paraspinous muscle tenderness.  Left SI joint tenderness.  Unable to perform straight leg due to pain, 4 /5 strength of left hip flexor, otherwise 5/5 lower extremities bilaterally. Sensation/DTRs intact bilaterally.  Skin: Warm, dry, no rashes.  Extremities: No leg edema.  Radial pulses 2+ symmetric  Psych:  Affect/mood is normal, judgement is good, memory is intact, grooming is appropriate.    Assessment/Plan:     Ailin was seen today for back pain.    Diagnoses and all orders for this visit:    DDD (degenerative disc disease), lumbar  -She has had continued moderate/severe pain for the past 5 weeks.  Flexeril and Toradol injection did not provide much benefit.  Given Medrol Dosepak, Norco to use as needed at night to help with the pain.  Due to continuation of symptoms, exam findings with lower extremity weakness will obtain MRI.  Referral placed to neurosurgery to discuss additional treatment options.  Cauda equina precautions reviewed.  -     MR-LUMBAR SPINE-W/O; Future  -     Referral to Neurosurgery  -     methylPREDNISolone (MEDROL DOSEPAK) 4 MG Tablet Therapy Pack; As " directed on the packaging label.  -     HYDROcodone-acetaminophen (NORCO) 5-325 MG Tab per tablet; Take 1 Tablet by mouth every four hours as needed (pain) for up to 10 days.  -     Consent for Opiate Prescription    Weakness of left lower extremity  -See above  -     MR-LUMBAR SPINE-W/O; Future  -     Referral to Neurosurgery  -     methylPREDNISolone (MEDROL DOSEPAK) 4 MG Tablet Therapy Pack; As directed on the packaging label.  -     HYDROcodone-acetaminophen (NORCO) 5-325 MG Tab per tablet; Take 1 Tablet by mouth every four hours as needed (pain) for up to 10 days.  -     Consent for Opiate Prescription    Back pain, lumbosacral  -See above  -     methylPREDNISolone (MEDROL DOSEPAK) 4 MG Tablet Therapy Pack; As directed on the packaging label.  -     HYDROcodone-acetaminophen (NORCO) 5-325 MG Tab per tablet; Take 1 Tablet by mouth every four hours as needed (pain) for up to 10 days.  -     Consent for Opiate Prescription        Return if symptoms worsen or fail to improve.

## 2023-05-12 ENCOUNTER — APPOINTMENT (OUTPATIENT)
Dept: RADIOLOGY | Facility: MEDICAL CENTER | Age: 73
End: 2023-05-12
Attending: PHYSICIAN ASSISTANT
Payer: MEDICARE

## 2023-05-12 DIAGNOSIS — R29.898 WEAKNESS OF LEFT LOWER EXTREMITY: ICD-10-CM

## 2023-05-12 DIAGNOSIS — M51.36 DDD (DEGENERATIVE DISC DISEASE), LUMBAR: ICD-10-CM

## 2023-05-12 PROCEDURE — 72148 MRI LUMBAR SPINE W/O DYE: CPT

## 2023-05-25 ENCOUNTER — PATIENT MESSAGE (OUTPATIENT)
Dept: MEDICAL GROUP | Age: 73
End: 2023-05-25

## 2023-05-30 DIAGNOSIS — M51.36 DDD (DEGENERATIVE DISC DISEASE), LUMBAR: ICD-10-CM

## 2023-06-08 ENCOUNTER — HOSPITAL ENCOUNTER (OUTPATIENT)
Dept: LAB | Facility: MEDICAL CENTER | Age: 73
End: 2023-06-08
Attending: INTERNAL MEDICINE
Payer: MEDICARE

## 2023-06-08 PROCEDURE — 36415 COLL VENOUS BLD VENIPUNCTURE: CPT

## 2023-06-08 PROCEDURE — 84439 ASSAY OF FREE THYROXINE: CPT

## 2023-06-08 PROCEDURE — 84443 ASSAY THYROID STIM HORMONE: CPT

## 2023-06-09 LAB
T4 FREE SERPL-MCNC: 1.84 NG/DL (ref 0.93–1.7)
TSH SERPL DL<=0.005 MIU/L-ACNC: 0.44 UIU/ML (ref 0.38–5.33)

## 2023-06-10 ENCOUNTER — HOSPITAL ENCOUNTER (EMERGENCY)
Facility: MEDICAL CENTER | Age: 73
End: 2023-06-10
Attending: EMERGENCY MEDICINE
Payer: MEDICARE

## 2023-06-10 VITALS
OXYGEN SATURATION: 96 % | RESPIRATION RATE: 18 BRPM | HEIGHT: 63 IN | WEIGHT: 141.09 LBS | TEMPERATURE: 98.5 F | DIASTOLIC BLOOD PRESSURE: 81 MMHG | BODY MASS INDEX: 25 KG/M2 | HEART RATE: 59 BPM | SYSTOLIC BLOOD PRESSURE: 162 MMHG

## 2023-06-10 DIAGNOSIS — M54.16 LUMBAR RADICULOPATHY: ICD-10-CM

## 2023-06-10 PROCEDURE — A9270 NON-COVERED ITEM OR SERVICE: HCPCS | Performed by: EMERGENCY MEDICINE

## 2023-06-10 PROCEDURE — 99283 EMERGENCY DEPT VISIT LOW MDM: CPT

## 2023-06-10 PROCEDURE — 700102 HCHG RX REV CODE 250 W/ 637 OVERRIDE(OP): Performed by: EMERGENCY MEDICINE

## 2023-06-10 PROCEDURE — 700111 HCHG RX REV CODE 636 W/ 250 OVERRIDE (IP): Performed by: EMERGENCY MEDICINE

## 2023-06-10 RX ORDER — OXYCODONE HYDROCHLORIDE 5 MG/1
5 TABLET ORAL ONCE
Status: COMPLETED | OUTPATIENT
Start: 2023-06-10 | End: 2023-06-10

## 2023-06-10 RX ORDER — ONDANSETRON 4 MG/1
4 TABLET, ORALLY DISINTEGRATING ORAL EVERY 8 HOURS PRN
Qty: 20 TABLET | Refills: 0 | Status: SHIPPED | OUTPATIENT
Start: 2023-06-10 | End: 2023-08-11

## 2023-06-10 RX ORDER — GABAPENTIN 300 MG/1
300 CAPSULE ORAL 3 TIMES DAILY PRN
Status: SHIPPED | COMMUNITY
Start: 2023-05-21 | End: 2023-10-09

## 2023-06-10 RX ORDER — ACETAMINOPHEN 500 MG
1000 TABLET ORAL EVERY 8 HOURS PRN
Status: ON HOLD | COMMUNITY
End: 2023-09-08

## 2023-06-10 RX ORDER — HYDROCODONE BITARTRATE AND ACETAMINOPHEN 5; 325 MG/1; MG/1
1 TABLET ORAL EVERY 4 HOURS PRN
Status: SHIPPED | COMMUNITY
End: 2023-08-11

## 2023-06-10 RX ORDER — ONDANSETRON 4 MG/1
4 TABLET, ORALLY DISINTEGRATING ORAL ONCE
Status: COMPLETED | OUTPATIENT
Start: 2023-06-10 | End: 2023-06-10

## 2023-06-10 RX ORDER — TIZANIDINE 4 MG/1
4 TABLET ORAL EVERY 6 HOURS PRN
Qty: 20 TABLET | Refills: 3 | Status: SHIPPED | OUTPATIENT
Start: 2023-06-10 | End: 2023-08-11

## 2023-06-10 RX ORDER — OXYCODONE HYDROCHLORIDE 5 MG/1
5 TABLET ORAL EVERY 4 HOURS PRN
Qty: 18 TABLET | Refills: 0 | Status: SHIPPED | OUTPATIENT
Start: 2023-06-10 | End: 2023-06-13

## 2023-06-10 RX ADMIN — ONDANSETRON 4 MG: 4 TABLET, ORALLY DISINTEGRATING ORAL at 10:04

## 2023-06-10 RX ADMIN — OXYCODONE 5 MG: 5 TABLET ORAL at 10:05

## 2023-06-10 ASSESSMENT — FIBROSIS 4 INDEX: FIB4 SCORE: 0.37

## 2023-06-10 NOTE — ED NOTES
Med rec completed per patient with sister at bedside.  Allergies reviewed with patient.  No outpatient antibiotics within the last 30 days.  Patient's preferred pharmacy: [facility].    On 5/9/2023 patient was prescribed a Medrol Dosepak, which she states that she finished.

## 2023-06-10 NOTE — ED PROVIDER NOTES
ED Provider Note    CHIEF COMPLAINT  Chief Complaint   Patient presents with    Low Back Pain       EXTERNAL RECORDS REVIEWED  Outpatient Notes MRI, shows severe L4 left neuro foraminal narrowing    HPI/ROS  LIMITATION TO HISTORY   Select: : None  OUTSIDE HISTORIAN(S):  Friend at bedside    Ailin Good is a 72 y.o. female who presents to the ED secondary to low back pain.  The patient has been having low back pain for the last few months, ultimately saw primary care physician approximately month ago who prescribed hydrocodone, Medrol Dosepak, she was able to go see Dr. Dyer and get an MRI, the patient has severe L4 neuroforaminal narrowing.  She had an epidural injection done last week and improved slightly but then got worse.  The sharp severe pain, left gluteal left lower back rating down her left leg, she has numbness to her calf but no weakness in her left leg.  Patient denies any urinary frequency or incontinence or retention.  She has been taking gabapentin, Tylenol, ibuprofen, and a hydrocodone at night to help with her pain.  She does not like how the gabapentin is making her feel, states it makes her drowsy.    PAST MEDICAL HISTORY   has a past medical history of Allergy, Anesthesia, Arthritis, Blood dyscrasia, Hypertension, Polyp of colon (02/20/2019), PONV (postoperative nausea and vomiting), Postoperative hypothyroidism, Psychiatric problem (03/2021), and Urinary incontinence.    SURGICAL HISTORY   has a past surgical history that includes tubal coagulation laparoscopic bilateral; thyroidectomy (2016); tonsillectomy (1969); dx bone marrow aspirations (Left, 3/10/2021); and dx bone marrow biopsies (Left, 3/10/2021).    FAMILY HISTORY  Family History   Problem Relation Age of Onset    Heart Disease Mother     Diabetes Mother     Hypertension Mother     Cancer Father         pancreatic cancer    Stroke Father     Hypertension Father     Heart Disease Sister     Hypertension Sister     Hyperlipidemia Sister  "       SOCIAL HISTORY  Social History     Tobacco Use    Smoking status: Former     Packs/day: 0.25     Types: Cigarettes     Quit date: 1/28/2013     Years since quitting: 10.3    Smokeless tobacco: Never   Vaping Use    Vaping Use: Never used   Substance and Sexual Activity    Alcohol use: Not Currently     Alcohol/week: 0.6 oz     Types: 1 Glasses of wine per week     Comment: wine nightly     Drug use: No    Sexual activity: Not Currently     Partners: Male       CURRENT MEDICATIONS  Home Medications    **Home medications have not yet been reviewed for this encounter**         ALLERGIES  Allergies   Allergen Reactions    Chocolate     Dust Mite Extract     Other Environmental      Sun exposure    Penicillins Hives     As a child thru testing    Pollen Extract     Seasonal      Every tree, grass       PHYSICAL EXAM  VITAL SIGNS: BP (!) 184/103   Pulse 64   Temp 36.7 °C (98 °F) (Temporal)   Resp 16   Ht 1.6 m (5' 3\")   Wt 64 kg (141 lb 1.5 oz)   LMP  (LMP Unknown)   SpO2 94%   BMI 24.99 kg/m²    Well-developed well-nourished 9-year-old female who appears in mild to moderate distress  Atraumatic, normocephalic  Back with tenderness palpation of the left paraspinal and left gluteal area  Extremity with no soft tissue swelling, she has 2+ pulse, muscle strength dorsiflexion plantarflexion is 5 out of 5 distally.    INITIAL ASSESSMENT, COURSE AND PLAN  Care Narrative: Patient with low back pain, pain has been progressively getting worse.  She gets very nauseated with hydrocodone, will switch to oxycodone, give the patient a prescription for Zofran.  I recommended the patient discontinue the gabapentin given that that is not helping with her pain and she is getting very drowsy with it.  I am hoping that the patient will get less nauseated with oxycodone and hydrocodone.  I recommend to exercise Tylenol with 2 ibuprofen every 6 hours, if continued pain add Zanaflex, if continued pain add oxycodone with Zofran.  " She will call Dr. Tamez's office on Monday.  She will return with worsening pain or new neurologic deficits.  I do not see any red flags at this point time.      DISPOSITION AND DISCUSSIONS  Escalation of care considered, and ultimately not performed:diagnostic imaging    Decision tools and prescription drugs considered including, but not limited to: Pain Medications and steroids .    FINAL DIAGNOSIS  1. Lumbar radiculopathy           Electronically signed by: Kyle Jernigan M.D., 6/10/2023 9:55 AM

## 2023-06-10 NOTE — ED TRIAGE NOTES
Chief Complaint   Patient presents with    Low Back Pain      Pt has a diagnosed bulging disc, currently getting treated  with steroid injections and under neurology care. Lower back pain is worse today, tylenol and gabapentin not working. Denies any new bowel or urinary incontinence.

## 2023-06-10 NOTE — DISCHARGE INSTRUCTIONS
Baseline pain control is 2 extra strength Tylenol with 2 ibuprofen every 6 hours.  If needed for pain control on top of that try the Zanaflex, if continued pain add on the oxycodone with Zofran.

## 2023-06-23 ENCOUNTER — DOCUMENTATION (OUTPATIENT)
Dept: HEALTH INFORMATION MANAGEMENT | Facility: OTHER | Age: 73
End: 2023-06-23
Payer: MEDICARE

## 2023-07-11 ENCOUNTER — HOSPITAL ENCOUNTER (OUTPATIENT)
Dept: LAB | Facility: MEDICAL CENTER | Age: 73
End: 2023-07-11
Attending: INTERNAL MEDICINE
Payer: MEDICARE

## 2023-07-11 LAB
ALBUMIN SERPL BCP-MCNC: 3.5 G/DL (ref 3.2–4.9)
ALBUMIN/GLOB SERPL: 1.2 G/DL
ALP SERPL-CCNC: 67 U/L (ref 30–99)
ALT SERPL-CCNC: 24 U/L (ref 2–50)
ANION GAP SERPL CALC-SCNC: 11 MMOL/L (ref 7–16)
APTT PPP: 34.5 SEC (ref 24.7–36)
AST SERPL-CCNC: 22 U/L (ref 12–45)
BASOPHILS # BLD AUTO: 0.7 % (ref 0–1.8)
BASOPHILS # BLD: 0.04 K/UL (ref 0–0.12)
BILIRUB SERPL-MCNC: 0.2 MG/DL (ref 0.1–1.5)
BUN SERPL-MCNC: 12 MG/DL (ref 8–22)
CALCIUM ALBUM COR SERPL-MCNC: 8.9 MG/DL (ref 8.5–10.5)
CALCIUM SERPL-MCNC: 8.5 MG/DL (ref 8.5–10.5)
CHLORIDE SERPL-SCNC: 104 MMOL/L (ref 96–112)
CO2 SERPL-SCNC: 27 MMOL/L (ref 20–33)
CREAT SERPL-MCNC: 0.75 MG/DL (ref 0.5–1.4)
EOSINOPHIL # BLD AUTO: 0.16 K/UL (ref 0–0.51)
EOSINOPHIL NFR BLD: 2.8 % (ref 0–6.9)
ERYTHROCYTE [DISTWIDTH] IN BLOOD BY AUTOMATED COUNT: 47.4 FL (ref 35.9–50)
GFR SERPLBLD CREATININE-BSD FMLA CKD-EPI: 85 ML/MIN/1.73 M 2
GLOBULIN SER CALC-MCNC: 3 G/DL (ref 1.9–3.5)
GLUCOSE SERPL-MCNC: 98 MG/DL (ref 65–99)
HCT VFR BLD AUTO: 40.7 % (ref 37–47)
HGB BLD-MCNC: 12.9 G/DL (ref 12–16)
IMM GRANULOCYTES # BLD AUTO: 0.02 K/UL (ref 0–0.11)
IMM GRANULOCYTES NFR BLD AUTO: 0.4 % (ref 0–0.9)
INR PPP: 0.93 (ref 0.87–1.13)
LYMPHOCYTES # BLD AUTO: 1.39 K/UL (ref 1–4.8)
LYMPHOCYTES NFR BLD: 24.4 % (ref 22–41)
MCH RBC QN AUTO: 30.4 PG (ref 27–33)
MCHC RBC AUTO-ENTMCNC: 31.7 G/DL (ref 32.2–35.5)
MCV RBC AUTO: 95.8 FL (ref 81.4–97.8)
MONOCYTES # BLD AUTO: 0.6 K/UL (ref 0–0.85)
MONOCYTES NFR BLD AUTO: 10.5 % (ref 0–13.4)
NEUTROPHILS # BLD AUTO: 3.49 K/UL (ref 1.82–7.42)
NEUTROPHILS NFR BLD: 61.2 % (ref 44–72)
NRBC # BLD AUTO: 0 K/UL
NRBC BLD-RTO: 0 /100 WBC (ref 0–0.2)
PLATELET # BLD AUTO: 567 K/UL (ref 164–446)
PMV BLD AUTO: 9.7 FL (ref 9–12.9)
POTASSIUM SERPL-SCNC: 4.8 MMOL/L (ref 3.6–5.5)
PROT SERPL-MCNC: 6.5 G/DL (ref 6–8.2)
PROTHROMBIN TIME: 12.4 SEC (ref 12–14.6)
RBC # BLD AUTO: 4.25 M/UL (ref 4.2–5.4)
SODIUM SERPL-SCNC: 142 MMOL/L (ref 135–145)
WBC # BLD AUTO: 5.7 K/UL (ref 4.8–10.8)

## 2023-07-11 PROCEDURE — 36415 COLL VENOUS BLD VENIPUNCTURE: CPT

## 2023-07-11 PROCEDURE — 85730 THROMBOPLASTIN TIME PARTIAL: CPT

## 2023-07-11 PROCEDURE — 85025 COMPLETE CBC W/AUTO DIFF WBC: CPT

## 2023-07-11 PROCEDURE — 85610 PROTHROMBIN TIME: CPT

## 2023-07-11 PROCEDURE — 80053 COMPREHEN METABOLIC PANEL: CPT

## 2023-07-18 ENCOUNTER — HOSPITAL ENCOUNTER (OUTPATIENT)
Dept: LAB | Facility: MEDICAL CENTER | Age: 73
End: 2023-07-18
Attending: PHYSICIAN ASSISTANT
Payer: MEDICARE

## 2023-07-18 ENCOUNTER — HOSPITAL ENCOUNTER (OUTPATIENT)
Dept: LAB | Facility: MEDICAL CENTER | Age: 73
End: 2023-07-18
Attending: INTERNAL MEDICINE
Payer: MEDICARE

## 2023-07-18 DIAGNOSIS — R73.01 ELEVATED FASTING GLUCOSE: ICD-10-CM

## 2023-07-18 LAB
ALBUMIN SERPL BCP-MCNC: 3.7 G/DL (ref 3.2–4.9)
ALBUMIN/GLOB SERPL: 1.2 G/DL
ALP SERPL-CCNC: 68 U/L (ref 30–99)
ALT SERPL-CCNC: 24 U/L (ref 2–50)
ANION GAP SERPL CALC-SCNC: 12 MMOL/L (ref 7–16)
AST SERPL-CCNC: 21 U/L (ref 12–45)
BILIRUB SERPL-MCNC: 0.2 MG/DL (ref 0.1–1.5)
BUN SERPL-MCNC: 13 MG/DL (ref 8–22)
CALCIUM ALBUM COR SERPL-MCNC: 9.6 MG/DL (ref 8.5–10.5)
CALCIUM SERPL-MCNC: 9.4 MG/DL (ref 8.5–10.5)
CHLORIDE SERPL-SCNC: 102 MMOL/L (ref 96–112)
CO2 SERPL-SCNC: 26 MMOL/L (ref 20–33)
CREAT SERPL-MCNC: 0.75 MG/DL (ref 0.5–1.4)
EST. AVERAGE GLUCOSE BLD GHB EST-MCNC: 134 MG/DL
GFR SERPLBLD CREATININE-BSD FMLA CKD-EPI: 85 ML/MIN/1.73 M 2
GLOBULIN SER CALC-MCNC: 3.2 G/DL (ref 1.9–3.5)
GLUCOSE SERPL-MCNC: 64 MG/DL (ref 65–99)
HBA1C MFR BLD: 6.3 % (ref 4–5.6)
POTASSIUM SERPL-SCNC: 4.1 MMOL/L (ref 3.6–5.5)
PROT SERPL-MCNC: 6.9 G/DL (ref 6–8.2)
SODIUM SERPL-SCNC: 140 MMOL/L (ref 135–145)
T4 FREE SERPL-MCNC: 1.58 NG/DL (ref 0.93–1.7)
TSH SERPL DL<=0.005 MIU/L-ACNC: 3.76 UIU/ML (ref 0.38–5.33)

## 2023-07-18 PROCEDURE — 84439 ASSAY OF FREE THYROXINE: CPT

## 2023-07-18 PROCEDURE — 36415 COLL VENOUS BLD VENIPUNCTURE: CPT

## 2023-07-18 PROCEDURE — 84443 ASSAY THYROID STIM HORMONE: CPT

## 2023-07-18 PROCEDURE — 83036 HEMOGLOBIN GLYCOSYLATED A1C: CPT

## 2023-07-18 PROCEDURE — 80053 COMPREHEN METABOLIC PANEL: CPT

## 2023-07-19 ENCOUNTER — APPOINTMENT (OUTPATIENT)
Dept: ADMISSIONS | Facility: MEDICAL CENTER | Age: 73
End: 2023-07-19
Attending: NEUROLOGICAL SURGERY
Payer: MEDICARE

## 2023-07-20 NOTE — ED NOTES
1581 NSL initiated Blood to lab  
Dc instructions and medications discussed with patient at bedside. All questions answered at this time. VSS. No IV in place at this time. Pt to lobby without incident. self to drive patient home.     
MD at bedside.   
US at bedside.  
no

## 2023-08-11 ENCOUNTER — PRE-ADMISSION TESTING (OUTPATIENT)
Dept: ADMISSIONS | Facility: MEDICAL CENTER | Age: 73
End: 2023-08-11
Attending: NEUROLOGICAL SURGERY
Payer: MEDICARE

## 2023-08-24 ENCOUNTER — PRE-ADMISSION TESTING (OUTPATIENT)
Dept: ADMISSIONS | Facility: MEDICAL CENTER | Age: 73
End: 2023-08-24
Attending: NEUROLOGICAL SURGERY
Payer: MEDICARE

## 2023-08-24 ENCOUNTER — HOSPITAL ENCOUNTER (OUTPATIENT)
Dept: RADIOLOGY | Facility: MEDICAL CENTER | Age: 73
End: 2023-08-24
Attending: NEUROLOGICAL SURGERY
Payer: MEDICARE

## 2023-08-24 DIAGNOSIS — Z01.812 PRE-OPERATIVE LABORATORY EXAMINATION: ICD-10-CM

## 2023-08-24 DIAGNOSIS — Z01.811 PRE-OPERATIVE RESPIRATORY EXAMINATION: ICD-10-CM

## 2023-08-24 DIAGNOSIS — Z01.810 PRE-OPERATIVE CARDIOVASCULAR EXAMINATION: ICD-10-CM

## 2023-08-24 LAB
ANION GAP SERPL CALC-SCNC: 10 MMOL/L (ref 7–16)
APTT PPP: 28.3 SEC (ref 24.7–36)
BASOPHILS # BLD AUTO: 1 % (ref 0–1.8)
BASOPHILS # BLD: 0.06 K/UL (ref 0–0.12)
BUN SERPL-MCNC: 15 MG/DL (ref 8–22)
CALCIUM SERPL-MCNC: 9.3 MG/DL (ref 8.5–10.5)
CHLORIDE SERPL-SCNC: 102 MMOL/L (ref 96–112)
CO2 SERPL-SCNC: 28 MMOL/L (ref 20–33)
CREAT SERPL-MCNC: 0.73 MG/DL (ref 0.5–1.4)
EKG IMPRESSION: NORMAL
EOSINOPHIL # BLD AUTO: 0.06 K/UL (ref 0–0.51)
EOSINOPHIL NFR BLD: 1 % (ref 0–6.9)
ERYTHROCYTE [DISTWIDTH] IN BLOOD BY AUTOMATED COUNT: 48.2 FL (ref 35.9–50)
GFR SERPLBLD CREATININE-BSD FMLA CKD-EPI: 88 ML/MIN/1.73 M 2
GLUCOSE SERPL-MCNC: 100 MG/DL (ref 65–99)
HCT VFR BLD AUTO: 43.8 % (ref 37–47)
HGB BLD-MCNC: 14.2 G/DL (ref 12–16)
IMM GRANULOCYTES # BLD AUTO: 0.01 K/UL (ref 0–0.11)
IMM GRANULOCYTES NFR BLD AUTO: 0.2 % (ref 0–0.9)
INR PPP: 0.98 (ref 0.87–1.13)
LYMPHOCYTES # BLD AUTO: 1.51 K/UL (ref 1–4.8)
LYMPHOCYTES NFR BLD: 24.8 % (ref 22–41)
MCH RBC QN AUTO: 30.9 PG (ref 27–33)
MCHC RBC AUTO-ENTMCNC: 32.4 G/DL (ref 32.2–35.5)
MCV RBC AUTO: 95.2 FL (ref 81.4–97.8)
MONOCYTES # BLD AUTO: 0.6 K/UL (ref 0–0.85)
MONOCYTES NFR BLD AUTO: 9.8 % (ref 0–13.4)
NEUTROPHILS # BLD AUTO: 3.86 K/UL (ref 1.82–7.42)
NEUTROPHILS NFR BLD: 63.2 % (ref 44–72)
NRBC # BLD AUTO: 0 K/UL
NRBC BLD-RTO: 0 /100 WBC (ref 0–0.2)
PLATELET # BLD AUTO: 664 K/UL (ref 164–446)
PMV BLD AUTO: 9.4 FL (ref 9–12.9)
POTASSIUM SERPL-SCNC: 5 MMOL/L (ref 3.6–5.5)
PROTHROMBIN TIME: 13.1 SEC (ref 12–14.6)
RBC # BLD AUTO: 4.6 M/UL (ref 4.2–5.4)
SODIUM SERPL-SCNC: 140 MMOL/L (ref 135–145)
WBC # BLD AUTO: 6.1 K/UL (ref 4.8–10.8)

## 2023-08-24 PROCEDURE — 36415 COLL VENOUS BLD VENIPUNCTURE: CPT

## 2023-08-24 PROCEDURE — 93005 ELECTROCARDIOGRAM TRACING: CPT

## 2023-08-24 PROCEDURE — 85025 COMPLETE CBC W/AUTO DIFF WBC: CPT

## 2023-08-24 PROCEDURE — 71046 X-RAY EXAM CHEST 2 VIEWS: CPT

## 2023-08-24 PROCEDURE — 85730 THROMBOPLASTIN TIME PARTIAL: CPT

## 2023-08-24 PROCEDURE — 93010 ELECTROCARDIOGRAM REPORT: CPT | Performed by: INTERNAL MEDICINE

## 2023-08-24 PROCEDURE — 80048 BASIC METABOLIC PNL TOTAL CA: CPT

## 2023-08-24 PROCEDURE — 85610 PROTHROMBIN TIME: CPT

## 2023-08-28 ENCOUNTER — HOSPITAL ENCOUNTER (OUTPATIENT)
Dept: LAB | Facility: MEDICAL CENTER | Age: 73
End: 2023-08-28
Attending: INTERNAL MEDICINE
Payer: MEDICARE

## 2023-08-28 LAB
T4 FREE SERPL-MCNC: 1.77 NG/DL (ref 0.93–1.7)
TSH SERPL DL<=0.005 MIU/L-ACNC: 0.82 UIU/ML (ref 0.38–5.33)

## 2023-08-28 PROCEDURE — 36415 COLL VENOUS BLD VENIPUNCTURE: CPT

## 2023-08-28 PROCEDURE — 84439 ASSAY OF FREE THYROXINE: CPT

## 2023-08-28 PROCEDURE — 84443 ASSAY THYROID STIM HORMONE: CPT

## 2023-09-08 ENCOUNTER — APPOINTMENT (OUTPATIENT)
Dept: RADIOLOGY | Facility: MEDICAL CENTER | Age: 73
End: 2023-09-08
Attending: NEUROLOGICAL SURGERY
Payer: MEDICARE

## 2023-09-08 ENCOUNTER — ANESTHESIA EVENT (OUTPATIENT)
Dept: SURGERY | Facility: MEDICAL CENTER | Age: 73
End: 2023-09-08
Payer: MEDICARE

## 2023-09-08 ENCOUNTER — HOSPITAL ENCOUNTER (OUTPATIENT)
Facility: MEDICAL CENTER | Age: 73
End: 2023-09-10
Attending: NEUROLOGICAL SURGERY | Admitting: NEUROLOGICAL SURGERY
Payer: MEDICARE

## 2023-09-08 ENCOUNTER — ANESTHESIA (OUTPATIENT)
Dept: SURGERY | Facility: MEDICAL CENTER | Age: 73
End: 2023-09-08
Payer: MEDICARE

## 2023-09-08 DIAGNOSIS — M48.061 LUMBAR STENOSIS WITHOUT NEUROGENIC CLAUDICATION: ICD-10-CM

## 2023-09-08 DIAGNOSIS — G89.18 POSTOPERATIVE PAIN: ICD-10-CM

## 2023-09-08 PROCEDURE — 110371 HCHG SHELL REV 272: Performed by: NEUROLOGICAL SURGERY

## 2023-09-08 PROCEDURE — C1821 INTERSPINOUS IMPLANT: HCPCS | Performed by: NEUROLOGICAL SURGERY

## 2023-09-08 PROCEDURE — 160035 HCHG PACU - 1ST 60 MINS PHASE I: Performed by: NEUROLOGICAL SURGERY

## 2023-09-08 PROCEDURE — 110454 HCHG SHELL REV 250: Performed by: NEUROLOGICAL SURGERY

## 2023-09-08 PROCEDURE — 700101 HCHG RX REV CODE 250: Performed by: NEUROLOGICAL SURGERY

## 2023-09-08 PROCEDURE — A9270 NON-COVERED ITEM OR SERVICE: HCPCS | Performed by: STUDENT IN AN ORGANIZED HEALTH CARE EDUCATION/TRAINING PROGRAM

## 2023-09-08 PROCEDURE — 700111 HCHG RX REV CODE 636 W/ 250 OVERRIDE (IP): Mod: JZ | Performed by: STUDENT IN AN ORGANIZED HEALTH CARE EDUCATION/TRAINING PROGRAM

## 2023-09-08 PROCEDURE — 700102 HCHG RX REV CODE 250 W/ 637 OVERRIDE(OP): Performed by: STUDENT IN AN ORGANIZED HEALTH CARE EDUCATION/TRAINING PROGRAM

## 2023-09-08 PROCEDURE — 700111 HCHG RX REV CODE 636 W/ 250 OVERRIDE (IP): Mod: JZ | Performed by: NEUROLOGICAL SURGERY

## 2023-09-08 PROCEDURE — 160041 HCHG SURGERY MINUTES - EA ADDL 1 MIN LEVEL 4: Performed by: NEUROLOGICAL SURGERY

## 2023-09-08 PROCEDURE — 700111 HCHG RX REV CODE 636 W/ 250 OVERRIDE (IP): Mod: JZ | Performed by: NURSE PRACTITIONER

## 2023-09-08 PROCEDURE — 502000 HCHG MISC OR IMPLANTS RC 0278: Performed by: NEUROLOGICAL SURGERY

## 2023-09-08 PROCEDURE — 700101 HCHG RX REV CODE 250: Performed by: STUDENT IN AN ORGANIZED HEALTH CARE EDUCATION/TRAINING PROGRAM

## 2023-09-08 PROCEDURE — G0378 HOSPITAL OBSERVATION PER HR: HCPCS

## 2023-09-08 PROCEDURE — 700101 HCHG RX REV CODE 250: Performed by: NURSE PRACTITIONER

## 2023-09-08 PROCEDURE — 160029 HCHG SURGERY MINUTES - 1ST 30 MINS LEVEL 4: Performed by: NEUROLOGICAL SURGERY

## 2023-09-08 PROCEDURE — 160048 HCHG OR STATISTICAL LEVEL 1-5: Performed by: NEUROLOGICAL SURGERY

## 2023-09-08 PROCEDURE — 700105 HCHG RX REV CODE 258: Performed by: STUDENT IN AN ORGANIZED HEALTH CARE EDUCATION/TRAINING PROGRAM

## 2023-09-08 PROCEDURE — 160036 HCHG PACU - EA ADDL 30 MINS PHASE I: Performed by: NEUROLOGICAL SURGERY

## 2023-09-08 PROCEDURE — 160009 HCHG ANES TIME/MIN: Performed by: NEUROLOGICAL SURGERY

## 2023-09-08 PROCEDURE — 302129 PCA PLUS W/IV POLE: Performed by: NEUROLOGICAL SURGERY

## 2023-09-08 PROCEDURE — 160002 HCHG RECOVERY MINUTES (STAT): Performed by: NEUROLOGICAL SURGERY

## 2023-09-08 PROCEDURE — 72100 X-RAY EXAM L-S SPINE 2/3 VWS: CPT

## 2023-09-08 PROCEDURE — 700105 HCHG RX REV CODE 258: Performed by: NURSE PRACTITIONER

## 2023-09-08 DEVICE — IMPLANTABLE DEVICE: Type: IMPLANTABLE DEVICE | Site: SPINE LUMBAR | Status: FUNCTIONAL

## 2023-09-08 RX ORDER — OXYCODONE HCL 5 MG/5 ML
5 SOLUTION, ORAL ORAL
Status: DISCONTINUED | OUTPATIENT
Start: 2023-09-08 | End: 2023-09-08 | Stop reason: HOSPADM

## 2023-09-08 RX ORDER — HYDROMORPHONE HYDROCHLORIDE 1 MG/ML
0.1 INJECTION, SOLUTION INTRAMUSCULAR; INTRAVENOUS; SUBCUTANEOUS
Status: DISCONTINUED | OUTPATIENT
Start: 2023-09-08 | End: 2023-09-08 | Stop reason: HOSPADM

## 2023-09-08 RX ORDER — ONDANSETRON 2 MG/ML
INJECTION INTRAMUSCULAR; INTRAVENOUS PRN
Status: DISCONTINUED | OUTPATIENT
Start: 2023-09-08 | End: 2023-09-08 | Stop reason: SURG

## 2023-09-08 RX ORDER — LEVOTHYROXINE SODIUM 0.12 MG/1
125 TABLET ORAL
Status: DISCONTINUED | OUTPATIENT
Start: 2023-09-09 | End: 2023-09-10 | Stop reason: HOSPADM

## 2023-09-08 RX ORDER — ROCURONIUM BROMIDE 10 MG/ML
INJECTION, SOLUTION INTRAVENOUS PRN
Status: DISCONTINUED | OUTPATIENT
Start: 2023-09-08 | End: 2023-09-08 | Stop reason: SURG

## 2023-09-08 RX ORDER — DIAZEPAM 5 MG/1
5 TABLET ORAL EVERY 4 HOURS PRN
Status: DISCONTINUED | OUTPATIENT
Start: 2023-09-08 | End: 2023-09-09

## 2023-09-08 RX ORDER — POLYETHYLENE GLYCOL 3350 17 G/17G
1 POWDER, FOR SOLUTION ORAL 2 TIMES DAILY PRN
Status: DISCONTINUED | OUTPATIENT
Start: 2023-09-08 | End: 2023-09-10 | Stop reason: HOSPADM

## 2023-09-08 RX ORDER — EPHEDRINE SULFATE 50 MG/ML
INJECTION, SOLUTION INTRAVENOUS PRN
Status: DISCONTINUED | OUTPATIENT
Start: 2023-09-08 | End: 2023-09-08 | Stop reason: SURG

## 2023-09-08 RX ORDER — HYDRALAZINE HYDROCHLORIDE 20 MG/ML
5 INJECTION INTRAMUSCULAR; INTRAVENOUS
Status: DISCONTINUED | OUTPATIENT
Start: 2023-09-08 | End: 2023-09-08 | Stop reason: HOSPADM

## 2023-09-08 RX ORDER — SODIUM CHLORIDE, SODIUM LACTATE, POTASSIUM CHLORIDE, CALCIUM CHLORIDE 600; 310; 30; 20 MG/100ML; MG/100ML; MG/100ML; MG/100ML
INJECTION, SOLUTION INTRAVENOUS CONTINUOUS
Status: DISCONTINUED | OUTPATIENT
Start: 2023-09-08 | End: 2023-09-08 | Stop reason: HOSPADM

## 2023-09-08 RX ORDER — PROMETHAZINE HYDROCHLORIDE 12.5 MG/1
12.5 SUPPOSITORY RECTAL EVERY 6 HOURS PRN
Status: DISCONTINUED | OUTPATIENT
Start: 2023-09-08 | End: 2023-09-08

## 2023-09-08 RX ORDER — DIPHENHYDRAMINE HCL 25 MG
25 TABLET ORAL EVERY 6 HOURS PRN
Status: DISCONTINUED | OUTPATIENT
Start: 2023-09-08 | End: 2023-09-10 | Stop reason: HOSPADM

## 2023-09-08 RX ORDER — SODIUM CHLORIDE AND POTASSIUM CHLORIDE 150; 900 MG/100ML; MG/100ML
INJECTION, SOLUTION INTRAVENOUS CONTINUOUS
Status: DISCONTINUED | OUTPATIENT
Start: 2023-09-08 | End: 2023-09-10 | Stop reason: HOSPADM

## 2023-09-08 RX ORDER — BUPIVACAINE HYDROCHLORIDE AND EPINEPHRINE 5; 5 MG/ML; UG/ML
INJECTION, SOLUTION PERINEURAL
Status: DISCONTINUED | OUTPATIENT
Start: 2023-09-08 | End: 2023-09-08 | Stop reason: HOSPADM

## 2023-09-08 RX ORDER — HYDROMORPHONE HYDROCHLORIDE 1 MG/ML
0.4 INJECTION, SOLUTION INTRAMUSCULAR; INTRAVENOUS; SUBCUTANEOUS
Status: DISCONTINUED | OUTPATIENT
Start: 2023-09-08 | End: 2023-09-08 | Stop reason: HOSPADM

## 2023-09-08 RX ORDER — ONDANSETRON 2 MG/ML
4 INJECTION INTRAMUSCULAR; INTRAVENOUS EVERY 4 HOURS PRN
Status: DISCONTINUED | OUTPATIENT
Start: 2023-09-08 | End: 2023-09-10 | Stop reason: HOSPADM

## 2023-09-08 RX ORDER — BISACODYL 10 MG
10 SUPPOSITORY, RECTAL RECTAL
Status: DISCONTINUED | OUTPATIENT
Start: 2023-09-08 | End: 2023-09-10 | Stop reason: HOSPADM

## 2023-09-08 RX ORDER — LABETALOL HYDROCHLORIDE 5 MG/ML
10 INJECTION, SOLUTION INTRAVENOUS
Status: DISCONTINUED | OUTPATIENT
Start: 2023-09-08 | End: 2023-09-10 | Stop reason: HOSPADM

## 2023-09-08 RX ORDER — METHOCARBAMOL 750 MG/1
750 TABLET, FILM COATED ORAL EVERY 8 HOURS PRN
Status: DISCONTINUED | OUTPATIENT
Start: 2023-09-08 | End: 2023-09-09

## 2023-09-08 RX ORDER — AMOXICILLIN 250 MG
1 CAPSULE ORAL NIGHTLY
Status: DISCONTINUED | OUTPATIENT
Start: 2023-09-08 | End: 2023-09-10 | Stop reason: HOSPADM

## 2023-09-08 RX ORDER — ENEMA 19; 7 G/133ML; G/133ML
1 ENEMA RECTAL
Status: DISCONTINUED | OUTPATIENT
Start: 2023-09-08 | End: 2023-09-10 | Stop reason: HOSPADM

## 2023-09-08 RX ORDER — HYDROCHLOROTHIAZIDE 25 MG/1
25 TABLET ORAL EVERY MORNING
Status: DISCONTINUED | OUTPATIENT
Start: 2023-09-09 | End: 2023-09-10 | Stop reason: HOSPADM

## 2023-09-08 RX ORDER — LIDOCAINE HYDROCHLORIDE 20 MG/ML
INJECTION, SOLUTION EPIDURAL; INFILTRATION; INTRACAUDAL; PERINEURAL PRN
Status: DISCONTINUED | OUTPATIENT
Start: 2023-09-08 | End: 2023-09-08 | Stop reason: SURG

## 2023-09-08 RX ORDER — CEFAZOLIN SODIUM 1 G/3ML
INJECTION, POWDER, FOR SOLUTION INTRAMUSCULAR; INTRAVENOUS
Status: DISCONTINUED | OUTPATIENT
Start: 2023-09-08 | End: 2023-09-08 | Stop reason: HOSPADM

## 2023-09-08 RX ORDER — OXYCODONE HCL 10 MG/1
10 TABLET, FILM COATED, EXTENDED RELEASE ORAL ONCE
Status: COMPLETED | OUTPATIENT
Start: 2023-09-08 | End: 2023-09-08

## 2023-09-08 RX ORDER — AMOXICILLIN 250 MG
1 CAPSULE ORAL
Status: DISCONTINUED | OUTPATIENT
Start: 2023-09-08 | End: 2023-09-10 | Stop reason: HOSPADM

## 2023-09-08 RX ORDER — GABAPENTIN 300 MG/1
300 CAPSULE ORAL 3 TIMES DAILY PRN
Status: DISCONTINUED | OUTPATIENT
Start: 2023-09-08 | End: 2023-09-10 | Stop reason: HOSPADM

## 2023-09-08 RX ORDER — ONDANSETRON 2 MG/ML
4 INJECTION INTRAMUSCULAR; INTRAVENOUS
Status: COMPLETED | OUTPATIENT
Start: 2023-09-08 | End: 2023-09-08

## 2023-09-08 RX ORDER — DIPHENHYDRAMINE HYDROCHLORIDE 50 MG/ML
25 INJECTION INTRAMUSCULAR; INTRAVENOUS EVERY 6 HOURS PRN
Status: DISCONTINUED | OUTPATIENT
Start: 2023-09-08 | End: 2023-09-10 | Stop reason: HOSPADM

## 2023-09-08 RX ORDER — DOCUSATE SODIUM 100 MG/1
100 CAPSULE, LIQUID FILLED ORAL 2 TIMES DAILY
Status: DISCONTINUED | OUTPATIENT
Start: 2023-09-08 | End: 2023-09-10 | Stop reason: HOSPADM

## 2023-09-08 RX ORDER — DEXAMETHASONE SODIUM PHOSPHATE 4 MG/ML
INJECTION, SOLUTION INTRA-ARTICULAR; INTRALESIONAL; INTRAMUSCULAR; INTRAVENOUS; SOFT TISSUE PRN
Status: DISCONTINUED | OUTPATIENT
Start: 2023-09-08 | End: 2023-09-08 | Stop reason: SURG

## 2023-09-08 RX ORDER — ONDANSETRON 4 MG/1
4 TABLET, ORALLY DISINTEGRATING ORAL EVERY 4 HOURS PRN
Status: DISCONTINUED | OUTPATIENT
Start: 2023-09-08 | End: 2023-09-10 | Stop reason: HOSPADM

## 2023-09-08 RX ORDER — EPHEDRINE SULFATE 50 MG/ML
5 INJECTION, SOLUTION INTRAVENOUS
Status: DISCONTINUED | OUTPATIENT
Start: 2023-09-08 | End: 2023-09-08 | Stop reason: HOSPADM

## 2023-09-08 RX ORDER — PROMETHAZINE HYDROCHLORIDE 12.5 MG/1
12.5 SUPPOSITORY RECTAL EVERY 4 HOURS PRN
Status: DISCONTINUED | OUTPATIENT
Start: 2023-09-08 | End: 2023-09-10 | Stop reason: HOSPADM

## 2023-09-08 RX ORDER — SODIUM CHLORIDE, SODIUM LACTATE, POTASSIUM CHLORIDE, CALCIUM CHLORIDE 600; 310; 30; 20 MG/100ML; MG/100ML; MG/100ML; MG/100ML
INJECTION, SOLUTION INTRAVENOUS
Status: DISCONTINUED | OUTPATIENT
Start: 2023-09-08 | End: 2023-09-08 | Stop reason: SURG

## 2023-09-08 RX ORDER — DIPHENHYDRAMINE HYDROCHLORIDE 50 MG/ML
12.5 INJECTION INTRAMUSCULAR; INTRAVENOUS
Status: DISCONTINUED | OUTPATIENT
Start: 2023-09-08 | End: 2023-09-08 | Stop reason: HOSPADM

## 2023-09-08 RX ORDER — HALOPERIDOL 5 MG/ML
1 INJECTION INTRAMUSCULAR
Status: DISCONTINUED | OUTPATIENT
Start: 2023-09-08 | End: 2023-09-08 | Stop reason: HOSPADM

## 2023-09-08 RX ORDER — OXYCODONE HCL 5 MG/5 ML
10 SOLUTION, ORAL ORAL
Status: DISCONTINUED | OUTPATIENT
Start: 2023-09-08 | End: 2023-09-08 | Stop reason: HOSPADM

## 2023-09-08 RX ORDER — CYCLOBENZAPRINE HCL 5 MG
10 TABLET ORAL EVERY 8 HOURS PRN
Status: DISCONTINUED | OUTPATIENT
Start: 2023-09-08 | End: 2023-09-09

## 2023-09-08 RX ORDER — HYDROMORPHONE HYDROCHLORIDE 1 MG/ML
0.2 INJECTION, SOLUTION INTRAMUSCULAR; INTRAVENOUS; SUBCUTANEOUS
Status: DISCONTINUED | OUTPATIENT
Start: 2023-09-08 | End: 2023-09-08 | Stop reason: HOSPADM

## 2023-09-08 RX ORDER — ACETAMINOPHEN 500 MG
1000 TABLET ORAL ONCE
Status: COMPLETED | OUTPATIENT
Start: 2023-09-08 | End: 2023-09-08

## 2023-09-08 RX ORDER — SODIUM CHLORIDE, SODIUM LACTATE, POTASSIUM CHLORIDE, CALCIUM CHLORIDE 600; 310; 30; 20 MG/100ML; MG/100ML; MG/100ML; MG/100ML
INJECTION, SOLUTION INTRAVENOUS CONTINUOUS
Status: DISCONTINUED | OUTPATIENT
Start: 2023-09-08 | End: 2023-09-08

## 2023-09-08 RX ORDER — CEFAZOLIN SODIUM 1 G/3ML
INJECTION, POWDER, FOR SOLUTION INTRAMUSCULAR; INTRAVENOUS PRN
Status: DISCONTINUED | OUTPATIENT
Start: 2023-09-08 | End: 2023-09-08 | Stop reason: SURG

## 2023-09-08 RX ADMIN — CEFAZOLIN 2 G: 1 INJECTION, POWDER, FOR SOLUTION INTRAMUSCULAR; INTRAVENOUS at 09:45

## 2023-09-08 RX ADMIN — EPHEDRINE SULFATE 10 MG: 50 INJECTION, SOLUTION INTRAVENOUS at 10:14

## 2023-09-08 RX ADMIN — ONDANSETRON 4 MG: 2 INJECTION INTRAMUSCULAR; INTRAVENOUS at 11:39

## 2023-09-08 RX ADMIN — POTASSIUM CHLORIDE AND SODIUM CHLORIDE: 900; 150 INJECTION, SOLUTION INTRAVENOUS at 15:11

## 2023-09-08 RX ADMIN — Medication: at 18:10

## 2023-09-08 RX ADMIN — EPHEDRINE SULFATE 20 MG: 50 INJECTION, SOLUTION INTRAVENOUS at 11:09

## 2023-09-08 RX ADMIN — FENTANYL CITRATE 100 MCG: 50 INJECTION, SOLUTION INTRAMUSCULAR; INTRAVENOUS at 09:40

## 2023-09-08 RX ADMIN — ONDANSETRON 4 MG: 2 INJECTION INTRAMUSCULAR; INTRAVENOUS at 17:36

## 2023-09-08 RX ADMIN — CEFAZOLIN 2 G: 2 INJECTION, POWDER, FOR SOLUTION INTRAMUSCULAR; INTRAVENOUS at 15:39

## 2023-09-08 RX ADMIN — EPHEDRINE SULFATE 20 MG: 50 INJECTION, SOLUTION INTRAVENOUS at 09:48

## 2023-09-08 RX ADMIN — PROPOFOL 100 MG: 10 INJECTION, EMULSION INTRAVENOUS at 09:40

## 2023-09-08 RX ADMIN — ROCURONIUM BROMIDE 50 MG: 50 INJECTION, SOLUTION INTRAVENOUS at 09:40

## 2023-09-08 RX ADMIN — EPHEDRINE SULFATE 10 MG: 50 INJECTION, SOLUTION INTRAVENOUS at 10:33

## 2023-09-08 RX ADMIN — DEXAMETHASONE SODIUM PHOSPHATE 4 MG: 4 INJECTION INTRA-ARTICULAR; INTRALESIONAL; INTRAMUSCULAR; INTRAVENOUS; SOFT TISSUE at 11:28

## 2023-09-08 RX ADMIN — EPHEDRINE SULFATE 10 MG: 50 INJECTION, SOLUTION INTRAVENOUS at 09:51

## 2023-09-08 RX ADMIN — DEXAMETHASONE SODIUM PHOSPHATE 4 MG: 4 INJECTION INTRA-ARTICULAR; INTRALESIONAL; INTRAMUSCULAR; INTRAVENOUS; SOFT TISSUE at 09:45

## 2023-09-08 RX ADMIN — SODIUM CHLORIDE, POTASSIUM CHLORIDE, SODIUM LACTATE AND CALCIUM CHLORIDE: 600; 310; 30; 20 INJECTION, SOLUTION INTRAVENOUS at 09:36

## 2023-09-08 RX ADMIN — FENTANYL CITRATE 50 MCG: 50 INJECTION, SOLUTION INTRAMUSCULAR; INTRAVENOUS at 10:02

## 2023-09-08 RX ADMIN — FENTANYL CITRATE 100 MCG: 50 INJECTION, SOLUTION INTRAMUSCULAR; INTRAVENOUS at 11:18

## 2023-09-08 RX ADMIN — SUGAMMADEX 200 MG: 100 INJECTION, SOLUTION INTRAVENOUS at 11:43

## 2023-09-08 RX ADMIN — HALOPERIDOL LACTATE 1 MG: 5 INJECTION, SOLUTION INTRAMUSCULAR at 12:25

## 2023-09-08 RX ADMIN — ACETAMINOPHEN 1000 MG: 500 TABLET ORAL at 08:06

## 2023-09-08 RX ADMIN — CEFAZOLIN 2 G: 2 INJECTION, POWDER, FOR SOLUTION INTRAMUSCULAR; INTRAVENOUS at 22:57

## 2023-09-08 RX ADMIN — FENTANYL CITRATE 50 MCG: 50 INJECTION, SOLUTION INTRAMUSCULAR; INTRAVENOUS at 10:42

## 2023-09-08 RX ADMIN — ONDANSETRON 4 MG: 2 INJECTION INTRAMUSCULAR; INTRAVENOUS at 12:20

## 2023-09-08 RX ADMIN — ONDANSETRON 4 MG: 2 INJECTION INTRAMUSCULAR; INTRAVENOUS at 21:38

## 2023-09-08 RX ADMIN — LIDOCAINE HYDROCHLORIDE 40 MG: 20 INJECTION, SOLUTION EPIDURAL; INFILTRATION; INTRACAUDAL at 09:40

## 2023-09-08 RX ADMIN — OXYCODONE HYDROCHLORIDE 10 MG: 10 TABLET, FILM COATED, EXTENDED RELEASE ORAL at 08:06

## 2023-09-08 ASSESSMENT — LIFESTYLE VARIABLES
ON A TYPICAL DAY WHEN YOU DRINK ALCOHOL HOW MANY DRINKS DO YOU HAVE: 1
TOTAL SCORE: 0
ALCOHOL_USE: YES
CONSUMPTION TOTAL: NEGATIVE
TOTAL SCORE: 0
TOTAL SCORE: 0
HAVE PEOPLE ANNOYED YOU BY CRITICIZING YOUR DRINKING: NO
HAVE YOU EVER FELT YOU SHOULD CUT DOWN ON YOUR DRINKING: NO
EVER FELT BAD OR GUILTY ABOUT YOUR DRINKING: NO
HOW MANY TIMES IN THE PAST YEAR HAVE YOU HAD 5 OR MORE DRINKS IN A DAY: 0
DOES PATIENT WANT TO STOP DRINKING: NO
AVERAGE NUMBER OF DAYS PER WEEK YOU HAVE A DRINK CONTAINING ALCOHOL: 7
EVER HAD A DRINK FIRST THING IN THE MORNING TO STEADY YOUR NERVES TO GET RID OF A HANGOVER: NO

## 2023-09-08 ASSESSMENT — PAIN DESCRIPTION - PAIN TYPE
TYPE: SURGICAL PAIN

## 2023-09-08 ASSESSMENT — PATIENT HEALTH QUESTIONNAIRE - PHQ9
SUM OF ALL RESPONSES TO PHQ9 QUESTIONS 1 AND 2: 0
2. FEELING DOWN, DEPRESSED, IRRITABLE, OR HOPELESS: NOT AT ALL
1. LITTLE INTEREST OR PLEASURE IN DOING THINGS: NOT AT ALL

## 2023-09-08 ASSESSMENT — FIBROSIS 4 INDEX
FIB4 SCORE: 0.46

## 2023-09-08 ASSESSMENT — PAIN SCALES - GENERAL: PAIN_LEVEL: 6

## 2023-09-08 NOTE — ANESTHESIA POSTPROCEDURE EVALUATION
Patient: Ailin Good    Procedure Summary     Date: 09/08/23 Room / Location: [facility] 05 / SURGERY [facility]    Anesthesia Start: 0936 Anesthesia Stop: 1154    Procedures:       POSTERIOR LEFT L4 TRANSPEDICULAR DECOMPRESSION, LEFT L5 LAMINECTOMY, L4-5 PLACEMENT OF INTRALAMINAR DEVICE (Spine Lumbar)      INSERTION, INTERSPINOUS PROCESS DEVICE (Spine Lumbar) Diagnosis: (SPINAL STENOSIS OF LUMBAR REGION)    Surgeons: Jaleel Dyer M.D. Responsible Provider: Tex Wells D.O.    Anesthesia Type: general ASA Status: 2          Final Anesthesia Type: general  Last vitals  BP   Blood Pressure : (!) 168/88    Temp   36.3 °C (97.3 °F)    Pulse   62   Resp   18    SpO2   96 %      Anesthesia Post Evaluation    Patient location during evaluation: PACU  Patient participation: complete - patient participated  Level of consciousness: awake and alert  Pain score: 6    Airway patency: patent  Anesthetic complications: no  Cardiovascular status: hemodynamically stable  Respiratory status: acceptable  Hydration status: euvolemic    PONV: none          No notable events documented.     Nurse Pain Score: 4 (NPRS)

## 2023-09-08 NOTE — ANESTHESIA PROCEDURE NOTES
Airway    Date/Time: 9/8/2023 9:41 AM    Performed by: Tex Wells D.O.  Authorized by: Tex Wells D.O.    Location:  OR  Urgency:  Elective  Difficult Airway: No    Indications for Airway Management:  Anesthesia      Spontaneous Ventilation: absent    Sedation Level:  Deep  Preoxygenated: Yes    Patient Position:  Sniffing  Mask Difficulty Assessment:  2 - vent by mask + OA or adjuvant +/- NMBA  Final Airway Type:  Endotracheal airway  Final Endotracheal Airway:  ETT  Cuffed: Yes    Technique Used for Successful ETT Placement:  Direct laryngoscopy    Insertion Site:  Oral  Blade Type:  Penny  Laryngoscope Blade/Videolaryngoscope Blade Size:  3  ETT Size (mm):  7.0  Measured from:  Teeth  ETT to Teeth (cm):  22  Placement Verified by: auscultation and capnometry    Cormack-Lehane Classification:  Grade IIa - partial view of glottis  Number of Attempts at Approach:  1

## 2023-09-08 NOTE — ANESTHESIA TIME REPORT
Anesthesia Start and Stop Event Times     Date Time Event    9/8/2023 0825 Ready for Procedure     0936 Anesthesia Start     1154 Anesthesia Stop        Responsible Staff  09/08/23    Name Role Begin End    Tex Wells D.O. Anesth 0936 1154        Overtime Reason:  no overtime (within assigned shift)    Comments:

## 2023-09-08 NOTE — ANESTHESIA PREPROCEDURE EVALUATION
Case: [id number] Date/Time: 09/08/23 0945    Procedures:       POSTERIOR LEFT L4 TRANSPEDICULAR DECOMPRESSION, LEFT L5 LAMINECTOMY, L4-5 PLACEMENT OF INTRALAMINAR DEVICE      INSERTION, INTERSPINOUS PROCESS DEVICE    Pre-op diagnosis: SPINAL STENOSIS OF LUMBAR REGION    Location: [facility] OR 05 / SURGERY [facility]    Surgeons: Jaleel Dyer M.D.          Relevant Problems   CARDIAC   (positive) Essential hypertension      ENDO   (positive) Postoperative hypothyroidism      Other   (positive) Arthritis       Physical Exam    Airway   Mallampati: II  TM distance: >3 FB  Neck ROM: full       Cardiovascular - normal exam  Rhythm: regular  Rate: normal  (-) murmur     Dental - normal exam           Pulmonary - normal exam  Breath sounds clear to auscultation     Abdominal    Neurological - normal exam                 Anesthesia Plan    ASA 2       Plan - general       Airway plan will be ETT          Induction: intravenous    Postoperative Plan: Postoperative administration of opioids is intended.    Pertinent diagnostic labs and testing reviewed    Informed Consent:    Anesthetic plan and risks discussed with patient.    Use of blood products discussed with: patient whom consented to blood products.

## 2023-09-08 NOTE — OP REPORT
DATE OF SERVICE:  09/08/2023     PREOPERATIVE DIAGNOSES:    1.  L4-L5 stenosis with left L4-L5 radiculopathies with epidural facet cyst,   left L5.  2.  L4-L5 grade 1 spondylolisthesis.     POSTOPERATIVE DIAGNOSES:    1.  L4-L5 stenosis with left L4-L5 radiculopathies with epidural facet cyst,   left L5.  2.  L4-L5 grade 1 spondylolisthesis.     OPERATIONS:    1.  Bilateral L5 partial laminectomy, excision of densely adherent facet cyst,   decompression of thecal sac in the left L5 nerve root.  2.  Microscopic left L4 transpedicular decompression of distal left L4 nerve   root.  3.  Placement of L4-L5 Coflex interspinous process device (10 mm).     SURGEON:  Jaleel Dyer MD     ASSISTANT:  BONG Washington     ANESTHESIA:  General endotracheal.     ANESTHESIOLOGIST:  Tex Wells DO     PREPARATION:  ChloraPrep.     MEDICATIONS:  The patient given Ancef prior to incision.     INDICATIONS:  This is a woman with a left L4-L5 radicular pain, refractory to   nonoperative therapies.  She had compromise of the left L4 root in the   foramen, 5 roots in the lateral recess and opposite the pedicle of L5 due to   stenosis plus epidural cyst.  The patient was felt to be a candidate for   decompression of the nerve roots and placement of Coflex clamp to attempt to   improve her pain, prevent further loss of neurologic function, optimize the   potential for the return of loss of function.  The patient understands major   risks and complications such as paralysis exceedingly rare, biggest risk of   surgery is nonresponse, which is 10%, small risk of wound infection, spinal   fluid leak, chance to require surgery again 15% including the possible need   for a fusion if she does not respond to the Coflex clamp and develops   recurrent stenosis.  The patient understanding and agreed to proceed and   signed consent.     NEED FOR SURGICAL ASSISTANCE:  Surgical assistant required throughout the case   for under both [facility]  and microscopic magnification for retraction, suction,   irrigation, cleaning instruments, keep the case moving forward to minimize   operative time.     DESCRIPTION OF PROCEDURE:  The patient was brought to the operating room.    Peripheral venous lines in place.  General anesthesia was induced.  The   patient intubated.  No Johnson catheter was placed.  The patient laid prone on   the OSI table using 6 posts.  Pressure points carefully padded.  The back was   sterilely prepped and draped.  Planned incision was marked in the midline, 3   through 5.  Skin was infiltrated with local and incised with scalpel using a   subperiosteal dissection.  Paravertebral muscles dissected off the spinous   process, lamina bilaterally.  Levels confirmed with intraoperative   fluoroscopy.  Using the large Leksell, I removed the interspinous ligament   between 4 and 5.  Then, using the [employer]2 Michael drill with AM-8 bit, inferior   portion of lamina of L4 on the left side, the superior portion of lamina L5   primarily on the left side was drilled down to paper thin shelf of bone.  Thin   shelf of bone elevated with 2 mm Kerrisons.  I worked laterally until I could   palpate the pedicle.  We worked to the lower border of the pedicle.  We   identified the 5 root.  The patient had densely adherent epidural cyst, which   was carefully dissected off the thecal sac with curettes and then resected in   this fashion. The 5 root and thecal sac was completely unroofed in the lateral   recess on the left side.  We sized the interspinous device for a 10 mm clamp.    This allowed for slight distraction on loading of the facet joints.  This   was tamped into place where it was about 3 mm from the dorsal aspect of the   dura.  The flanges were then crimped down on the spinous processes of L4 and   L5.  AP and lateral x-ray showed satisfactory position of the Coflex clamp.    Next, I dissected out the lateral pars of 4 and transverse process of 4.     Unilateral Dave retractor was placed.  Operating microscope was brought in   under high power magnification using micro technique.  The superior aspect of   the 4-5 facet joint along with the lateral pars was drilled to paper thin   shelf of bone.  This thin shelf of bone plus lateral ligamentum flavum removed   with straight and angled 2 mm Kerrisons.  The distal 4 root was identified   palpating inferiorly. There was hard bulging annulus, no free fragments with   bone and ligament. Decompression of the foramen L4 root was completely   decompressed from medial to lateral pedicle.  I could pass a nerve probe   medially, laterally in the soft tissues without further compromise.    Throughout bleeders controlled with bipolar electrocautery plus Gelfoam powder   mixed with thrombin.  Once the 4 root was decompressed, retractors were   withdrawn, the microscope was withdrawn.  Muscle bleeders controlled with   bipolar electrocautery.  Wound was irrigated with antibiotic irrigation.    Medium Hemovac drain was placed in depth of the wound, brought out through a   separate stab incision.  Deep fascia was closed with 0 Vicryl, subcutaneous   fascia with 0 Vicryl, subcuticular closed with 3-0 Vicryl.  Drain was sutured   in place with a 2-0 Vicryl, connected to closed drainage system.  A   quarter-inch Steri-Strips were placed.  Final sponge, needle counted, counts   correct.  Estimated blood loss 100-150 mL. The patient tolerated the procedure   well without complication.        ______________________________  MD LORENA OCAMPO/JANUARY/MIKE    DD:  09/08/2023 12:11  DT:  09/08/2023 13:16    Job#:  [id number]    CC:Tex Wells DO

## 2023-09-08 NOTE — PROGRESS NOTES
Assumed care. Patient drowsy. Awakens to verbal stimuli. Holding nausea bag. States is still nauseated, but just a little bit. Pain 5/10. Dressings to back, CDI. SCDs in place. Admission started.

## 2023-09-08 NOTE — OR NURSING
Patient arrived to PACU in stable condition.  Oral airway in place, removed at 1200  Surgical site to mid/low back. Dressing CDI. Hemovac to incision, compressed to self-suction  Medicated for nausea per MAR.  Patient denies pain  Sister Mary updated on patient condition   Report given to Roya RN. Patient transferred to T202 with transport

## 2023-09-09 LAB
ANION GAP SERPL CALC-SCNC: 12 MMOL/L (ref 7–16)
BUN SERPL-MCNC: 12 MG/DL (ref 8–22)
CALCIUM SERPL-MCNC: 8.3 MG/DL (ref 8.5–10.5)
CHLORIDE SERPL-SCNC: 103 MMOL/L (ref 96–112)
CO2 SERPL-SCNC: 25 MMOL/L (ref 20–33)
CREAT SERPL-MCNC: 0.59 MG/DL (ref 0.5–1.4)
GFR SERPLBLD CREATININE-BSD FMLA CKD-EPI: 96 ML/MIN/1.73 M 2
GLUCOSE SERPL-MCNC: 152 MG/DL (ref 65–99)
POTASSIUM SERPL-SCNC: 3.8 MMOL/L (ref 3.6–5.5)
SODIUM SERPL-SCNC: 140 MMOL/L (ref 135–145)

## 2023-09-09 PROCEDURE — 96368 THER/DIAG CONCURRENT INF: CPT

## 2023-09-09 PROCEDURE — A9270 NON-COVERED ITEM OR SERVICE: HCPCS | Performed by: NEUROLOGICAL SURGERY

## 2023-09-09 PROCEDURE — 96376 TX/PRO/DX INJ SAME DRUG ADON: CPT

## 2023-09-09 PROCEDURE — 96375 TX/PRO/DX INJ NEW DRUG ADDON: CPT

## 2023-09-09 PROCEDURE — 700101 HCHG RX REV CODE 250: Performed by: NURSE PRACTITIONER

## 2023-09-09 PROCEDURE — 700111 HCHG RX REV CODE 636 W/ 250 OVERRIDE (IP): Performed by: NURSE PRACTITIONER

## 2023-09-09 PROCEDURE — 97535 SELF CARE MNGMENT TRAINING: CPT

## 2023-09-09 PROCEDURE — 700102 HCHG RX REV CODE 250 W/ 637 OVERRIDE(OP): Performed by: NEUROLOGICAL SURGERY

## 2023-09-09 PROCEDURE — 96365 THER/PROPH/DIAG IV INF INIT: CPT

## 2023-09-09 PROCEDURE — 80048 BASIC METABOLIC PNL TOTAL CA: CPT

## 2023-09-09 PROCEDURE — G0378 HOSPITAL OBSERVATION PER HR: HCPCS

## 2023-09-09 PROCEDURE — 96366 THER/PROPH/DIAG IV INF ADDON: CPT

## 2023-09-09 PROCEDURE — A9270 NON-COVERED ITEM OR SERVICE: HCPCS | Performed by: NURSE PRACTITIONER

## 2023-09-09 PROCEDURE — 97161 PT EVAL LOW COMPLEX 20 MIN: CPT

## 2023-09-09 PROCEDURE — 700102 HCHG RX REV CODE 250 W/ 637 OVERRIDE(OP): Performed by: NURSE PRACTITIONER

## 2023-09-09 RX ORDER — METHOCARBAMOL 750 MG/1
750 TABLET, FILM COATED ORAL EVERY 8 HOURS PRN
Status: DISCONTINUED | OUTPATIENT
Start: 2023-09-09 | End: 2023-09-10 | Stop reason: HOSPADM

## 2023-09-09 RX ORDER — MORPHINE SULFATE 4 MG/ML
2 INJECTION INTRAVENOUS
Status: DISCONTINUED | OUTPATIENT
Start: 2023-09-09 | End: 2023-09-10 | Stop reason: HOSPADM

## 2023-09-09 RX ORDER — CYCLOBENZAPRINE HCL 5 MG
10 TABLET ORAL 3 TIMES DAILY
Status: DISCONTINUED | OUTPATIENT
Start: 2023-09-09 | End: 2023-09-09

## 2023-09-09 RX ORDER — DIAZEPAM 5 MG/1
5 TABLET ORAL EVERY 4 HOURS PRN
Status: DISCONTINUED | OUTPATIENT
Start: 2023-09-09 | End: 2023-09-10 | Stop reason: HOSPADM

## 2023-09-09 RX ORDER — DIAZEPAM 5 MG/1
5 TABLET ORAL 3 TIMES DAILY
Status: DISCONTINUED | OUTPATIENT
Start: 2023-09-09 | End: 2023-09-09

## 2023-09-09 RX ORDER — CYCLOBENZAPRINE HCL 5 MG
10 TABLET ORAL EVERY 8 HOURS PRN
Status: DISCONTINUED | OUTPATIENT
Start: 2023-09-09 | End: 2023-09-10 | Stop reason: HOSPADM

## 2023-09-09 RX ORDER — OXYCODONE HYDROCHLORIDE 5 MG/1
5 TABLET ORAL EVERY 4 HOURS PRN
Status: DISCONTINUED | OUTPATIENT
Start: 2023-09-09 | End: 2023-09-10 | Stop reason: HOSPADM

## 2023-09-09 RX ORDER — METHOCARBAMOL 750 MG/1
750 TABLET, FILM COATED ORAL 3 TIMES DAILY
Status: DISCONTINUED | OUTPATIENT
Start: 2023-09-09 | End: 2023-09-09

## 2023-09-09 RX ADMIN — GABAPENTIN 300 MG: 300 CAPSULE ORAL at 20:43

## 2023-09-09 RX ADMIN — METHOCARBAMOL 750 MG: 750 TABLET ORAL at 11:46

## 2023-09-09 RX ADMIN — ONDANSETRON 4 MG: 4 TABLET, ORALLY DISINTEGRATING ORAL at 11:46

## 2023-09-09 RX ADMIN — CYCLOBENZAPRINE HYDROCHLORIDE 10 MG: 5 TABLET, FILM COATED ORAL at 20:43

## 2023-09-09 RX ADMIN — HYDROCHLOROTHIAZIDE 25 MG: 25 TABLET ORAL at 05:14

## 2023-09-09 RX ADMIN — DOCUSATE SODIUM 100 MG: 100 CAPSULE, LIQUID FILLED ORAL at 17:39

## 2023-09-09 RX ADMIN — OXYCODONE HYDROCHLORIDE 5 MG: 5 TABLET ORAL at 21:17

## 2023-09-09 RX ADMIN — LEVOTHYROXINE SODIUM 125 MCG: 0.12 TABLET ORAL at 05:14

## 2023-09-09 RX ADMIN — ONDANSETRON 4 MG: 4 TABLET, ORALLY DISINTEGRATING ORAL at 17:39

## 2023-09-09 RX ADMIN — POTASSIUM CHLORIDE AND SODIUM CHLORIDE: 900; 150 INJECTION, SOLUTION INTRAVENOUS at 05:15

## 2023-09-09 RX ADMIN — GABAPENTIN 300 MG: 300 CAPSULE ORAL at 14:01

## 2023-09-09 RX ADMIN — SENNOSIDES AND DOCUSATE SODIUM 1 TABLET: 50; 8.6 TABLET ORAL at 20:43

## 2023-09-09 RX ADMIN — DOCUSATE SODIUM 100 MG: 100 CAPSULE, LIQUID FILLED ORAL at 05:14

## 2023-09-09 ASSESSMENT — COGNITIVE AND FUNCTIONAL STATUS - GENERAL
MOVING FROM LYING ON BACK TO SITTING ON SIDE OF FLAT BED: A LITTLE
MOBILITY SCORE: 18
MOVING TO AND FROM BED TO CHAIR: A LITTLE
CLIMB 3 TO 5 STEPS WITH RAILING: A LITTLE
SUGGESTED CMS G CODE MODIFIER MOBILITY: CK
TURNING FROM BACK TO SIDE WHILE IN FLAT BAD: A LITTLE
WALKING IN HOSPITAL ROOM: A LITTLE
STANDING UP FROM CHAIR USING ARMS: A LITTLE

## 2023-09-09 ASSESSMENT — GAIT ASSESSMENTS
DISTANCE (FEET): 15
DEVIATION: DECREASED BASE OF SUPPORT;SHUFFLED GAIT;DECREASED HEEL STRIKE;DECREASED TOE OFF
ASSISTIVE DEVICE: FRONT WHEEL WALKER
GAIT LEVEL OF ASSIST: CONTACT GUARD ASSIST

## 2023-09-09 ASSESSMENT — PAIN DESCRIPTION - PAIN TYPE
TYPE: SURGICAL PAIN
TYPE: ACUTE PAIN

## 2023-09-09 NOTE — THERAPY
Physical Therapy   Initial Evaluation     Patient Name: Ailin Good  Age:  73 y.o., Sex:  female  Medical Record #: [id number]  Today's Date: 9/9/2023          Assessment  Patient is 73 y.o. female admitted to the hospital following L5 laminectomy and decompression and well as placement of L4/L5 Coflex interspinous process device. Pt reports that she fell off of a ladder in April on 2023 causing back injury and non-operative management was not successful. Pt reports paraesthesias and weakness in L LE prior to surgery.  Pt states that prior to surgery, she was independent with ambulation without AD but was limited by pain. Pt lives alone but sister can assist with care upon DC but will likely not be staying with pt following DC.     At time of initial evaluation, pt able to perform supine to sit with HOB partially elevated with SBA. Sit to stand variable from SBA to minimal assist based on height of surface (increased assist from lower surfaces.) Pt ambulated short distance in room with FWW with CGA. Pt slightly unsteady with ambulation. Pt reports paraesthesias have mostly resolved in L LE post-op, mild weakness still present in L LE vs R. Pt educated on spinal precautions and she was able to maintain precautions with mobility. Overall doing well with mobility, will continue to follow. Pt will need FWW for DC home.     Plan    Physical Therapy Initial Treatment Plan   Treatment Plan : (P) Bed Mobility, Gait Training, Neuro Re-Education / Balance, Self Care / Home Evaluation, Stair Training, Therapeutic Activities, Therapeutic Exercise  Treatment Frequency: (P) 3 Times per Week  Duration: (P) Until Therapy Goals Met                09/09/23 1606   Pain 0 - 10 Group   Location Back   Therapist Pain Assessment 10;Post Activity Pain Same as Prior to Activity   Non Verbal Descriptors   Non Verbal Scale  Calm   Prior Living Situation   Prior Services None   Housing / Facility [address] [place] [facility]   Steps Into Home 2   Bathroom Set  up Walk In Shower   Equipment Owned None   Lives with - Patient's Self Care Capacity Alone and Able to Care For Self   Comments Pt reports that sister lives in town and can assist once home, however, pt reports that sister does not plan on staying with her post DC   Prior Level of Functional Mobility   Bed Mobility Independent   Transfer Status Independent   Ambulation Independent   Ambulation Distance Short community distances   Assistive Devices Used None   Comments Pt reports that she was able to ambulate prior to surgery but increased pain with prolonged standing and ambulation   History of Falls   Date of Last Fall   (fall off ladder in April which caused back injury)   Cognition    Cognition / Consciousness WDL   Level of Consciousness Alert   Comments Pt pleasant and cooperative   Passive ROM Lower Body   Passive ROM Lower Body WDL   Active ROM Lower Body    Active ROM Lower Body  WDL   Strength Lower Body   Lower Body Strength  X   Comments Very slight L vs R LE weakness but WFL for mobility tasks   Sensation Lower Body   Lower Extremity Sensation   WDL   Comments Pt reports no current numbness/tingling but did have tingling in L LE when SCD's in place. N/T prior to surgery in L LE   Lower Body Muscle Tone   Lower Body Muscle Tone  WDL   Balance Assessment   Sitting Balance (Static) Fair +   Sitting Balance (Dynamic) Fair   Standing Balance (Static) Fair -   Standing Balance (Dynamic) Fair -   Weight Shift Sitting Fair   Weight Shift Standing Fair   Comments Standing balance with FWW   Bed Mobility    Supine to Sit Standby Assist  (HOB partially elevated)   Sit to Supine Standby Assist   Scooting Standby Assist   Rolling Standby Assist   Comments Pt able to log roll with appropriate form   Gait Analysis   Gait Level Of Assist Contact Guard Assist   Assistive Device Front Wheel Walker   Distance (Feet) 15   # of Times Distance was Traveled 2   Deviation Decreased Base Of Support;Shuffled Gait;Decreased Heel  Strike;Decreased Toe Off  (wobbly on feet with increased lateral trunk sway)   Weight Bearing Status No restrictions   Comments Pt ambulated short distance in room, to restroom and back to bed. Pt reports not feeling well and declined ambulation in hallway   Functional Mobility   Sit to Stand Contact Guard Assist  (Min A from lower surfaces)   Bed, Chair, Wheelchair Transfer Contact Guard Assist   Toilet Transfers Contact Guard Assist   Transfer Method Stand Step   Mobility Ambulated to/from restroom   How much difficulty does the patient currently have...   Turning over in bed (including adjusting bedclothes, sheets and blankets)? 3   Sitting down on and standing up from a chair with arms (e.g., wheelchair, bedside commode, etc.) 3   Moving from lying on back to sitting on the side of the bed? 3   How much help from another person does the patient currently need...   Moving to and from a bed to a chair (including a wheelchair)? 3   Need to walk in a hospital room? 3   Climbing 3-5 steps with a railing? 3   6 clicks Mobility Score 18   Activity Tolerance   Sitting in Chair Toilet X 3 minutes   Sitting Edge of Bed 8 min   Standing 5 minutes total   Patient / Family Goals    Patient / Family Goal #1 Home   Short Term Goals    Short Term Goal # 1 Pt will perform supine to sit with HOB flat with SPV within 6 sessions to allow pt to get in and out of bed   Short Term Goal # 2 Pt will perform sit to stand with FWW with SPV within 6 sessions to allow pt to transfer between surfaces   Short Term Goal # 3 Pt will ambulate 150 feet with FWW with SPV within 6 sessions to allow pt to access home environment   Short Term Goal # 4 Pt will ascend/descend 2 steps with FWW with SPV within 6 sessions to allow pt to access home environment   Education Group   Education Provided Role of Physical Therapist;Spine Precautions   Spine Precautions Patient Response Patient;Acceptance;Explanation;Handout;Verbal Demonstration   Role of  Physical Therapist Patient Response Patient;Acceptance;Explanation;Verbal Demonstration   Physical Therapy Initial Treatment Plan    Treatment Plan  Bed Mobility;Gait Training;Neuro Re-Education / Balance;Self Care / Home Evaluation;Stair Training;Therapeutic Activities;Therapeutic Exercise   Treatment Frequency 3 Times per Week   Duration Until Therapy Goals Met

## 2023-09-09 NOTE — PROGRESS NOTES
Report received from night shift RN. Patient A&O, in bed resting comfortably. VSS, denies pain, bed in lowest position and locked, call light in reach.      PCA infusing. Setting verified with night RN.

## 2023-09-09 NOTE — PROGRESS NOTES
PCA discontinued per surgery.     Spinal vac emptied with 35 ml of bloody liquid.     Up to bathroom with x1 handheld assist.     Muscle relaxers and PO pain meds started.     C/o nausea, zofran given.

## 2023-09-09 NOTE — PROGRESS NOTES
Neurosurgery Progress Note    Subjective:  Nauseous all night and this am   Cont. With LLE pain, tingling     Exam:  AAO  LLE IP/quad 4/5 pf/df 4/5, tingling   RLE 5/5   HVAC 70ml/8hr   Incision CDI       BP  Min: 104/57  Max: 143/74  Pulse  Av.6  Min: 58  Max: 77  Resp  Av.9  Min: 12  Max: 18  Temp  Av °C (96.8 °F)  Min: 35.8 °C (96.5 °F)  Max: 36.2 °C (97.1 °F)  SpO2  Av.6 %  Min: 94 %  Max: 99 %    No data recorded        Recent Labs     23  0023   SODIUM 140   POTASSIUM 3.8   CHLORIDE 103   CO2 25   GLUCOSE 152*   BUN 12   CREATININE 0.59   CALCIUM 8.3*               Intake/Output                         23 0700 - 23 0659 23 - 09/10/23 0659      Total 1900-0659 Total                 Intake    I.V.  1000  977.5 1977.5  46.1  -- 46.1    PCA End of Shift Total Volume (ml) -- -- -- 46.1 -- 46.1    Volume (mL) (Lactated Ringers) 1000 -- 1000 -- -- --    Volume (mL) (0.9 % NaCl with KCl 20 mEq infusion) -- 977.5 977.5 -- -- --    Total Intake 1000 977.5 1977.5 46.1 -- 46.1       Output    Urine  --  -- --  --  -- --    Number of Times Voided -- 1 x 1 x 1 x -- 1 x    Drains  40  70 110  --  -- --    Output (mL) (Closed/Suction Drain 1 Inferior;Right Back Hemovac) 40 70 110 -- -- --    Blood  50  -- 50  --  -- --    Est. Blood Loss 50 -- 50 -- -- --    Total Output 90 70 160 -- -- --       Net I/O     910 907.5 1817.5 46.1 -- 46.1              Intake/Output Summary (Last 24 hours) at 2023 1057  Last data filed at 2023 0700  Gross per 24 hour   Intake .55 ml   Output 160 ml   Net 1863.55 ml             oxyCODONE immediate-release  5 mg Q4HRS PRN    morphine injection  2 mg Q3HRS PRN    methocarbamol  750 mg TID    Or    cyclobenzaprine  10 mg TID    Or    diazePAM  5 mg TID    gabapentin  300 mg TID PRN    hydroCHLOROthiazide  25 mg QAM    levothyroxine  125 mcg AM ES    Pharmacy Consult Request  1 Each PHARMACY TO DOSE    MD  ALERT...DO NOT ADMINISTER NSAIDS or ASPIRIN unless ORDERED By Neurosurgery  1 Each PRN    docusate sodium  100 mg BID    senna-docusate  1 Tablet Nightly    senna-docusate  1 Tablet Q24HRS PRN    polyethylene glycol/lytes  1 Packet BID PRN    magnesium hydroxide  30 mL QDAY PRN    bisacodyl  10 mg Q24HRS PRN    sodium phosphate  1 Each Once PRN    0.9 % NaCl with KCl 20 mEq 1,000 mL   Continuous    ondansetron  4 mg Q4HRS PRN    ondansetron  4 mg Q4HRS PRN    diphenhydrAMINE  25 mg Q6HRS PRN    Or    diphenhydrAMINE  25 mg Q6HRS PRN    labetalol  10 mg Q HOUR PRN    promethazine  12.5 mg Q4HRS PRN       Assessment and Plan:  Hospital day #2  POD #1 lft L4 transpedic, Lt L5 aramblua, L4-5 Coflex   Prophylactic anticoagulation: no         Start date/time: TBD after drain removal     Plan  Stable   Cont IVF until eating drinking  DC PCA, transition to orals (oxy 5, muscle relaxants, IV MS breakthrough  HVAC remains   -cont to suction, record output Q8  -can dc when 30ml or less in 8 hour   PT/OT eval/treat  -antiemetics as needed

## 2023-09-09 NOTE — DISCHARGE PLANNING
HTH/SCP TCN chart review completed. No CM consulted. The most current review of medical record, knowledge of pt's PLOF and social support, LACE+ score of 10, no 6 click scores documented.    Pt seen at bedside. Introduced TCN program. Provided education regarding post acute levels of care. Discussed HTH/SCP plan benefits (Meds to Beds, medical uber and [facility] transitional care). Pt verbalizes understanding.     Patient reports that she lives alone, but her sister is going to come stay with her to assist with recovery.  Reports she does not own FWW, choice obtained.  Reports she has ambulated to bathroom x 2, and nausea/vomiting are biggest issues as long as she has FWW.  Patient reports she is also open to HH if needed, choice obtained.  Awaiting PT recommendations for discharge planning.  No further TCN needs at this time.     Choice proactively obtained for DME (AD if needed) and HH and faxed to [facility]. TCN will continue to follow and collaborate with discharge planning team as additional post acute needs arise. Thank you.     Completed today:  Awaiting PT recommendation   Choice obtained: [facility], DME (AD if needed)  F/u with neurosurgery

## 2023-09-09 NOTE — CARE PLAN
The patient is Watcher - Medium risk of patient condition declining or worsening    Shift Goals  Clinical Goals: pain control, nausea managed, monitor drain output, ambulate  Patient Goals: rest    Progress made toward(s) clinical / shift goals:      Problem: Knowledge Deficit - Standard  Goal: Patient and family/care givers will demonstrate understanding of plan of care, disease process/condition, diagnostic tests and medications  Outcome: Progressing     Problem: Pain - Standard  Goal: Alleviation of pain or a reduction in pain to the patient’s comfort goal  Outcome: Progressing       Patient is not progressing towards the following goals:

## 2023-09-10 VITALS
HEIGHT: 63 IN | BODY MASS INDEX: 25.41 KG/M2 | WEIGHT: 143.4 LBS | TEMPERATURE: 98 F | RESPIRATION RATE: 20 BRPM | SYSTOLIC BLOOD PRESSURE: 113 MMHG | HEART RATE: 63 BPM | OXYGEN SATURATION: 90 % | DIASTOLIC BLOOD PRESSURE: 58 MMHG

## 2023-09-10 PROCEDURE — 700102 HCHG RX REV CODE 250 W/ 637 OVERRIDE(OP): Performed by: NEUROLOGICAL SURGERY

## 2023-09-10 PROCEDURE — 97530 THERAPEUTIC ACTIVITIES: CPT

## 2023-09-10 PROCEDURE — A9270 NON-COVERED ITEM OR SERVICE: HCPCS | Performed by: NURSE PRACTITIONER

## 2023-09-10 PROCEDURE — G0378 HOSPITAL OBSERVATION PER HR: HCPCS

## 2023-09-10 PROCEDURE — A9270 NON-COVERED ITEM OR SERVICE: HCPCS | Performed by: NEUROLOGICAL SURGERY

## 2023-09-10 PROCEDURE — 700102 HCHG RX REV CODE 250 W/ 637 OVERRIDE(OP): Performed by: NURSE PRACTITIONER

## 2023-09-10 PROCEDURE — 96366 THER/PROPH/DIAG IV INF ADDON: CPT

## 2023-09-10 RX ORDER — TRAMADOL HYDROCHLORIDE 50 MG/1
50 TABLET ORAL EVERY 4 HOURS PRN
Qty: 30 TABLET | Refills: 0 | Status: SHIPPED | OUTPATIENT
Start: 2023-09-10 | End: 2023-09-17

## 2023-09-10 RX ORDER — ONDANSETRON 4 MG/1
4 TABLET, ORALLY DISINTEGRATING ORAL EVERY 4 HOURS PRN
Qty: 10 TABLET | Refills: 0 | Status: SHIPPED | OUTPATIENT
Start: 2023-09-10 | End: 2023-10-09

## 2023-09-10 RX ADMIN — OXYCODONE HYDROCHLORIDE 5 MG: 5 TABLET ORAL at 04:57

## 2023-09-10 RX ADMIN — CYCLOBENZAPRINE HYDROCHLORIDE 10 MG: 5 TABLET, FILM COATED ORAL at 08:43

## 2023-09-10 RX ADMIN — LEVOTHYROXINE SODIUM 125 MCG: 0.12 TABLET ORAL at 04:57

## 2023-09-10 RX ADMIN — DOCUSATE SODIUM 100 MG: 100 CAPSULE, LIQUID FILLED ORAL at 08:43

## 2023-09-10 RX ADMIN — HYDROCHLOROTHIAZIDE 25 MG: 25 TABLET ORAL at 04:57

## 2023-09-10 RX ADMIN — SENNOSIDES AND DOCUSATE SODIUM 1 TABLET: 50; 8.6 TABLET ORAL at 04:56

## 2023-09-10 ASSESSMENT — GAIT ASSESSMENTS
DEVIATION: BRADYKINETIC;ANTALGIC
ASSISTIVE DEVICE: FRONT WHEEL WALKER
DISTANCE (FEET): 150
GAIT LEVEL OF ASSIST: SUPERVISED

## 2023-09-10 ASSESSMENT — COGNITIVE AND FUNCTIONAL STATUS - GENERAL
MOVING TO AND FROM BED TO CHAIR: A LITTLE
TURNING FROM BACK TO SIDE WHILE IN FLAT BAD: A LITTLE
SUGGESTED CMS G CODE MODIFIER MOBILITY: CJ
MOBILITY SCORE: 21
MOVING FROM LYING ON BACK TO SITTING ON SIDE OF FLAT BED: A LITTLE

## 2023-09-10 NOTE — THERAPY
Physical Therapy   Daily Treatment     Patient Name: Ailin Good  Age:  73 y.o., Sex:  female  Medical Record #: [id number]  Today's Date: 9/10/2023     Precautions  Precautions: Fall Risk;Spinal / Back Precautions     Assessment    The pt presents today agreeable to participate in tx session, and demonstrating significant improvement in gait stability using FWW.  She was received laying in bed, and was able to demonstrate good carryover of log roll technique for bed mobility requiring only spv w/ HOB flat and no rails.  The pt demo'd STS EOB w/ FWW spv requiring initial VC for hand placement, however once again demonstrating good carryover.  She demo'd gait 150' using FWW via slow quinten and antalgic LLE, no LOB observed and distance limited by fatigue.  The pt expresses confidence in being able to navigate 2 stairs for access to her home at this time. Recommend pt DC home w/ OP PT f/u when appropriate given current extent of functional independence demo'd.  Will no longer follow 2/2 goals met, please re consult should there be a change in the pt's condition.     Plan    Treatment Plan Status: Modify Current Treatment Plan  Type of Treatment: Bed Mobility, Gait Training, Neuro Re-Education / Balance, Self Care / Home Evaluation, Stair Training, Therapeutic Activities, Therapeutic Exercise  Treatment Frequency: 3 Times per Week  Treatment Duration: Until Therapy Goals Met    DC Equipment Recommendations: Front-Wheel Walker  Discharge Recommendations: Recommend outpatient physical therapy services to address higher level deficits      Subjective/Objective       09/10/23 1032   Cognition    Cognition / Consciousness WDL   Level of Consciousness Alert   Comments pt pleasant and cooperative   Passive ROM Lower Body   Passive ROM Lower Body WDL   Active ROM Lower Body    Active ROM Lower Body  WDL   Comments observed during functional mobility   Strength Lower Body   Lower Body Strength  WDL   Comments as above   Sensation  Lower Body   Lower Extremity Sensation   WDL   Comments Pt reports baseline tingling in LLE, sensation intact to LT and DP BLE   Other Treatments   Other Treatments Provided Reviewed spinal precautions and log roll technique for bed mobility   Balance   Sitting Balance (Static) Good   Sitting Balance (Dynamic) Fair +   Standing Balance (Static) Fair +   Standing Balance (Dynamic) Fair   Weight Shift Sitting Good   Weight Shift Standing Fair   Skilled Intervention Tactile Cuing;Verbal Cuing   Comments stand w/ FWW   Bed Mobility    Supine to Sit Supervised   Sit to Supine Supervised   Scooting Supervised   Rolling Supervised   Skilled Intervention Verbal Cuing;Compensatory Strategies   Comments HOB flat, no rails, good demonstration of log roll   Gait Analysis   Gait Level Of Assist Supervised   Assistive Device Front Wheel Walker   Distance (Feet) 150   # of Times Distance was Traveled 1   Deviation Bradykinetic;Antalgic  (antalgic LLE)   Weight Bearing Status no restrictions   Vision Deficits Impacting Mobility pt denies   Skilled Intervention Verbal Cuing   Comments distance limited by fatigue   Functional Mobility   Sit to Stand Supervised   Bed, Chair, Wheelchair Transfer Supervised   Transfer Method Stand Step   Mobility STS EOB and toilet w/ FWW, EOB->hallway->toilet->EOB   Skilled Intervention Verbal Cuing;Compensatory Strategies   Activity Tolerance   Sitting in Chair 5min  (toilet)   Sitting Edge of Bed 10min   Standing 10min   Patient / Family Goals    Patient / Family Goal #1 Home   Goal #1 Outcome Progressing as expected   Short Term Goals    Short Term Goal # 1 Pt will perform supine to sit with HOB flat with SPV within 6 sessions to allow pt to get in and out of bed   Goal Outcome # 1 Goal met   Short Term Goal # 2 Pt will perform sit to stand with FWW with SPV within 6 sessions to allow pt to transfer between surfaces   Goal Outcome # 2 Goal met   Short Term Goal # 3 Pt will ambulate 150 feet with FWW  with SPV within 6 sessions to allow pt to access home environment   Goal Outcome # 3 Goal met   Short Term Goal # 4 Pt will ascend/descend 2 steps with FWW with SPV within 6 sessions to allow pt to access home environment   Goal Outcome # 4 Other (see comments)  (not assessed; pt endorses confidence in ability to navigate stairs to access home)   Education Group   Education Provided Spine Precautions;Role of Physical Therapist   Spine Precautions Patient Response Patient;Acceptance;Explanation;Demonstration;Action Demonstration;Verbal Demonstration   Role of Physical Therapist Patient Response Patient;Acceptance;Explanation;Demonstration;Action Demonstration   Additional Comments pt receptive of edu provided, good carryover demonstrated for log roll technique   Interdisciplinary Plan of Care Collaboration   IDT Collaboration with  Nursing   Patient Position at End of Therapy In Bed;Call Light within Reach;Tray Table within Reach;Phone within Reach   Collaboration Comments regarding outcome of tx session   Session Information   Date / Session Number  9/10- 2 (DC, goals met)

## 2023-09-10 NOTE — PROGRESS NOTES
Neurosurgery Progress Note    Subjective:  Better today, no c/o N/V. Still has left groin pain s/p surgery       Exam:  AAO  LLE IP/quad 4/5 pf/df 4/5, tingling   RLE 5/5   Incision CDI   Eating, drinking, voiding       BP  Min: 113/58  Max: 140/70  Pulse  Av.4  Min: 59  Max: 63  Resp  Av  Min: 16  Max: 16  Temp  Av.4 °C (97.6 °F)  Min: 36.3 °C (97.3 °F)  Max: 36.7 °C (98 °F)  SpO2  Av.4 %  Min: 90 %  Max: 93 %    No data recorded        Recent Labs     23  0023   SODIUM 140   POTASSIUM 3.8   CHLORIDE 103   CO2 25   GLUCOSE 152*   BUN 12   CREATININE 0.59   CALCIUM 8.3*                 Intake/Output                         23 - 09/10/23 0659 09/10/23 0700 - 2359      Total 0-0659 Total                 Intake    P.O.  --  720 720  --  -- --    P.O. -- 720 720 -- -- --    I.V.  91.1  -- 91.1  --  -- --    PCA End of Shift Total Volume (ml) 91.1 -- 91.1 -- -- --    Total Intake 91.1 720 811.1 -- -- --       Output    Urine  --  -- --  --  -- --    Number of Times Voided 1 x 2 x 3 x 1 x -- 1 x    Drains  65  -- 65  --  -- --    Output (mL) ([REMOVED] Closed/Suction Drain 1 Inferior;Right Back Hemovac 23 1850) 65 -- 65 -- -- --    Total Output 65 -- 65 -- -- --       Net I/O     26.1 720 746.1 -- -- --              Intake/Output Summary (Last 24 hours) at 9/10/2023 1034  Last data filed at 9/10/2023 0429  Gross per 24 hour   Intake 765 ml   Output 65 ml   Net 700 ml               oxyCODONE immediate-release  5 mg Q4HRS PRN    morphine injection  2 mg Q3HRS PRN    methocarbamol  750 mg Q8HRS PRN    Or    cyclobenzaprine  10 mg Q8HRS PRN    Or    diazePAM  5 mg Q4HRS PRN    gabapentin  300 mg TID PRN    hydroCHLOROthiazide  25 mg QAM    levothyroxine  125 mcg AM ES    Pharmacy Consult Request  1 Each PHARMACY TO DOSE    MD ALERT...DO NOT ADMINISTER NSAIDS or ASPIRIN unless ORDERED By Neurosurgery  1 Each PRN    docusate sodium  100 mg BID     senna-docusate  1 Tablet Nightly    senna-docusate  1 Tablet Q24HRS PRN    polyethylene glycol/lytes  1 Packet BID PRN    magnesium hydroxide  30 mL QDAY PRN    bisacodyl  10 mg Q24HRS PRN    sodium phosphate  1 Each Once PRN    0.9 % NaCl with KCl 20 mEq 1,000 mL   Continuous    ondansetron  4 mg Q4HRS PRN    ondansetron  4 mg Q4HRS PRN    diphenhydrAMINE  25 mg Q6HRS PRN    Or    diphenhydrAMINE  25 mg Q6HRS PRN    labetalol  10 mg Q HOUR PRN    promethazine  12.5 mg Q4HRS PRN       Assessment and Plan:  Hospital day #3  POD #2 lft L4 transpedic, Lt L5 arambula, L4-5 Coflex   Prophylactic anticoagulation: no         Start date/time: TBD after drain removal     Plan  Stable   DC home   FWW ordered   Cleared by PT/OT for home   Zofran added to pharmacy     Keep incision clean and dry. No dressing required over incision.   OK to shower and pat dry.   Do not soak incision in bath, hot tub, etc.   Take over the counter stool softener daily with pain medication.   Do not lift anything over 10 pounds. No repetitive bending, lifting, twisting, movements.   No Aspirin or anticoagulants until cleared by Neurosurgery.   No driving if taking narcotic medication.   Follow up with [facility] Neurosurgery in 2 weeks as scheduled,       Or call for an appointment [phone]

## 2023-09-10 NOTE — CARE PLAN
The patient is Stable - Low risk of patient condition declining or worsening    Shift Goals  Clinical Goals: Pain control, PT/OT  Patient Goals: Rest    Progress made toward(s) clinical / shift goals:      Patient is not progressing towards the following goals:Pain management, pt had increased prolonged pain after ambulation.      Problem: Knowledge Deficit - Standard  Goal: Patient and family/care givers will demonstrate understanding of plan of care, disease process/condition, diagnostic tests and medications  Outcome: Progressing     Problem: Pain - Standard  Goal: Alleviation of pain or a reduction in pain to the patient’s comfort goal  Outcome: Progressing     Problem: Discharge Barriers/Planning  Goal: Patient's continuum of care needs are met  Outcome: Progressing     Problem: Hemodynamics  Goal: Patient's hemodynamics, fluid balance and neurologic status will be stable or improve  Outcome: Progressing     Problem: Urinary Elimination  Goal: Establish and maintain regular urinary output  Outcome: Progressing     Problem: Mobility  Goal: Patient's capacity to carry out activities will improve  Outcome: Progressing     Problem: Infection - Standard  Goal: Patient will remain free from infection  Outcome: Progressing     Problem: Wound/ / Incision Healing  Goal: Patient's wound/surgical incision will decrease in size and heals properly  Outcome: Progressing

## 2023-09-10 NOTE — PROGRESS NOTES
Pt to DC. Pt states she ready to discharge. Denies need for pain medication at this time. Discharge instructions given to patient at bedside, verbalizes understanding and states plans for follow-up with PCP and neurosurg. Reviewed RX's and where to get them with pharmacy and neurosurg APRN. Walker delivered to BS. IV cathlon removed. All belongings accounted for, all questions answered at this time. Pt taken off floor by tech via WC. Pt denies further needs.

## 2023-09-10 NOTE — DISCHARGE INSTRUCTIONS
Discharge education from [facility] Neurosurgery    Keep incision clean and dry. No dressing required over incision.   OK to shower and pat dry.   Do not soak incision in bath, hot tub, etc.   Take over the counter stool softener daily with pain medication.   Do not lift anything over 10 pounds. No repetitive bending, lifting, twisting, movements.   No Aspirin or anticoagulants until cleared by Neurosurgery.   No driving if taking narcotic medication.   Follow up with [facility] Neurosurgery in 2 weeks as scheduled,       Or call for an appointment [phone]     Prescriptions sent to pharmacy (tramadol, zofran)

## 2023-09-10 NOTE — DISCHARGE PLANNING
HTH/SCP TCN chart review completed. Collaborated with ANNABELLA GONZALEZ  Current discharge considerations are for close outpatient f/u and a FWW when medically cleared.   Patient seen at bedside and FWW has been delivered to patients room.  TCN will continue to follow and collaborate with discharge planning team as additional post acute needs arise. Thank you.    Completed:  PT recommends outpatient physical therapy  on 9/9/23 services to address higher level deficits and a FWW.    Choice obtained: HH, [facility] (AD if needed)            F/u with neurosurgery.

## 2023-09-10 NOTE — CARE PLAN
The patient is Stable - Low risk of patient condition declining or worsening    Shift Goals  Clinical Goals: Discharge?  Patient Goals: Discharge?    Progress made toward(s) clinical / shift goals:  Discharging. Discussed pt's care with neurosurg APRN      Problem: Knowledge Deficit - Standard  Goal: Patient and family/care givers will demonstrate understanding of plan of care, disease process/condition, diagnostic tests and medications  Outcome: Met     Problem: Pain - Standard  Goal: Alleviation of pain or a reduction in pain to the patient’s comfort goal  Outcome: Met     Problem: Discharge Barriers/Planning  Goal: Patient's continuum of care needs are met  Outcome: Met     Problem: Hemodynamics  Goal: Patient's hemodynamics, fluid balance and neurologic status will be stable or improve  Outcome: Met     Problem: Urinary Elimination  Goal: Establish and maintain regular urinary output  Outcome: Met     Problem: Mobility  Goal: Patient's capacity to carry out activities will improve  Outcome: Met     Problem: Infection - Standard  Goal: Patient will remain free from infection  Outcome: Met     Problem: Wound/ / Incision Healing  Goal: Patient's wound/surgical incision will decrease in size and heals properly  Outcome: Met

## 2023-09-18 ENCOUNTER — APPOINTMENT (OUTPATIENT)
Dept: RADIOLOGY | Facility: MEDICAL CENTER | Age: 73
End: 2023-09-18
Attending: EMERGENCY MEDICINE
Payer: MEDICARE

## 2023-09-18 ENCOUNTER — HOSPITAL ENCOUNTER (EMERGENCY)
Facility: MEDICAL CENTER | Age: 73
End: 2023-09-18
Attending: EMERGENCY MEDICINE
Payer: MEDICARE

## 2023-09-18 VITALS
DIASTOLIC BLOOD PRESSURE: 88 MMHG | OXYGEN SATURATION: 94 % | BODY MASS INDEX: 24.26 KG/M2 | TEMPERATURE: 98.1 F | WEIGHT: 136.91 LBS | HEIGHT: 63 IN | RESPIRATION RATE: 18 BRPM | HEART RATE: 72 BPM | SYSTOLIC BLOOD PRESSURE: 138 MMHG

## 2023-09-18 DIAGNOSIS — R79.89 ELEVATED TROPONIN: ICD-10-CM

## 2023-09-18 DIAGNOSIS — M79.89 LEG SWELLING: ICD-10-CM

## 2023-09-18 LAB
ALBUMIN SERPL BCP-MCNC: 4.1 G/DL (ref 3.2–4.9)
ALBUMIN/GLOB SERPL: 1.5 G/DL
ALP SERPL-CCNC: 63 U/L (ref 30–99)
ALT SERPL-CCNC: 36 U/L (ref 2–50)
ANION GAP SERPL CALC-SCNC: 13 MMOL/L (ref 7–16)
AST SERPL-CCNC: 35 U/L (ref 12–45)
BASOPHILS # BLD AUTO: 0.4 % (ref 0–1.8)
BASOPHILS # BLD: 0.03 K/UL (ref 0–0.12)
BILIRUB SERPL-MCNC: 0.2 MG/DL (ref 0.1–1.5)
BUN SERPL-MCNC: 19 MG/DL (ref 8–22)
CALCIUM ALBUM COR SERPL-MCNC: 8.8 MG/DL (ref 8.5–10.5)
CALCIUM SERPL-MCNC: 8.9 MG/DL (ref 8.4–10.2)
CHLORIDE SERPL-SCNC: 107 MMOL/L (ref 96–112)
CO2 SERPL-SCNC: 24 MMOL/L (ref 20–33)
CREAT SERPL-MCNC: 0.73 MG/DL (ref 0.5–1.4)
EKG IMPRESSION: NORMAL
EOSINOPHIL # BLD AUTO: 0.1 K/UL (ref 0–0.51)
EOSINOPHIL NFR BLD: 1.3 % (ref 0–6.9)
ERYTHROCYTE [DISTWIDTH] IN BLOOD BY AUTOMATED COUNT: 43.8 FL (ref 35.9–50)
GFR SERPLBLD CREATININE-BSD FMLA CKD-EPI: 88 ML/MIN/1.73 M 2
GLOBULIN SER CALC-MCNC: 2.7 G/DL (ref 1.9–3.5)
GLUCOSE SERPL-MCNC: 98 MG/DL (ref 65–99)
HCT VFR BLD AUTO: 37.3 % (ref 37–47)
HGB BLD-MCNC: 12.3 G/DL (ref 12–16)
IMM GRANULOCYTES # BLD AUTO: 0.02 K/UL (ref 0–0.11)
IMM GRANULOCYTES NFR BLD AUTO: 0.3 % (ref 0–0.9)
INR PPP: 0.97 (ref 0.87–1.13)
LYMPHOCYTES # BLD AUTO: 2.06 K/UL (ref 1–4.8)
LYMPHOCYTES NFR BLD: 27.5 % (ref 22–41)
MCH RBC QN AUTO: 31.1 PG (ref 27–33)
MCHC RBC AUTO-ENTMCNC: 33 G/DL (ref 32.2–35.5)
MCV RBC AUTO: 94.2 FL (ref 81.4–97.8)
MONOCYTES # BLD AUTO: 0.5 K/UL (ref 0–0.85)
MONOCYTES NFR BLD AUTO: 6.7 % (ref 0–13.4)
NEUTROPHILS # BLD AUTO: 4.77 K/UL (ref 1.82–7.42)
NEUTROPHILS NFR BLD: 63.8 % (ref 44–72)
NRBC # BLD AUTO: 0 K/UL
NRBC BLD-RTO: 0 /100 WBC (ref 0–0.2)
NT-PROBNP SERPL IA-MCNC: 614 PG/ML (ref 0–125)
PLATELET # BLD AUTO: 676 K/UL (ref 164–446)
PMV BLD AUTO: 8.9 FL (ref 9–12.9)
POTASSIUM SERPL-SCNC: 4.2 MMOL/L (ref 3.6–5.5)
PROT SERPL-MCNC: 6.8 G/DL (ref 6–8.2)
PROTHROMBIN TIME: 13.4 SEC (ref 12–14.6)
RBC # BLD AUTO: 3.96 M/UL (ref 4.2–5.4)
SODIUM SERPL-SCNC: 144 MMOL/L (ref 135–145)
TROPONIN T SERPL-MCNC: 24 NG/L (ref 6–19)
TROPONIN T SERPL-MCNC: 25 NG/L (ref 6–19)
WBC # BLD AUTO: 7.5 K/UL (ref 4.8–10.8)

## 2023-09-18 PROCEDURE — 85610 PROTHROMBIN TIME: CPT

## 2023-09-18 PROCEDURE — 83880 ASSAY OF NATRIURETIC PEPTIDE: CPT

## 2023-09-18 PROCEDURE — 36415 COLL VENOUS BLD VENIPUNCTURE: CPT

## 2023-09-18 PROCEDURE — 84484 ASSAY OF TROPONIN QUANT: CPT

## 2023-09-18 PROCEDURE — 99284 EMERGENCY DEPT VISIT MOD MDM: CPT

## 2023-09-18 PROCEDURE — 93971 EXTREMITY STUDY: CPT | Mod: RT

## 2023-09-18 PROCEDURE — 85025 COMPLETE CBC W/AUTO DIFF WBC: CPT

## 2023-09-18 PROCEDURE — 80053 COMPREHEN METABOLIC PANEL: CPT

## 2023-09-18 PROCEDURE — 71045 X-RAY EXAM CHEST 1 VIEW: CPT

## 2023-09-18 PROCEDURE — 93005 ELECTROCARDIOGRAM TRACING: CPT | Performed by: EMERGENCY MEDICINE

## 2023-09-18 ASSESSMENT — FIBROSIS 4 INDEX: FIB4 SCORE: 0.46

## 2023-09-19 NOTE — ED NOTES
Pt back to room, PIV placed, labs collected  EKG completed  Pt using cell phone    US tech at bedside

## 2023-09-19 NOTE — DISCHARGE INSTRUCTIONS
US-EXTREMITY VENOUS LOWER UNILAT RIGHT   Final Result      Negative for right lower extremity deep venous thrombus      DX-CHEST-PORTABLE (1 VIEW)   Final Result      1.  Enlarged cardiac silhouette with no evidence of edema.   2.  There is linear bibasilar atelectasis.

## 2023-09-19 NOTE — ED PROVIDER NOTES
"  ER Provider Note    Scribed for Daniel Harvey M.d. by Amarilis Graham. 9/18/2023  6:11 PM    Primary Care Provider: Jemma Martin P.A.-C.    CHIEF COMPLAINT  Chief Complaint   Patient presents with    Leg Swelling     Post laminectomy leg swelling, rt worse than left. Rt leg and foot aching.  Surgery: Sept 8  Denies CP, SOB.      EXTERNAL RECORDS REVIEWED  Inpatient Notes The patient was admitted 9/8/23 to [facility] following a surgery for lumbar stenosis.     HPI/ROS  LIMITATION TO HISTORY   Select: : None  OUTSIDE HISTORIAN(S):  None    Ailin Good is a 73 y.o. female who presents to the ED complaining of leg swelling onset earlier today. The patient states the swelling is worse on her right side but present in both legs. The patient has associated discomfort in legs. She denies having any chest pain pressure, shortness of breath, loss of blood, or lots of fluids in surgery.  She notes that she takes aspirin daily.    PAST MEDICAL HISTORY  Past Medical History:   Diagnosis Date    Allergy     Anesthesia     PONV    Arthritis     osteoarthritis    Blood dyscrasia     \" too many platelets \"    Essential (hemorrhagic) thrombocythemia (HCC)     Hypertension     Polyp of colon 02/20/2019    PONV (postoperative nausea and vomiting)     Postoperative hypothyroidism     thyroidectomy    Psychiatric problem 03/2021    situational depression ( loss of dog)    Urinary incontinence     minor       SURGICAL HISTORY  Past Surgical History:   Procedure Laterality Date    DE INSJ STABLJ DEV W/DCMPRN 1 LVL  9/8/2023    Procedure: INSERTION, INTERSPINOUS PROCESS DEVICE;  Surgeon: Jaleel Dyer M.D.;  Location: SURGERY [facility];  Service: Neurosurgery    LUMBAR LAMINECTOMY DISKECTOMY  9/8/2023    Procedure: POSTERIOR LEFT L4 TRANSPEDICULAR DECOMPRESSION, LEFT L5 LAMINECTOMY, L4-5 PLACEMENT OF INTRALAMINAR DEVICE;  Surgeon: Jaleel Dyer M.D.;  Location: SURGERY [facility];  Service: Neurosurgery    DE DX BONE MARROW " ASPIRATIONS Left 03/10/2021    Procedure: ASPIRATION, BONE MARROW -;  Surgeon: Joe Black M.D.;  Location: ENDOSCOPY [facility];  Service: Orthopedics    IL DX BONE MARROW BIOPSIES Left 03/10/2021    Procedure: BIOPSY, BONE MARROW, USING NEEDLE OR TROCAR-DR. PERRY;  Surgeon: Joe Black M.D.;  Location: ENDOSCOPY [facility];  Service: Orthopedics    THYROIDECTOMY  2016    TONSILLECTOMY  1969    TUBAL COAGULATION LAPAROSCOPIC BILATERAL         FAMILY HISTORY  Family History   Problem Relation Age of Onset    Heart Disease Mother     Diabetes Mother     Hypertension Mother     Cancer Father         pancreatic cancer    Stroke Father     Hypertension Father     Heart Disease Sister     Hypertension Sister     Hyperlipidemia Sister        SOCIAL HISTORY   reports that she quit smoking about 10 years ago. Her smoking use included cigarettes. She started smoking about 45 years ago. She has a 35.0 pack-year smoking history. She has never used smokeless tobacco. She reports current alcohol use of about 4.2 oz of alcohol per week. She reports that she does not currently use drugs.    CURRENT MEDICATIONS  Discharge Medication List as of 9/18/2023  9:51 PM        CONTINUE these medications which have NOT CHANGED    Details   hydroCHLOROthiazide (HYDRODIURIL) 25 MG Tab TAKE 1 TABLET BY MOUTH EVERY DAY, Disp-100 Tablet, R-3, Normal      ondansetron (ZOFRAN ODT) 4 MG TABLET DISPERSIBLE Take 1 Tablet by mouth every four hours as needed for Nausea/Vomiting (Give PO if IV route is unavailable.)., Disp-10 Tablet, R-0, Normal      gabapentin (NEURONTIN) 300 MG Cap Take 300 mg by mouth 3 times a day as needed (Nerve Pain)., Historical Med      levothyroxine (SYNTHROID) 125 MCG Tab Take 125 mcg by mouth every morning on an empty stomach., Historical Med             ALLERGIES  Penicillins, Dust mite extract, Pollen extract, and Seasonal    PHYSICAL EXAM  BP (!) 141/86   Pulse 65   Temp 36.4 °C (97.6 °F) (Temporal)   Resp  "17   Ht 1.6 m (5' 3\")   Wt 62.1 kg (136 lb 14.5 oz)   LMP  (LMP Unknown)   SpO2 93%   BMI 24.25 kg/m²     Constitutional: Alert in no apparent distress.  HENT: No signs of trauma, Bilateral external ears normal, Nose normal. Uvula midline.   Eyes: Pupils are equal and reactive, Conjunctiva normal, Non-icteric.   Neck: Normal range of motion, No tenderness, Supple, No stridor.   Lymphatic: No lymphadenopathy noted.   Cardiovascular: Regular rate and rhythm, no murmurs.   Thorax & Lungs: Normal breath sounds, No respiratory distress, No wheezing, No chest tenderness.   Abdomen: Bowel sounds normal, Soft, No tenderness, No peritoneal signs, No masses, No pulsatile masses.   Skin: Dressing on lower back clean and intact,  2+ pitting edema lower to the lower 1/3 of lower right extremity greater than lower left extremity. Warm, Dry, No erythema, No rash.   Back: No bony tenderness, No CVA tenderness.   Extremities: Intact distal pulses, No edema, No tenderness, No cyanosis.  Musculoskeletal: Good range of motion in all major joints. No tenderness to palpation or major deformities noted.   Neurologic: Alert , Normal motor function, Normal sensory function, No focal deficits noted.   Psychiatric: Affect normal, Judgment normal, Mood normal.      DIAGNOSTIC STUDIES    Labs:   Results for orders placed or performed during the hospital encounter of 09/18/23   CBC w/ Differential   Result Value Ref Range    WBC 7.5 4.8 - 10.8 K/uL    RBC 3.96 (L) 4.20 - 5.40 M/uL    Hemoglobin 12.3 12.0 - 16.0 g/dL    Hematocrit 37.3 37.0 - 47.0 %    MCV 94.2 81.4 - 97.8 fL    MCH 31.1 27.0 - 33.0 pg    MCHC 33.0 32.2 - 35.5 g/dL    RDW 43.8 35.9 - 50.0 fL    Platelet Count 676 (H) 164 - 446 K/uL    MPV 8.9 (L) 9.0 - 12.9 fL    Neutrophils-Polys 63.80 44.00 - 72.00 %    Lymphocytes 27.50 22.00 - 41.00 %    Monocytes 6.70 0.00 - 13.40 %    Eosinophils 1.30 0.00 - 6.90 %    Basophils 0.40 0.00 - 1.80 %    Immature Granulocytes 0.30 0.00 - " 0.90 %    Nucleated RBC 0.00 0.00 - 0.20 /100 WBC    Neutrophils (Absolute) 4.77 1.82 - 7.42 K/uL    Lymphs (Absolute) 2.06 1.00 - 4.80 K/uL    Monos (Absolute) 0.50 0.00 - 0.85 K/uL    Eos (Absolute) 0.10 0.00 - 0.51 K/uL    Baso (Absolute) 0.03 0.00 - 0.12 K/uL    Immature Granulocytes (abs) 0.02 0.00 - 0.11 K/uL    NRBC (Absolute) 0.00 K/uL   Complete Metabolic Panel (CMP)   Result Value Ref Range    Sodium 144 135 - 145 mmol/L    Potassium 4.2 3.6 - 5.5 mmol/L    Chloride 107 96 - 112 mmol/L    Co2 24 20 - 33 mmol/L    Anion Gap 13.0 7.0 - 16.0    Glucose 98 65 - 99 mg/dL    Bun 19 8 - 22 mg/dL    Creatinine 0.73 0.50 - 1.40 mg/dL    Calcium 8.9 8.4 - 10.2 mg/dL    Correct Calcium 8.8 8.5 - 10.5 mg/dL    AST(SGOT) 35 12 - 45 U/L    ALT(SGPT) 36 2 - 50 U/L    Alkaline Phosphatase 63 30 - 99 U/L    Total Bilirubin 0.2 0.1 - 1.5 mg/dL    Albumin 4.1 3.2 - 4.9 g/dL    Total Protein 6.8 6.0 - 8.2 g/dL    Globulin 2.7 1.9 - 3.5 g/dL    A-G Ratio 1.5 g/dL   proBrain Natriuretic Peptide, NT   Result Value Ref Range    NT-proBNP 614 (H) 0 - 125 pg/mL   PT/INR   Result Value Ref Range    PT 13.4 12.0 - 14.6 sec    INR 0.97 0.87 - 1.13   TROPONIN   Result Value Ref Range    Troponin T 24 (H) 6 - 19 ng/L   ESTIMATED GFR   Result Value Ref Range    GFR (CKD-EPI) 88 >60 mL/min/1.73 m 2   TROPONIN   Result Value Ref Range    Troponin T 25 (H) 6 - 19 ng/L   EKG   Result Value Ref Range    Report       [facility] Emergency Dept.    Test Date:  2023  Pt Name:    WENDY AUGUSTE             Department: [facility]  MRN:        [id number]                      [facility]  Gender:     Female                       Technician: [id number]  :        [id number]                   Requested By:MATI DO  Order #:    [id number]                    Reading MD:    Measurements  Intervals                                Axis  Rate:       63                           P:          71  IA:         140                           QRS:        -44  QRSD:       95                           T:          -17  QT:         453  QTc:        464    Interpretive Statements  Sinus rhythm  Ventricular premature complex  Probable left atrial enlargement  Left axis deviation  Abnormal R-wave progression, late transition  Nonspecific T abnormalities, inferior leads  Compared to ECG 08/24/2023 10:32:22  Left-axis deviation now present  T-wave abnormality now present  Left anterior fascicula r block no longer present  Incomplete right bundle-branch block no longer present  Right bundle-branch block no longer present          EKG:   I have independently interpreted this EKG    Radiology:   The attending emergency physician has independently interpreted the diagnostic imaging associated with this visit and am waiting the final reading from the radiologist.   Preliminary interpretation is a follows: No large DVT  Radiologist interpretation:   US-EXTREMITY VENOUS LOWER UNILAT RIGHT   Final Result      Negative for right lower extremity deep venous thrombus      DX-CHEST-PORTABLE (1 VIEW)   Final Result      1.  Enlarged cardiac silhouette with no evidence of edema.   2.  There is linear bibasilar atelectasis.           COURSE & MEDICAL DECISION MAKING     ED Observation Status? No; Patient does not meet criteria for ED Observation.     INITIAL ASSESSMENT, COURSE AND PLAN  Care Narrative: The patient is a 73 year old female who presents to the ED for evaluation of leg swelling.    The differential diagnoses include but are not limited to:  #Leg swelling    Chest X-Ray remarkable for Enlarged cardiac silhouette with no evidence of edema and bibasilar   US- Extremity lower right remarkable for no DVT  Blood work remarkable for above  Mild elevation in trop  No significant change on repeat  HEART SCORE is NA (if they had chest pain, Heart score would be 4 but they have had no pain now or in last few weeks)   No SOB to suggest PE, no increased WOB.   Pt  will need f/u with cardiology, referral sent.     6:21 PM Patient presents to the ED with leg swelling. Ordered for DX- Chest, US- Extremity venous lower unilateral right, CBC with diff, CMP, EKG, proBrain Natriuretic Peptide, and PT/ INR to evaluate her symptoms.      7:03 PM I ordered for troponin every two hours.    9:58 PM Patient reevaluated at bedside. Discussed plan for discharge, including plan for follow-up, and informed them to return to the [facility] ED with any new or worsening symptoms. Patient was given the opportunity for questions, and I addressed all questions or concerns. She is stable for discharge at this time. Patient verbalizes understanding and support with my plan for discharge.      DISPOSITION AND DISCUSSIONS  I have discussed management of the patient with the following physicians and SYDNEY's:  None    Discussion of management with other QHP or appropriate source(s): RT Oxygen and Respiratory Consult      Escalation of care considered, and ultimately not performed: acute inpatient care management, however at this time, the patient is most appropriate for outpatient management.    Barriers to care at this time, including but not limited to:  None .         The patient will return for new or worsening symptoms and is stable at the time of discharge.    The patient is referred to a primary physician for blood pressure management, diabetic screening, and for all other preventative health concerns.    DISPOSITION:  Patient will be discharged home in stable condition.    FOLLOW UP:  Jemma Martin P.A.-C.  [address] [place] [address] Dr Conor GARDNER [id number] [phone]    In 3 days      [facility], Emergency Dept  [address]  [place] [id number] [phone]    If symptoms worsen    FINAL DIAGNOSIS  1. Leg swelling    2. Elevated troponin         I, Amarilis Graham (Scribe), am scribing for, and in the presence of, Daniel Harvey M.D..    Electronically signed by: Amarilis  Santos (Scribe), 9/18/2023    IDaniel M.D. personally performed the services described in this documentation, as scribed by Amarilis Graham in my presence, and it is both accurate and complete.   The note accurately reflects work and decisions made by me.  Daniel Harvey M.D.  9/18/2023  11:00 PM

## 2023-09-19 NOTE — ED TRIAGE NOTES
Chief Complaint   Patient presents with    Leg Swelling     Post laminectomy leg swelling, rt worse than left. Rt leg and foot aching.  Surgery: Sept 8  Denies CP, SOB.

## 2023-09-28 ENCOUNTER — HOSPITAL ENCOUNTER (OUTPATIENT)
Dept: LAB | Facility: MEDICAL CENTER | Age: 73
End: 2023-09-28
Attending: INTERNAL MEDICINE
Payer: MEDICARE

## 2023-09-28 LAB
T4 FREE SERPL-MCNC: 1.69 NG/DL (ref 0.93–1.7)
TSH SERPL DL<=0.005 MIU/L-ACNC: 2.8 UIU/ML (ref 0.38–5.33)

## 2023-09-28 PROCEDURE — 84443 ASSAY THYROID STIM HORMONE: CPT

## 2023-09-28 PROCEDURE — 84439 ASSAY OF FREE THYROXINE: CPT

## 2023-09-28 PROCEDURE — 36415 COLL VENOUS BLD VENIPUNCTURE: CPT

## 2023-10-02 PROBLEM — G31.9 CEREBRAL ATROPHY (HCC): Status: ACTIVE | Noted: 2023-10-02

## 2023-10-02 PROBLEM — R73.03 PREDIABETES: Status: ACTIVE | Noted: 2023-10-02

## 2023-10-02 PROBLEM — E78.5 DYSLIPIDEMIA: Status: ACTIVE | Noted: 2023-10-02

## 2023-10-09 ENCOUNTER — OFFICE VISIT (OUTPATIENT)
Dept: MEDICAL GROUP | Age: 73
End: 2023-10-09
Payer: MEDICARE

## 2023-10-09 VITALS
TEMPERATURE: 97 F | WEIGHT: 134 LBS | BODY MASS INDEX: 24.66 KG/M2 | SYSTOLIC BLOOD PRESSURE: 124 MMHG | HEART RATE: 70 BPM | DIASTOLIC BLOOD PRESSURE: 78 MMHG | OXYGEN SATURATION: 98 % | RESPIRATION RATE: 16 BRPM | HEIGHT: 62 IN

## 2023-10-09 DIAGNOSIS — R73.01 ELEVATED FASTING GLUCOSE: ICD-10-CM

## 2023-10-09 DIAGNOSIS — Z87.891 HISTORY OF CIGARETTE SMOKING: ICD-10-CM

## 2023-10-09 DIAGNOSIS — E89.0 POSTOPERATIVE HYPOTHYROIDISM: ICD-10-CM

## 2023-10-09 DIAGNOSIS — Z23 NEED FOR VACCINATION: ICD-10-CM

## 2023-10-09 DIAGNOSIS — Z78.0 POSTMENOPAUSAL STATUS (AGE-RELATED) (NATURAL): ICD-10-CM

## 2023-10-09 DIAGNOSIS — R79.89 ELEVATED BRAIN NATRIURETIC PEPTIDE (BNP) LEVEL: ICD-10-CM

## 2023-10-09 DIAGNOSIS — E78.5 DYSLIPIDEMIA: ICD-10-CM

## 2023-10-09 DIAGNOSIS — D47.3 ESSENTIAL THROMBOCYTHEMIA (HCC): ICD-10-CM

## 2023-10-09 DIAGNOSIS — Z00.00 MEDICARE ANNUAL WELLNESS VISIT, SUBSEQUENT: Primary | ICD-10-CM

## 2023-10-09 DIAGNOSIS — G31.9 CEREBRAL ATROPHY (HCC): ICD-10-CM

## 2023-10-09 DIAGNOSIS — D32.9 MENINGIOMA (HCC): ICD-10-CM

## 2023-10-09 DIAGNOSIS — I10 ESSENTIAL HYPERTENSION: ICD-10-CM

## 2023-10-09 DIAGNOSIS — H91.93 DECREASED HEARING OF BOTH EARS: ICD-10-CM

## 2023-10-09 DIAGNOSIS — H61.23 BILATERAL IMPACTED CERUMEN: ICD-10-CM

## 2023-10-09 DIAGNOSIS — N95.1 MENOPAUSAL STATE: ICD-10-CM

## 2023-10-09 DIAGNOSIS — I51.7 CARDIOMEGALY: ICD-10-CM

## 2023-10-09 PROBLEM — R73.03 PREDIABETES: Status: RESOLVED | Noted: 2023-10-02 | Resolved: 2023-10-09

## 2023-10-09 PROCEDURE — 3078F DIAST BP <80 MM HG: CPT | Performed by: PHYSICIAN ASSISTANT

## 2023-10-09 PROCEDURE — 3074F SYST BP LT 130 MM HG: CPT | Performed by: PHYSICIAN ASSISTANT

## 2023-10-09 PROCEDURE — G0439 PPPS, SUBSEQ VISIT: HCPCS | Mod: 25 | Performed by: PHYSICIAN ASSISTANT

## 2023-10-09 PROCEDURE — G0008 ADMIN INFLUENZA VIRUS VAC: HCPCS | Performed by: PHYSICIAN ASSISTANT

## 2023-10-09 PROCEDURE — 90662 IIV NO PRSV INCREASED AG IM: CPT | Performed by: PHYSICIAN ASSISTANT

## 2023-10-09 PROCEDURE — 99214 OFFICE O/P EST MOD 30 MIN: CPT | Mod: 25 | Performed by: PHYSICIAN ASSISTANT

## 2023-10-09 RX ORDER — LEVOTHYROXINE SODIUM 0.1 MG/1
TABLET ORAL
COMMUNITY
End: 2023-10-09

## 2023-10-09 RX ORDER — LEVOTHYROXINE SODIUM 0.1 MG/1
100 TABLET ORAL
COMMUNITY
Start: 2023-08-30 | End: 2023-10-09

## 2023-10-09 RX ORDER — LEVOTHYROXINE SODIUM 0.1 MG/1
100 TABLET ORAL
COMMUNITY

## 2023-10-09 SDOH — HEALTH STABILITY: PHYSICAL HEALTH: ON AVERAGE, HOW MANY DAYS PER WEEK DO YOU ENGAGE IN MODERATE TO STRENUOUS EXERCISE (LIKE A BRISK WALK)?: 2 DAYS

## 2023-10-09 SDOH — ECONOMIC STABILITY: FOOD INSECURITY: WITHIN THE PAST 12 MONTHS, THE FOOD YOU BOUGHT JUST DIDN'T LAST AND YOU DIDN'T HAVE MONEY TO GET MORE.: NEVER TRUE

## 2023-10-09 SDOH — ECONOMIC STABILITY: INCOME INSECURITY: IN THE LAST 12 MONTHS, WAS THERE A TIME WHEN YOU WERE NOT ABLE TO PAY THE MORTGAGE OR RENT ON TIME?: NO

## 2023-10-09 SDOH — ECONOMIC STABILITY: INCOME INSECURITY: HOW HARD IS IT FOR YOU TO PAY FOR THE VERY BASICS LIKE FOOD, HOUSING, MEDICAL CARE, AND HEATING?: NOT VERY HARD

## 2023-10-09 SDOH — ECONOMIC STABILITY: HOUSING INSECURITY: IN THE LAST 12 MONTHS, HOW MANY PLACES HAVE YOU LIVED?: 1

## 2023-10-09 SDOH — ECONOMIC STABILITY: FOOD INSECURITY: WITHIN THE PAST 12 MONTHS, YOU WORRIED THAT YOUR FOOD WOULD RUN OUT BEFORE YOU GOT MONEY TO BUY MORE.: NEVER TRUE

## 2023-10-09 SDOH — HEALTH STABILITY: MENTAL HEALTH
STRESS IS WHEN SOMEONE FEELS TENSE, NERVOUS, ANXIOUS, OR CAN'T SLEEP AT NIGHT BECAUSE THEIR MIND IS TROUBLED. HOW STRESSED ARE YOU?: RATHER MUCH

## 2023-10-09 SDOH — HEALTH STABILITY: PHYSICAL HEALTH: ON AVERAGE, HOW MANY MINUTES DO YOU ENGAGE IN EXERCISE AT THIS LEVEL?: 80 MIN

## 2023-10-09 ASSESSMENT — SOCIAL DETERMINANTS OF HEALTH (SDOH)
IN A TYPICAL WEEK, HOW MANY TIMES DO YOU TALK ON THE PHONE WITH FAMILY, FRIENDS, OR NEIGHBORS?: MORE THAN THREE TIMES A WEEK
HOW OFTEN DO YOU GET TOGETHER WITH FRIENDS OR RELATIVES?: MORE THAN THREE TIMES A WEEK
DO YOU BELONG TO ANY CLUBS OR ORGANIZATIONS SUCH AS CHURCH GROUPS UNIONS, FRATERNAL OR ATHLETIC GROUPS, OR SCHOOL GROUPS?: NO
HOW OFTEN DO YOU HAVE A DRINK CONTAINING ALCOHOL: 4 OR MORE TIMES A WEEK
IN A TYPICAL WEEK, HOW MANY TIMES DO YOU TALK ON THE PHONE WITH FAMILY, FRIENDS, OR NEIGHBORS?: MORE THAN THREE TIMES A WEEK
HOW OFTEN DO YOU ATTEND CHURCH OR RELIGIOUS SERVICES?: NEVER
HOW HARD IS IT FOR YOU TO PAY FOR THE VERY BASICS LIKE FOOD, HOUSING, MEDICAL CARE, AND HEATING?: NOT VERY HARD
WITHIN THE PAST 12 MONTHS, YOU WORRIED THAT YOUR FOOD WOULD RUN OUT BEFORE YOU GOT THE MONEY TO BUY MORE: NEVER TRUE
HOW OFTEN DO YOU ATTENT MEETINGS OF THE CLUB OR ORGANIZATION YOU BELONG TO?: NEVER
DO YOU BELONG TO ANY CLUBS OR ORGANIZATIONS SUCH AS CHURCH GROUPS UNIONS, FRATERNAL OR ATHLETIC GROUPS, OR SCHOOL GROUPS?: NO
HOW OFTEN DO YOU HAVE SIX OR MORE DRINKS ON ONE OCCASION: NEVER
HOW OFTEN DO YOU ATTEND CHURCH OR RELIGIOUS SERVICES?: NEVER
HOW MANY DRINKS CONTAINING ALCOHOL DO YOU HAVE ON A TYPICAL DAY WHEN YOU ARE DRINKING: 1 OR 2
HOW OFTEN DO YOU GET TOGETHER WITH FRIENDS OR RELATIVES?: MORE THAN THREE TIMES A WEEK
HOW OFTEN DO YOU ATTENT MEETINGS OF THE CLUB OR ORGANIZATION YOU BELONG TO?: NEVER

## 2023-10-09 ASSESSMENT — LIFESTYLE VARIABLES
HOW OFTEN DO YOU HAVE SIX OR MORE DRINKS ON ONE OCCASION: NEVER
HOW OFTEN DO YOU HAVE A DRINK CONTAINING ALCOHOL: 4 OR MORE TIMES A WEEK
HOW MANY STANDARD DRINKS CONTAINING ALCOHOL DO YOU HAVE ON A TYPICAL DAY: 1 OR 2
SKIP TO QUESTIONS 9-10: 1
AUDIT-C TOTAL SCORE: 4

## 2023-10-09 ASSESSMENT — PATIENT HEALTH QUESTIONNAIRE - PHQ9: CLINICAL INTERPRETATION OF PHQ2 SCORE: 0

## 2023-10-09 ASSESSMENT — ACTIVITIES OF DAILY LIVING (ADL): BATHING_REQUIRES_ASSISTANCE: 0

## 2023-10-09 ASSESSMENT — FIBROSIS 4 INDEX: FIB4 SCORE: 0.61

## 2023-10-09 ASSESSMENT — ENCOUNTER SYMPTOMS: GENERAL WELL-BEING: POOR

## 2023-10-09 NOTE — PROGRESS NOTES
Chief Complaint   Patient presents with    Annual Wellness Visit       HPI:  Ailin Good is a 73 y.o. here for Medicare Annual Wellness Visit     She was also seen in the ER about 3 weeks ago for lower extremity edema after lumbar laminectomy.  Negative for DVT, however troponins were slightly elevated, cardiomegaly on chest x-ray, BNP was elevated.  She was advised to follow-up with cardiology.  She does have an appointment, but not until December of this year.  Denies chest pain, palpitations, shortness of breath, lower extremity edema.    She has also noted some decreased hearing, would be interested in referral to audiology for hearing screening    Patient Active Problem List    Diagnosis Date Noted    Elevated fasting glucose 10/09/2023    Dyslipidemia 10/02/2023    Cerebral atrophy (HCC) 10/02/2023    BMI 24.0-24.9, adult 10/02/2023    Lumbar stenosis without neurogenic claudication 09/08/2023    Primary osteoarthritis of first carpometacarpal joint of right hand 05/27/2022    Primary osteoarthritis of first carpometacarpal joint of left hand 05/27/2022    Trigger little finger of right hand 05/27/2022    Ganglion cyst of dorsum of left wrist 05/27/2022    Meningioma (HCC) 06/29/2021    Essential thrombocythemia (HCC) 01/13/2020    Arthritis 01/07/2020    Overactive bladder 09/16/2019    Essential hypertension 04/28/2017    Postoperative hypothyroidism 04/28/2017    Family history of coronary artery disease 04/28/2017    Family history of pancreatic cancer 04/28/2017    History of tobacco use disorder 04/28/2017    Environmental allergies 04/28/2017    Localized deposits of fat 04/28/2017       Current Outpatient Medications   Medication Sig Dispense Refill    levothyroxine (SYNTHROID) 100 MCG Tab Take 100 mcg by mouth every morning on an empty stomach.      ASPIRIN 81 PO Take 81 mg by mouth. EACH MORNING      hydroCHLOROthiazide (HYDRODIURIL) 25 MG Tab TAKE 1 TABLET BY MOUTH EVERY  Tablet 3     No  current facility-administered medications for this visit.          Current supplements as per medication list.     Allergies: Penicillins, Dust mite extract, Pollen extract, and Seasonal    Current social contact/activities: walking     She  reports that she quit smoking about 10 years ago. Her smoking use included cigarettes. She started smoking about 45 years ago. She has a 35.0 pack-year smoking history. She has never used smokeless tobacco. She reports current alcohol use of about 4.2 oz of alcohol per week. She reports that she does not use drugs.  Counseling given: Not Answered  Tobacco comments: do not recall dates      ROS:    Gait: Uses no assistive device  Ostomy: No  Other tubes: No  Amputations: No  Chronic oxygen use: No  Last eye exam: 2021  Wears hearing aids: No   : Denies any urinary leakage during the last 6 months    Screening:    Depression Screening  Little interest or pleasure in doing things?  0 - not at all  Feeling down, depressed , or hopeless? 0 - not at all  Trouble falling or staying asleep, or sleeping too much?     Feeling tired or having little energy?     Poor appetite or overeating?     Feeling bad about yourself - or that you are a failure or have let yourself or your family down?    Trouble concentrating on things, such as reading the newspaper or watching television?    Moving or speaking so slowly that other people could have noticed.  Or the opposite - being so fidgety or restless that you have been moving around a lot more than usual?     Thoughts that you would be better off dead, or of hurting yourself?     Patient Health Questionnaire Score:      If depressive symptoms identified deferred to follow up visit unless specifically addressed in assessment and plan.    Interpretation of PHQ-9 Total Score   Score Severity   1-4 No Depression   5-9 Mild Depression   10-14 Moderate Depression   15-19 Moderately Severe Depression   20-27 Severe Depression    Screening for Cognitive  Impairment  Do you or any of your friends or family members have any concern about your memory? No  Three Minute Recall (Banana, Sunrise, Chair) 3/3    Best clock face with all 12 numbers and set the hands to show 20 past 8.  Yes    Cognitive concerns identified deferred for follow up unless specifically addressed in assessment and plan.    Fall Risk Assessment  Has the patient had two or more falls in the last year or any fall with injury in the last year?  No    Safety Assessment  Do you always wear your seatbelt?  Yes  Any changes to home needed to function safely? No  Difficulty hearing.  No  Patient counseled about all safety risks that were identified.    Functional Assessment ADLs  Are there any barriers preventing you from cooking for yourself or meeting nutritional needs?  No.    Are there any barriers preventing you from driving safely or obtaining transportation?  No.    Are there any barriers preventing you from using a telephone or calling for help?  No    Are there any barriers preventing you from shopping?  No.    Are there any barriers preventing you from taking care of your own finances?  No    Are there any barriers preventing you from managing your medications?  No    Are there any barriers preventing you from showering, bathing or dressing yourself? No    Are there any barriers preventing you from doing housework or laundry? No  Are there any barriers preventing you from using the toilet?No  Are you currently engaging in any exercise or physical activity?  Yes.      Self-Assessment of Health  What is your perception of your health? Poor  Do you sleep more than six hours a night? No  In the past 7 days, how much did pain keep you from doing your normal work? Some  Do you spend quality time with family or friends (virtually or in person)? No  Do you usually eat a heart healthy diet that constists of a variety of fruits, vegetables, whole grains and fiber? Yes  Do you eat foods high in fat and/or  Fast Food more than three times per week? No    Advance Care Planning  Do you have an Advance Directive, Living Will, Durable Power of , or POLST? Yes  Advance Directive Living Will     is on file      Health Maintenance Summary            Overdue - COVID-19 Vaccine (1) Overdue - never done      No completion history exists for this topic.              Overdue - Zoster (Shingles) Vaccines (1 of 2) Overdue - never done      No completion history exists for this topic.              Ordered - Lung Cancer Screening Shared Decision Making (Once) Ordered on 10/9/2023      No completion history exists for this topic.              Ordered - Bone Density Scan (Every 5 Years) Ordered on 10/9/2023      03/23/2018  DS-BONE DENSITY STUDY (DEXA)    11/02/2007  DS-BONE DENSITY STUDY (DEXA)              Ordered - Influenza Vaccine (1) Ordered on 10/9/2023      10/18/2022  Imm Admin: Influenza, Unspecified - HISTORICAL DATA    10/11/2022  Imm Admin: Influenza Vaccine Adult HD    10/11/2022  Imm Admin: Influenza Vaccine Adult HD    10/19/2021  Imm Admin: Influenza Vaccine Adult HD    11/03/2020  Imm Admin: Influenza Vaccine Adult HD    Only the first 5 history entries have been loaded, but more history exists.              Annual Wellness Visit (Every 366 Days) Next due on 10/9/2024      10/09/2023  Level of Service: ANNUAL WELLNESS VISIT-INCLUDES PPPS SUBSEQUE*    10/09/2023  Visit Dx: Medicare annual wellness visit, subsequent    10/02/2023  Level of Service: [place] ANNUAL WELLNESS VISIT-INCLUDES PPPS SUBSEQUE*    07/25/2022  Level of Service: [place] ANNUAL WELLNESS VISIT-INCLUDES PPPS SUBSEQUE*    07/25/2022  Visit Dx: Medicare annual wellness visit, subsequent    Only the first 5 history entries have been loaded, but more history exists.              Mammogram (Every 2 Years) Next due on 1/13/2025 01/13/2023  MA-SCREENING MAMMO BILAT W/TOMOSYNTHESIS W/CAD    12/10/2021  MA-SCREENING MAMMO BILAT W/TOMOSYNTHESIS W/CAD     2020  MA-SCREENING MAMMO BILAT W/TOMOSYNTHESIS W/CAD    2019  MA-SCREENING MAMMO BILAT W/TOMOSYNTHESIS W/CAD    2018  MA-MAMMO SCREENING BILAT W/ODALIS W/CAD    Only the first 5 history entries have been loaded, but more history exists.              Colorectal Cancer Screening (Colon Cancer Screening Cologuard Stool (FIT DNA) - Every 3 Years) Next due on 2025  Colon Cancer Screening Cologuard Stool (FIT DNA) (Done)    2022  COLOGUARD COLON CANCER SCREENING    2020  OCCULT BLOOD FECES IMMUNOASSAY    2015  REFERRAL TO GI FOR COLONOSCOPY              IMM DTaP/Tdap/Td Vaccine (2 - Td or Tdap) Next due on 2019  Imm Admin: Tdap Vaccine    02/15/1996  Imm Admin: TD Vaccine              Hepatitis C Screening  Completed      02/10/2020  Hepatitis C Antibody component of HEP C VIRUS ANTIBODY    01/10/2020  Hepatitis C Antibody component of HCV Scrn ( 8047-2166 1xLife)              Pneumococcal Vaccine: 65+ Years (Series Information) Completed      2022  Imm Admin: Pneumococcal polysaccharide vaccine (PPSV-23)    2018  Imm Admin: Pneumococcal Conjugate Vaccine (Prevnar/PCV-13)    2016  Imm Admin: Pneumococcal polysaccharide vaccine (PPSV-23)              Hepatitis A Vaccine (Hep A) (Series Information) Aged Out      No completion history exists for this topic.              Hepatitis B Vaccine (Hep B) (Series Information) Aged Out      No completion history exists for this topic.              HPV Vaccines (Series Information) Aged Out      No completion history exists for this topic.              Polio Vaccine (Inactivated Polio) (Series Information) Aged Out      No completion history exists for this topic.              Meningococcal Immunization (Series Information) Aged Out      No completion history exists for this topic.              Discontinued - Cervical Cancer Screening  Discontinued        Frequency changed to Never  automatically (Topic No Longer Applies)    11/10/2015  PAP IG (IMAGE GUIDED)                    Patient Care Team:  Jemma Martin P.A.-C. as PCP - General (Family Medicine)  Una Brito M.D. as PCP - [facility] Paneled  Dr. Cholo Colbert DDS as Medical Home Care Manager (Dentistry)  [facility] as Consulting Physician (Optometry)  Haley Decker M.D. (Inactive) as Consulting Physician (Dermatology)      Social History     Tobacco Use    Smoking status: Former     Current packs/day: 0.00     Average packs/day: 1 pack/day for 35.0 years (35.0 ttl pk-yrs)     Types: Cigarettes     Start date: 1/28/1978     Quit date: 1/28/2013     Years since quitting: 10.7    Smokeless tobacco: Never    Tobacco comments:     do not recall dates   Vaping Use    Vaping Use: Never used   Substance Use Topics    Alcohol use: Yes     Alcohol/week: 4.2 oz     Types: 7 Glasses of wine per week     Comment: glass of wine at dinner    Drug use: Never     Family History   Problem Relation Age of Onset    Heart Disease Mother     Diabetes Mother     Hypertension Mother     Cancer Father         pancreatic cancer    Stroke Father     Hypertension Father     Heart Disease Sister     Hypertension Sister     Hyperlipidemia Sister      She  has a past medical history of Allergy, Anesthesia, Arthritis, Blood dyscrasia, Essential (hemorrhagic) thrombocythemia (HCC), Hypertension, Polyp of colon (02/20/2019), PONV (postoperative nausea and vomiting), Postoperative hypothyroidism, Psychiatric problem (03/2021), and Urinary incontinence.   Past Surgical History:   Procedure Laterality Date    KS INSJ STABLJ DEV W/DCMPRN 1 LVL  9/8/2023    Procedure: INSERTION, INTERSPINOUS PROCESS DEVICE;  Surgeon: Jaleel Dyer M.D.;  Location: SURGERY [facility];  Service: Neurosurgery    LUMBAR LAMINECTOMY DISKECTOMY  9/8/2023    Procedure: POSTERIOR LEFT L4 TRANSPEDICULAR DECOMPRESSION, LEFT L5 LAMINECTOMY, L4-5 PLACEMENT OF INTRALAMINAR DEVICE;  Surgeon:  "Jaleel Dyer M.D.;  Location: SURGERY [facility];  Service: Neurosurgery    CO DX BONE MARROW ASPIRATIONS Left 03/10/2021    Procedure: ASPIRATION, BONE MARROW -;  Surgeon: Joe Black M.D.;  Location: ENDOSCOPY [facility];  Service: Orthopedics    CO DX BONE MARROW BIOPSIES Left 03/10/2021    Procedure: BIOPSY, BONE MARROW, USING NEEDLE OR TROCAR-DR. PERRY;  Surgeon: Joe Black M.D.;  Location: ENDOSCOPY [facility];  Service: Orthopedics    THYROIDECTOMY  2016    TONSILLECTOMY  1969    TUBAL COAGULATION LAPAROSCOPIC BILATERAL         Exam:   /78 (BP Location: Left arm, Patient Position: Sitting, BP Cuff Size: Adult)   Pulse 70   Temp 36.1 °C (97 °F) (Temporal)   Resp 16   Ht 1.575 m (5' 2\")   Wt 60.8 kg (134 lb)   SpO2 98%  Body mass index is 24.51 kg/m².    Hearing fair.    Dentition good  Alert, oriented in no acute distress.  Eye contact is good, speech goal directed, affect calm  Heart: Regular rate and rhythm, no murmurs  Tms: Bilateral cerumen impaction.  No signs of infection after ear lavage    Procedure note    Procedure: Cerumen removal  Indications: Impacted cerumen    Patient was prepped and bilateral ears were lavaged by medical assistant.  Ear lavage was not successful-still bilateral cerumen impaction      Assessment and Plan. The following treatment and monitoring plan is recommended:      1. Medicare annual wellness visit, subsequent  Continue with regular physical activity as tolerated.  Reviewed diet control.  Recommended shingle, COVID and RSV vaccines at the pharmacy    2. Meningioma (HCC)  -Has been stable.  Currently followed by neurosurgery, does have follow-up MRI ordered.    3. Cerebral atrophy (HCC)  Stable.  Incidental finding on imaging no cognitive deficits    4. Essential thrombocythemia (HCC)  Stable.  Currently followed by hematology.  Continue 81 mg of aspirin    5. Postoperative hypothyroidism  -Stable.  Followed by endocrinology.  Continue 100 mcg of " levothyroxine    6. Postmenopausal status (age-related) (natural)  -Due for DEXA  - DS-BONE DENSITY STUDY (DEXA); Future    7. Menopausal state  - DS-BONE DENSITY STUDY (DEXA); Future    8. Essential hypertension  Controlled.  Continue 25 mg of HCTZ    9. Elevated fasting glucose  -Previously mildly elevated.  Monitoring.  Repeat labs again in 6 months  - HEMOGLOBIN A1C; Future  - Comp Metabolic Panel; Future    10. Dyslipidemia  -Previously mildly elevated.  Monitoring.  Check labs in 6 months  - Lipid Profile; Future    11. History of cigarette smoking  -Prior history of smoking.  Referral placed for lung cancer screening program  - REFERRAL TO LUNG CANCER SCREENING PROGRAM    12. Cardiomegaly  -New problem.  Incidental finding during work-up in the ER.  BNP was elevated, will obtain echo.  She does have an appointment scheduled with cardiology in 2 months.  We will trend repeat BMP.  Asymptomatic.  - EC-ECHOCARDIOGRAM COMPLETE W/O CONT; Future  - proBrain Natriuretic Peptide, NT; Future    13. Elevated brain natriuretic peptide (BNP) level  -New problem.  See above  - EC-ECHOCARDIOGRAM COMPLETE W/O CONT; Future  - proBrain Natriuretic Peptide, NT; Future    14. Bilateral impacted cerumen  New problem.  Ear lavage was unsuccessful.  Advise Debrox daily over-the-counter, return in 2 to 3 weeks for repeat ear lavage    15. Decreased hearing of both ears    Referral placed to audiology  - Referral to Audiology    16. Need for vaccination  -Immunization given in clinic today  - Influenza Vaccine, High Dose (65+ Only)        Services suggested: No services needed at this time  Health Care Screening: Age-appropriate preventive services recommended by USPTF and ACIP covered by Medicare were discussed today. Services ordered if indicated and agreed upon by the patient.  Referrals offered: Community-based lifestyle interventions to reduce health risks and promote self-management and wellness, fall prevention, nutrition,  physical activity, tobacco-use cessation, weight loss, and mental health services as per orders if indicated.    Discussion today about general wellness and lifestyle habits:    Prevent falls and reduce trip hazards; Cautioned about securing or removing rugs.  Have a working fire alarm and carbon monoxide detector;   Engage in regular physical activity and social activities     Follow-up: Return in about 6 weeks (around 11/20/2023) for Repeat ear lavage.

## 2023-10-11 ENCOUNTER — APPOINTMENT (OUTPATIENT)
Dept: RADIOLOGY | Facility: MEDICAL CENTER | Age: 73
End: 2023-10-11
Attending: PHYSICIAN ASSISTANT
Payer: MEDICARE

## 2023-10-11 ENCOUNTER — HOSPITAL ENCOUNTER (OUTPATIENT)
Dept: CARDIOLOGY | Facility: MEDICAL CENTER | Age: 73
End: 2023-10-11
Attending: PHYSICIAN ASSISTANT
Payer: MEDICARE

## 2023-10-11 DIAGNOSIS — I51.7 CARDIOMEGALY: ICD-10-CM

## 2023-10-11 DIAGNOSIS — R79.89 ELEVATED BRAIN NATRIURETIC PEPTIDE (BNP) LEVEL: ICD-10-CM

## 2023-10-11 LAB
LV EJECT FRACT  99904: 61
LV EJECT FRACT MOD 2C 99903: 51.6
LV EJECT FRACT MOD 4C 99902: 68.27
LV EJECT FRACT MOD BP 99901: 60.97

## 2023-10-11 PROCEDURE — 93306 TTE W/DOPPLER COMPLETE: CPT | Mod: 26 | Performed by: INTERNAL MEDICINE

## 2023-10-11 PROCEDURE — 93306 TTE W/DOPPLER COMPLETE: CPT

## 2023-10-17 ENCOUNTER — APPOINTMENT (OUTPATIENT)
Dept: MEDICAL GROUP | Age: 73
End: 2023-10-17
Payer: MEDICARE

## 2023-10-19 DIAGNOSIS — Z87.891 HISTORY OF CIGARETTE SMOKING: ICD-10-CM

## 2023-11-16 ENCOUNTER — APPOINTMENT (OUTPATIENT)
Dept: MEDICAL GROUP | Age: 73
End: 2023-11-16
Payer: MEDICARE

## 2023-11-17 ENCOUNTER — HOSPITAL ENCOUNTER (OUTPATIENT)
Dept: LAB | Facility: MEDICAL CENTER | Age: 73
End: 2023-11-17
Attending: PHYSICIAN ASSISTANT
Payer: MEDICARE

## 2023-11-17 DIAGNOSIS — E78.5 DYSLIPIDEMIA: ICD-10-CM

## 2023-11-17 DIAGNOSIS — R73.01 ELEVATED FASTING GLUCOSE: ICD-10-CM

## 2023-11-17 DIAGNOSIS — I51.7 CARDIOMEGALY: ICD-10-CM

## 2023-11-17 DIAGNOSIS — R79.89 ELEVATED BRAIN NATRIURETIC PEPTIDE (BNP) LEVEL: ICD-10-CM

## 2023-11-17 LAB
ALBUMIN SERPL BCP-MCNC: 4.2 G/DL (ref 3.2–4.9)
ALBUMIN/GLOB SERPL: 1.7 G/DL
ALP SERPL-CCNC: 59 U/L (ref 30–99)
ALT SERPL-CCNC: 33 U/L (ref 2–50)
ANION GAP SERPL CALC-SCNC: 11 MMOL/L (ref 7–16)
AST SERPL-CCNC: 33 U/L (ref 12–45)
BILIRUB SERPL-MCNC: 0.3 MG/DL (ref 0.1–1.5)
BUN SERPL-MCNC: 25 MG/DL (ref 8–22)
CALCIUM ALBUM COR SERPL-MCNC: 9.1 MG/DL (ref 8.5–10.5)
CALCIUM SERPL-MCNC: 9.3 MG/DL (ref 8.5–10.5)
CHLORIDE SERPL-SCNC: 104 MMOL/L (ref 96–112)
CHOLEST SERPL-MCNC: 189 MG/DL (ref 100–199)
CO2 SERPL-SCNC: 25 MMOL/L (ref 20–33)
CREAT SERPL-MCNC: 0.78 MG/DL (ref 0.5–1.4)
EST. AVERAGE GLUCOSE BLD GHB EST-MCNC: 111 MG/DL
FASTING STATUS PATIENT QL REPORTED: NORMAL
GFR SERPLBLD CREATININE-BSD FMLA CKD-EPI: 81 ML/MIN/1.73 M 2
GLOBULIN SER CALC-MCNC: 2.5 G/DL (ref 1.9–3.5)
GLUCOSE SERPL-MCNC: 83 MG/DL (ref 65–99)
HBA1C MFR BLD: 5.5 % (ref 4–5.6)
HDLC SERPL-MCNC: 91 MG/DL
LDLC SERPL CALC-MCNC: 90 MG/DL
NT-PROBNP SERPL IA-MCNC: 399 PG/ML (ref 0–125)
POTASSIUM SERPL-SCNC: 4.9 MMOL/L (ref 3.6–5.5)
PROT SERPL-MCNC: 6.7 G/DL (ref 6–8.2)
SODIUM SERPL-SCNC: 140 MMOL/L (ref 135–145)
T4 FREE SERPL-MCNC: 1.18 NG/DL (ref 0.93–1.7)
TRIGL SERPL-MCNC: 40 MG/DL (ref 0–149)
TSH SERPL DL<=0.005 MIU/L-ACNC: 1.93 UIU/ML (ref 0.38–5.33)

## 2023-11-17 PROCEDURE — 80053 COMPREHEN METABOLIC PANEL: CPT

## 2023-11-17 PROCEDURE — 83880 ASSAY OF NATRIURETIC PEPTIDE: CPT

## 2023-11-17 PROCEDURE — 83036 HEMOGLOBIN GLYCOSYLATED A1C: CPT

## 2023-11-17 PROCEDURE — 84439 ASSAY OF FREE THYROXINE: CPT

## 2023-11-17 PROCEDURE — 84443 ASSAY THYROID STIM HORMONE: CPT

## 2023-11-17 PROCEDURE — 36415 COLL VENOUS BLD VENIPUNCTURE: CPT

## 2023-11-17 PROCEDURE — 80061 LIPID PANEL: CPT

## 2023-11-29 ENCOUNTER — HOSPITAL ENCOUNTER (OUTPATIENT)
Dept: LAB | Facility: MEDICAL CENTER | Age: 73
End: 2023-11-29
Attending: INTERNAL MEDICINE
Payer: MEDICARE

## 2023-11-29 LAB
APTT PPP: 31.4 SEC (ref 24.7–36)
BASOPHILS # BLD AUTO: 0.6 % (ref 0–1.8)
BASOPHILS # BLD: 0.04 K/UL (ref 0–0.12)
EOSINOPHIL # BLD AUTO: 0.11 K/UL (ref 0–0.51)
EOSINOPHIL NFR BLD: 1.5 % (ref 0–6.9)
ERYTHROCYTE [DISTWIDTH] IN BLOOD BY AUTOMATED COUNT: 48 FL (ref 35.9–50)
HCT VFR BLD AUTO: 46.1 % (ref 37–47)
HGB BLD-MCNC: 15 G/DL (ref 12–16)
IMM GRANULOCYTES # BLD AUTO: 0.01 K/UL (ref 0–0.11)
IMM GRANULOCYTES NFR BLD AUTO: 0.1 % (ref 0–0.9)
INR PPP: 0.99 (ref 0.87–1.13)
LYMPHOCYTES # BLD AUTO: 2.03 K/UL (ref 1–4.8)
LYMPHOCYTES NFR BLD: 28.4 % (ref 22–41)
MCH RBC QN AUTO: 30.2 PG (ref 27–33)
MCHC RBC AUTO-ENTMCNC: 32.5 G/DL (ref 32.2–35.5)
MCV RBC AUTO: 92.9 FL (ref 81.4–97.8)
MONOCYTES # BLD AUTO: 0.49 K/UL (ref 0–0.85)
MONOCYTES NFR BLD AUTO: 6.9 % (ref 0–13.4)
NEUTROPHILS # BLD AUTO: 4.46 K/UL (ref 1.82–7.42)
NEUTROPHILS NFR BLD: 62.5 % (ref 44–72)
NRBC # BLD AUTO: 0 K/UL
NRBC BLD-RTO: 0 /100 WBC (ref 0–0.2)
PLATELET # BLD AUTO: 755 K/UL (ref 164–446)
PMV BLD AUTO: 10.2 FL (ref 9–12.9)
PROTHROMBIN TIME: 13.2 SEC (ref 12–14.6)
RBC # BLD AUTO: 4.96 M/UL (ref 4.2–5.4)
WBC # BLD AUTO: 7.1 K/UL (ref 4.8–10.8)

## 2023-11-29 PROCEDURE — 80053 COMPREHEN METABOLIC PANEL: CPT

## 2023-11-29 PROCEDURE — 85025 COMPLETE CBC W/AUTO DIFF WBC: CPT

## 2023-11-29 PROCEDURE — 85730 THROMBOPLASTIN TIME PARTIAL: CPT

## 2023-11-29 PROCEDURE — 36415 COLL VENOUS BLD VENIPUNCTURE: CPT

## 2023-11-29 PROCEDURE — 85610 PROTHROMBIN TIME: CPT

## 2023-11-30 LAB
ALBUMIN SERPL BCP-MCNC: 4.1 G/DL (ref 3.2–4.9)
ALBUMIN/GLOB SERPL: 1.4 G/DL
ALP SERPL-CCNC: 61 U/L (ref 30–99)
ALT SERPL-CCNC: 29 U/L (ref 2–50)
ANION GAP SERPL CALC-SCNC: 12 MMOL/L (ref 7–16)
AST SERPL-CCNC: 28 U/L (ref 12–45)
BILIRUB SERPL-MCNC: 0.2 MG/DL (ref 0.1–1.5)
BUN SERPL-MCNC: 37 MG/DL (ref 8–22)
CALCIUM ALBUM COR SERPL-MCNC: 9.2 MG/DL (ref 8.5–10.5)
CALCIUM SERPL-MCNC: 9.3 MG/DL (ref 8.5–10.5)
CHLORIDE SERPL-SCNC: 104 MMOL/L (ref 96–112)
CO2 SERPL-SCNC: 24 MMOL/L (ref 20–33)
CREAT SERPL-MCNC: 0.78 MG/DL (ref 0.5–1.4)
FASTING STATUS PATIENT QL REPORTED: 1
GFR SERPLBLD CREATININE-BSD FMLA CKD-EPI: 81 ML/MIN/1.73 M 2
GLOBULIN SER CALC-MCNC: 2.9 G/DL (ref 1.9–3.5)
GLUCOSE SERPL-MCNC: 108 MG/DL (ref 65–99)
POTASSIUM SERPL-SCNC: 4 MMOL/L (ref 3.6–5.5)
PROT SERPL-MCNC: 7 G/DL (ref 6–8.2)
SODIUM SERPL-SCNC: 140 MMOL/L (ref 135–145)

## 2023-12-21 DIAGNOSIS — L65.9 HAIR LOSS: ICD-10-CM

## 2023-12-26 ENCOUNTER — OFFICE VISIT (OUTPATIENT)
Dept: CARDIOLOGY | Facility: MEDICAL CENTER | Age: 73
End: 2023-12-26
Attending: EMERGENCY MEDICINE
Payer: MEDICARE

## 2023-12-26 VITALS
SYSTOLIC BLOOD PRESSURE: 160 MMHG | OXYGEN SATURATION: 97 % | WEIGHT: 134 LBS | RESPIRATION RATE: 16 BRPM | HEART RATE: 55 BPM | DIASTOLIC BLOOD PRESSURE: 88 MMHG | BODY MASS INDEX: 24.66 KG/M2 | HEIGHT: 62 IN

## 2023-12-26 DIAGNOSIS — I34.1 MITRAL VALVE PROLAPSE: ICD-10-CM

## 2023-12-26 DIAGNOSIS — I34.0 MODERATE MITRAL REGURGITATION: ICD-10-CM

## 2023-12-26 DIAGNOSIS — R79.89 ELEVATED TROPONIN: ICD-10-CM

## 2023-12-26 DIAGNOSIS — I10 ESSENTIAL HYPERTENSION: ICD-10-CM

## 2023-12-26 PROCEDURE — 3077F SYST BP >= 140 MM HG: CPT | Performed by: INTERNAL MEDICINE

## 2023-12-26 PROCEDURE — 99204 OFFICE O/P NEW MOD 45 MIN: CPT | Performed by: INTERNAL MEDICINE

## 2023-12-26 PROCEDURE — 99211 OFF/OP EST MAY X REQ PHY/QHP: CPT | Performed by: INTERNAL MEDICINE

## 2023-12-26 PROCEDURE — 3079F DIAST BP 80-89 MM HG: CPT | Performed by: INTERNAL MEDICINE

## 2023-12-26 RX ORDER — AMLODIPINE BESYLATE 5 MG/1
5 TABLET ORAL DAILY
Qty: 90 TABLET | Refills: 3 | Status: SHIPPED | OUTPATIENT
Start: 2023-12-26

## 2023-12-26 ASSESSMENT — ENCOUNTER SYMPTOMS
DIZZINESS: 0
MYALGIAS: 0
ABDOMINAL PAIN: 0
ORTHOPNEA: 0
PALPITATIONS: 0
PND: 0
LOSS OF CONSCIOUSNESS: 0
SHORTNESS OF BREATH: 0
COUGH: 0

## 2023-12-26 ASSESSMENT — FIBROSIS 4 INDEX: FIB4 SCORE: 0.49

## 2023-12-26 NOTE — PROGRESS NOTES
Cardiology Initial Consultation Note    Date of note:    12/26/2023    Primary Care Provider: Jemma Martin P.A.-C.  Referring Provider: Daniel Harvey M.D.    Patient Name: Ailin Good     YOB: 1950  MRN:              [id number]    Chief Complaint   Patient presents with    Establish Care    Hypertension    Dyslipidemia       History of Present Illness: Ms. Ailin Good is a 73 year old woman with past medical history significant for hypertension, hypothyroidism, thrombocythemia who presents to the cardiology office to establish care.    Initially presented to the ED back in Sept 2023 with lower extremity edema. Workup was unrevealing. No evidence of DVT. Incidentially found to have minmally elevated troponin of 24 --> 25. No chest pain during that visit.  Due to minimally elevated troponin, she was referred to our office for further evaluation.  Since her ED visit, she had interval diagnostic testing performed which includes echocardiogram.  Echocardiogram performed in October 2023 shows normal LV systolic function with moderate mitral regurgitation.  Noted to have prolapse of the posterior mitral valve leaflet resulting in MR.    Today, she has no major cardiac complaints.  She is doing quite well.  Denies having exertional chest pain or dyspnea.  No lower extremity edema, orthopnea or PND.  She denies having lightheadedness/dizziness or episodes of syncope.    Of note, she has history of thrombocytosis/essential thrombocythemia.  Follows with hematology. Dr. Marquez. Workup in the past including BM bx. Negative for JAK2 mutations. On aspirin for therapy.    Cardiovascular Risk Factors:  1. Smoking status: Denies  2. Type II Diabetes Mellitus: Prediabetes   Lab Results   Component Value Date/Time    HBA1C 5.5 11/17/2023 10:09 AM    HBA1C 6.3 (H) 07/18/2023 12:01 PM     3. Hypertension: Yes  4. Dyslipidemia: Denies   Cholesterol,Tot   Date Value Ref Range Status   11/17/2023 189 100 - 199  "mg/dL Final     LDL   Date Value Ref Range Status   11/17/2023 90 <100 mg/dL Final     HDL   Date Value Ref Range Status   11/17/2023 91 >=40 mg/dL Final     Triglycerides   Date Value Ref Range Status   11/17/2023 40 0 - 149 mg/dL Final     5. Family history of early Coronary Artery Disease in a first degree relative (Male less than 55 years of age; Female less than 65 years of age): Denies      Review of Systems:  Review of Systems   Constitutional:  Negative for malaise/fatigue.   Respiratory:  Negative for cough and shortness of breath.    Cardiovascular:  Negative for chest pain, palpitations, orthopnea, leg swelling and PND.   Gastrointestinal:  Negative for abdominal pain.   Musculoskeletal:  Negative for joint pain and myalgias.   Neurological:  Negative for dizziness and loss of consciousness.       Past Medical History:   Diagnosis Date    Allergy     Anesthesia     PONV    Arthritis     osteoarthritis    Blood dyscrasia     \" too many platelets \"    Essential (hemorrhagic) thrombocythemia (HCC)     Hypertension     Polyp of colon 02/20/2019    PONV (postoperative nausea and vomiting)     Postoperative hypothyroidism     thyroidectomy    Psychiatric problem 03/2021    situational depression ( loss of dog)    Urinary incontinence     minor         Past Surgical History:   Procedure Laterality Date    NJ INSJ STABLJ DEV W/DCMPRN 1 LVL  9/8/2023    Procedure: INSERTION, INTERSPINOUS PROCESS DEVICE;  Surgeon: Jaleel Dyer M.D.;  Location: SURGERY [facility];  Service: Neurosurgery    LUMBAR LAMINECTOMY DISKECTOMY  9/8/2023    Procedure: POSTERIOR LEFT L4 TRANSPEDICULAR DECOMPRESSION, LEFT L5 LAMINECTOMY, L4-5 PLACEMENT OF INTRALAMINAR DEVICE;  Surgeon: Jaleel Dyer M.D.;  Location: SURGERY [facility];  Service: Neurosurgery    NJ DX BONE MARROW ASPIRATIONS Left 03/10/2021    Procedure: ASPIRATION, BONE MARROW -;  Surgeon: Joe Black M.D.;  Location: [facility];  Service: Orthopedics    NJ " DX BONE MARROW BIOPSIES Left 03/10/2021    Procedure: BIOPSY, BONE MARROW, USING NEEDLE OR TROCAR-DR. PERRY;  Surgeon: Joe Black M.D.;  Location: [facility];  Service: Orthopedics    THYROIDECTOMY  2016    TONSILLECTOMY  1969    TUBAL COAGULATION LAPAROSCOPIC BILATERAL           Current Outpatient Medications   Medication Sig Dispense Refill    amLODIPine (NORVASC) 5 MG Tab Take 1 Tablet by mouth every day. 90 Tablet 3    levothyroxine (SYNTHROID) 100 MCG Tab Take 100 mcg by mouth every morning on an empty stomach.      ASPIRIN 81 PO Take 81 mg by mouth. EACH MORNING      hydroCHLOROthiazide (HYDRODIURIL) 25 MG Tab TAKE 1 TABLET BY MOUTH EVERY  Tablet 3     No current facility-administered medications for this visit.         Allergies   Allergen Reactions    Penicillins Unspecified     States she was told she was allergic as a child through allergy testing but has since tolerated e.g. ampicillin    Dust Mite Extract Runny Nose          Pollen Extract Runny Nose          Seasonal Runny Nose     Every tree, grass         Family History   Problem Relation Age of Onset    Heart Disease Mother     Diabetes Mother     Hypertension Mother     Cancer Father         pancreatic cancer    Stroke Father     Hypertension Father     Heart Disease Sister     Hypertension Sister     Hyperlipidemia Sister          Social History     Socioeconomic History    Marital status: Single     Spouse name: Not on file    Number of children: Not on file    Years of education: Not on file    Highest education level: Associate degree: occupational, technical, or vocational program   Occupational History    Not on file   Tobacco Use    Smoking status: Former     Current packs/day: 0.00     Average packs/day: 1 pack/day for 35.0 years (35.0 ttl pk-yrs)     Types: Cigarettes     Start date: 1/28/1978     Quit date: 1/28/2013     Years since quitting: 10.9    Smokeless tobacco: Never    Tobacco comments:     do not recall  dates   Vaping Use    Vaping Use: Never used   Substance and Sexual Activity    Alcohol use: Yes     Alcohol/week: 4.2 oz     Types: 7 Glasses of wine per week     Comment: glass of wine at dinner    Drug use: Never    Sexual activity: Not Currently     Partners: Male   Other Topics Concern    Not on file   Social History Narrative    Not on file     Social Determinants of Health     Financial Resource Strain: Low Risk  (10/9/2023)    Overall Financial Resource Strain (CARDIA)     Difficulty of Paying Living Expenses: Not very hard   Food Insecurity: No Food Insecurity (10/9/2023)    Hunger Vital Sign     Worried About Running Out of Food in the Last Year: Never true     Ran Out of Food in the Last Year: Never true   Transportation Needs: No Transportation Needs (10/9/2023)    PRAPARE - Transportation     Lack of Transportation (Medical): No     Lack of Transportation (Non-Medical): No   Physical Activity: Sufficiently Active (10/9/2023)    Exercise Vital Sign     Days of Exercise per Week: 2 days     Minutes of Exercise per Session: 80 min   Stress: Stress Concern Present (10/9/2023)    [employer] - Occupational Stress Questionnaire     Feeling of Stress : Rather much   Social Connections: Socially Isolated (10/9/2023)    Social Connection and Isolation Panel [NHANES]     Frequency of Communication with Friends and Family: More than three times a week     Frequency of Social Gatherings with Friends and Family: More than three times a week     Attends [employer] Services: Never     Active Member of Clubs or Organizations: No     Attends Club or Organization Meetings: Never     Marital Status:    Intimate Partner Violence: Not on file   Housing Stability: Low Risk  (10/9/2023)    Housing Stability Vital Sign     Unable to Pay for Housing in the Last Year: No     Number of Places Lived in the Last Year: 1     Unstable Housing in the Last Year: No         Physical Exam:  Ambulatory  "Vitals  BP (!) 160/88 (BP Location: Left arm, Patient Position: Sitting, BP Cuff Size: Adult)   Pulse (!) 55   Resp 16   Ht 1.575 m (5' 2\")   Wt 60.8 kg (134 lb)   SpO2 97%    Oxygen Therapy:  Pulse Oximetry: 97 %  BP Readings from Last 4 Encounters:   12/26/23 (!) 160/88   10/09/23 124/78   10/02/23 (!) 140/80   09/18/23 138/88       Weight/BMI: Body mass index is 24.51 kg/m².  Wt Readings from Last 4 Encounters:   12/26/23 60.8 kg (134 lb)   10/09/23 60.8 kg (134 lb)   10/02/23 61.4 kg (135 lb 6.4 oz)   09/18/23 62.1 kg (136 lb 14.5 oz)       General: Not in acute distress, appears comfortable  HEENT: OP clear   Neck:  No carotid bruits, No JVD appreciated  CVS:  RRR, Normal S1, S2. +2/6 late systolic murmur at left MAL  Resp: Normal respiratory effort, lungs CTA bilaterally. No rales or rhonchi  Abdomen: Soft, non-distended, non-tender to palpation  Skin: No obvious rashes, no cyanosis  Neurological: Alert and oriented x3, moves all extremities, no focal neurologic deficits  Psychiatric: Appropriate affect  Extremities:   Extremities warm, 2+ bilateral radial pulses.  2+ bilateral dp pulses, no lower extremity edema bilaterally      Lab Data Review:  Lab Results   Component Value Date/Time    CHOLSTRLTOT 189 11/17/2023 10:09 AM    LDL 90 11/17/2023 10:09 AM    HDL 91 11/17/2023 10:09 AM    TRIGLYCERIDE 40 11/17/2023 10:09 AM       Lab Results   Component Value Date/Time    SODIUM 140 11/29/2023 01:08 PM    POTASSIUM 4.0 11/29/2023 01:08 PM    CHLORIDE 104 11/29/2023 01:08 PM    CO2 24 11/29/2023 01:08 PM    GLUCOSE 108 (H) 11/29/2023 01:08 PM    BUN 37 (H) 11/29/2023 01:08 PM    CREATININE 0.78 11/29/2023 01:08 PM     Lab Results   Component Value Date/Time    ALKPHOSPHAT 61 11/29/2023 01:08 PM    ASTSGOT 28 11/29/2023 01:08 PM    ALTSGPT 29 11/29/2023 01:08 PM    TBILIRUBIN 0.2 11/29/2023 01:08 PM      Lab Results   Component Value Date/Time    WBC 7.1 11/29/2023 01:08 PM    HEMOGLOBIN 15.0 11/29/2023 01:08 " PM     Lab Results   Component Value Date/Time    HBA1C 5.5 11/17/2023 10:09 AM    HBA1C 6.3 (H) 07/18/2023 12:01 PM         Cardiac Imaging and Procedures Review:    EKG dated 9/18/2023:   My personal interpretation is sinus rhythm, PVC    Echo dated 10/11/2023:   Normal left ventricular size, thickness, and systolic function.  Normal right ventricular size and systolic function.  Normal left atrial size.  Prolapse of the posterior mitral leaflet.  Moderate mitral regurgitation.  Mild tricuspid regurgitation.  No pericardial effusion.     No prior study is available for comparison.       Assessment & Plan     1. Elevated troponin        2. Mitral valve prolapse        3. Moderate mitral regurgitation        4. Essential hypertension  amLODIPine (NORVASC) 5 MG Tab            Medical Decision Making:  Ms. Ailin Good is a 73 year old woman with past medical history significant for hypertension, hypothyroidism, thrombocythemia, mitral valve prolapse with moderate mitral regurgitation who presents to the cardiology office to establish care.    1. Elevated troponin  -Minimally elevated at 25.  Repeat levels remain flat.  EKG shows no evidence of ischemia.  She is asymptomatic.  No evidence of ACS.    2. Mitral valve prolapse  3. Moderate mitral regurgitation  -Review of echocardiogram shows mitral valve prolapse involving posterior mitral valve leaflet resulting in moderate mitral regurgitation.  Currently asymptomatic without dyspnea or evidence of volume overload.  Discussed the natural history of mitral valve prolapse mitral regurgitation.  Given that she is currently asymptomatic with moderate MR, recommend repeat echocardiogram in 1 year for surveillance.  Hold off on oral Lasix for now given euvolemic status.  She will need better blood pressure control moving forward.  Start amlodipine 5 mg daily for afterload reduction.    4. Essential hypertension  -BP elevated in office today.  Goal BP should be less than  130/80.  Continue HCTZ 25 mg daily.  Start amlodipine 5 mg daily.      It was a pleasure seeing Ms. Ailin Good in the office today. Return in about 6 months (around 6/26/2024) for Hypertension, Mitral regurgitation. Patient is aware to call the cardiology clinic with any questions or concerns.      Clay Durham MD, [facility]  Cardiologist, [facility], [facility]  [address] [address]  Phone: [phone]  Fax: [phone]    Please note that this dictation was created using voice recognition software. I have made every reasonable attempt to correct obvious errors, but it is possible there are errors of grammar and possibly content that I did not discover before finalizing the note.

## 2024-01-04 ENCOUNTER — HOSPITAL ENCOUNTER (OUTPATIENT)
Dept: LAB | Facility: MEDICAL CENTER | Age: 74
End: 2024-01-04
Attending: INTERNAL MEDICINE
Payer: MEDICARE

## 2024-01-04 PROCEDURE — 36415 COLL VENOUS BLD VENIPUNCTURE: CPT

## 2024-01-04 PROCEDURE — 84443 ASSAY THYROID STIM HORMONE: CPT

## 2024-01-04 PROCEDURE — 84439 ASSAY OF FREE THYROXINE: CPT

## 2024-01-05 LAB
T4 FREE SERPL-MCNC: 1.4 NG/DL (ref 0.93–1.7)
TSH SERPL DL<=0.005 MIU/L-ACNC: 6.53 UIU/ML (ref 0.38–5.33)

## 2024-01-08 DIAGNOSIS — I34.0 MODERATE MITRAL REGURGITATION: ICD-10-CM

## 2024-01-08 DIAGNOSIS — R79.89 ELEVATED TROPONIN: ICD-10-CM

## 2024-01-16 ENCOUNTER — APPOINTMENT (OUTPATIENT)
Dept: RADIOLOGY | Facility: MEDICAL CENTER | Age: 74
End: 2024-01-16
Attending: PHYSICIAN ASSISTANT
Payer: MEDICARE

## 2024-01-25 ENCOUNTER — APPOINTMENT (OUTPATIENT)
Dept: RADIOLOGY | Facility: MEDICAL CENTER | Age: 74
End: 2024-01-25
Attending: PHYSICIAN ASSISTANT
Payer: MEDICARE

## 2024-03-18 ENCOUNTER — HOSPITAL ENCOUNTER (OUTPATIENT)
Dept: LAB | Facility: MEDICAL CENTER | Age: 74
End: 2024-03-18
Attending: PHYSICIAN ASSISTANT
Payer: MEDICARE

## 2024-03-18 LAB
T4 FREE SERPL-MCNC: 1.64 NG/DL (ref 0.93–1.7)
TSH SERPL DL<=0.005 MIU/L-ACNC: 1.94 UIU/ML (ref 0.38–5.33)

## 2024-03-18 PROCEDURE — 36415 COLL VENOUS BLD VENIPUNCTURE: CPT

## 2024-03-18 PROCEDURE — 84443 ASSAY THYROID STIM HORMONE: CPT

## 2024-03-18 PROCEDURE — 84439 ASSAY OF FREE THYROXINE: CPT

## 2024-04-17 ENCOUNTER — APPOINTMENT (OUTPATIENT)
Dept: ADMISSIONS | Facility: MEDICAL CENTER | Age: 74
DRG: 027 | End: 2024-04-17
Attending: NEUROLOGICAL SURGERY
Payer: MEDICARE

## 2024-04-18 ENCOUNTER — PRE-ADMISSION TESTING (OUTPATIENT)
Dept: ADMISSIONS | Facility: MEDICAL CENTER | Age: 74
DRG: 027 | End: 2024-04-18
Attending: NEUROLOGICAL SURGERY
Payer: MEDICARE

## 2024-04-18 NOTE — OR NURSING
Call made to Dr. Dyer's office @2232.  Message left for Selene, his surgery scheduler, to update that patient is scheduled to complete preop testing tomorrow 4/19/2024.  Asked Selene to return call if there was a problem with that date.

## 2024-04-19 ENCOUNTER — PRE-ADMISSION TESTING (OUTPATIENT)
Dept: ADMISSIONS | Facility: MEDICAL CENTER | Age: 74
DRG: 027 | End: 2024-04-19
Attending: NEUROLOGICAL SURGERY
Payer: MEDICARE

## 2024-04-19 ENCOUNTER — HOSPITAL ENCOUNTER (OUTPATIENT)
Dept: RADIOLOGY | Facility: MEDICAL CENTER | Age: 74
End: 2024-04-19
Attending: NEUROLOGICAL SURGERY | Admitting: NEUROLOGICAL SURGERY
Payer: MEDICARE

## 2024-04-19 DIAGNOSIS — Z01.812 PRE-OPERATIVE LABORATORY EXAMINATION: ICD-10-CM

## 2024-04-19 DIAGNOSIS — Z01.811 PRE-OPERATIVE RESPIRATORY EXAMINATION: ICD-10-CM

## 2024-04-19 DIAGNOSIS — Z01.810 PRE-OPERATIVE CARDIOVASCULAR EXAMINATION: ICD-10-CM

## 2024-04-19 LAB
ALBUMIN SERPL BCP-MCNC: 4.5 G/DL (ref 3.2–4.9)
ALBUMIN/GLOB SERPL: 2 G/DL
ALP SERPL-CCNC: 56 U/L (ref 30–99)
ALT SERPL-CCNC: 13 U/L (ref 2–50)
ANION GAP SERPL CALC-SCNC: 13 MMOL/L (ref 7–16)
APTT PPP: 28.2 SEC (ref 24.7–36)
AST SERPL-CCNC: 22 U/L (ref 12–45)
BASOPHILS # BLD AUTO: 0.7 % (ref 0–1.8)
BASOPHILS # BLD: 0.04 K/UL (ref 0–0.12)
BILIRUB SERPL-MCNC: 0.3 MG/DL (ref 0.1–1.5)
BUN SERPL-MCNC: 21 MG/DL (ref 8–22)
CALCIUM ALBUM COR SERPL-MCNC: 8.6 MG/DL (ref 8.5–10.5)
CALCIUM SERPL-MCNC: 9 MG/DL (ref 8.5–10.5)
CHLORIDE SERPL-SCNC: 98 MMOL/L (ref 96–112)
CO2 SERPL-SCNC: 27 MMOL/L (ref 20–33)
CREAT SERPL-MCNC: 0.89 MG/DL (ref 0.5–1.4)
EKG IMPRESSION: NORMAL
EOSINOPHIL # BLD AUTO: 0.06 K/UL (ref 0–0.51)
EOSINOPHIL NFR BLD: 1 % (ref 0–6.9)
ERYTHROCYTE [DISTWIDTH] IN BLOOD BY AUTOMATED COUNT: 49.4 FL (ref 35.9–50)
GFR SERPLBLD CREATININE-BSD FMLA CKD-EPI: 69 ML/MIN/1.73 M 2
GLOBULIN SER CALC-MCNC: 2.2 G/DL (ref 1.9–3.5)
GLUCOSE SERPL-MCNC: 131 MG/DL (ref 65–99)
HCT VFR BLD AUTO: 44.5 % (ref 37–47)
HGB BLD-MCNC: 14.6 G/DL (ref 12–16)
IMM GRANULOCYTES # BLD AUTO: 0.01 K/UL (ref 0–0.11)
IMM GRANULOCYTES NFR BLD AUTO: 0.2 % (ref 0–0.9)
INR PPP: 1.02 (ref 0.87–1.13)
LYMPHOCYTES # BLD AUTO: 2.1 K/UL (ref 1–4.8)
LYMPHOCYTES NFR BLD: 34.9 % (ref 22–41)
MCH RBC QN AUTO: 30.7 PG (ref 27–33)
MCHC RBC AUTO-ENTMCNC: 32.8 G/DL (ref 32.2–35.5)
MCV RBC AUTO: 93.7 FL (ref 81.4–97.8)
MONOCYTES # BLD AUTO: 0.55 K/UL (ref 0–0.85)
MONOCYTES NFR BLD AUTO: 9.1 % (ref 0–13.4)
NEUTROPHILS # BLD AUTO: 3.26 K/UL (ref 1.82–7.42)
NEUTROPHILS NFR BLD: 54.1 % (ref 44–72)
NRBC # BLD AUTO: 0 K/UL
NRBC BLD-RTO: 0 /100 WBC (ref 0–0.2)
PLATELET # BLD AUTO: 717 K/UL (ref 164–446)
PMV BLD AUTO: 9.9 FL (ref 9–12.9)
POTASSIUM SERPL-SCNC: 3.6 MMOL/L (ref 3.6–5.5)
PROT SERPL-MCNC: 6.7 G/DL (ref 6–8.2)
PROTHROMBIN TIME: 13.5 SEC (ref 12–14.6)
RBC # BLD AUTO: 4.75 M/UL (ref 4.2–5.4)
SODIUM SERPL-SCNC: 138 MMOL/L (ref 135–145)
WBC # BLD AUTO: 6 K/UL (ref 4.8–10.8)

## 2024-04-19 PROCEDURE — 71046 X-RAY EXAM CHEST 2 VIEWS: CPT

## 2024-04-19 PROCEDURE — 80053 COMPREHEN METABOLIC PANEL: CPT

## 2024-04-19 PROCEDURE — 36415 COLL VENOUS BLD VENIPUNCTURE: CPT

## 2024-04-19 PROCEDURE — 93005 ELECTROCARDIOGRAM TRACING: CPT

## 2024-04-19 PROCEDURE — 93010 ELECTROCARDIOGRAM REPORT: CPT | Performed by: INTERNAL MEDICINE

## 2024-04-19 PROCEDURE — 85610 PROTHROMBIN TIME: CPT

## 2024-04-19 PROCEDURE — 85025 COMPLETE CBC W/AUTO DIFF WBC: CPT

## 2024-04-19 PROCEDURE — 85730 THROMBOPLASTIN TIME PARTIAL: CPT

## 2024-04-29 ENCOUNTER — TELEPHONE (OUTPATIENT)
Dept: CARDIOLOGY | Facility: MEDICAL CENTER | Age: 74
End: 2024-04-29
Payer: MEDICARE

## 2024-04-29 NOTE — LETTER
PROCEDURE/SURGERY CLEARANCE FORM      Encounter Date: 4/29/2024    Patient: Ailin Good  YOB: 1950    CARDIOLOGIST:  MELO Mercer    REFERRING DOCTOR:  No ref. provider found    Procedure/surgery: Stealth right suboccipital craniectomy, meningeoma excision, Titanium cranioplasty surgery                                            PROCEDURE/SURGERY CLEARANCE FORM    Date: 4/29/2024   Patient Name: Ailin Good    Dear Surgeon or Proceduralist,      Thank you for your request for cardiac stratification of our mutual patient Ailin Good 1950. We have reviewed their [facility] records; and to the best of our understanding this patient has not had stenting, ablation, cardiothoracic surgery or hospitalization for cardiovascular reasons in the past 6 months.  Ailin Good has been seen within the past 18 months and is considered to have non-modifiable cardiac risk for this low-risk procedure/surgery. They may proceed from a cardiovascular standpoint and may hold their antiplatelet/anticoagulation as briefly as possible. Please have patient resume this medication when hemodynamically stable to do so.     Aspirin or Prasugrel   - hold 7 days prior to procedure/surgery, resume when hemodynamically stable      Clopidrogrel or Ticagrelor  - hold 7 days for all neurological procedures, hold 5 days prior to all other procedure/surgery,  resume when hemodynamically stable     Warfarin - hold 7 days for all neurological procedures, hold 5 days prior to all other procedure/surgery and coordinate with [facility] ([phone]) INR testing and dose management.      Pradaxa/Xarelto/Eliquis/Savesya - hold 1 day prior to procedure for low bleeding risk procedure, 2 days for high bleeding risk procedure, or consider holding 3 days or longer for patients with reduced kidney function (CrCl <30mL/min) or spinal/cranial surgeries/procedures.      If they have a mechanical heart valve, please coordinate  with [facility] ([phone]) the proper management of their anticoagulant in the periprocedural or perioperative period.      Some patients have higher risk for cardiovascular complications or holding medication. If our patient has had prior complications of holding antiplatelet or anticoagulants in the past and we have seen them after these events, we have addressed these concerns with the patient. They are at an unknown degree of increased risk for recurrent complication.  You may hold anticoagulation/antiplatelets for the procedure or surgery if the benefits of the procedure or surgery outweigh this nonmodifiable risk.      If Ailin Good 1950 has new symptoms of heart failure decompensation, unstable arrythmia, or angina please reach out and we will assess the patient.      If you have other patient-specific concerns, please feel free to reach out to the patient's cardiologist directly at [phone].     Thank you,       [facility]         Electronically signed        MD Signature   ANDREZ Mercer.

## 2024-04-29 NOTE — TELEPHONE ENCOUNTER
ZORAN    Caller:  Lovely Morton     Office Name, phone number, fax number:   [facility]   Fax - [phone]    Procedure Name:  Craniotomy     Procedure Scheduled Date: 5/2/24    Callback Number: [phone]    Thank you,   Cierra VILLEGAS

## 2024-04-29 NOTE — TELEPHONE ENCOUNTER
"Last OV: 12/26/2023 (Sent to Amisha Soto, MK is out on Vac)  Proposed Surgery: Stealth right suboccipital craniectomy, meningeoma excision, Titanium cranioplasty surgery  Surgery Date: 05/02/2024  Requesting Office Name: [facility]  Fax Number: [phone]  Preference of Location (default is surgery center unless specified by Cardiologist or SYDNEY)  Prior Clearance Addressed: No      Anticoags/Antiplatelets: Aspirin  Anticoags/Antiplatelet managed by Cardiology? YES    Outstanding Cardiac Imaging : No  Stent, Cardiac Devices, or Catheterization: No  Ablation, TAVR/Valve (including open heart), Cardioversion: No  Recent Cardiac Hospitalization: No            When: N/A  History (cardiac history):   Past Medical History:   Diagnosis Date    Allergy     allergic to trees/grass    Anesthesia     PONV    Arthritis     osteoarthritis    Blood dyscrasia     \" too many platelets \"    Essential (hemorrhagic) thrombocythemia (HCC)     Fatty liver disease, nonalcoholic     pt states she was told by MD    Heart murmur 04/18/2024    was told as a child    Heart valve disease     was told by cardiologist    Hypertension     medicated    Polyp of colon 02/20/2019    PONV (postoperative nausea and vomiting)     Postoperative hypothyroidism     thyroidectomy; takes levothyroxine    Psychiatric problem 03/2021    situational depression ( loss of dog)    Urinary incontinence     minor             Surgical Clearance Letter Sent: YES   **Scan clearance request letter into [employer].**   "

## 2024-05-02 ENCOUNTER — APPOINTMENT (OUTPATIENT)
Dept: RADIOLOGY | Facility: MEDICAL CENTER | Age: 74
DRG: 027 | End: 2024-05-02
Attending: NEUROLOGICAL SURGERY
Payer: MEDICARE

## 2024-05-02 ENCOUNTER — ANESTHESIA EVENT (OUTPATIENT)
Dept: SURGERY | Facility: MEDICAL CENTER | Age: 74
DRG: 027 | End: 2024-05-02
Payer: MEDICARE

## 2024-05-02 ENCOUNTER — HOSPITAL ENCOUNTER (INPATIENT)
Facility: MEDICAL CENTER | Age: 74
LOS: 3 days | DRG: 027 | End: 2024-05-05
Attending: NEUROLOGICAL SURGERY | Admitting: NEUROLOGICAL SURGERY
Payer: MEDICARE

## 2024-05-02 ENCOUNTER — ANESTHESIA (OUTPATIENT)
Dept: SURGERY | Facility: MEDICAL CENTER | Age: 74
DRG: 027 | End: 2024-05-02
Payer: MEDICARE

## 2024-05-02 DIAGNOSIS — D32.0 MENINGIOMA OF CEREBELLUM (HCC): ICD-10-CM

## 2024-05-02 LAB
ANION GAP SERPL CALC-SCNC: 13 MMOL/L (ref 7–16)
BUN SERPL-MCNC: 13 MG/DL (ref 8–22)
CALCIUM SERPL-MCNC: 8.3 MG/DL (ref 8.5–10.5)
CHLORIDE SERPL-SCNC: 104 MMOL/L (ref 96–112)
CO2 SERPL-SCNC: 21 MMOL/L (ref 20–33)
CREAT SERPL-MCNC: 0.62 MG/DL (ref 0.5–1.4)
ERYTHROCYTE [DISTWIDTH] IN BLOOD BY AUTOMATED COUNT: 47.9 FL (ref 35.9–50)
GFR SERPLBLD CREATININE-BSD FMLA CKD-EPI: 95 ML/MIN/1.73 M 2
GLUCOSE SERPL-MCNC: 178 MG/DL (ref 65–99)
HCT VFR BLD AUTO: 39.3 % (ref 37–47)
HGB BLD-MCNC: 13.2 G/DL (ref 12–16)
MAGNESIUM SERPL-MCNC: 1.7 MG/DL (ref 1.5–2.5)
MCH RBC QN AUTO: 31.3 PG (ref 27–33)
MCHC RBC AUTO-ENTMCNC: 33.6 G/DL (ref 32.2–35.5)
MCV RBC AUTO: 93.1 FL (ref 81.4–97.8)
PATHOLOGY CONSULT NOTE: NORMAL
PLATELET # BLD AUTO: 541 K/UL (ref 164–446)
PMV BLD AUTO: 9.9 FL (ref 9–12.9)
POTASSIUM SERPL-SCNC: 4.1 MMOL/L (ref 3.6–5.5)
RBC # BLD AUTO: 4.22 M/UL (ref 4.2–5.4)
SODIUM SERPL-SCNC: 138 MMOL/L (ref 135–145)
WBC # BLD AUTO: 9.9 K/UL (ref 4.8–10.8)

## 2024-05-02 PROCEDURE — 99223 1ST HOSP IP/OBS HIGH 75: CPT | Performed by: INTERNAL MEDICINE

## 2024-05-02 PROCEDURE — 00BC0ZZ EXCISION OF CEREBELLUM, OPEN APPROACH: ICD-10-PCS | Performed by: NEUROLOGICAL SURGERY

## 2024-05-02 DEVICE — IMPLANTABLE DEVICE: Type: IMPLANTABLE DEVICE | Site: CRANIAL | Status: FUNCTIONAL

## 2024-05-02 DEVICE — DURASEAL SEALANT SYSTEM 5ML - (5/BX): Type: IMPLANTABLE DEVICE | Site: CRANIAL | Status: FUNCTIONAL

## 2024-05-02 DEVICE — SCREW STRYK NC 1.5X5MM (6NCX40=240) (80EA/PK) CONSIGNED QTY 240 PRE-LOAD: Type: IMPLANTABLE DEVICE | Site: CRANIAL | Status: FUNCTIONAL

## 2024-05-02 DEVICE — GRAFT DURAMATRIX ONLAY PLUS 1IN X 3IN OR 2.75CM X 7.5CM: Type: IMPLANTABLE DEVICE | Site: CRANIAL | Status: FUNCTIONAL

## 2024-05-02 RX ORDER — HYDROMORPHONE HYDROCHLORIDE 1 MG/ML
0.4 INJECTION, SOLUTION INTRAMUSCULAR; INTRAVENOUS; SUBCUTANEOUS
Status: DISCONTINUED | OUTPATIENT
Start: 2024-05-02 | End: 2024-05-02 | Stop reason: HOSPADM

## 2024-05-02 RX ORDER — LIDOCAINE HYDROCHLORIDE 20 MG/ML
INJECTION, SOLUTION EPIDURAL; INFILTRATION; INTRACAUDAL; PERINEURAL PRN
Status: DISCONTINUED | OUTPATIENT
Start: 2024-05-02 | End: 2024-05-02 | Stop reason: SURG

## 2024-05-02 RX ORDER — ONDANSETRON 2 MG/ML
4 INJECTION INTRAMUSCULAR; INTRAVENOUS
Status: COMPLETED | OUTPATIENT
Start: 2024-05-02 | End: 2024-05-02

## 2024-05-02 RX ORDER — OXYCODONE HCL 5 MG/5 ML
5 SOLUTION, ORAL ORAL
Status: DISCONTINUED | OUTPATIENT
Start: 2024-05-02 | End: 2024-05-02 | Stop reason: HOSPADM

## 2024-05-02 RX ORDER — DEXAMETHASONE SODIUM PHOSPHATE 4 MG/ML
4 INJECTION, SOLUTION INTRA-ARTICULAR; INTRALESIONAL; INTRAMUSCULAR; INTRAVENOUS; SOFT TISSUE EVERY 8 HOURS
Status: DISCONTINUED | OUTPATIENT
Start: 2024-05-02 | End: 2024-05-05 | Stop reason: HOSPADM

## 2024-05-02 RX ORDER — FAMOTIDINE 20 MG/1
20 TABLET, FILM COATED ORAL 2 TIMES DAILY
Status: DISCONTINUED | OUTPATIENT
Start: 2024-05-02 | End: 2024-05-05 | Stop reason: HOSPADM

## 2024-05-02 RX ORDER — CEFAZOLIN SODIUM 1 G/3ML
INJECTION, POWDER, FOR SOLUTION INTRAMUSCULAR; INTRAVENOUS PRN
Status: DISCONTINUED | OUTPATIENT
Start: 2024-05-02 | End: 2024-05-02 | Stop reason: SURG

## 2024-05-02 RX ORDER — DIPHENHYDRAMINE HYDROCHLORIDE 50 MG/ML
12.5 INJECTION INTRAMUSCULAR; INTRAVENOUS
Status: DISCONTINUED | OUTPATIENT
Start: 2024-05-02 | End: 2024-05-02 | Stop reason: HOSPADM

## 2024-05-02 RX ORDER — LEVOTHYROXINE SODIUM 0.1 MG/1
100 TABLET ORAL
Status: DISCONTINUED | OUTPATIENT
Start: 2024-05-03 | End: 2024-05-05 | Stop reason: HOSPADM

## 2024-05-02 RX ORDER — ENEMA 19; 7 G/133ML; G/133ML
1 ENEMA RECTAL
Status: DISCONTINUED | OUTPATIENT
Start: 2024-05-02 | End: 2024-05-05 | Stop reason: HOSPADM

## 2024-05-02 RX ORDER — SODIUM CHLORIDE, SODIUM LACTATE, POTASSIUM CHLORIDE, CALCIUM CHLORIDE 600; 310; 30; 20 MG/100ML; MG/100ML; MG/100ML; MG/100ML
INJECTION, SOLUTION INTRAVENOUS CONTINUOUS
Status: ACTIVE | OUTPATIENT
Start: 2024-05-02 | End: 2024-05-02

## 2024-05-02 RX ORDER — DEXAMETHASONE SODIUM PHOSPHATE 4 MG/ML
INJECTION, SOLUTION INTRA-ARTICULAR; INTRALESIONAL; INTRAMUSCULAR; INTRAVENOUS; SOFT TISSUE PRN
Status: DISCONTINUED | OUTPATIENT
Start: 2024-05-02 | End: 2024-05-02 | Stop reason: SURG

## 2024-05-02 RX ORDER — LABETALOL HYDROCHLORIDE 5 MG/ML
10 INJECTION, SOLUTION INTRAVENOUS
Status: DISCONTINUED | OUTPATIENT
Start: 2024-05-02 | End: 2024-05-05 | Stop reason: HOSPADM

## 2024-05-02 RX ORDER — DEXAMETHASONE 4 MG/1
4 TABLET ORAL EVERY 8 HOURS
Status: DISCONTINUED | OUTPATIENT
Start: 2024-05-02 | End: 2024-05-05 | Stop reason: HOSPADM

## 2024-05-02 RX ORDER — LABETALOL HYDROCHLORIDE 5 MG/ML
5 INJECTION, SOLUTION INTRAVENOUS
Status: DISCONTINUED | OUTPATIENT
Start: 2024-05-02 | End: 2024-05-02 | Stop reason: HOSPADM

## 2024-05-02 RX ORDER — ONDANSETRON 2 MG/ML
4 INJECTION INTRAMUSCULAR; INTRAVENOUS EVERY 4 HOURS PRN
Status: DISCONTINUED | OUTPATIENT
Start: 2024-05-02 | End: 2024-05-05 | Stop reason: HOSPADM

## 2024-05-02 RX ORDER — PHENYLEPHRINE HCL IN 0.9% NACL 1 MG/10 ML
SYRINGE (ML) INTRAVENOUS PRN
Status: DISCONTINUED | OUTPATIENT
Start: 2024-05-02 | End: 2024-05-02 | Stop reason: SURG

## 2024-05-02 RX ORDER — CEFAZOLIN SODIUM 1 G/3ML
INJECTION, POWDER, FOR SOLUTION INTRAMUSCULAR; INTRAVENOUS
Status: DISCONTINUED | OUTPATIENT
Start: 2024-05-02 | End: 2024-05-02 | Stop reason: HOSPADM

## 2024-05-02 RX ORDER — ONDANSETRON 2 MG/ML
INJECTION INTRAMUSCULAR; INTRAVENOUS PRN
Status: DISCONTINUED | OUTPATIENT
Start: 2024-05-02 | End: 2024-05-02 | Stop reason: SURG

## 2024-05-02 RX ORDER — SCOLOPAMINE TRANSDERMAL SYSTEM 1 MG/1
1 PATCH, EXTENDED RELEASE TRANSDERMAL
Status: DISCONTINUED | OUTPATIENT
Start: 2024-05-02 | End: 2024-05-05 | Stop reason: HOSPADM

## 2024-05-02 RX ORDER — DIPHENHYDRAMINE HYDROCHLORIDE 50 MG/ML
25 INJECTION INTRAMUSCULAR; INTRAVENOUS EVERY 6 HOURS PRN
Status: DISCONTINUED | OUTPATIENT
Start: 2024-05-02 | End: 2024-05-05 | Stop reason: HOSPADM

## 2024-05-02 RX ORDER — HYDRALAZINE HYDROCHLORIDE 20 MG/ML
10 INJECTION INTRAMUSCULAR; INTRAVENOUS
Status: DISCONTINUED | OUTPATIENT
Start: 2024-05-02 | End: 2024-05-05 | Stop reason: HOSPADM

## 2024-05-02 RX ORDER — SODIUM CHLORIDE, SODIUM LACTATE, POTASSIUM CHLORIDE, CALCIUM CHLORIDE 600; 310; 30; 20 MG/100ML; MG/100ML; MG/100ML; MG/100ML
INJECTION, SOLUTION INTRAVENOUS CONTINUOUS
Status: DISCONTINUED | OUTPATIENT
Start: 2024-05-02 | End: 2024-05-02 | Stop reason: HOSPADM

## 2024-05-02 RX ORDER — HALOPERIDOL 5 MG/ML
1 INJECTION INTRAMUSCULAR
Status: DISCONTINUED | OUTPATIENT
Start: 2024-05-02 | End: 2024-05-02 | Stop reason: HOSPADM

## 2024-05-02 RX ORDER — SODIUM CHLORIDE AND POTASSIUM CHLORIDE 150; 900 MG/100ML; MG/100ML
INJECTION, SOLUTION INTRAVENOUS CONTINUOUS
Status: DISCONTINUED | OUTPATIENT
Start: 2024-05-02 | End: 2024-05-03

## 2024-05-02 RX ORDER — BUPIVACAINE HYDROCHLORIDE AND EPINEPHRINE 5; 5 MG/ML; UG/ML
INJECTION, SOLUTION EPIDURAL; INTRACAUDAL; PERINEURAL
Status: DISCONTINUED | OUTPATIENT
Start: 2024-05-02 | End: 2024-05-02 | Stop reason: HOSPADM

## 2024-05-02 RX ORDER — OXYCODONE HCL 5 MG/5 ML
10 SOLUTION, ORAL ORAL
Status: DISCONTINUED | OUTPATIENT
Start: 2024-05-02 | End: 2024-05-02 | Stop reason: HOSPADM

## 2024-05-02 RX ORDER — BISACODYL 10 MG
10 SUPPOSITORY, RECTAL RECTAL
Status: DISCONTINUED | OUTPATIENT
Start: 2024-05-02 | End: 2024-05-05 | Stop reason: HOSPADM

## 2024-05-02 RX ORDER — AMLODIPINE BESYLATE 5 MG/1
5 TABLET ORAL DAILY
Status: DISCONTINUED | OUTPATIENT
Start: 2024-05-02 | End: 2024-05-05 | Stop reason: HOSPADM

## 2024-05-02 RX ORDER — EPHEDRINE SULFATE 50 MG/ML
5 INJECTION, SOLUTION INTRAVENOUS
Status: DISCONTINUED | OUTPATIENT
Start: 2024-05-02 | End: 2024-05-02 | Stop reason: HOSPADM

## 2024-05-02 RX ORDER — ROCURONIUM BROMIDE 10 MG/ML
INJECTION, SOLUTION INTRAVENOUS PRN
Status: DISCONTINUED | OUTPATIENT
Start: 2024-05-02 | End: 2024-05-02 | Stop reason: SURG

## 2024-05-02 RX ORDER — EPHEDRINE SULFATE 50 MG/ML
INJECTION, SOLUTION INTRAVENOUS PRN
Status: DISCONTINUED | OUTPATIENT
Start: 2024-05-02 | End: 2024-05-02 | Stop reason: SURG

## 2024-05-02 RX ORDER — DOCUSATE SODIUM 100 MG/1
100 CAPSULE, LIQUID FILLED ORAL 2 TIMES DAILY
Status: DISCONTINUED | OUTPATIENT
Start: 2024-05-02 | End: 2024-05-05 | Stop reason: HOSPADM

## 2024-05-02 RX ORDER — HYDRALAZINE HYDROCHLORIDE 20 MG/ML
5 INJECTION INTRAMUSCULAR; INTRAVENOUS
Status: DISCONTINUED | OUTPATIENT
Start: 2024-05-02 | End: 2024-05-02 | Stop reason: HOSPADM

## 2024-05-02 RX ORDER — HYDROMORPHONE HYDROCHLORIDE 1 MG/ML
0.1 INJECTION, SOLUTION INTRAMUSCULAR; INTRAVENOUS; SUBCUTANEOUS
Status: DISCONTINUED | OUTPATIENT
Start: 2024-05-02 | End: 2024-05-02 | Stop reason: HOSPADM

## 2024-05-02 RX ORDER — CLONIDINE HYDROCHLORIDE 0.1 MG/1
0.1 TABLET ORAL EVERY 4 HOURS PRN
Status: DISCONTINUED | OUTPATIENT
Start: 2024-05-02 | End: 2024-05-05 | Stop reason: HOSPADM

## 2024-05-02 RX ORDER — AMOXICILLIN 250 MG
1 CAPSULE ORAL NIGHTLY
Status: DISCONTINUED | OUTPATIENT
Start: 2024-05-02 | End: 2024-05-05 | Stop reason: HOSPADM

## 2024-05-02 RX ORDER — DEXAMETHASONE SODIUM PHOSPHATE 4 MG/ML
4 INJECTION, SOLUTION INTRA-ARTICULAR; INTRALESIONAL; INTRAMUSCULAR; INTRAVENOUS; SOFT TISSUE
Status: COMPLETED | OUTPATIENT
Start: 2024-05-02 | End: 2024-05-02

## 2024-05-02 RX ORDER — POLYETHYLENE GLYCOL 3350 17 G/17G
1 POWDER, FOR SOLUTION ORAL 2 TIMES DAILY PRN
Status: DISCONTINUED | OUTPATIENT
Start: 2024-05-02 | End: 2024-05-05 | Stop reason: HOSPADM

## 2024-05-02 RX ORDER — PROCHLORPERAZINE EDISYLATE 5 MG/ML
10 INJECTION INTRAMUSCULAR; INTRAVENOUS EVERY 4 HOURS PRN
Status: DISCONTINUED | OUTPATIENT
Start: 2024-05-02 | End: 2024-05-03

## 2024-05-02 RX ORDER — AMOXICILLIN 250 MG
1 CAPSULE ORAL
Status: DISCONTINUED | OUTPATIENT
Start: 2024-05-02 | End: 2024-05-05 | Stop reason: HOSPADM

## 2024-05-02 RX ORDER — BACITRACIN ZINC 500 [USP'U]/G
OINTMENT TOPICAL
Status: DISCONTINUED | OUTPATIENT
Start: 2024-05-02 | End: 2024-05-02 | Stop reason: HOSPADM

## 2024-05-02 RX ORDER — HYDROCHLOROTHIAZIDE 25 MG/1
25 TABLET ORAL DAILY
Status: DISCONTINUED | OUTPATIENT
Start: 2024-05-02 | End: 2024-05-05 | Stop reason: HOSPADM

## 2024-05-02 RX ORDER — HYDROMORPHONE HYDROCHLORIDE 1 MG/ML
0.2 INJECTION, SOLUTION INTRAMUSCULAR; INTRAVENOUS; SUBCUTANEOUS
Status: DISCONTINUED | OUTPATIENT
Start: 2024-05-02 | End: 2024-05-02 | Stop reason: HOSPADM

## 2024-05-02 RX ADMIN — EPHEDRINE SULFATE 10 MG: 50 INJECTION, SOLUTION INTRAVENOUS at 09:15

## 2024-05-02 RX ADMIN — FAMOTIDINE 20 MG: 10 INJECTION, SOLUTION INTRAVENOUS at 17:24

## 2024-05-02 RX ADMIN — PROPOFOL 120 MG: 10 INJECTION, EMULSION INTRAVENOUS at 07:36

## 2024-05-02 RX ADMIN — CEFAZOLIN 2 G: 2 INJECTION, POWDER, FOR SOLUTION INTRAMUSCULAR; INTRAVENOUS at 16:33

## 2024-05-02 RX ADMIN — ONDANSETRON 4 MG: 2 INJECTION INTRAMUSCULAR; INTRAVENOUS at 13:12

## 2024-05-02 RX ADMIN — Medication: at 14:23

## 2024-05-02 RX ADMIN — ONDANSETRON 4 MG: 2 INJECTION INTRAMUSCULAR; INTRAVENOUS at 10:39

## 2024-05-02 RX ADMIN — ROCURONIUM BROMIDE 50 MG: 50 INJECTION, SOLUTION INTRAVENOUS at 07:36

## 2024-05-02 RX ADMIN — FENTANYL CITRATE 50 MCG: 50 INJECTION, SOLUTION INTRAMUSCULAR; INTRAVENOUS at 08:38

## 2024-05-02 RX ADMIN — DEXAMETHASONE SODIUM PHOSPHATE 4 MG: 4 INJECTION INTRA-ARTICULAR; INTRALESIONAL; INTRAMUSCULAR; INTRAVENOUS; SOFT TISSUE at 14:02

## 2024-05-02 RX ADMIN — SUGAMMADEX 200 MG: 100 INJECTION, SOLUTION INTRAVENOUS at 09:40

## 2024-05-02 RX ADMIN — Medication 100 MCG: at 09:26

## 2024-05-02 RX ADMIN — PROPOFOL 75 MCG/KG/MIN: 10 INJECTION, EMULSION INTRAVENOUS at 07:51

## 2024-05-02 RX ADMIN — EPHEDRINE SULFATE 10 MG: 50 INJECTION, SOLUTION INTRAVENOUS at 09:20

## 2024-05-02 RX ADMIN — SODIUM CHLORIDE, POTASSIUM CHLORIDE, SODIUM LACTATE AND CALCIUM CHLORIDE: 600; 310; 30; 20 INJECTION, SOLUTION INTRAVENOUS at 07:30

## 2024-05-02 RX ADMIN — HYDRALAZINE HYDROCHLORIDE 5 MG: 20 INJECTION, SOLUTION INTRAMUSCULAR; INTRAVENOUS at 10:42

## 2024-05-02 RX ADMIN — FENTANYL CITRATE 50 MCG: 50 INJECTION, SOLUTION INTRAMUSCULAR; INTRAVENOUS at 08:10

## 2024-05-02 RX ADMIN — DEXAMETHASONE SODIUM PHOSPHATE 4 MG: 4 INJECTION INTRA-ARTICULAR; INTRALESIONAL; INTRAMUSCULAR; INTRAVENOUS; SOFT TISSUE at 11:54

## 2024-05-02 RX ADMIN — CEFAZOLIN 2 G: 1 INJECTION, POWDER, FOR SOLUTION INTRAMUSCULAR; INTRAVENOUS at 07:50

## 2024-05-02 RX ADMIN — ONDANSETRON 4 MG: 2 INJECTION INTRAMUSCULAR; INTRAVENOUS at 09:40

## 2024-05-02 RX ADMIN — ONDANSETRON 4 MG: 2 INJECTION INTRAMUSCULAR; INTRAVENOUS at 17:24

## 2024-05-02 RX ADMIN — POTASSIUM CHLORIDE AND SODIUM CHLORIDE: 900; 150 INJECTION, SOLUTION INTRAVENOUS at 11:50

## 2024-05-02 RX ADMIN — POTASSIUM CHLORIDE AND SODIUM CHLORIDE: 900; 150 INJECTION, SOLUTION INTRAVENOUS at 21:47

## 2024-05-02 RX ADMIN — EPHEDRINE SULFATE 10 MG: 50 INJECTION, SOLUTION INTRAVENOUS at 07:39

## 2024-05-02 RX ADMIN — DIPHENHYDRAMINE HYDROCHLORIDE 25 MG: 50 INJECTION, SOLUTION INTRAMUSCULAR; INTRAVENOUS at 14:48

## 2024-05-02 RX ADMIN — DEXAMETHASONE SODIUM PHOSPHATE 10 MG: 4 INJECTION INTRA-ARTICULAR; INTRALESIONAL; INTRAMUSCULAR; INTRAVENOUS; SOFT TISSUE at 07:50

## 2024-05-02 RX ADMIN — ROCURONIUM BROMIDE 50 MG: 50 INJECTION, SOLUTION INTRAVENOUS at 08:35

## 2024-05-02 RX ADMIN — FENTANYL CITRATE 50 MCG: 50 INJECTION, SOLUTION INTRAMUSCULAR; INTRAVENOUS at 09:42

## 2024-05-02 RX ADMIN — SCOPOLAMINE 1 PATCH: 1.5 PATCH, EXTENDED RELEASE TRANSDERMAL at 18:04

## 2024-05-02 RX ADMIN — LIDOCAINE HYDROCHLORIDE 50 MG: 20 INJECTION, SOLUTION EPIDURAL; INFILTRATION; INTRACAUDAL at 07:36

## 2024-05-02 RX ADMIN — DEXAMETHASONE SODIUM PHOSPHATE 4 MG: 4 INJECTION INTRA-ARTICULAR; INTRALESIONAL; INTRAMUSCULAR; INTRAVENOUS; SOFT TISSUE at 21:54

## 2024-05-02 RX ADMIN — FENTANYL CITRATE 50 MCG: 50 INJECTION, SOLUTION INTRAMUSCULAR; INTRAVENOUS at 07:36

## 2024-05-02 RX ADMIN — GADOTERIDOL 15 ML: 279.3 INJECTION, SOLUTION INTRAVENOUS at 06:40

## 2024-05-02 ASSESSMENT — LIFESTYLE VARIABLES
HOW MANY TIMES IN THE PAST YEAR HAVE YOU HAD 5 OR MORE DRINKS IN A DAY: 0
CONSUMPTION TOTAL: NEGATIVE
EVER HAD A DRINK FIRST THING IN THE MORNING TO STEADY YOUR NERVES TO GET RID OF A HANGOVER: NO
HAVE PEOPLE ANNOYED YOU BY CRITICIZING YOUR DRINKING: NO
TOTAL SCORE: 0
TOTAL SCORE: 0
AVERAGE NUMBER OF DAYS PER WEEK YOU HAVE A DRINK CONTAINING ALCOHOL: 1
ON A TYPICAL DAY WHEN YOU DRINK ALCOHOL HOW MANY DRINKS DO YOU HAVE: 1
HAVE YOU EVER FELT YOU SHOULD CUT DOWN ON YOUR DRINKING: NO
EVER FELT BAD OR GUILTY ABOUT YOUR DRINKING: NO
DOES PATIENT WANT TO STOP DRINKING: NO
TOTAL SCORE: 0
ALCOHOL_USE: YES

## 2024-05-02 ASSESSMENT — PATIENT HEALTH QUESTIONNAIRE - PHQ9
8. MOVING OR SPEAKING SO SLOWLY THAT OTHER PEOPLE COULD HAVE NOTICED. OR THE OPPOSITE, BEING SO FIGETY OR RESTLESS THAT YOU HAVE BEEN MOVING AROUND A LOT MORE THAN USUAL: NOT AT ALL
5. POOR APPETITE OR OVEREATING: NOT AT ALL
SUM OF ALL RESPONSES TO PHQ QUESTIONS 1-9: 1
7. TROUBLE CONCENTRATING ON THINGS, SUCH AS READING THE NEWSPAPER OR WATCHING TELEVISION: NOT AT ALL
9. THOUGHTS THAT YOU WOULD BE BETTER OFF DEAD, OR OF HURTING YOURSELF: NOT AT ALL
1. LITTLE INTEREST OR PLEASURE IN DOING THINGS: NOT AT ALL
6. FEELING BAD ABOUT YOURSELF - OR THAT YOU ARE A FAILURE OR HAVE LET YOURSELF OR YOUR FAMILY DOWN: NOT AL ALL
2. FEELING DOWN, DEPRESSED, IRRITABLE, OR HOPELESS: SEVERAL DAYS
SUM OF ALL RESPONSES TO PHQ9 QUESTIONS 1 AND 2: 1
4. FEELING TIRED OR HAVING LITTLE ENERGY: NOT AT ALL
3. TROUBLE FALLING OR STAYING ASLEEP OR SLEEPING TOO MUCH: NOT AT ALL

## 2024-05-02 ASSESSMENT — COGNITIVE AND FUNCTIONAL STATUS - GENERAL
PERSONAL GROOMING: A LITTLE
WALKING IN HOSPITAL ROOM: A LITTLE
DRESSING REGULAR UPPER BODY CLOTHING: A LITTLE
SUGGESTED CMS G CODE MODIFIER MOBILITY: CK
STANDING UP FROM CHAIR USING ARMS: A LITTLE
TOILETING: A LITTLE
SUGGESTED CMS G CODE MODIFIER DAILY ACTIVITY: CK
HELP NEEDED FOR BATHING: A LITTLE
DRESSING REGULAR LOWER BODY CLOTHING: A LITTLE
DAILY ACTIVITIY SCORE: 18
EATING MEALS: A LITTLE
CLIMB 3 TO 5 STEPS WITH RAILING: A LITTLE
MOBILITY SCORE: 18
MOVING FROM LYING ON BACK TO SITTING ON SIDE OF FLAT BED: A LITTLE
TURNING FROM BACK TO SIDE WHILE IN FLAT BAD: A LITTLE
MOVING TO AND FROM BED TO CHAIR: A LITTLE

## 2024-05-02 ASSESSMENT — FIBROSIS 4 INDEX
FIB4 SCORE: 0.6
FIB4 SCORE: 0.6

## 2024-05-02 ASSESSMENT — PAIN DESCRIPTION - PAIN TYPE
TYPE: ACUTE PAIN
TYPE: ACUTE PAIN

## 2024-05-02 ASSESSMENT — PAIN SCALES - GENERAL: PAIN_LEVEL: 0

## 2024-05-02 NOTE — PROGRESS NOTES
4 Eyes Skin Assessment Completed by PONCHO Benson and PONCHO Meyer.    Head Scab and Incision surgical incision, surgical scab  Ears Redness and Blanching  Nose WDL  Mouth WDL  Neck WDL  Breast/Chest WDL  Shoulder Blades WDL  Spine WDL  (R) Arm/Elbow/Hand Redness and Blanching elbows   (L) Arm/Elbow/Hand Redness and Blanching elbows  Abdomen WDL  Groin WDL  Scrotum/Coccyx/Buttocks Discoloration, bruising Right ischium  (R) Leg WDL  (L) Leg WDL  (R) Heel/Foot/Toe Redness and Non-Blanching Great Toe  (L) Heel/Foot/Toe Redness and Blanching          Devices In Places ECG, Blood Pressure Cuff, Pulse Ox, Johnson, Arterial Line, SCD's, and Oxy Mask      Interventions In Place Q2 Turns and Low Air Loss Mattress    Possible Skin Injury Yes    Pictures Uploaded Into Epic Yes  Wound Consult Placed Yes  RN Wound Prevention Protocol Ordered Yes

## 2024-05-02 NOTE — DISCHARGE PLANNING
[facility] Transitional Care Coordination    Referral from: BONG Olivares   Insurance Provider on Facesheet: SCP  Potential Rehab Diagnosis: NTBI    Chart review indicates patient may have on going medical management and may have therapy needs to possibly meet inpatient rehab facility criteria with the goal of returning to community.    D/C support: TBD     Physiatry consultation pended per protocol.     Physiatry consult pended, awaiting therapy evals as appropriate. TCC will follow.     Thank you for the referral.

## 2024-05-02 NOTE — ANESTHESIA POSTPROCEDURE EVALUATION
Patient: Ailin Good    Procedure Summary       Date: 05/02/24 Room / Location: [facility] 05 / SURGERY [facility]    Anesthesia Start: 0730 Anesthesia Stop: 1001    Procedure: STEALTH RIGHT SUBOCCIPITAL CRANIECTOMY, MENINGIOMA EXCISION, TITANIUM CRANIOPLASTY (Right: Skull) Diagnosis: (INTRACRANIAL MENINGIOMA)    Surgeons: Jaleel Dyer M.D. Responsible Provider: Vincent Alfaro M.D.    Anesthesia Type: general ASA Status: 2            Final Anesthesia Type: general  Last vitals  BP   Blood Pressure : (!) 148/78, Arterial BP: 146/79    Temp   (!) 35.4 °C (95.7 °F)    Pulse   (!) 59   Resp   13    SpO2   98 %      Anesthesia Post Evaluation    Patient location during evaluation: PACU  Patient participation: complete - patient participated  Level of consciousness: awake and alert  Pain score: 0    Airway patency: patent  Anesthetic complications: no  Cardiovascular status: hemodynamically stable  Respiratory status: acceptable  Hydration status: euvolemic    PONV: none          No notable events documented.     Nurse Pain Score: 0 (NPRS)

## 2024-05-02 NOTE — CONSULTS
"Critical Care Consultation    Date of consult: 5/2/2024    Referring Physician  Jaleel Dyer M.D.    Reason for Consultation  Postoperative critical care management after meningioma resection    History of Presenting Illness  73 y.o. female, PMH hypertension, osteoarthritis, essential thrombocytosis, hypothyroidism with prior thyroidectomy who presented 5/2/2024 for elective right suboccipital craniectomy with resection of complex right cerebellar meningioma.    Code Status  Full Code    Review of Systems  Review of Systems   Unable to perform ROS: Acuity of condition       Past Medical History  Past Medical History:   Diagnosis Date    Allergy     allergic to trees/grass    Anesthesia     PONV    Arthritis     osteoarthritis    Blood dyscrasia     \" too many platelets \"    Essential (hemorrhagic) thrombocythemia (HCC)     Fatty liver disease, nonalcoholic     pt states she was told by MD    Heart murmur 04/18/2024    was told as a child    Heart valve disease     was told by cardiologist    Hypertension     medicated    Polyp of colon 02/20/2019    PONV (postoperative nausea and vomiting)     Postoperative hypothyroidism     thyroidectomy; takes levothyroxine    Psychiatric problem 03/2021    situational depression ( loss of dog)    Urinary incontinence     minor       Surgical History   has a past surgical history that includes tubal coagulation laparoscopic bilateral; thyroidectomy (2016); tonsillectomy (1969); pr dx bone marrow aspirations (Left, 03/10/2021); pr dx bone marrow biopsies (Left, 03/10/2021); pr insj stablj dev w/dcmprn 1 lvl (9/8/2023); and lumbar laminectomy diskectomy (9/8/2023).    Family History  family history includes Cancer in her father; Diabetes in her mother; Heart Disease in her mother and sister; Hyperlipidemia in her sister; Hypertension in her father, mother, and sister; Stroke in her father.    Social History   reports that she quit smoking about 11 years ago. Her smoking use " included cigarettes. She started smoking about 46 years ago. She has a 35 pack-year smoking history. She has never been exposed to tobacco smoke. She has never used smokeless tobacco. She reports current alcohol use of about 4.2 oz of alcohol per week. She reports that she does not use drugs.    Medications  Home Medications       Reviewed by Betsy Lemon R.N. (Registered Nurse) on 05/02/24 at 0622  Med List Status: Complete     Medication Last Dose Status   amLODIPine (NORVASC) 5 MG Tab UNK Active   ASPIRIN 81 PO 2 WEEKS AGO Active   Ferrous Sulfate (IRON PO) 2 WEEKS AGO Active   hydroCHLOROthiazide (HYDRODIURIL) 25 MG Tab 2 WEEKS AGO Active   levothyroxine (SYNTHROID) 100 MCG Tab 5/2/2024 Active                  Current Facility-Administered Medications   Medication Dose Route Frequency Provider Last Rate Last Admin    amLODIPine (Norvasc) tablet 5 mg  5 mg Oral DAILY ANGEL MurrayPNILA        hydroCHLOROthiazide tablet 25 mg  25 mg Oral DAILY MELO Murray        [START ON 5/3/2024] levothyroxine (Synthroid) tablet 100 mcg  100 mcg Oral AM ES MELO Murray        Pharmacy Consult Request ...Pain Management Review 1 Each  1 Each Other PHARMACY TO DOSE ANDREZ Murray.        MD ALERT...DO NOT ADMINISTER NSAIDS or ASPIRIN unless ORDERED By Neurosurgery 1 Each  1 Each Other PRN ANGEL MurrayPPAWAN.        ondansetron (Zofran) syringe/vial injection 4 mg  4 mg Intravenous Q4HRS PRN ANGEL MurrayP.N.   4 mg at 05/02/24 1312    diphenhydrAMINE (Benadryl) injection 25 mg  25 mg Intravenous Q6HRS PRN ANGEL MurrayP.N.        scopolamine (Transderm-Scop) patch 1 Patch  1 Patch Transdermal Q72HRS PRN ANGEL MurrayP.N.        labetalol (Normodyne/Trandate) injection 10 mg  10 mg Intravenous Q HOUR PRN ANGEL MurrayP.N.        hydrALAZINE (Apresoline) injection 10 mg  10 mg Intravenous Q HOUR PRN KT Murray.POpalN.        cloNIDine  (Catapres) tablet 0.1 mg  0.1 mg Oral Q4HRS PRN KT Murray.P.NOpal        niCARdipine (Cardene) 25 mg in  mL Standard Infusion  0-15 mg/hr Intravenous Continuous KT Murray.P.KAYLIN        docusate sodium (Colace) capsule 100 mg  100 mg Oral BID ANGEL MurrayP.NOpal        senna-docusate (Pericolace Or Senokot S) 8.6-50 MG per tablet 1 Tablet  1 Tablet Oral Nightly KT Murray.P.NOpal        senna-docusate (Pericolace Or Senokot S) 8.6-50 MG per tablet 1 Tablet  1 Tablet Oral Q24HRS PRN KT Murray.P.NOpal        polyethylene glycol/lytes (Miralax) Packet 1 Packet  1 Packet Oral BID PRN KT Murray.P.NOpal        magnesium hydroxide (Milk Of Magnesia) suspension 30 mL  30 mL Oral QDAY PRN KT Murray.P.NOpal        bisacodyl (Dulcolax) suppository 10 mg  10 mg Rectal Q24HRS PRN KT Murray.P.NOpal        sodium phosphate (Fleet) enema 133 mL  1 Each Rectal Once PRN KT Murray.P.NOpal        artificial tears (Eye Lubricant) ophth ointment 1 Application  1 Application Both Eyes PRN KT Murray.P.NOpal        0.9 % NaCl with KCl 20 mEq infusion   Intravenous Continuous KT Murray.P.N. 100 mL/hr at 05/02/24 1400 Rate Verify at 05/02/24 1400    HYDROmorphone (DILAUDID) 0.2 mg/mL in 50 mL NS (PCA)   Intravenous Continuous KT Murray.P.NOpal        ceFAZolin (Ancef) 2 g in  mL IVPB  2 g Intravenous Q8HR KT Murray.P.NOpal        dexamethasone (Decadron) tablet 4 mg  4 mg Oral Q8HRS KT Murray.P.N.        Or    dexamethasone (Decadron) injection 4 mg  4 mg Intravenous Q8HRS Susana Olivares, A.P.N.   4 mg at 05/02/24 1402    famotidine (Pepcid) tablet 20 mg  20 mg Oral BID Susana Olivares, A.P.N.        Or    famotidine (Pepcid) injection 20 mg  20 mg Intravenous BID Susana Olivares, A.P.N.           Allergies  Allergies   Allergen Reactions    Penicillins Unspecified     States she was told she was allergic as a  child through allergy testing but has since tolerated e.g. ampicillin    Dust Mite Extract Runny Nose          Pollen Extract Runny Nose          Seasonal Runny Nose     Every tree, grass       Vital Signs last 24 hours  Temp:  [35.4 °C (95.7 °F)-36.2 °C (97.1 °F)] 35.4 °C (95.7 °F)  Pulse:  [57-68] 68  Resp:  [8-16] 13  BP: ()/(50-89) 127/68  SpO2:  [89 %-99 %] 94 %    Physical Exam  Physical Exam  Constitutional:       Comments: Somnolent, nausea and emesis x 1 postop   HENT:      Head:      Comments: Surgical dressing in place     Mouth/Throat:      Mouth: Mucous membranes are moist.   Cardiovascular:      Rate and Rhythm: Normal rate and regular rhythm.      Pulses: Normal pulses.   Pulmonary:      Effort: Pulmonary effort is normal. No respiratory distress.      Breath sounds: No wheezing.   Abdominal:      General: There is no distension.      Palpations: Abdomen is soft.   Musculoskeletal:         General: No swelling.      Cervical back: Neck supple.   Skin:     General: Skin is warm and dry.      Capillary Refill: Capillary refill takes less than 2 seconds.   Neurological:      General: No focal deficit present.         Fluids    Intake/Output Summary (Last 24 hours) at 5/2/2024 1419  Last data filed at 5/2/2024 1400  Gross per 24 hour   Intake 1614.91 ml   Output 338 ml   Net 1276.91 ml       Laboratory  Recent Results (from the past 48 hour(s))   Histology Request    Collection Time: 05/02/24  8:24 AM   Result Value Ref Range    Pathology Request Sent to Histo        Imaging  Reviewed    Assessment/Plan  Meningioma  -Suboccipital craniectomy and stealth resection 5/2  -Postoperative pain control, antiemetics, close monitoring in neuro ICU with neurochecks  -Follow-up imaging per neurosurgery  -Will obtain labs and provide additional crystalloid if needed    Essential thrombocytosis  -Follow-up CBC    Hypothyroidism  -Prior thyroidectomy  -Continue Synthroid    Hypertension  -BP management  perioperatively as above        Discussed patient condition and risk of morbidity and/or mortality with RN, RT, Pharmacy, and NS .    The patient remains critically ill.  Critical care time = 35 minutes in directly providing and coordinating critical care and extensive data review.  No time overlap and excludes procedures.

## 2024-05-02 NOTE — ANESTHESIA PROCEDURE NOTES
Arterial Line    Performed by: Vincent Alfaro M.D.  Authorized by: Vincent Alfaro M.D.    Start Time:  5/2/2024 7:48 AM  End Time:  5/2/2024 7:50 AM  Localization: ultrasound guidance and surface landmarks    Patient Location:  OR  Indication: continuous blood pressure monitoring        Catheter Size:  20 G  Seldinger Technique?: Yes    Laterality:  Left  Site:  Radial artery  Line Secured:  Antimicrobial disc, tape and transparent dressing  Events: patient tolerated procedure well with no complications

## 2024-05-02 NOTE — ANESTHESIA PREPROCEDURE EVALUATION
Case: [id number] Date/Time: 05/02/24 0715    Procedure: STEALTH RIGHT SUBOCCIPITAL CRANIECTOMY, MENINGIOMA EXCISION, TITANIUM CRANIOPLASTY    Pre-op diagnosis: INTRACRANIAL MENINGIOMA    Location: [facility] OR 05 / SURGERY [facility]    Surgeons: Jaleel Dyer M.D.            Relevant Problems   ANESTHESIA (within normal limits)      PULMONARY (within normal limits)      NEURO (within normal limits)      CARDIAC   (positive) Essential hypertension   (positive) Mitral valve prolapse   (positive) Moderate mitral regurgitation      GI (within normal limits)       (within normal limits)      ENDO   (positive) Postoperative hypothyroidism      Other   (positive) Arthritis   (positive) History of tobacco use disorder       Physical Exam    Airway   Mallampati: II  TM distance: >3 FB  Neck ROM: full       Cardiovascular - normal exam  Rhythm: regular  Rate: normal  (-) murmur     Dental - normal exam           Pulmonary - normal exam  Breath sounds clear to auscultation     Abdominal    Neurological - normal exam                   Anesthesia Plan    ASA 2       Plan - general       Airway plan will be ETT          Induction: intravenous    Postoperative Plan: Postoperative administration of opioids is intended.    Pertinent diagnostic labs and testing reviewed    Informed Consent:    Anesthetic plan and risks discussed with patient.    Use of blood products discussed with: patient whom consented to blood products.

## 2024-05-02 NOTE — OP REPORT
DATE OF SERVICE:  05/02/2024     PREOPERATIVE DIAGNOSIS:  Complex right cerebellar meningioma.     POSTOPERATIVE DIAGNOSIS:  Complex right cerebellar meningioma.     OPERATIONS:  1.  Right suboccipital craniectomy with microscopic gross total excision of 4   cm right complex cerebellar meningioma with Stealth guidance.  2.  Microscopic dissection.  3.  A 5 cm titanium cranioplasty.     SURGEON:  Jaleel Dyer MD     ASSISTANT:  BONG Farrell     ANESTHESIA:  General endotracheal.     ANESTHESIOLOGIST:  Vincent Alfaro MD     PREPARATION:  ChloraPrep.     MEDICATIONS:  The patient given Ancef prior to incision.     INDICATIONS:  This woman we have been following for several years with the   posterior fossa meningioma tumor starting to enlarge, increased mass effect on   the posterior fossa contents including the fourth ventricle.  The patient was   felt to be a candidate for surgical excision to prevent development of   neurologic problems.  The patient understood major risks and complications,   roughly 1% intraoperative bleeding, stroke, death, paralysis, small risk of   wound infection, risk of spinal fluid leak, chance of recurrent tumor roughly   10% and the need for periodic imaging indefinitely.  The patient was   understanding, agreed to proceed and signed consent.     EXPLANATION OF COMPLEXITY:  This case was complex given the large size of the   meningioma, its close association with the transverse and sigmoid sinuses   making the surgery technically more difficult, challenging and increasing the   risk of the procedure.     NEED FOR SURGICAL ASSISTANCE:  Surgical assistance required under both loupe   and microscopic magnification for retraction, suction, irrigation, cleaning of   instruments, keep the case moving forward to minimize operative time.     DESCRIPTION OF PROCEDURE:  The patient was brought to the operating room.    Peripheral venous lines in place.  General anesthesia was induced.   The   patient was intubated.  A Johnson catheter was placed.  Dr. Alfaro placed radial   arterial line.  The patient was placed in lateral decubitus position.    Axillary roll was placed.  Pressure points carefully padded.  Head was   maintained in 3-point fixation with the [place] head bhat.  The head was   positioned with the left ear to the left shoulder turned where the right   suboccipital area was most superior in the field.  The hair was shaved behind   the right ear and slightly superiorly.  The patient was then registered to the   preoperative [facility] MRI. Registration air was 2.4 mm.  There was excellent   correlation with surface anatomy.  The levels of the transverse and sigmoid   sinus were identified and the planned incision was marked.  The scalp was   doubly prepped with ChloraPrep by myself and draped.  The planned incision was   linear behind the right ear.  Skin was infiltrated with local and incised   with a scalpel.  Connie clips were applied in the scalp margins for hemostasis.    Muscle dissection was carried down to the bone and the muscle dissected   anterior and posteriorly.  Self-retaining retractor was placed.  The levels of   the transverse and sigmoid sinuses were marked on the skull.  Then, using the   , 4 bur holes were placed.  Then, using the acorn bit, bone was   drilled to thin shelf of bone and thin shelf was resected with a 2 and 3 mm   Kerrisons.  We worked superiorly to just inferior to the sinus and anteriorly   to just behind the sigmoid sinus.  When removing the bone, the tumor had   eroded through the dura superiorly.  Frozen section was sent.  There was no   atypical features seen by the pathologist.  Under initial loupe magnification,   the tumor was gutted with the Sonopet and created a plane posteriorly,   advancing 1x3 cottonoids around the tumor.  We worked towards the depth   superiorly, anteriorly, inferiorly, guided by a stereotaxis to the point where   I  could mobilize the capsule.  We dissected the inferior capsule, delivering   it superiorly.  There was no significant erosion of the cerebellar vitaliy.  In   this fashion, the 95% of the tumor was resected.  Operating microscope was   brought in under high power magnification.  I could identify the tentorium.    It appeared that the tumor took origin from the transverse sigmoid junction   dura, identified the posterior petrous ridge.  The remaining tumor was   resected with the Sonopet.  I then vigorously electrocoagulated the inferior   surface of the tentorium in the posterior aspect of the petrous wall and then   the inferior margins of the transverse and sigmoid sinuses to destroy any   remaining microscopic tumor cells.  Final hemostasis was achieved with   irrigating bipolar.  The cerebellum was soft and pulsatile.  Irrigation was   placed.  Hemostasis was perfect.  There was no bleeding.  I placed a small   piece of Gelfoam at the transverse sigmoid junction and then the resection   cavity was lined with postage stamp Surgicel.  The dura was repaired with a   Duragen, which was placed over the skull, layer of fibrin glue.  Then, a 6 cm   prefabricated titanium mesh plate was positioned and secured with 5 mm screws   circumferentially.  The muscle layer was closed with 2-0 Vicryl, galea was   closed with 2-0 Vicryl subcutaneous, skin was closed with 2-0 Vicryl, and skin   and scalp closed with staples.  Sterile dressing was placed.  The patient was   taken out of the headholder, extubated, head of bed elevated, and taken to   recovery room in satisfactory condition.  The patient tolerated the procedure   well without apparent complication.  Estimated blood loss was 150 mL.        ______________________________  MD LORENA OCAMPO/RICARDO    DD:  05/02/2024 10:21  DT:  05/02/2024 10:46    Job#:  [id number]

## 2024-05-02 NOTE — OR NURSING
1000.Patient arrived from OR in stable condition. Received report from OR nurse and anesthesiologist. VSS. Dressing is CDI    1030 Dr. Dyer at bedside assessing patients. BP parameters are 160 and below. Per his viewpoint, patient is stable to go to assigned room.     1100 Report given to Betsy ISAAC    1109.Patient transported to floor via ICU bed with RN, CNA, and monitor.  On 6 L O2 with tank >50% and tubing connected to patient.  RN notified that pt is on the way to the floor. Family updated.

## 2024-05-02 NOTE — PROGRESS NOTES
Medication history reviewed with PT at bedside    Med rec is complete per PT reporting    Allergies reviewed.     Patient denies any outpatient antibiotics in the last 30 days.     Patient is not taking anticoagulants.    Preferred pharmacy for this visit - [facility] ([phone])

## 2024-05-02 NOTE — ANESTHESIA PROCEDURE NOTES
Airway    Date/Time: 5/2/2024 7:37 AM    Performed by: Vincent Alfaro M.D.  Authorized by: Vincent Alfaro M.D.    Location:  OR  Urgency:  Elective  Indications for Airway Management:  Anesthesia      Spontaneous Ventilation: absent    Sedation Level:  Deep  Preoxygenated: Yes    Patient Position:  Sniffing  Mask Difficulty Assessment:  0 - not attempted  Final Airway Type:  Endotracheal airway  Final Endotracheal Airway:  ETT  Cuffed: Yes    Technique Used for Successful ETT Placement:  Direct laryngoscopy    Insertion Site:  Oral  Blade Type:  Penny  Laryngoscope Blade/Videolaryngoscope Blade Size:  3  ETT Size (mm):  7.0  Measured from:  Teeth  ETT to Teeth (cm):  22  Placement Verified by: auscultation and capnometry    Cormack-Lehane Classification:  Grade I - full view of glottis  Number of Attempts at Approach:  1

## 2024-05-02 NOTE — ANESTHESIA TIME REPORT
Anesthesia Start and Stop Event Times       Date Time Event    5/2/2024 0714 Ready for Procedure     0730 Anesthesia Start     1001 Anesthesia Stop          Responsible Staff  05/02/24      Name Role Begin End    Vincent Alfaro M.D. Anesth 0730 1001          Overtime Reason:  no overtime (within assigned shift)    Comments:

## 2024-05-03 ENCOUNTER — HOSPITAL ENCOUNTER (OUTPATIENT)
Dept: RADIOLOGY | Facility: MEDICAL CENTER | Age: 74
End: 2024-05-03
Attending: NURSE PRACTITIONER
Payer: MEDICARE

## 2024-05-03 LAB
ANION GAP SERPL CALC-SCNC: 12 MMOL/L (ref 7–16)
BUN SERPL-MCNC: 14 MG/DL (ref 8–22)
CA-I SERPL-SCNC: 1 MMOL/L (ref 1.1–1.3)
CALCIUM SERPL-MCNC: 8.1 MG/DL (ref 8.5–10.5)
CHLORIDE SERPL-SCNC: 106 MMOL/L (ref 96–112)
CO2 SERPL-SCNC: 21 MMOL/L (ref 20–33)
CREAT SERPL-MCNC: 0.52 MG/DL (ref 0.5–1.4)
ERYTHROCYTE [DISTWIDTH] IN BLOOD BY AUTOMATED COUNT: 47.8 FL (ref 35.9–50)
GFR SERPLBLD CREATININE-BSD FMLA CKD-EPI: 99 ML/MIN/1.73 M 2
GLUCOSE BLD STRIP.AUTO-MCNC: 122 MG/DL (ref 65–99)
GLUCOSE BLD STRIP.AUTO-MCNC: 128 MG/DL (ref 65–99)
GLUCOSE BLD STRIP.AUTO-MCNC: 129 MG/DL (ref 65–99)
GLUCOSE BLD STRIP.AUTO-MCNC: 137 MG/DL (ref 65–99)
GLUCOSE BLD STRIP.AUTO-MCNC: 138 MG/DL (ref 65–99)
GLUCOSE SERPL-MCNC: 149 MG/DL (ref 65–99)
HCT VFR BLD AUTO: 39 % (ref 37–47)
HGB BLD-MCNC: 13 G/DL (ref 12–16)
MAGNESIUM SERPL-MCNC: 2.6 MG/DL (ref 1.5–2.5)
MCH RBC QN AUTO: 30.8 PG (ref 27–33)
MCHC RBC AUTO-ENTMCNC: 33.3 G/DL (ref 32.2–35.5)
MCV RBC AUTO: 92.4 FL (ref 81.4–97.8)
PLATELET # BLD AUTO: 551 K/UL (ref 164–446)
PMV BLD AUTO: 9.8 FL (ref 9–12.9)
POTASSIUM SERPL-SCNC: 4.7 MMOL/L (ref 3.6–5.5)
RBC # BLD AUTO: 4.22 M/UL (ref 4.2–5.4)
SODIUM SERPL-SCNC: 139 MMOL/L (ref 135–145)
WBC # BLD AUTO: 10.9 K/UL (ref 4.8–10.8)

## 2024-05-03 RX ORDER — MAGNESIUM SULFATE HEPTAHYDRATE 40 MG/ML
2 INJECTION, SOLUTION INTRAVENOUS ONCE
Status: COMPLETED | OUTPATIENT
Start: 2024-05-03 | End: 2024-05-03

## 2024-05-03 RX ORDER — DEXTROSE MONOHYDRATE 25 G/50ML
25 INJECTION, SOLUTION INTRAVENOUS
Status: DISCONTINUED | OUTPATIENT
Start: 2024-05-03 | End: 2024-05-05 | Stop reason: HOSPADM

## 2024-05-03 RX ORDER — PROCHLORPERAZINE EDISYLATE 5 MG/ML
10 INJECTION INTRAMUSCULAR; INTRAVENOUS EVERY 6 HOURS PRN
Status: DISCONTINUED | OUTPATIENT
Start: 2024-05-03 | End: 2024-05-05 | Stop reason: HOSPADM

## 2024-05-03 RX ORDER — PROMETHAZINE HYDROCHLORIDE 25 MG/1
12.5 SUPPOSITORY RECTAL EVERY 6 HOURS PRN
Status: DISCONTINUED | OUTPATIENT
Start: 2024-05-03 | End: 2024-05-05 | Stop reason: HOSPADM

## 2024-05-03 RX ORDER — CALCIUM GLUCONATE 20 MG/ML
2 INJECTION, SOLUTION INTRAVENOUS ONCE
Status: COMPLETED | OUTPATIENT
Start: 2024-05-03 | End: 2024-05-03

## 2024-05-03 RX ORDER — ACETAMINOPHEN 325 MG/1
650 TABLET ORAL EVERY 4 HOURS PRN
Status: DISCONTINUED | OUTPATIENT
Start: 2024-05-03 | End: 2024-05-05 | Stop reason: HOSPADM

## 2024-05-03 RX ADMIN — MINERAL OIL AND WHITE PETROLATUM 1 APPLICATION: 30; 940 OINTMENT OPHTHALMIC at 16:16

## 2024-05-03 RX ADMIN — LEVOTHYROXINE SODIUM 100 MCG: 0.1 TABLET ORAL at 05:24

## 2024-05-03 RX ADMIN — HYDROCHLOROTHIAZIDE 25 MG: 25 TABLET ORAL at 05:24

## 2024-05-03 RX ADMIN — FAMOTIDINE 20 MG: 20 TABLET, FILM COATED ORAL at 17:35

## 2024-05-03 RX ADMIN — DIPHENHYDRAMINE HYDROCHLORIDE 25 MG: 50 INJECTION, SOLUTION INTRAMUSCULAR; INTRAVENOUS at 03:29

## 2024-05-03 RX ADMIN — POTASSIUM CHLORIDE AND SODIUM CHLORIDE: 900; 150 INJECTION, SOLUTION INTRAVENOUS at 08:00

## 2024-05-03 RX ADMIN — DOCUSATE SODIUM 100 MG: 100 CAPSULE, LIQUID FILLED ORAL at 05:24

## 2024-05-03 RX ADMIN — FAMOTIDINE 20 MG: 10 INJECTION, SOLUTION INTRAVENOUS at 05:28

## 2024-05-03 RX ADMIN — DEXAMETHASONE 4 MG: 4 TABLET ORAL at 22:03

## 2024-05-03 RX ADMIN — DEXAMETHASONE SODIUM PHOSPHATE 4 MG: 4 INJECTION INTRA-ARTICULAR; INTRALESIONAL; INTRAMUSCULAR; INTRAVENOUS; SOFT TISSUE at 05:29

## 2024-05-03 RX ADMIN — DOCUSATE SODIUM 100 MG: 100 CAPSULE, LIQUID FILLED ORAL at 17:35

## 2024-05-03 RX ADMIN — CALCIUM GLUCONATE 2 G: 20 INJECTION, SOLUTION INTRAVENOUS at 10:08

## 2024-05-03 RX ADMIN — MAGNESIUM SULFATE HEPTAHYDRATE 2 G: 2 INJECTION, SOLUTION INTRAVENOUS at 00:28

## 2024-05-03 RX ADMIN — ACETAMINOPHEN 650 MG: 325 TABLET, FILM COATED ORAL at 23:48

## 2024-05-03 RX ADMIN — ONDANSETRON 4 MG: 2 INJECTION INTRAMUSCULAR; INTRAVENOUS at 00:28

## 2024-05-03 RX ADMIN — CEFAZOLIN 2 G: 2 INJECTION, POWDER, FOR SOLUTION INTRAMUSCULAR; INTRAVENOUS at 00:08

## 2024-05-03 RX ADMIN — ACETAMINOPHEN 650 MG: 325 TABLET, FILM COATED ORAL at 19:52

## 2024-05-03 RX ADMIN — ACETAMINOPHEN 650 MG: 325 TABLET, FILM COATED ORAL at 14:25

## 2024-05-03 RX ADMIN — DEXAMETHASONE 4 MG: 4 TABLET ORAL at 14:23

## 2024-05-03 ASSESSMENT — GAIT ASSESSMENTS
ASSISTIVE DEVICE: HAND HELD ASSIST
DEVIATION: STEP TO;BRADYKINETIC;DECREASED BASE OF SUPPORT
DISTANCE (FEET): 100
GAIT LEVEL OF ASSIST: CONTACT GUARD ASSIST

## 2024-05-03 ASSESSMENT — COGNITIVE AND FUNCTIONAL STATUS - GENERAL
MOBILITY SCORE: 19
MOVING FROM LYING ON BACK TO SITTING ON SIDE OF FLAT BED: A LITTLE
DAILY ACTIVITIY SCORE: 20
PERSONAL GROOMING: A LITTLE
TOILETING: A LITTLE
SUGGESTED CMS G CODE MODIFIER DAILY ACTIVITY: CJ
SUGGESTED CMS G CODE MODIFIER MOBILITY: CK
MOVING TO AND FROM BED TO CHAIR: A LITTLE
STANDING UP FROM CHAIR USING ARMS: A LITTLE
CLIMB 3 TO 5 STEPS WITH RAILING: A LITTLE
HELP NEEDED FOR BATHING: A LITTLE
WALKING IN HOSPITAL ROOM: A LITTLE
DRESSING REGULAR LOWER BODY CLOTHING: A LITTLE

## 2024-05-03 ASSESSMENT — PAIN DESCRIPTION - PAIN TYPE
TYPE: ACUTE PAIN

## 2024-05-03 ASSESSMENT — ACTIVITIES OF DAILY LIVING (ADL): TOILETING: INDEPENDENT

## 2024-05-03 NOTE — THERAPY
Occupational Therapy   Initial Evaluation     Patient Name: Ailin Good  Age:  73 y.o., Sex:  female  Medical Record #: [id number]  Today's Date: 5/3/2024     Precautions  Precautions: Fall Risk  HOB > 30 and SBP < 150    Assessment  Patient is a 73 y.o. female with a h/o hypertension, osteoarthritis, essential thrombocytosis, hypothyroidism with prior thyroidectomy who presented 5/2/2024 for elective right suboccipital craniectomy with resection of complex right cerebellar meningioma. Pt is active and independent at baseline. She lives alone with her dogs but has a sister and friends near by. During OT eval, pt limited most by headache, dizziness and light sensitivity. Pt's dizziness exacerbated when turning. Expect pt's symptoms to improve and for her to be able to return home. Recommend help initially with IADLs from family or friends.     Pt seen for OT eval in coordination with PT due to patient's  poor activity tolerance related to headache, nausea and dizziness  with attempts at mobility    Plan    Occupational Therapy Initial Treatment Plan   Treatment Interventions: Self Care / Activities of Daily Living, Therapeutic Exercises, Therapeutic Activity  Treatment Frequency: 3 Times per Week  Duration: Until Therapy Goals Met    DC Equipment Recommendations: Unable to determine at this time  Discharge Recommendations: Recommend home health for continued occupational therapy services (as long as pt has assist from sister or friends initially for IADLs)     Subjective    Agreeable to therapy session      Objective       05/03/24 1019   Prior Living Situation   Prior Services None;Home-Independent   Housing / Facility 1 Story House   Steps Into Home 1   Steps In Home 0   Bathroom Set up Walk In Shower;Shower Chair   Equipment Owned Front-Wheel Walker;Tub / Shower Seat   Lives with - Patient's Self Care Capacity Alone and Able to Care For Self  (with her dogs)   Comments Pt's sister lives down the street and pt has  supportive neighbors we well.   Prior Level of ADL Function   Self Feeding Independent   Grooming / Hygiene Independent   Bathing Independent   Dressing Independent   Toileting Independent   Prior Level of IADL Function   Medication Management Independent   Laundry Independent   Kitchen Mobility Independent   Home Management Independent   Shopping Independent   Prior Level Of Mobility Independent Without Device in Community   Driving / Transportation Driving Independent   Comments Pt reports she had back surgery 7 months ago and still has not fully recovered to her pre-surgical activity level. Pt reports she walks a couple miles per day   History of Falls   History of Falls Yes  (with the past week)   Precautions   Precautions Fall Risk   Vitals   O2 Delivery Device Silicone Nasal Cannula   Pain 0 - 10 Group   Therapist Pain Assessment During Activity;Nurse Notified  (headache)   Cognition    Cognition / Consciousness WDL   Level of Consciousness Alert   Active ROM Upper Body   Active ROM Upper Body  WDL   Dominant Hand Right   Strength Upper Body   Upper Body Strength  WDL   Upper Body Muscle Tone   Upper Body Muscle Tone  WDL   Coordination Upper Body   Coordination WDL   Balance Assessment   Sitting Balance (Static) Fair +   Sitting Balance (Dynamic) Fair +   Standing Balance (Static) Fair -   Standing Balance (Dynamic) Fair -   Weight Shift Sitting Good   Weight Shift Standing Fair   Comments without AD   Bed Mobility    Supine to Sit Standby Assist   Sit to Supine Standby Assist   Scooting Standby Assist   ADL Assessment   Eating Independent   Grooming Supervision;Seated  (grooming performed in sitting due to headache following mobility)   Lower Body Dressing Standby Assist   Toileting   (morrell)   6 Clicks Daily Activity Score 20   Functional Mobility   Sit to Stand Contact Guard Assist   Mobility walked in room with close SBA, limited by headache and dizziness   Comments Pt reports dizziness with turning,  benefits from cues for gaze stablization   ICU Target Mobility Level   ICU Mobility - Targeted Level Level 4   Activity Tolerance   Comments declined to sit up in chair   Patient / Family Goals   Patient / Family Goal #1 to return home   Short Term Goals   Short Term Goal # 1 Pt will stand at the sink to groom with supervision   Short Term Goal # 2 Pt will complete toilet transfers with supervision   Short Term Goal # 3 Pt will complete LB dressing with supervision   Education Group   Education Provided Role of Occupational Therapist   Role of Occupational Therapist Patient Response Patient;Acceptance;Explanation;Verbal Demonstration   Occupational Therapy Initial Treatment Plan    Treatment Interventions Self Care / Activities of Daily Living;Therapeutic Exercises;Therapeutic Activity   Treatment Frequency 3 Times per Week   Duration Until Therapy Goals Met   Problem List   Problem List Decreased Active Daily Living Skills;Decreased Homemaking Skills;Decreased Functional Mobility;Decreased Activity Tolerance;Impaired Postural Control / Balance

## 2024-05-03 NOTE — DIETARY
Nutrition Update:    Day 1 of admit.  Ailin Good is a 73 y.o. female with admitting DX of Benign neoplasm of cerebral meninges, Meningioma.     Pt is on clear liquids as of yesterday afternoon r/t nausea. Discussed with RN on floor, pt wanting less sugary items and more broths, sugar-containing items are about half of the items on the tray. I went ahead and modified several meals going forward to incorporate sugar free clear liquids and additional broths for pt in hopes to promote adequate PO intake and tolerance of clear liquids.     Problem: Nutritional:  Goal: Achieve adequate nutritional intake  Description: Patient will tolerate diet past clear liquids and consume 50%+ of meals  Outcome: Progressing    Advance diet past clear liquids when pt is able to tolerate. Monitor for tolerance of PO intake and adequate PO intake.     RD following.

## 2024-05-03 NOTE — CARE PLAN
The patient is Stable - Low risk of patient condition declining or worsening    Shift Goals  Clinical Goals: Stable neuro exam, SBP < 160, decrease nausea  Patient Goals: Rest  Family Goals: DEJA    Progress made toward(s) clinical / shift goals:        Problem: Fall Risk  Goal: Patient will remain free from falls  Outcome: Progressing     Problem: Knowledge Deficit - Standard  Goal: Patient and family/care givers will demonstrate understanding of plan of care, disease process/condition, diagnostic tests and medications  Outcome: Progressing     Problem: Neuro Status  Goal: Neuro status will remain stable or improve  Outcome: Progressing     Problem: Pain - Standard  Goal: Alleviation of pain or a reduction in pain to the patient’s comfort goal  Outcome: Progressing       Patient is not progressing towards the following goals:    N/A

## 2024-05-03 NOTE — CARE PLAN
The patient is Watcher - Medium risk of patient condition declining or worsening         Progress made toward(s) clinical / shift goals:    Problem: Fall Risk  Goal: Patient will remain free from falls  Outcome: Progressing     Problem: Knowledge Deficit - Standard  Goal: Patient and family/care givers will demonstrate understanding of plan of care, disease process/condition, diagnostic tests and medications  Outcome: Progressing     Problem: Skin Integrity  Goal: Skin integrity is maintained or improved  Outcome: Progressing     Problem: Neuro Status  Goal: Neuro status will remain stable or improve  Outcome: Progressing   Improved throughout the shift    Patient is not progressing towards the following goals:

## 2024-05-03 NOTE — THERAPY
Physical Therapy   Initial Evaluation     Patient Name: Ailin Godo  Age:  73 y.o., Sex:  female  Medical Record #: [id number]  Today's Date: 5/3/2024     Precautions  Precautions: Fall Risk    Assessment  Pt presents with impaired activity tolerance, dynamic balance and dizziness s/p meningioma resection requiring suboccipital crani in sitting of HTN, OA, hypothyroidism with prior thyroidism with prior thyroidectomy. Pt is most limited by acute dizziness, able to improve with cues of gaze stabilization and segmental movement with turns, turns do trigger dizziness but able to focus gaze and return to room; pt does live alone discussed sister staying with her at least for first few days as a fall/posterior head strike could be catastrophic; she does drink a glass of wine a night; discussed recommendations hopefully with acute management of swelling/subacute healing will resolve; will see at least one more session for stairs and hopeful improved balance; pt declines any placement options at this time.       Plan    Physical Therapy Initial Treatment Plan   Treatment Plan : Equipment, Bed Mobility, Gait Training, Family / Caregiver Training, Neuro Re-Education / Balance, Manual Therapy, Self Care / Home Evaluation, Therapeutic Activities, Therapeutic Exercise, Stair Training  Treatment Frequency: 4 Times per Week  Duration: Until Therapy Goals Met    DC Equipment Recommendations: Unable to determine at this time (hopefully none, may need FWW if does not improve)  Discharge Recommendations: Recommend home health for continued physical therapy services (home safety set up)       Abridged Subjective/Objective     05/03/24 1025   Prior Living Situation   Prior Services None;Home-Independent   Housing / Facility 1 Story House   Steps Into Home 1   Steps In Home 0   Bathroom Set up Walk In Shower;Shower Chair   Equipment Owned Front-Wheel Walker;Tub / Shower Seat   Lives with - Patient's Self Care Capacity Alone and Able to  Care For Self   Comments has a sister that lives 'down the road' and caregiver that is keeping dog currently; reports 4 falls, lumbar sx 7months ago and had progressed back to 5 miles a day walking   Prior Level of Functional Mobility   Bed Mobility Independent   Transfer Status Independent   Ambulation Independent   Ambulation Distance to tolerance   Assistive Devices Used None   Cognition    Cognition / Consciousness X   Level of Consciousness Alert   Safety Awareness Impaired   New Learning Impaired   Comments slightly impulsive/fast moving; when discussing rehab vs home pt reports ' i plan on water size' discussed daily tasks needs for home and safety concern given conitnued dizziness and recent imbalance   Passive ROM Lower Body   Passive ROM Lower Body WDL   Strength Lower Body   Lower Body Strength  WDL   Sensation Lower Body   Lower Extremity Sensation   WDL   Balance Assessment   Sitting Balance (Static) Fair +   Sitting Balance (Dynamic) Fair +   Standing Balance (Static) Fair   Standing Balance (Dynamic) Fair -   Weight Shift Sitting Good   Weight Shift Standing Fair   Comments HHA intermittently; no loss of balance but only pertrubations was turning and achieved increasd dizziness   Bed Mobility    Supine to Sit Standby Assist  (VCfor sequencing of eye to head movement to reduce dizzines)   Sit to Supine Standby Assist   Gait Analysis   Gait Level Of Assist Contact Guard Assist   Assistive Device Hand Held Assist   Distance (Feet) 100   # of Times Distance was Traveled 1   Deviation Step To;Bradykinetic;Decreased Base Of Support   Weight Bearing Status full   Vision Deficits Impacting Mobility light sensitivity and scanning dizziness   Comments distance limited by therapist as not to not provoke further symptoms, pt reports dizziness and nausea with turns, did better wiht eye to head then trunk turns;   Functional Mobility   Sit to Stand Standby Assist   Edema / Skin Assessment   Edema / Skin  X    Comments incision c/d/i; otherwise appeared normal, socks donned throughout   Patient / Family Goals    Patient / Family Goal #1 to improve symptoms   Short Term Goals    Short Term Goal # 1 pt will ambulate x 150ft with no device and supervision wihtin 6 visits to ensure independent mobility at home.   Short Term Goal # 2 Pt will ascend/descend 1-2 steps with B UE support within 6 visits to enter/exit of home.   Short Term Goal # 3 Pt will demonstrate independenec wiht HEP aimed at VOR/balance rehab wihtin 6 visits.   Education Group   Role of Physical Therapist Patient Response Patient;Acceptance;Explanation;Demonstration;Action Demonstration;Verbal Demonstration   Exercises - Supine Patient Response Patient;Acceptance;Explanation;Demonstration;Reinforcement Needed;Verbal Demonstration;Action Demonstration  (vertical positioning, ankle pumps)   Additional Comments ice vs heat; protein/water in diet

## 2024-05-03 NOTE — CARE PLAN
The patient is Stable - Low risk of patient condition declining or worsening    Shift Goals  Clinical Goals: Stable neuro exam, SBP < 160, decrease nausea  Patient Goals: Rest  Family Goals: DEJA    Progress made toward(s) clinical / shift goals:    Problem: Fall Risk  Goal: Patient will remain free from falls  Outcome: Progressing     Problem: Knowledge Deficit - Standard  Goal: Patient and family/care givers will demonstrate understanding of plan of care, disease process/condition, diagnostic tests and medications  Outcome: Progressing     Problem: Skin Integrity  Goal: Skin integrity is maintained or improved  Outcome: Progressing     Problem: Neuro Status  Goal: Neuro status will remain stable or improve  Outcome: Progressing     Problem: Incision Care  Goal: Optimal post surgical incision care  Outcome: Progressing     Problem: Pain - Standard  Goal: Alleviation of pain or a reduction in pain to the patient’s comfort goal  Outcome: Progressing       Patient is not progressing towards the following goals:

## 2024-05-03 NOTE — PROGRESS NOTES
Neurosurgery Progress Note    Subjective:  States some nausea.  Min YEPEZ. Johnson, PCA    Exam:  AAOx4, Tongue ML, no drift. FCx4.  CDI    BP  Min: 121/68  Max: 162/73  Pulse  Av.4  Min: 53  Max: 135  Resp  Av  Min: 10  Max: 29  Monitored Temp 2  Av.6 °C (97.8 °F)  Min: 35.5 °C (95.9 °F)  Max: 37.2 °C (99 °F)  SpO2  Av %  Min: 92 %  Max: 98 %    No data recorded    Recent Labs     24  1506 24  0435   WBC 9.9 10.9*   RBC 4.22 4.22   HEMOGLOBIN 13.2 13.0   HEMATOCRIT 39.3 39.0   MCV 93.1 92.4   MCH 31.3 30.8   MCHC 33.6 33.3   RDW 47.9 47.8   PLATELETCT 541* 551*   MPV 9.9 9.8     Recent Labs     24  1506 24  0435   SODIUM 138 139   POTASSIUM 4.1 4.7   CHLORIDE 104 106   CO2 21 21   GLUCOSE 178* 149*   BUN 13 14   CREATININE 0.62 0.52   CALCIUM 8.3* 8.1*               Intake/Output                         24 - 24 0659 24 - 2459     1900-0659 Total 1900-0659 Total                 Intake    P.O.  90  120 210  --  -- --    P.O. 90 120 210 -- -- --    I.V.  2001.9  1128.1 3130.1  554.4  -- 554.4    Magnesium Sulfate Volume -- -- -- 50 -- 50    PCA End of Shift Total Volume (ml) -- 0 0 -- -- --    Volume (mL) (0.9 % NaCl with KCl 20 mEq infusion) 601.9 1128.1 1730.1 504.4 -- 504.4    Volume (mL) (lactated ringers infusion) 1400 -- 1400 -- -- --    IV Piggyback  100  100 200  --  -- --    Volume (mL) (ceFAZolin (Ancef) 2 g in  mL IVPB) 100 100 200 -- -- --    Total Intake 2192 1348.1 3540.1 554.4 -- 554.4       Output    Urine  388  607 943  715  -- 715    Urine 100 -- 100 -- -- --    Output (mL) ([REMOVED] Urethral Catheter Non-latex;Temperature probe 16 Fr. 24 1316) 288 196 843 715 -- 713    Emesis  100  50 150  --  -- --    Emesis 100 50 150 -- -- --    Emesis - Number of Times 1 x 1 x 2 x -- -- --    Stool  --  -- --  --  -- --    Number of Times Stooled -- 0 x 0 x -- -- --    Blood  100  -- 100  --  -- --     Est. Blood Loss 100 -- 100 -- -- --    Total Output  715 -- 715       Net I/O     1604 743.1 2347.1 -160.6 -- -160.6              Intake/Output Summary (Last 24 hours) at 5/3/2024 1319  Last data filed at 5/3/2024 1206  Gross per 24 hour   Intake 2679.54 ml   Output 1670 ml   Net 1009.54 ml             insulin regular  2-9 Units Q6HRS    And    dextrose bolus  25 g Q15 MIN PRN    prochlorperazine  10 mg Q6HRS PRN    acetaminophen  650 mg Q4HRS PRN    promethazine  12.5 mg Q6HRS PRN    amLODIPine  5 mg DAILY    hydroCHLOROthiazide  25 mg DAILY    levothyroxine  100 mcg AM ES    Pharmacy Consult Request  1 Each PHARMACY TO DOSE    MD ALERT...DO NOT ADMINISTER NSAIDS or ASPIRIN unless ORDERED By Neurosurgery  1 Each PRN    ondansetron  4 mg Q4HRS PRN    diphenhydrAMINE  25 mg Q6HRS PRN    scopolamine  1 Patch Q72HRS PRN    labetalol  10 mg Q HOUR PRN    hydrALAZINE  10 mg Q HOUR PRN    cloNIDine  0.1 mg Q4HRS PRN    docusate sodium  100 mg BID    senna-docusate  1 Tablet Nightly    senna-docusate  1 Tablet Q24HRS PRN    polyethylene glycol/lytes  1 Packet BID PRN    magnesium hydroxide  30 mL QDAY PRN    bisacodyl  10 mg Q24HRS PRN    sodium phosphate  1 Each Once PRN    artificial tears  1 Application PRN    dexamethasone  4 mg Q8HRS    Or    dexamethasone  4 mg Q8HRS    famotidine  20 mg BID       Assessment and Plan:    POD #1  Prophylactic anticoagulation: no         Start date/time: tbd       Brain Compression: No  CT stable  Ok out of ICU  DC pca- start tyelnol only  Phenergan suppositories  PT- ambulate

## 2024-05-03 NOTE — DISCHARGE SUMMARY
DATE OF ADMISSION:  05/02/2024    DATE OF DISCHARGE:  05/04/2024    OPERATION PERFORMED:  Right-sided suboccipital craniotomy for tumor.     On date of admission, operation was performed.  Postoperatively, the patient   has done well.  She does have some nausea.  Her PCA has been stopped.  Her   exam is otherwise intact.  We will move her Johnson and she will ambulate today.    If doing well tomorrow, we will discharge tomorrow or Sunday.     DISCHARGE INSTRUCTIONS:  Given to patient in paper format.     DISCHARGE MEDICATIONS:  1.  The patient is likely using Tylenol only.  2.  Steroids will be stopped at the time of discharge.     ASSESSMENT AND PLAN:  1.  Status post above delineated surgery with Dr. Dyer on 05/02/2024.  2.  The patient will avoid all NSAIDs until clear.  3.  The patient will follow up at 2 and 4 weeks for staple removal and then   follow up with Dr. Dyer.     Should the patient have further questions or concerns, they will not hesitate   to give our office a call.          ___________________________________  BONG Washington      DD: 05/03/2024 13:27:48  DT: 05/03/2024 16:48:18  Job#: [id number]

## 2024-05-03 NOTE — WOUND TEAM
[facility]  Inpatient Services  Wound and Skin Care Brief Evaluation    Admission Date: 5/2/2024     Last order of IP CONSULT TO WOUND CARE was found on 5/2/2024 from Hospital Encounter on 4/8/2024     HPI, PMH, SH: Reviewed    No chief complaint on file.    Diagnosis: Benign neoplasm of cerebral meninges (HCC) [D32.0]  Meningioma (HCC) [D32.9]    Unit where seen by Wound Team: R103/00     Wound consult placed regarding R hallux. Chart and images reviewed. This discussed with bedside RN Betsy. This clinician in to assess patient. Patient pleasant and agreeable. Sacrum is intact and blanching, with bruise on the R buttocks area. Patient states she sustained fall prior to admission which caused bruising. Per bedside RN, when patient arrived from OR, DANIEL hose were removed. The R medial hallux had slow-to-sam erythema present that has since resolved after DANIEL hose removal. Heels are intact and blanching. Non-selectively debrided with Moist warm washcloth.     No pressure injuries or advanced wound care needs identified. Wound consult completed. No further follow up unless indicated and consulted.       Sacrum     L dorsal foot      L heel     R dorsal foot     R heel        PREVENTATIVE INTERVENTIONS:    Q shift Aiden - performed per nursing policy  Q shift pressure point assessments - performed per nursing policy    Surface/Positioning  ICU Low Airloss - Currently in Place  Reposition q 2 hours - Currently in Place    Offloading/Redistribution  Float Heels off Bed with Pillows - Currently in Place           Respiratory  Silicone O2 tubing - Currently in Place  Gray Foam Ear protectors - Currently in Place    Containment/Moisture Prevention    Dri-megan pad - Currently in Place  Johnson Catheter - Currently in Place

## 2024-05-04 ENCOUNTER — HOME HEALTH ADMISSION (OUTPATIENT)
Dept: HOME HEALTH SERVICES | Facility: HOME HEALTHCARE | Age: 74
End: 2024-05-04
Payer: MEDICARE

## 2024-05-04 LAB
ANION GAP SERPL CALC-SCNC: 9 MMOL/L (ref 7–16)
BUN SERPL-MCNC: 18 MG/DL (ref 8–22)
CALCIUM SERPL-MCNC: 8.4 MG/DL (ref 8.5–10.5)
CHLORIDE SERPL-SCNC: 104 MMOL/L (ref 96–112)
CO2 SERPL-SCNC: 25 MMOL/L (ref 20–33)
CREAT SERPL-MCNC: 0.54 MG/DL (ref 0.5–1.4)
ERYTHROCYTE [DISTWIDTH] IN BLOOD BY AUTOMATED COUNT: 48.4 FL (ref 35.9–50)
GFR SERPLBLD CREATININE-BSD FMLA CKD-EPI: 98 ML/MIN/1.73 M 2
GLUCOSE BLD STRIP.AUTO-MCNC: 128 MG/DL (ref 65–99)
GLUCOSE BLD STRIP.AUTO-MCNC: 131 MG/DL (ref 65–99)
GLUCOSE BLD STRIP.AUTO-MCNC: 148 MG/DL (ref 65–99)
GLUCOSE SERPL-MCNC: 140 MG/DL (ref 65–99)
HCT VFR BLD AUTO: 38.3 % (ref 37–47)
HGB BLD-MCNC: 12.6 G/DL (ref 12–16)
MCH RBC QN AUTO: 30.8 PG (ref 27–33)
MCHC RBC AUTO-ENTMCNC: 32.9 G/DL (ref 32.2–35.5)
MCV RBC AUTO: 93.6 FL (ref 81.4–97.8)
PLATELET # BLD AUTO: 499 K/UL (ref 164–446)
PMV BLD AUTO: 9.9 FL (ref 9–12.9)
POTASSIUM SERPL-SCNC: 4.3 MMOL/L (ref 3.6–5.5)
RBC # BLD AUTO: 4.09 M/UL (ref 4.2–5.4)
SODIUM SERPL-SCNC: 138 MMOL/L (ref 135–145)
WBC # BLD AUTO: 10.1 K/UL (ref 4.8–10.8)

## 2024-05-04 PROCEDURE — 99223 1ST HOSP IP/OBS HIGH 75: CPT | Performed by: PHYSICAL MEDICINE & REHABILITATION

## 2024-05-04 RX ADMIN — DEXAMETHASONE 4 MG: 4 TABLET ORAL at 14:49

## 2024-05-04 RX ADMIN — FAMOTIDINE 20 MG: 20 TABLET, FILM COATED ORAL at 17:31

## 2024-05-04 RX ADMIN — FAMOTIDINE 20 MG: 20 TABLET, FILM COATED ORAL at 05:44

## 2024-05-04 RX ADMIN — DEXAMETHASONE 4 MG: 4 TABLET ORAL at 05:44

## 2024-05-04 RX ADMIN — DOCUSATE SODIUM 100 MG: 100 CAPSULE, LIQUID FILLED ORAL at 05:44

## 2024-05-04 RX ADMIN — HYDROCHLOROTHIAZIDE 25 MG: 25 TABLET ORAL at 05:44

## 2024-05-04 RX ADMIN — AMLODIPINE BESYLATE 5 MG: 5 TABLET ORAL at 05:44

## 2024-05-04 RX ADMIN — ACETAMINOPHEN 650 MG: 325 TABLET, FILM COATED ORAL at 17:31

## 2024-05-04 RX ADMIN — DOCUSATE SODIUM 100 MG: 100 CAPSULE, LIQUID FILLED ORAL at 17:31

## 2024-05-04 RX ADMIN — ACETAMINOPHEN 650 MG: 325 TABLET, FILM COATED ORAL at 05:50

## 2024-05-04 RX ADMIN — DEXAMETHASONE 4 MG: 4 TABLET ORAL at 20:48

## 2024-05-04 RX ADMIN — ACETAMINOPHEN 650 MG: 325 TABLET, FILM COATED ORAL at 12:37

## 2024-05-04 RX ADMIN — LEVOTHYROXINE SODIUM 100 MCG: 0.1 TABLET ORAL at 05:44

## 2024-05-04 ASSESSMENT — GAIT ASSESSMENTS
DISTANCE (FEET): 250
GAIT LEVEL OF ASSIST: STANDBY ASSIST
DEVIATION: DECREASED BASE OF SUPPORT

## 2024-05-04 ASSESSMENT — PAIN DESCRIPTION - PAIN TYPE
TYPE: SURGICAL PAIN
TYPE: ACUTE PAIN
TYPE: SURGICAL PAIN

## 2024-05-04 ASSESSMENT — COGNITIVE AND FUNCTIONAL STATUS - GENERAL
STANDING UP FROM CHAIR USING ARMS: A LITTLE
MOBILITY SCORE: 19
WALKING IN HOSPITAL ROOM: A LITTLE
MOVING TO AND FROM BED TO CHAIR: A LITTLE
CLIMB 3 TO 5 STEPS WITH RAILING: A LITTLE
MOVING FROM LYING ON BACK TO SITTING ON SIDE OF FLAT BED: A LITTLE
SUGGESTED CMS G CODE MODIFIER MOBILITY: CK

## 2024-05-04 NOTE — FACE TO FACE
Face to Face Supporting Documentation - Home Health    The encounter with this patient was in whole or in part the primary reason for home health admission.    Date of encounter:   Patient:                    MRN:                       YOB: 2022  Ailin Good  [id number]  1950     Home health to see patient for:  Skilled Nursing care for assessment, interventions & education and Physical Therapy evaluation and treatment    Skilled need for:  Surgical Aftercare postop    Skilled nursing interventions to include:  Comment: psot op    Homebound status evidenced by:  Needs the assistance of another person in order to leave the home. Leaving home requires a considerable and taxing effort. There is a normal inability to leave the home.    Community Physician to provide follow up care: Jemma Martin P.A.-C.     Optional Interventions? No      I certify the face to face encounter for this home health care referral meets the CMS requirements and the encounter/clinical assessment with the patient was, in whole, or in part, for the medical condition(s) listed above, which is the primary reason for home health care. Based on my clinical findings: the service(s) are medically necessary, support the need for home health care, and the homebound criteria are met.  I certify that this patient has had a face to face encounter by myself.  ANDREZ Murray. - NPI: [id number]

## 2024-05-04 NOTE — CARE PLAN
The patient is Stable - Low risk of patient condition declining or worsening    Shift Goals  Clinical Goals: Pain management, Stable neuro  Patient Goals: Pain management, D/C tomorrow  Family Goals: DEJA    Progress made toward(s) clinical / shift goals:  Patient is A&Ox4. Educated patient on POC. Medicated patient per MAR for pain. Incision dressing is CDI. Bedside commode in use for voiding needs. Stable neuros throughout shift. Bed is low and locked. Bed alarm on. Call light within reach. Hourly rounding continues.       Problem: Knowledge Deficit - Standard  Goal: Patient and family/care givers will demonstrate understanding of plan of care, disease process/condition, diagnostic tests and medications  Outcome: Progressing     Problem: Neuro Status  Goal: Neuro status will remain stable or improve  Outcome: Progressing       Patient is not progressing towards the following goals:

## 2024-05-04 NOTE — THERAPY
Physical Therapy   Daily Treatment     Patient Name: Ailin Good  Age:  73 y.o., Sex:  female  Medical Record #: [id number]  Today's Date: 5/4/2024     Precautions  Precautions: Fall Risk    Assessment    Pt with improved dizziness, able to navigate household environments with behavioral compensations, discussed home exercise program to begin after a few days of no symptoms of VOR, gaze stabilization exercises and level pelvis walking with mirror feedback; recommend dc home when medically appropriate to do so; will follow if remains in house     Plan    Treatment Plan Status: Continue Current Treatment Plan  Type of Treatment: Equipment, Bed Mobility, Gait Training, Family / Caregiver Training, Neuro Re-Education / Balance, Manual Therapy, Self Care / Home Evaluation, Therapeutic Activities, Therapeutic Exercise, Stair Training  Treatment Frequency: 4 Times per Week  Treatment Duration: Until Therapy Goals Met    DC Equipment Recommendations: None  Discharge Recommendations: Recommend home health for continued physical therapy services (home safety set up)       Abridged Subjective/Objective     05/04/24 1210   Cognition    Cognition / Consciousness WDL   Level of Consciousness Alert   Comments better today, more focused with good carryover of education and good questions;   Sensation Lower Body   Lower Extremity Sensation   WDL   Balance   Sitting Balance (Static) Good   Sitting Balance (Dynamic) Fair +   Standing Balance (Static) Fair +   Standing Balance (Dynamic) Fair   Weight Shift Sitting Good   Weight Shift Standing Fair   Skilled Intervention Postural Facilitation;Sequencing;Tactile Cuing;Verbal Cuing   Comments no UE support in sitting/standing; no loss of balance in moving environment;   Bed Mobility    Supine to Sit Modified Independent   Sit to Supine Modified Independent   Gait Analysis   Gait Level Of Assist Standby Assist   Assistive Device None   Distance (Feet) 250   # of Times Distance was Traveled 2    Deviation Decreased Base Of Support   # of Stairs Climbed 2   Level of Assist with Stairs Modified Independent   Weight Bearing Status full   Vision Deficits Impacting Mobility light sensitivyt, utilized sunglasses in hallway   Skilled Intervention Tactile Cuing;Sequencing;Postural Facilitation;Verbal Cuing   Comments distance limited by stair training;   Functional Mobility   Sit to Stand Supervised   Short Term Goals    Short Term Goal # 1 pt will ambulate x 150ft with no device and supervision wihtin 6 visits to ensure independent mobility at home.   Goal Outcome # 1 Goal met   Short Term Goal # 2 Pt will ascend/descend 1-2 steps with B UE support within 6 visits to enter/exit of home.   Goal Outcome # 2 Goal met   Short Term Goal # 3 Pt will demonstrate independenec wiht HEP aimed at VOR/balance rehab wihtin 6 visits.   Goal Outcome # 3 Goal not met

## 2024-05-04 NOTE — PROGRESS NOTES
Neurosurgery Progress Note    Subjective:  States nausea improved.   Pain controlled with tyelnol  Eating, ambulating and voiding    Exam:  AAOx4, Tongue ML, no drift. FCx4.  CDI    BP  Min: 126/77  Max: 162/73  Pulse  Av.4  Min: 51  Max: 67  Resp  Av.4  Min: 16  Max: 18  Temp  Av.5 °C (97.7 °F)  Min: 36.2 °C (97.2 °F)  Max: 36.8 °C (98.3 °F)  Monitored Temp 2  Av.6 °C (97.8 °F)  Min: 36.3 °C (97.3 °F)  Max: 36.8 °C (98.3 °F)  SpO2  Av.1 %  Min: 91 %  Max: 98 %    No data recorded    Recent Labs     24  1506 24  0435 24  0645   WBC 9.9 10.9* 10.1   RBC 4.22 4.22 4.09*   HEMOGLOBIN 13.2 13.0 12.6   HEMATOCRIT 39.3 39.0 38.3   MCV 93.1 92.4 93.6   MCH 31.3 30.8 30.8   MCHC 33.6 33.3 32.9   RDW 47.9 47.8 48.4   PLATELETCT 541* 551* 499*   MPV 9.9 9.8 9.9     Recent Labs     24  1506 24  0435 24  0645   SODIUM 138 139 138   POTASSIUM 4.1 4.7 4.3   CHLORIDE 104 106 104   CO2 21 21 25   GLUCOSE 178* 149* 140*   BUN 13 14 18   CREATININE 0.62 0.52 0.54   CALCIUM 8.3* 8.1* 8.4*               Intake/Output                         24 - 24 0624 - 2459      Total  Total                 Intake    I.V.  554.4  -- 554.4  --  -- --    Magnesium Sulfate Volume 50 -- 50 -- -- --    PCA End of Shift Total Volume (ml) 0 -- 0 -- -- --    Volume (mL) (0.9 % NaCl with KCl 20 mEq infusion) 504.4 -- 504.4 -- -- --    Total Intake 554.4 -- 554.4 -- -- --       Output    Urine  1115  175 1290  --  -- --    Number of Times Voided -- 2 x 2 x -- -- --    Urine Void (mL) 400 175 575 -- -- --    Output (mL) ([REMOVED] Urethral Catheter Non-latex;Temperature probe 16 Fr. 24 1316) 715 -- 715 -- -- --    Stool  --  -- --  --  -- --    Number of Times Stooled -- 0 x 0 x -- -- --    Total Output 5130 360 2199 -- -- --       Net I/O     -560.6 -175 -735.6 -- -- --              Intake/Output Summary (Last 24  hours) at 5/4/2024 1050  Last data filed at 5/3/2024 1952  Gross per 24 hour   Intake 118.05 ml   Output 875 ml   Net -756.95 ml             insulin regular  2-9 Units Q6HRS    And    dextrose bolus  25 g Q15 MIN PRN    prochlorperazine  10 mg Q6HRS PRN    acetaminophen  650 mg Q4HRS PRN    promethazine  12.5 mg Q6HRS PRN    amLODIPine  5 mg DAILY    hydroCHLOROthiazide  25 mg DAILY    levothyroxine  100 mcg AM ES    Pharmacy Consult Request  1 Each PHARMACY TO DOSE    MD ALERT...DO NOT ADMINISTER NSAIDS or ASPIRIN unless ORDERED By Neurosurgery  1 Each PRN    ondansetron  4 mg Q4HRS PRN    diphenhydrAMINE  25 mg Q6HRS PRN    scopolamine  1 Patch Q72HRS PRN    labetalol  10 mg Q HOUR PRN    hydrALAZINE  10 mg Q HOUR PRN    cloNIDine  0.1 mg Q4HRS PRN    docusate sodium  100 mg BID    senna-docusate  1 Tablet Nightly    senna-docusate  1 Tablet Q24HRS PRN    polyethylene glycol/lytes  1 Packet BID PRN    magnesium hydroxide  30 mL QDAY PRN    bisacodyl  10 mg Q24HRS PRN    sodium phosphate  1 Each Once PRN    artificial tears  1 Application PRN    dexamethasone  4 mg Q8HRS    Or    dexamethasone  4 mg Q8HRS    famotidine  20 mg BID       Assessment and Plan:    POD #2 Crani for tumor  Path not yet av  Prophylactic anticoagulation: no         Start date/time: tbd       Brain Compression: No  CT stable  Ok home when able to wean O2

## 2024-05-04 NOTE — PROGRESS NOTES
Monitor summary [id number]    KS 0.15/QRS 0.08/QT 0.44    Sinus bradycardia to sinus rhythm in the 80's.

## 2024-05-04 NOTE — CONSULTS
Physical Medicine and Rehabilitation Consultation          Date of initial consultation: 5/4/2024  Consulting provider:  MELO Murray   Reason for consultation: assess for acute inpatient rehab appropriateness  LOS: 2 Day(s)    Chief complaint: Right cerebellar meningioma    HPI: The patient is a 73 y.o. right hand dominant female with a past medical history of right cerebellar meningioma, NAFLD;  who presented on 5/2/2024  4:46 AM for an elective resection of her complex right cerebellar meningioma with Dr. Jaleel Dyer MD at who removed the 4 cm mass under Stealth guidance with a right suboccipital craniectomy and titanium cranioplasty.  Postop labs are essentially within normal limits, her platelet count is a little high at 499.  Vitals reviewed, had an isolated spike in blood pressure to 162 systolic otherwise has been below 140 today.     The patient currently reports overall feeling well.  She has some headache, some residual imbalance.  Patient feels she is appropriate to discharge home and reports that her sister can stay to care for her as long as she needs to.  Sister can drive her to follow-up appointments.  She has a walk-in shower and would need a shower chair.     ROS  Pertinent positives are mentioned in the HPI, all others reviewed and are negative.    Social Hx:  1 SH  1 JOSHUA  With: Alone.  Sister lives down the road.    Alcohol: 1 glass of wine per night      THERAPY:  Restrictions: Fall risk  PT: Functional mobility   5/3: Walking 100 feet contact-guard assist with hand-held assist  5/4: Walking 250 feet x 2 without AD and standby assist, climb 2 stairs    OT: ADLs  5/3: Standby assist lower body dressing    SLP:   None    IMAGING:  CT head 5/3/2024  1.  Postoperative changes of right occipital craniotomy with small area of hemorrhage within the surgical bed.  2.  Mild bilateral ventricular dilatation, similar to prior MRI dated May 2, 2024. Could represent ex vacuo changes or  "possibly mild hydronephrosis in the appropriate clinical setting.  3.  Bilateral nondependent pneumocephalus    PROCEDURES:  Jaleel Dyer MD 5/2/2024  1.  Right suboccipital craniectomy with microscopic gross total excision of 4   cm right complex cerebellar meningioma with Stealth guidance.  2.  Microscopic dissection.  3.  A 5 cm titanium cranioplasty.    PMH:  Past Medical History:   Diagnosis Date    Allergy     allergic to trees/grass    Anesthesia     PONV    Arthritis     osteoarthritis    Blood dyscrasia     \" too many platelets \"    Essential (hemorrhagic) thrombocythemia (HCC)     Fatty liver disease, nonalcoholic     pt states she was told by MD    Heart murmur 04/18/2024    was told as a child    Heart valve disease     was told by cardiologist    Hypertension     medicated    Polyp of colon 02/20/2019    PONV (postoperative nausea and vomiting)     Postoperative hypothyroidism     thyroidectomy; takes levothyroxine    Psychiatric problem 03/2021    situational depression ( loss of dog)    Urinary incontinence     minor       PSH:  Past Surgical History:   Procedure Laterality Date    CRANIOTOMY STEALTH Right 5/2/2024    Procedure: STEALTH RIGHT SUBOCCIPITAL CRANIECTOMY, MENINGIOMA EXCISION, TITANIUM CRANIOPLASTY;  Surgeon: Jaleel Dyer M.D.;  Location: [facility];  Service: Neurosurgery    WV INSJ STABLJ DEV W/DCMPRN 1 LVL  9/8/2023    Procedure: INSERTION, INTERSPINOUS PROCESS DEVICE;  Surgeon: Jaleel Dyer M.D.;  Location: [facility];  Service: Neurosurgery    LUMBAR LAMINECTOMY DISKECTOMY  9/8/2023    Procedure: POSTERIOR LEFT L4 TRANSPEDICULAR DECOMPRESSION, LEFT L5 LAMINECTOMY, L4-5 PLACEMENT OF INTRALAMINAR DEVICE;  Surgeon: Jaleel Dyer M.D.;  Location: [facility];  Service: Neurosurgery    WV DX BONE MARROW ASPIRATIONS Left 03/10/2021    Procedure: ASPIRATION, BONE MARROW -;  Surgeon: Joe Black M.D.;  Location: [facility];  Service: Orthopedics    " [place] DX BONE MARROW BIOPSIES Left 03/10/2021    Procedure: BIOPSY, BONE MARROW, USING NEEDLE OR TROCAR-DR. PERRY;  Surgeon: Joe Black M.D.;  Location: [facility];  Service: Orthopedics    THYROIDECTOMY  2016    TONSILLECTOMY  1969    TUBAL COAGULATION LAPAROSCOPIC BILATERAL         FHX:  Family History   Problem Relation Age of Onset    Heart Disease Mother     Diabetes Mother     Hypertension Mother     Cancer Father         pancreatic cancer    Stroke Father     Hypertension Father     Heart Disease Sister     Hypertension Sister     Hyperlipidemia Sister        Medications:  Current Facility-Administered Medications   Medication Dose    insulin regular (HumuLIN R,NovoLIN R) injection  2-9 Units    And    dextrose 50% (D50W) injection 25 g  25 g    prochlorperazine (Compazine) injection 10 mg  10 mg    acetaminophen (Tylenol) tablet 650 mg  650 mg    promethazine (Phenergan) suppository 12.5 mg  12.5 mg    amLODIPine (Norvasc) tablet 5 mg  5 mg    hydroCHLOROthiazide tablet 25 mg  25 mg    levothyroxine (Synthroid) tablet 100 mcg  100 mcg    Pharmacy Consult Request ...Pain Management Review 1 Each  1 Each    MD ALERT...DO NOT ADMINISTER NSAIDS or ASPIRIN unless ORDERED By Neurosurgery 1 Each  1 Each    ondansetron (Zofran) syringe/vial injection 4 mg  4 mg    diphenhydrAMINE (Benadryl) injection 25 mg  25 mg    scopolamine (Transderm-Scop) patch 1 Patch  1 Patch    labetalol (Normodyne/Trandate) injection 10 mg  10 mg    hydrALAZINE (Apresoline) injection 10 mg  10 mg    cloNIDine (Catapres) tablet 0.1 mg  0.1 mg    docusate sodium (Colace) capsule 100 mg  100 mg    senna-docusate (Pericolace Or Senokot S) 8.6-50 MG per tablet 1 Tablet  1 Tablet    senna-docusate (Pericolace Or Senokot S) 8.6-50 MG per tablet 1 Tablet  1 Tablet    polyethylene glycol/lytes (Miralax) Packet 1 Packet  1 Packet    magnesium hydroxide (Milk Of Magnesia) suspension 30 mL  30 mL    bisacodyl (Dulcolax) suppository 10 mg  " 10 mg    sodium phosphate (Fleet) enema 133 mL  1 Each    artificial tears (Eye Lubricant) ophth ointment 1 Application  1 Application    dexamethasone (Decadron) tablet 4 mg  4 mg    Or    dexamethasone (Decadron) injection 4 mg  4 mg    famotidine (Pepcid) tablet 20 mg  20 mg       Allergies:  Allergies   Allergen Reactions    Penicillins Unspecified     States she was told she was allergic as a child through allergy testing but has since tolerated e.g. ampicillin    Dust Mite Extract Runny Nose          Pollen Extract Runny Nose          Seasonal Runny Nose     Every tree, grass         Physical Exam:  Vitals: BP (!) 140/81   Pulse (!) 51   Temp 36.4 °C (97.5 °F) (Temporal)   Resp 18   Ht 1.575 m (5' 2\")   Wt 63.5 kg (139 lb 15.9 oz)   SpO2 96%   Gen: NAD  Head: NC/AT  Eyes/ Nose/ Mouth: PERRLA, moist mucous membranes  Cardio: RRR, good distal perfusion, warm extremities  Pulm: normal respiratory effort, no cyanosis   Abd: Soft NTND, negative borborygmi   Ext: No peripheral edema. No calf tenderness. No clubbing.    Mental status:  A&Ox4 (person, place, date, situation) answers questions appropriately follows commands  Speech: fluent, no aphasia or dysarthria    Motor:      Upper Extremity  Myotome R L   Shoulder flexion C5 5 5   Elbow flexion C5 5 5   Wrist extension C6 5 5   Elbow extension C7 5 5   Finger flexion C8 5 5   Finger abduction T1 5 5     Lower Extremity Myotome R L   Hip flexion L2 5 5   Knee extension L3 5 5   Ankle dorsiflexion L4 5 5   Toe extension L5 5 5   Ankle plantarflexion S1 5 5     Sensory:   intact to light touch through out    Coordination:   Slightly altered finger omer on right      Labs: Reviewed and significant for   Recent Labs     05/02/24  1506 05/03/24  0435 05/04/24  0645   RBC 4.22 4.22 4.09*   HEMOGLOBIN 13.2 13.0 12.6   HEMATOCRIT 39.3 39.0 38.3   PLATELETCT 541* 551* 499*     Recent Labs     05/02/24  1506 05/03/24  0435 05/04/24  0645   SODIUM 138 139 138 "   POTASSIUM 4.1 4.7 4.3   CHLORIDE 104 106 104   CO2 21 21 25   GLUCOSE 178* 149* 140*   BUN 13 14 18   CREATININE 0.62 0.52 0.54   CALCIUM 8.3* 8.1* 8.4*     Recent Results (from the past 24 hour(s))   POCT glucose device results    Collection Time: 05/03/24 11:56 AM   Result Value Ref Range    POC Glucose, Blood 122 (H) 65 - 99 mg/dL   POCT glucose device results    Collection Time: 05/03/24  5:37 PM   Result Value Ref Range    POC Glucose, Blood 128 (H) 65 - 99 mg/dL   POCT glucose device results    Collection Time: 05/03/24 11:44 PM   Result Value Ref Range    POC Glucose, Blood 137 (H) 65 - 99 mg/dL   POCT glucose device results    Collection Time: 05/04/24  5:47 AM   Result Value Ref Range    POC Glucose, Blood 148 (H) 65 - 99 mg/dL   Basic Metabolic Panel (BMP)    Collection Time: 05/04/24  6:45 AM   Result Value Ref Range    Sodium 138 135 - 145 mmol/L    Potassium 4.3 3.6 - 5.5 mmol/L    Chloride 104 96 - 112 mmol/L    Co2 25 20 - 33 mmol/L    Glucose 140 (H) 65 - 99 mg/dL    Bun 18 8 - 22 mg/dL    Creatinine 0.54 0.50 - 1.40 mg/dL    Calcium 8.4 (L) 8.5 - 10.5 mg/dL    Anion Gap 9.0 7.0 - 16.0   CBC without Differential    Collection Time: 05/04/24  6:45 AM   Result Value Ref Range    WBC 10.1 4.8 - 10.8 K/uL    RBC 4.09 (L) 4.20 - 5.40 M/uL    Hemoglobin 12.6 12.0 - 16.0 g/dL    Hematocrit 38.3 37.0 - 47.0 %    MCV 93.6 81.4 - 97.8 fL    MCH 30.8 27.0 - 33.0 pg    MCHC 32.9 32.2 - 35.5 g/dL    RDW 48.4 35.9 - 50.0 fL    Platelet Count 499 (H) 164 - 446 K/uL    MPV 9.9 9.0 - 12.9 fL   ESTIMATED GFR    Collection Time: 05/04/24  6:45 AM   Result Value Ref Range    GFR (CKD-EPI) 98 >60 mL/min/1.73 m 2         ASSESSMENT:  Patient is a 73 y.o. female admitted for right cerebellar meningioma resection on 5/2/2024     Rehabilitation: Impaired ADLs and mobility  Barriers to transfer include: Insurance authorization, TCCs to verify disposition, medical clearance and bed availability. All cases are subject to  administrative review.     Disposition recommendations:  -Patient does not require IPR.  She is walking household distances, completing ADLs at standby assist.  She has good support from her sister.  She can discharge directly home with sibling support.  -Recommend shower chair DME on discharge  -PMR will sign off, please reconsult or reach out via Voalte if further evaluation or medical management is requested    Medical Complexity:    Right complex cerebellar meningioma  -Status post right suboccipital craniectomy with microscopic gross total resection by Dr. Jaleel Dyer MD on 5/2/2024  -Titanium cranioplasty  -Decadron per neurosurgery  -Some residual incoordination on the right  -Continue PT OT while in-house  -Patient okay to discharge directly home from a functional perspective when medically cleared.   -Follow-up with neurosurgery  -Continue with outpatient PT OT    Pain control  -Tylenol 650 mg every 4 hours as needed    Hypertension  -Amlodipine 5 mg daily  -Clonidine 0.1 mg every 4 hours as needed  -HCTZ 25 mg daily    DVT PPX: SCDs, walking      Thank you for allowing us to participate in the care of this patient.     Patient was seen for >80 minutes on unit/floor of which > 50% of time was spent on counseling and coordination of care regarding the above, including prognosis, risk reduction, benefits of treatment, and options for next stage of care.    José Arce, DO   Physical Medicine and Rehabilitation     Please note that this dictation was created using voice recognition software. I have made every reasonable attempt to correct obvious errors, but there may be errors of grammar and possibly content that I did not discover before finalizing the note.

## 2024-05-04 NOTE — DISCHARGE PLANNING
ATTN: Case Management  RE: Referral for Home Health    As of 5/4/2024, we have accepted the Home Health referral for the patient listed above.    A [facility] clinician will be out to see the patient within 48 hours. If you have any questions or concerns regarding the patient’s transition to Home Health, please do not hesitate to contact us at [phone].      We look forward to collaborating with you,  [facility] Home Health Team

## 2024-05-04 NOTE — PROGRESS NOTES
4 Eyes Skin Assessment Completed by PONCHO Deluna and PONCHO Salinas.    Head Incision right posterior head, staples in place and covered with gauze, DEJA  Ears Blanching  Nose WDL  Mouth WDL  Neck WDL  Breast/Chest WDL  Shoulder Blades WDL  Spine Blanching  (R) Arm/Elbow/Hand Redness and Blanching  (L) Arm/Elbow/Hand Redness and Blanching  Abdomen WDL  Groin WDL  Scrotum/Coccyx/Buttocks Blanching, bruising   (R) Leg Redness and Blanching  (L) Leg Redness and Blanching  (R) Heel/Foot/Toe Redness and Blanching of top of foot/hallux  (L) Heel/Foot/Toe Redness and Blanching on top of foot/hallux          Devices In Places Pulse Ox, SCD's, and Nasal Cannula      Interventions In Place Gray Ear Foams, Pillows, and Pressure Redistribution Mattress    Possible Skin Injury No    Pictures Uploaded Into Epic N/A  Wound Consult Placed N/A  RN Wound Prevention Protocol Ordered No

## 2024-05-04 NOTE — DISCHARGE PLANNING
HTH/SCP TCN chart review completed. Collaborated with HAYLEY Alexander. The most current review of medical record, knowledge of pt's PLOF and social support, LACE+ score of 8, 6 clicks scores of 19 mobility were considered.      Pt seen at bedside. Introduced TCN program. Provided education regarding post acute levels of care. Education provided regarding case management policy for blanket SNF referrals. Discussed HTH/SCP plan benefits. Pt verbalizes understanding.     Pt reports she is anticipating dc to home with HH services once medically cleared. Would note that PT and OT have evaluated pt and are currently recommending HH. Would also note APRN FTF for HH at time of writing this note as well. Note that at time of TCN visit, pt was on supplemental 02 and reports she would be agreeable to supplemental 02 if indicated at time of dc as well.    No additional provider requests. Given aforementioned, choice proactively obtained for HH and DME (02) and faxed to [facility]. TCN will continue to follow and collaborate with discharge planning team as additional post acute needs arise. Thank you.     Completed today:  PT/OT with recommendations for HH on 5/3  Choice obtained: HH ([facility])*, DME (02 via [facility])  SCP with [facility] PCP. Discussed possible outpatient transitional PCP follow up and pt in agreement; sent information to      *noted in PM [facility] has accepted as well

## 2024-05-05 VITALS
WEIGHT: 142.64 LBS | DIASTOLIC BLOOD PRESSURE: 76 MMHG | SYSTOLIC BLOOD PRESSURE: 130 MMHG | BODY MASS INDEX: 26.25 KG/M2 | RESPIRATION RATE: 17 BRPM | HEART RATE: 50 BPM | TEMPERATURE: 96.9 F | HEIGHT: 62 IN | OXYGEN SATURATION: 95 %

## 2024-05-05 LAB — GLUCOSE BLD STRIP.AUTO-MCNC: 144 MG/DL (ref 65–99)

## 2024-05-05 RX ADMIN — ACETAMINOPHEN 650 MG: 325 TABLET, FILM COATED ORAL at 04:46

## 2024-05-05 RX ADMIN — DEXAMETHASONE 4 MG: 4 TABLET ORAL at 04:46

## 2024-05-05 RX ADMIN — FAMOTIDINE 20 MG: 20 TABLET, FILM COATED ORAL at 04:46

## 2024-05-05 RX ADMIN — LEVOTHYROXINE SODIUM 100 MCG: 0.1 TABLET ORAL at 04:46

## 2024-05-05 RX ADMIN — HYDROCHLOROTHIAZIDE 25 MG: 25 TABLET ORAL at 04:46

## 2024-05-05 ASSESSMENT — FIBROSIS 4 INDEX: FIB4 SCORE: 0.87

## 2024-05-05 ASSESSMENT — PAIN DESCRIPTION - PAIN TYPE: TYPE: ACUTE PAIN

## 2024-05-05 NOTE — CARE PLAN
The patient is Stable - Low risk of patient condition declining or worsening    Shift Goals  Clinical Goals: Pain management, discharge  Patient Goals: Go home  Family Goals: DEJA    Progress made toward(s) clinical / shift goals:    Problem: Skin Integrity  Goal: Skin integrity is maintained or improved  Description: Target End Date:  Prior to discharge or change in level of care    Document interventions on Skin Risk/Aiden flowsheet groups and corresponding LDA    1.  Assess and monitor skin integrity, appearance and/or temperature  2.  Assess risk factors for impaired skin integrity and/or pressures ulcers  3.  Implement precautions to protect skin integrity in collaboration with interdisciplinary team  4.  Implement pressure ulcer prevention protocol if at risk for skin breakdown  5.  Confirm wound care consult if at risk for skin breakdown  6.  Ensure patient use of pressure relieving devices  (Low air loss bed, waffle overlay, heel protectors, ROHO cushion, etc)  Outcome: Progressing     Problem: Neuro Status  Goal: Neuro status will remain stable or improve  Description: Target End Date:  Prior to discharge or change in level of care    Document on Neuro assessment in the Assessment flowsheet    1.  Assess and monitor neurologic status per provider order/protocol/unit policy  2.  Assess level of consciousness and orientation  3.  Assess for speech, dysarthria, dysphagia, facial symmetry  4.  Assess visual field, eye movements, gaze preference, pupil reaction and size  5.  Assess muscle strength and motor response in all four extremities  6.  Assess for sensation (numbness and tingling)  7.  Assess basic neuro reflexes (cough, gag, corneal)  8.  Identify changes in neuro status and report to provider for testing/treatment orders  Outcome: Progressing       Patient is not progressing towards the following goals:

## 2024-05-05 NOTE — PROGRESS NOTES
Neurosurgery Progress Note    Subjective:  No events overnight  Oxygen better, on room air, using IS frequently  Pain controlled with tylenol  Eating, ambulating and voiding    Exam:  AAOx4, Tongue ML, no drift. FCx4.  CDI    BP  Min: 107/58  Max: 130/76  Pulse  Av  Min: 50  Max: 73  Resp  Av.3  Min: 17  Max: 18  Temp  Av.3 °C (97.3 °F)  Min: 36.1 °C (96.9 °F)  Max: 36.5 °C (97.7 °F)  SpO2  Av.7 %  Min: 94 %  Max: 95 %    No data recorded    Recent Labs     24  1506 24  0435 24  0645   WBC 9.9 10.9* 10.1   RBC 4.22 4.22 4.09*   HEMOGLOBIN 13.2 13.0 12.6   HEMATOCRIT 39.3 39.0 38.3   MCV 93.1 92.4 93.6   MCH 31.3 30.8 30.8   MCHC 33.6 33.3 32.9   RDW 47.9 47.8 48.4   PLATELETCT 541* 551* 499*   MPV 9.9 9.8 9.9     Recent Labs     24  1506 24  0435 24  0645   SODIUM 138 139 138   POTASSIUM 4.1 4.7 4.3   CHLORIDE 104 106 104   CO2 21 21 25   GLUCOSE 178* 149* 140*   BUN 13 14 18   CREATININE 0.62 0.52 0.54   CALCIUM 8.3* 8.1* 8.4*               Intake/Output                         24 - 24 - 2459      Total  Total                 Intake    P.O.  180  -- 180  --  -- --    P.O. 180 -- 180 -- -- --    Total Intake 180 -- 180 -- -- --       Output    Urine  --  -- --  --  -- --    Number of Times Voided -- 2 x 2 x 1 x -- 1 x    Stool  --  -- --  --  -- --    Number of Times Stooled 0 x 0 x 0 x -- -- --    Total Output -- -- -- -- -- --       Net I/O     180 -- 180 -- -- --              Intake/Output Summary (Last 24 hours) at 2024 1000  Last data filed at 2024 1030  Gross per 24 hour   Intake 180 ml   Output --   Net 180 ml             insulin regular  2-9 Units Q6HRS    And    dextrose bolus  25 g Q15 MIN PRN    prochlorperazine  10 mg Q6HRS PRN    acetaminophen  650 mg Q4HRS PRN    promethazine  12.5 mg Q6HRS PRN    amLODIPine  5 mg DAILY    hydroCHLOROthiazide  25 mg DAILY     levothyroxine  100 mcg AM ES    Pharmacy Consult Request  1 Each PHARMACY TO DOSE    MD ALERT...DO NOT ADMINISTER NSAIDS or ASPIRIN unless ORDERED By Neurosurgery  1 Each PRN    ondansetron  4 mg Q4HRS PRN    diphenhydrAMINE  25 mg Q6HRS PRN    scopolamine  1 Patch Q72HRS PRN    labetalol  10 mg Q HOUR PRN    hydrALAZINE  10 mg Q HOUR PRN    cloNIDine  0.1 mg Q4HRS PRN    docusate sodium  100 mg BID    senna-docusate  1 Tablet Nightly    senna-docusate  1 Tablet Q24HRS PRN    polyethylene glycol/lytes  1 Packet BID PRN    magnesium hydroxide  30 mL QDAY PRN    bisacodyl  10 mg Q24HRS PRN    sodium phosphate  1 Each Once PRN    artificial tears  1 Application PRN    dexamethasone  4 mg Q8HRS    Or    dexamethasone  4 mg Q8HRS    famotidine  20 mg BID       Assessment and Plan:    POD #3 Crani for tumor  Path not yet av  Prophylactic anticoagulation: no         Start date/time: tbd       Brain Compression: No    O2 improved, on RA overnight  Pain well controlled with tylenol  Ok to dc home today

## 2024-05-05 NOTE — DISCHARGE PLANNING
HTH/SCP TCN chart review completed. Collaborated with ANNABELLA.  Current discharge considerations are for Home with Home Health and close outpatient f/u when medically cleared.  Per chart review, PT recommends HH on 5/4/24 and OT recommends Home Health on 5/3/24.  TCC no longer following.  Please see PM & R Consult by GUY Marquez on 5/4/24 at 8:58 AM.  [facility] has accepted.  Patient has a discharge order.  O2 choice is in medica if supplemental O2 needed for discharge home.  TCN will continue to follow and collaborate with discharge planning team as additional post acute needs arise. Thank you.    Completed:  [facility] has accepted.    PT with recommendations for HH on 5/4  OT with recommendations for HH on 5/3  Choice obtained: HH ([facility]), DME (02 via [facility])  SCP with [facility] PCP. Discussed possible outpatient transitional PCP follow up and pt in agreement; sent information to

## 2024-05-05 NOTE — DISCHARGE SUMMARY
Discharge Summary    CHIEF COMPLAINT ON ADMISSION  No chief complaint on file.      Reason for Admission  Benign neoplasm of cerebral mening*     Admission Date  5/2/2024    CODE STATUS  Full Code    HPI & HOSPITAL COURSE  This is a 73 y.o. female here with a previous medical history of meningioma. She was admitted to the hospital on 5/2/2024 and underwent a right suboccipital craniectomy for tumor removal with Dr. Dyer. She tolerated the procedure with no perioperative complications. She is adequately voiding, tolerating her diet, and her pain is controlled. They have been cleared by PT and OT for discharge home with no needs.    No notes on file    Therefore, she is discharged in good and stable condition to home with close outpatient follow-up.    The patient met 2-midnight criteria for an inpatient stay at the time of discharge.    Discharge Date  5/5/2024    FOLLOW UP ITEMS POST DISCHARGE  See discharge instructions     DISCHARGE DIAGNOSES  Principal Problem:    Meningioma (HCC) (POA: Yes)  Resolved Problems:    * No resolved hospital problems. *      FOLLOW UP  Future Appointments   Date Time Provider Department Center   7/18/2024 10:00 AM Clay NELSON M.D. [facility] None     No follow-up provider specified.    MEDICATIONS ON DISCHARGE     Medication List        CONTINUE taking these medications        Instructions   amLODIPine 5 MG Tabs  Commonly known as: Norvasc   Take 1 Tablet by mouth every day.  Dose: 5 mg     hydroCHLOROthiazide 25 MG Tabs   TAKE 1 TABLET BY MOUTH EVERY DAY  Dose: 25 mg     IRON PO   Take 1 Tablet by mouth every day.  Dose: 1 Tablet     levothyroxine 100 MCG Tabs  Commonly known as: Synthroid   Take 100 mcg by mouth every morning on an empty stomach.  Dose: 100 mcg            STOP taking these medications      ASPIRIN 81 PO              Allergies  Allergies   Allergen Reactions    Penicillins Unspecified     States she was told she was allergic as a child through allergy testing but has  since tolerated e.g. ampicillin    Dust Mite Extract Runny Nose          Pollen Extract Runny Nose          Seasonal Runny Nose     Every tree, grass       DIET  Orders Placed This Encounter   Procedures    Diet Order Diet: Regular     Standing Status:   Standing     Number of Occurrences:   1     Order Specific Question:   Diet:     Answer:   Regular [1]       ACTIVITY  As tolerated.    LABORATORY  Lab Results   Component Value Date    SODIUM 138 05/04/2024    POTASSIUM 4.3 05/04/2024    CHLORIDE 104 05/04/2024    CO2 25 05/04/2024    GLUCOSE 140 (H) 05/04/2024    BUN 18 05/04/2024    CREATININE 0.54 05/04/2024        Lab Results   Component Value Date    WBC 10.1 05/04/2024    HEMOGLOBIN 12.6 05/04/2024    HEMATOCRIT 38.3 05/04/2024    PLATELETCT 499 (H) 05/04/2024        Total time of the discharge process exceeds 35 minutes.

## 2024-05-05 NOTE — DISCHARGE INSTRUCTIONS
Neurosurgery Instructions:  You will follow up with APRN at [facility] in 2 weeks as previously scheduled. Please call [phone] for clarification or to make changes to your appointment.  You will follow up with Dr. Restrepo in 6 weeks for post-operative check.     Activity restrictions:   No pushing, pulling, or lifting greater than 10 pounds (about a gallon of milk).  No repetitive bending, twisting, lifting, or extending your arms over your head.  Ambulate as much is comfortable.  No driving for at least 2 weeks following surgery especially while taking narcotic medications.     Incision care:  You may shower starting today. Wash cranial incision daily, starting today, with baby shampoo, do not use conditioner, gently rub the incision with the pads of your fingertips, pat the incision area to dry. Do not apply any ointments, creams, lotions, etc and leave the incision open to air.  No soaking/submerging the incision - no pools, baths, hot tubs, rivers, lakes, etc for 6 weeks.     Medications:  Do not take non-steroidal anti-inflammatory medications (e.g. ibuprofen, Motrin, naproxen, Aleve, Advil, etc) or aspirin until cleared by Dr. Dyer.  You should take a stool softener (Colace) while you are taking narcotic medications, for at least 2 weeks after surgery. You can purchase this over-the-counter, but a prescription will also be sent in. If you are still constipated, you may take a laxative (e.g. senna, Miralax, milk of magnesia, etc) as needed.

## 2024-05-06 ENCOUNTER — HOME CARE VISIT (OUTPATIENT)
Dept: HOME HEALTH SERVICES | Facility: HOME HEALTHCARE | Age: 74
End: 2024-05-06

## 2024-05-30 ENCOUNTER — TELEPHONE (OUTPATIENT)
Dept: HEALTH INFORMATION MANAGEMENT | Facility: OTHER | Age: 74
End: 2024-05-30

## 2024-06-12 ENCOUNTER — APPOINTMENT (OUTPATIENT)
Dept: RADIOLOGY | Facility: IMAGING CENTER | Age: 74
End: 2024-06-12
Attending: PHYSICIAN ASSISTANT
Payer: MEDICARE

## 2024-06-12 ENCOUNTER — HOSPITAL ENCOUNTER (OUTPATIENT)
Dept: LAB | Facility: MEDICAL CENTER | Age: 74
End: 2024-06-12
Attending: INTERNAL MEDICINE
Payer: MEDICARE

## 2024-06-12 ENCOUNTER — OFFICE VISIT (OUTPATIENT)
Dept: URGENT CARE | Facility: CLINIC | Age: 74
End: 2024-06-12
Payer: MEDICARE

## 2024-06-12 VITALS
DIASTOLIC BLOOD PRESSURE: 82 MMHG | RESPIRATION RATE: 19 BRPM | WEIGHT: 141 LBS | BODY MASS INDEX: 24.07 KG/M2 | OXYGEN SATURATION: 94 % | TEMPERATURE: 97.1 F | HEIGHT: 64 IN | HEART RATE: 64 BPM | SYSTOLIC BLOOD PRESSURE: 124 MMHG

## 2024-06-12 DIAGNOSIS — S92.515A CLOSED NONDISPLACED FRACTURE OF PROXIMAL PHALANX OF LESSER TOE OF LEFT FOOT, INITIAL ENCOUNTER: ICD-10-CM

## 2024-06-12 DIAGNOSIS — S99.922A FOOT INJURY, LEFT, INITIAL ENCOUNTER: ICD-10-CM

## 2024-06-12 PROCEDURE — 3074F SYST BP LT 130 MM HG: CPT | Performed by: PHYSICIAN ASSISTANT

## 2024-06-12 PROCEDURE — 3079F DIAST BP 80-89 MM HG: CPT | Performed by: PHYSICIAN ASSISTANT

## 2024-06-12 PROCEDURE — 84439 ASSAY OF FREE THYROXINE: CPT

## 2024-06-12 PROCEDURE — 36415 COLL VENOUS BLD VENIPUNCTURE: CPT

## 2024-06-12 PROCEDURE — 99213 OFFICE O/P EST LOW 20 MIN: CPT | Performed by: PHYSICIAN ASSISTANT

## 2024-06-12 PROCEDURE — 84443 ASSAY THYROID STIM HORMONE: CPT

## 2024-06-12 PROCEDURE — 73630 X-RAY EXAM OF FOOT: CPT | Mod: TC,FY,LT | Performed by: PHYSICIAN ASSISTANT

## 2024-06-12 ASSESSMENT — ENCOUNTER SYMPTOMS
FEVER: 0
NUMBNESS: 1
CHILLS: 0
TINGLING: 0
ARTHRALGIAS: 1
JOINT SWELLING: 1
NAUSEA: 0
VOMITING: 0
SENSORY CHANGE: 0
WEAKNESS: 0

## 2024-06-12 ASSESSMENT — FIBROSIS 4 INDEX: FIB4 SCORE: 0.87

## 2024-06-12 NOTE — PROGRESS NOTES
"Subjective     Ailin Good is a 73 y.o. female who presents with Toe Injury (Swelling left toe )            Toe Injury  This is a new problem. Episode onset: lsat week . Patient hit left 5th toe on a door frame. Pain and swelling has persisted. The problem has been unchanged. Associated symptoms include arthralgias, joint swelling and numbness (mild numbness in left 5th toe). Pertinent negatives include no chills, fever, nausea, rash, vomiting or weakness. Exacerbated by: bearing weight. She has tried ice for the symptoms.       Past Medical History:   Diagnosis Date    Allergy     allergic to trees/grass    Anesthesia     PONV    Arthritis     osteoarthritis    Blood dyscrasia     \" too many platelets \"    Essential (hemorrhagic) thrombocythemia (HCC)     Fatty liver disease, nonalcoholic     pt states she was told by MD    Heart murmur 04/18/2024    was told as a child    Heart valve disease     was told by cardiologist    Hypertension     medicated    Polyp of colon 02/20/2019    PONV (postoperative nausea and vomiting)     Postoperative hypothyroidism     thyroidectomy; takes levothyroxine    Psychiatric problem 03/2021    situational depression ( loss of dog)    Urinary incontinence     minor       Past Surgical History:   Procedure Laterality Date    CRANIOTOMY STEALTH Right 5/2/2024    Procedure: STEALTH RIGHT SUBOCCIPITAL CRANIECTOMY, MENINGIOMA EXCISION, TITANIUM CRANIOPLASTY;  Surgeon: Jaleel Dyer M.D.;  Location: SURGERY [facility];  Service: Neurosurgery    MAYTE INSJ STALAUROJ DEV W/DCMPRN 1 LVL  9/8/2023    Procedure: INSERTION, INTERSPINOUS PROCESS DEVICE;  Surgeon: Jaleel Dyer M.D.;  Location: SURGERY [facility];  Service: Neurosurgery    LUMBAR LAMINECTOMY DISKECTOMY  9/8/2023    Procedure: POSTERIOR LEFT L4 TRANSPEDICULAR DECOMPRESSION, LEFT L5 LAMINECTOMY, L4-5 PLACEMENT OF INTRALAMINAR DEVICE;  Surgeon: Jaleel Dyer M.D.;  Location: SURGERY [facility];  Service: Neurosurgery    MAYTE JARAMILLO BONE " "MARROW ASPIRATIONS Left 03/10/2021    Procedure: ASPIRATION, BONE MARROW -;  Surgeon: Joe Black M.D.;  Location: ENDOSCOPY [facility];  Service: Orthopedics    GA DX BONE MARROW BIOPSIES Left 03/10/2021    Procedure: BIOPSY, BONE MARROW, USING NEEDLE OR TROCAR-DR. PERRY;  Surgeon: Joe Black M.D.;  Location: ENDOSCOPY [facility];  Service: Orthopedics    THYROIDECTOMY  2016    TONSILLECTOMY  1969    TUBAL COAGULATION LAPAROSCOPIC BILATERAL         Family History   Problem Relation Age of Onset    Heart Disease Mother     Diabetes Mother     Hypertension Mother     Cancer Father         pancreatic cancer    Stroke Father     Hypertension Father     Heart Disease Sister     Hypertension Sister     Hyperlipidemia Sister      Allergies:  Penicillins, Dust mite extract, Pollen extract, and Seasonal    Medications, Allergies, and current problem list reviewed today in Epic    Review of Systems   Constitutional:  Negative for chills, fever and malaise/fatigue.   Gastrointestinal:  Negative for nausea and vomiting.   Musculoskeletal:  Positive for arthralgias, joint pain (left 5th toe pain) and joint swelling.   Skin:  Negative for rash.   Neurological:  Positive for numbness (mild numbness in left 5th toe). Negative for tingling, sensory change and weakness.     All other systems reviewed and are negative.            Objective     /82   Pulse 64   Temp 36.2 °C (97.1 °F) (Temporal)   Resp 19   Ht 1.626 m (5' 4\")   Wt 64 kg (141 lb)   LMP  (LMP Unknown)   SpO2 94%   BMI 24.20 kg/m²      Physical Exam  Constitutional:       General: She is not in acute distress.     Appearance: She is not ill-appearing.   HENT:      Head: Normocephalic and atraumatic.   Eyes:      Conjunctiva/sclera: Conjunctivae normal.   Cardiovascular:      Rate and Rhythm: Normal rate and regular rhythm.   Pulmonary:      Effort: Pulmonary effort is normal. No respiratory distress.      Breath sounds: No stridor. No wheezing. "   Musculoskeletal:        Feet:    Skin:     General: Skin is warm and dry.   Neurological:      General: No focal deficit present.      Mental Status: She is alert and oriented to person, place, and time.   Psychiatric:         Mood and Affect: Mood normal.         Behavior: Behavior normal.         Thought Content: Thought content normal.         Judgment: Judgment normal.                6/12/2024 2:54 PM     HISTORY/REASON FOR EXAM:  Pain/Deformity Following Trauma        TECHNIQUE/EXAM DESCRIPTION AND NUMBER OF VIEWS:  3 views of the LEFT foot.     COMPARISON:  9/20/2019     FINDINGS:  Questionable minimally displaced proximal fifth toe proximal phalangeal base fracture. There is no dislocation.  No bone erosion is noted. Degenerative changes of the great toe MTP joint.     Plantar spur.     IMPRESSION:     1.  Questionable minimally displaced proximal fifth toe proximal phalangeal base fracture.  2.  Degenerative changes of the great toe MTP joint.                Assessment & Plan        1. Closed nondisplaced fracture of proximal phalanx of lesser toe of left foot, initial encounter  DX-FOOT-COMPLETE 3+ LEFT            - DX-FOOT-COMPLETE 3+ LEFT; Future     VALENTINE Kay taping  OTC analgesics.    Differential diagnoses, Supportive care, and indications for immediate follow-up discussed with patient.   Pathogenesis of diagnosis discussed including typical length and natural progression.   Instructed to return to clinic or nearest emergency department for any change in condition, further concerns, or worsening of symptoms.    The patient demonstrated a good understanding and agreed with the treatment plan.    Jess Kendrick P.A.-C.

## 2024-06-13 LAB
T4 FREE SERPL-MCNC: 1.43 NG/DL (ref 0.93–1.7)
TSH SERPL DL<=0.005 MIU/L-ACNC: 3.79 UIU/ML (ref 0.38–5.33)

## 2024-06-25 ENCOUNTER — HOSPITAL ENCOUNTER (OUTPATIENT)
Dept: RADIOLOGY | Facility: MEDICAL CENTER | Age: 74
End: 2024-06-25
Attending: NEUROLOGICAL SURGERY
Payer: MEDICARE

## 2024-06-25 DIAGNOSIS — D32.0 BENIGN NEOPLASM OF CEREBRAL MENINGES (HCC): ICD-10-CM

## 2024-06-25 PROCEDURE — 70450 CT HEAD/BRAIN W/O DYE: CPT

## 2024-07-18 ENCOUNTER — OFFICE VISIT (OUTPATIENT)
Dept: CARDIOLOGY | Facility: MEDICAL CENTER | Age: 74
End: 2024-07-18
Attending: INTERNAL MEDICINE
Payer: MEDICARE

## 2024-07-18 VITALS
HEIGHT: 61 IN | DIASTOLIC BLOOD PRESSURE: 78 MMHG | BODY MASS INDEX: 25.86 KG/M2 | WEIGHT: 137 LBS | RESPIRATION RATE: 14 BRPM | HEART RATE: 66 BPM | OXYGEN SATURATION: 95 % | SYSTOLIC BLOOD PRESSURE: 126 MMHG

## 2024-07-18 DIAGNOSIS — I34.0 MODERATE MITRAL REGURGITATION: ICD-10-CM

## 2024-07-18 DIAGNOSIS — I10 ESSENTIAL HYPERTENSION: ICD-10-CM

## 2024-07-18 DIAGNOSIS — I34.1 MITRAL VALVE PROLAPSE: ICD-10-CM

## 2024-07-18 PROCEDURE — 3078F DIAST BP <80 MM HG: CPT | Performed by: INTERNAL MEDICINE

## 2024-07-18 PROCEDURE — 99211 OFF/OP EST MAY X REQ PHY/QHP: CPT | Performed by: INTERNAL MEDICINE

## 2024-07-18 PROCEDURE — 99214 OFFICE O/P EST MOD 30 MIN: CPT | Performed by: INTERNAL MEDICINE

## 2024-07-18 PROCEDURE — 3074F SYST BP LT 130 MM HG: CPT | Performed by: INTERNAL MEDICINE

## 2024-07-18 RX ORDER — ASPIRIN 81 MG/1
81 TABLET ORAL DAILY
COMMUNITY

## 2024-07-18 ASSESSMENT — FIBROSIS 4 INDEX: FIB4 SCORE: 0.88

## 2024-08-30 ENCOUNTER — OFFICE VISIT (OUTPATIENT)
Dept: MEDICAL GROUP | Age: 74
End: 2024-08-30
Payer: MEDICARE

## 2024-08-30 VITALS
HEART RATE: 65 BPM | BODY MASS INDEX: 25.86 KG/M2 | TEMPERATURE: 97.1 F | OXYGEN SATURATION: 96 % | DIASTOLIC BLOOD PRESSURE: 70 MMHG | HEIGHT: 61 IN | WEIGHT: 137 LBS | SYSTOLIC BLOOD PRESSURE: 146 MMHG

## 2024-08-30 DIAGNOSIS — R11.2 NAUSEA AND VOMITING, UNSPECIFIED VOMITING TYPE: ICD-10-CM

## 2024-08-30 DIAGNOSIS — H65.02 NON-RECURRENT ACUTE SEROUS OTITIS MEDIA OF LEFT EAR: ICD-10-CM

## 2024-08-30 DIAGNOSIS — R07.89 OTHER CHEST PAIN: ICD-10-CM

## 2024-08-30 RX ORDER — PREDNISONE 20 MG/1
TABLET ORAL
Qty: 12 TABLET | Refills: 0 | Status: SHIPPED | OUTPATIENT
Start: 2024-08-30

## 2024-08-30 RX ORDER — AZITHROMYCIN 250 MG/1
TABLET, FILM COATED ORAL
Qty: 6 TABLET | Refills: 0 | Status: SHIPPED | OUTPATIENT
Start: 2024-08-30

## 2024-08-30 RX ORDER — ONDANSETRON 8 MG/1
8 TABLET, FILM COATED ORAL EVERY 8 HOURS PRN
Qty: 15 TABLET | Refills: 0 | Status: SHIPPED | OUTPATIENT
Start: 2024-08-30

## 2024-08-30 ASSESSMENT — FIBROSIS 4 INDEX: FIB4 SCORE: 0.88

## 2024-08-30 NOTE — PROGRESS NOTES
This medical record contains text that has been entered with the assistance of computer voice recognition and dictation software.  Therefore, it may contain unintended errors in text, spelling, punctuation, or grammar      Verbal consent was acquired by the patient to use [employer] ambient listening note generation during this visit Yes       Chief Complaint   Patient presents with    Dizziness     Vomit, nausea, diarrhea, fever, ongoing since last Wednesday         Ailin Good is a 74 y.o. female here evaluation and management of: feeling sick      HPI:     HCC Gap Form    Diagnosis to address: G31.9 - Cerebral atrophy (HCC)  Assessment and plan: Chronic, stable. Continue with current defined treatment plan: . Follow-up at least annually.  Last edited 08/30/24 14:42 PDT by Tyler Smith M.D.           1. Ear pain  2. Nausea and vomiting, unspecified vomiting type    History of Present Illness  The patient is a 74-year-old female who presents for evaluation of multiple medical concerns.    She has been unwell for the past week, experiencing symptoms such as vomiting, dizziness, loss of appetite, diarrhea, fever, and earaches. Despite taking meclizine, her condition has not improved. She finds some relief from dizziness when her ears are plugged. Her vision is poor, but it's unclear if this is related to her dizziness. She reports no chest pain or shortness of breath. She has tested negative for COVID-19 twice.    Her medical history includes back surgery a year ago, brain surgery four months ago, and thyroid removal. She had a heart murmur as a child and is currently seeing a cardiologist.    ALLERGIES  She is allergic to PENICILLIN.        Current medicines (including changes today)  Current Outpatient Medications   Medication Sig Dispense Refill    azithromycin (ZITHROMAX) 250 MG Tab 2 tabs by mouth day 1, 1 tab by mouth days 2-5 6 Tablet 0    predniSONE (DELTASONE) 20 MG Tab Take 3 tabs po for 2 days then 2  tabs for 2 days then 1 for 2 days 12 Tablet 0    ondansetron (ZOFRAN) 8 MG Tab Take 1 Tablet by mouth every 8 hours as needed for Nausea/Vomiting. 15 Tablet 0    aspirin 81 MG EC tablet Take 81 mg by mouth every day.      levothyroxine (SYNTHROID) 100 MCG Tab Take 100 mcg by mouth every morning on an empty stomach. Indications: Underactive Thyroid      hydroCHLOROthiazide (HYDRODIURIL) 25 MG Tab TAKE 1 TABLET BY MOUTH EVERY  Tablet 3     No current facility-administered medications for this visit.     She  has a past medical history of Allergy, Anesthesia, Arthritis, Blood dyscrasia, Essential (hemorrhagic) thrombocythemia (HCC), Fatty liver disease, nonalcoholic, Heart murmur (2024), Heart valve disease, Hypertension, Polyp of colon (2019), PONV (postoperative nausea and vomiting), Postoperative hypothyroidism, Psychiatric problem (2021), and Urinary incontinence.  She  has a past surgical history that includes tubal coagulation laparoscopic bilateral; thyroidectomy (); tonsillectomy (); pr dx bone marrow aspirations (Left, 03/10/2021); pr dx bone marrow biopsies (Left, 03/10/2021); pr insj stablj dev w/dcmprn 1 lvl (2023); lumbar laminectomy diskectomy (2023); and craniotomy stealth (Right, 2024).  Social History     Tobacco Use    Smoking status: Former     Current packs/day: 0.00     Average packs/day: 1 pack/day for 35.0 years (35.0 ttl pk-yrs)     Types: Cigarettes     Start date: 1978     Quit date: 2013     Years since quittin.5     Passive exposure: Never    Smokeless tobacco: Never    Tobacco comments:     do not recall dates   Vaping Use    Vaping status: Never Used   Substance Use Topics    Alcohol use: Yes     Alcohol/week: 4.2 oz     Types: 7 Glasses of wine per week     Comment: glass of wine at dinner; 7 per week    Drug use: Never     Social History     Social History Narrative    Not on file     Family History   Problem Relation Age of Onset  "   Heart Disease Mother     Diabetes Mother     Hypertension Mother     Cancer Father         pancreatic cancer    Stroke Father     Hypertension Father     Heart Disease Sister     Hypertension Sister     Hyperlipidemia Sister      Family Status   Relation Name Status    Raheem Good     Breann Good     Ashley Sunshine Alive    MGMo      MGFa      PGMo      PGFa     No partnership data on file         ROS    The pertinent  ROS findings can be seen in the HPI above.     All other systems reviewed and are negative     Objective:     BP (!) 146/70 (BP Location: Left arm, Patient Position: Sitting, BP Cuff Size: Adult)   Pulse 65   Temp 36.2 °C (97.1 °F) (Temporal)   Ht 1.549 m (5' 1\")   Wt 62.1 kg (137 lb)   SpO2 96%  Body mass index is 25.89 kg/m².      Physical Exam:    Constitutional: Alert, no distress.  Skin: No suspicious lesions  Eye: Equal, round and reactive, conjunctiva clear, lids normal.  ENMT: Lips without lesions, good dentition, oropharynx clear bilateral bulging erythematous tympanic membranes.  Neck: Trachea midline, no masses, no thyromegaly. No cervical or supraclavicular lymphadenopathy.  Respiratory: Unlabored respiratory effort, lungs clear to auscultation, no wheezes, no ronchi.  Cardiovascular: Normal S1, S2, no murmur, no edema  Abdomen: Soft, non-tender, no masses, no hepatosplenomegaly.    EKG reveals sinus rhythm left atrial enlargement left anterior fascicular block unchanged from previous EKG in April of this year    Assessment and Plan:   The following treatment plan was discussed    All recent labs and provider notes reviewed    1. Non-recurrent acute serous otitis media of left ear    Patient is afebrile and hemodynamically stable, she may safely be treated as an outpatient    - azithromycin (ZITHROMAX) 250 MG Tab; 2 tabs by mouth day 1, 1 tab by mouth days 2-5  Dispense: 6 Tablet; Refill: 0  - predniSONE (DELTASONE) 20 MG " Tab; Take 3 tabs po for 2 days then 2 tabs for 2 days then 1 for 2 days  Dispense: 12 Tablet; Refill: 0  - ondansetron (ZOFRAN) 8 MG Tab; Take 1 Tablet by mouth every 8 hours as needed for Nausea/Vomiting.  Dispense: 15 Tablet; Refill: 0    2. Nausea and vomiting, unspecified vomiting type    - ondansetron (ZOFRAN) 8 MG Tab; Take 1 Tablet by mouth every 8 hours as needed for Nausea/Vomiting.  Dispense: 15 Tablet; Refill: 0    3. Other chest pain  - EKG; Future             Instructed to Follow up in clinic or ER for worsening symptoms, difficulty breathing, lack of expected recovery, or should new symptoms or problems arise.    Followup: Return in about 4 weeks (around 9/27/2024) for Reevaluation, labs, With PCP.

## 2024-09-03 ENCOUNTER — OFFICE VISIT (OUTPATIENT)
Dept: MEDICAL GROUP | Age: 74
End: 2024-09-03
Payer: MEDICARE

## 2024-09-03 VITALS
TEMPERATURE: 96.3 F | OXYGEN SATURATION: 97 % | SYSTOLIC BLOOD PRESSURE: 130 MMHG | HEART RATE: 68 BPM | HEIGHT: 61 IN | WEIGHT: 138 LBS | DIASTOLIC BLOOD PRESSURE: 71 MMHG | BODY MASS INDEX: 26.06 KG/M2

## 2024-09-03 DIAGNOSIS — H81.13 BENIGN PAROXYSMAL POSITIONAL VERTIGO DUE TO BILATERAL VESTIBULAR DISORDER: ICD-10-CM

## 2024-09-03 PROBLEM — R42 DIZZINESS: Status: ACTIVE | Noted: 2024-09-03

## 2024-09-03 PROCEDURE — 99214 OFFICE O/P EST MOD 30 MIN: CPT | Performed by: FAMILY MEDICINE

## 2024-09-03 PROCEDURE — 3078F DIAST BP <80 MM HG: CPT | Performed by: FAMILY MEDICINE

## 2024-09-03 PROCEDURE — 3075F SYST BP GE 130 - 139MM HG: CPT | Performed by: FAMILY MEDICINE

## 2024-09-03 RX ORDER — MECLIZINE HYDROCHLORIDE 25 MG/1
25 TABLET ORAL 3 TIMES DAILY PRN
Qty: 30 TABLET | Refills: 0 | Status: SHIPPED | OUTPATIENT
Start: 2024-09-03 | End: 2024-09-12 | Stop reason: SDUPTHER

## 2024-09-03 ASSESSMENT — FIBROSIS 4 INDEX: FIB4 SCORE: 0.88

## 2024-09-03 NOTE — PROGRESS NOTES
This medical record contains text that has been entered with the assistance of computer voice recognition and dictation software.  Therefore, it may contain unintended errors in text, spelling, punctuation, or grammar      Verbal consent was acquired by the patient to use [employer] ambient listening note generation during this visit Yes       Chief Complaint   Patient presents with    Dizziness     Continue and is about the same and pain in the ear is gone         Ailin Good is a 74 y.o. female here evaluation and management of: dizziness      HPI:           1. Dizziness  History of Present Illness  The patient is a 74-year-old female who presents for evaluation of dizziness.    She reports that the medication prescribed for her dizziness has not been effective. Despite treatment, her dizziness persists. The onset of these symptoms was two weeks ago. Currently, she feels dizzy and experiences a sensation of clogged ears and pressure, accompanied by a headache. She inquires about the possibility of crystals causing her dizziness. She has tried meclizine, a medication used by her sister, but it did not alleviate her symptoms.    She also mentions a rapid buildup of earwax, which she finds difficult to remove. She did not take the medication intended for nausea as she no longer experiences vomiting. Her blood pressure was low this morning, but she managed to drive herself to the clinic without any issues.    She has a history of allergies and has been on medication for several years, but she is unsure if this is related to her current symptoms.    Additionally, she expresses interest in receiving a stronger influenza vaccine.          Current medicines (including changes today)  Current Outpatient Medications   Medication Sig Dispense Refill    meclizine (ANTIVERT) 25 MG Tab Take 1 Tablet by mouth 3 times a day as needed for Dizziness. 30 Tablet 0    azithromycin (ZITHROMAX) 250 MG Tab 2 tabs by mouth day 1, 1 tab  by mouth days 2-5 6 Tablet 0    predniSONE (DELTASONE) 20 MG Tab Take 3 tabs po for 2 days then 2 tabs for 2 days then 1 for 2 days 12 Tablet 0    ondansetron (ZOFRAN) 8 MG Tab Take 1 Tablet by mouth every 8 hours as needed for Nausea/Vomiting. 15 Tablet 0    aspirin 81 MG EC tablet Take 81 mg by mouth every day.      levothyroxine (SYNTHROID) 100 MCG Tab Take 100 mcg by mouth every morning on an empty stomach. Indications: Underactive Thyroid      hydroCHLOROthiazide (HYDRODIURIL) 25 MG Tab TAKE 1 TABLET BY MOUTH EVERY  Tablet 3     No current facility-administered medications for this visit.     She  has a past medical history of Allergy, Anesthesia, Arthritis, Blood dyscrasia, Essential (hemorrhagic) thrombocythemia (HCC), Fatty liver disease, nonalcoholic, Heart murmur (2024), Heart valve disease, Hypertension, Polyp of colon (2019), PONV (postoperative nausea and vomiting), Postoperative hypothyroidism, Psychiatric problem (2021), and Urinary incontinence.  She  has a past surgical history that includes tubal coagulation laparoscopic bilateral; thyroidectomy (); tonsillectomy (); pr dx bone marrow aspirations (Left, 03/10/2021); pr dx bone marrow biopsies (Left, 03/10/2021); pr insj stablj dev w/dcmprn 1 lvl (2023); lumbar laminectomy diskectomy (2023); and craniotomy stealth (Right, 2024).  Social History     Tobacco Use    Smoking status: Former     Current packs/day: 0.00     Average packs/day: 1 pack/day for 35.0 years (35.0 ttl pk-yrs)     Types: Cigarettes     Start date: 1978     Quit date: 2013     Years since quittin.6     Passive exposure: Never    Smokeless tobacco: Never    Tobacco comments:     do not recall dates   Vaping Use    Vaping status: Never Used   Substance Use Topics    Alcohol use: Yes     Alcohol/week: 4.2 oz     Types: 7 Glasses of wine per week     Comment: glass of wine at dinner; 7 per week    Drug use: Never     Social  "History     Social History Narrative    Not on file     Family History   Problem Relation Age of Onset    Heart Disease Mother     Diabetes Mother     Hypertension Mother     Cancer Father         pancreatic cancer    Stroke Father     Hypertension Father     Heart Disease Sister     Hypertension Sister     Hyperlipidemia Sister      Family Status   Relation Name Status    Raheem Good     Breann Good     Ashley Sunshine Alive    MGMo      MGFa      PGMo      PGFa     No partnership data on file         ROS    The pertinent  ROS findings can be seen in the HPI above.     All other systems reviewed and are negative     Objective:     /71 (BP Location: Left arm, Patient Position: Sitting, BP Cuff Size: Adult)   Pulse 68   Temp (!) 35.7 °C (96.3 °F) (Temporal)   Ht 1.549 m (5' 1\")   Wt 62.6 kg (138 lb)   SpO2 97%  Body mass index is 26.07 kg/m².      Physical Exam:    Constitutional: Alert, no distress.  Skin: No suspicious lesions  Eye: Equal, round and reactive, conjunctiva clear, lids normal.  ENMT: Lips without lesions, good dentition, oropharynx clear.  Neck: Trachea midline, no masses, no thyromegaly. No cervical or supraclavicular lymphadenopathy.  Respiratory: Unlabored respiratory effort, lungs clear to auscultation, no wheezes, no ronchi.  Cardiovascular: Normal S1, S2, no murmur, no edema  Abdomen: Soft, non-tender, no masses, no hepatosplenomegaly.  TANYA Hallpike did reproduce dizziness      Assessment and Plan:   The following treatment plan was discussed    All recent labs and provider notes reviewed    1. Benign paroxysmal positional vertigo due to bilateral vestibular disorder    We reviewed several particle repositioning maneuvers   Such as modified tobi, Maggy Avila      Instructed  to take meclizine as prescribed  Establish care in PT for vestibular rehabilitation    - Referral to ENT  - meclizine (ANTIVERT) 25 MG Tab; " Take 1 Tablet by mouth 3 times a day as needed for Dizziness.  Dispense: 30 Tablet; Refill: 0             Instructed to Follow up in clinic or ER for worsening symptoms, difficulty breathing, lack of expected recovery, or should new symptoms or problems arise.    Followup: Return in about 6 months (around 3/3/2025) for Reevaluation, labs.

## 2024-09-12 ENCOUNTER — OFFICE VISIT (OUTPATIENT)
Dept: MEDICAL GROUP | Age: 74
End: 2024-09-12
Payer: MEDICARE

## 2024-09-12 VITALS
TEMPERATURE: 97 F | HEIGHT: 61 IN | HEART RATE: 67 BPM | BODY MASS INDEX: 25.86 KG/M2 | SYSTOLIC BLOOD PRESSURE: 120 MMHG | WEIGHT: 137 LBS | OXYGEN SATURATION: 96 % | DIASTOLIC BLOOD PRESSURE: 64 MMHG | RESPIRATION RATE: 18 BRPM

## 2024-09-12 DIAGNOSIS — D32.9 MENINGIOMA (HCC): ICD-10-CM

## 2024-09-12 DIAGNOSIS — H81.10 BENIGN PAROXYSMAL POSITIONAL VERTIGO, UNSPECIFIED LATERALITY: ICD-10-CM

## 2024-09-12 DIAGNOSIS — E78.5 DYSLIPIDEMIA: ICD-10-CM

## 2024-09-12 DIAGNOSIS — J01.20 ACUTE NON-RECURRENT ETHMOIDAL SINUSITIS: ICD-10-CM

## 2024-09-12 DIAGNOSIS — R42 DIZZINESS: ICD-10-CM

## 2024-09-12 PROCEDURE — 3074F SYST BP LT 130 MM HG: CPT | Performed by: PHYSICIAN ASSISTANT

## 2024-09-12 PROCEDURE — 99214 OFFICE O/P EST MOD 30 MIN: CPT | Performed by: PHYSICIAN ASSISTANT

## 2024-09-12 PROCEDURE — 3078F DIAST BP <80 MM HG: CPT | Performed by: PHYSICIAN ASSISTANT

## 2024-09-12 PROCEDURE — G2211 COMPLEX E/M VISIT ADD ON: HCPCS | Performed by: PHYSICIAN ASSISTANT

## 2024-09-12 RX ORDER — MECLIZINE HYDROCHLORIDE 25 MG/1
25 TABLET ORAL 3 TIMES DAILY PRN
Qty: 60 TABLET | Refills: 0 | Status: SHIPPED | OUTPATIENT
Start: 2024-09-12

## 2024-09-12 RX ORDER — DOXYCYCLINE HYCLATE 100 MG
100 TABLET ORAL 2 TIMES DAILY
Qty: 14 TABLET | Refills: 0 | Status: SHIPPED | OUTPATIENT
Start: 2024-09-12 | End: 2024-09-19

## 2024-09-12 ASSESSMENT — FIBROSIS 4 INDEX: FIB4 SCORE: 0.88

## 2024-09-12 NOTE — PROGRESS NOTES
cc: Dizziness    Subjective:     HPI    History of Present Illness  The patient is a 74-year-old female presenting with concerns of dizziness.    She has been experiencing persistent dizziness for the past 5 weeks, which intensifies with positional changes such as bending over or transitioning from lying down to sitting up. The sensation is akin to being on a boat. Rapid head movements also trigger her dizziness. Despite taking meclizine three times a day, there has been no improvement. She was seen in the clinic by another provider on 09/03/2024 with similar concerns and was prescribed meclizine, referred to ENT,   At that time she also had an ear infection that was treated with Z-Alfred. Her nausea has improved since her last visit, and she no longer takes Zofran.  However did not help much otherwise.  She has been experiencing sinus-type headaches and constant sinus pressure, described as feeling like a punch to the face. Salting her nose provides some relief. Steroid treatment did not alleviate her symptoms. She also reports a crinkling sound in her ears but has no cough.  She has been doing the Epley maneuver 3 times a day, not providing much benefit.  S    Unfortunately ENT is not willing to schedule her for some reason.  The dizziness although exacerbated with positional changes is persistent throughout the day.  She does have history of meningioma excision done by Dr. Dyer about 2 months ago.  Initially was having some swelling that area, needed to be drained and was experiencing dizziness then.          Review of systems:  See above.       Current Outpatient Medications:     meclizine (ANTIVERT) 25 MG Tab, Take 1 Tablet by mouth 3 times a day as needed for Dizziness., Disp: 60 Tablet, Rfl: 0    doxycycline (VIBRAMYCIN) 100 MG Tab, Take 1 Tablet by mouth 2 times a day for 7 days., Disp: 14 Tablet, Rfl: 0    ondansetron (ZOFRAN) 8 MG Tab, Take 1 Tablet by mouth every 8 hours as needed for Nausea/Vomiting.,  "Disp: 15 Tablet, Rfl: 0    aspirin 81 MG EC tablet, Take 81 mg by mouth every day., Disp: , Rfl:     levothyroxine (SYNTHROID) 100 MCG Tab, Take 100 mcg by mouth every morning on an empty stomach. Indications: Underactive Thyroid, Disp: , Rfl:     hydroCHLOROthiazide (HYDRODIURIL) 25 MG Tab, TAKE 1 TABLET BY MOUTH EVERY DAY, Disp: 100 Tablet, Rfl: 3    Allergies, past medical history, past surgical history, family history, social history reviewed and updated    Objective:     Vitals: /64 (BP Location: Left arm, Patient Position: Sitting, BP Cuff Size: Adult)   Pulse 67   Temp 36.1 °C (97 °F) (Temporal)   Resp 18   Ht 1.549 m (5' 1\")   Wt 62.1 kg (137 lb)   LMP  (LMP Unknown)   SpO2 96%   BMI 25.89 kg/m²   General: Alert, pleasant, NAD  HEENT: Normocephalic.  TMs gray, retracted bilaterally.  Moderate ethmoid sinus pressure tenderness.  Mildly erythematous posterior pharynx no tonsillar enlargement or exudate.  Uvula midline.  Neck supple.  No thyromegaly or masses palpated. No cervical or supraclavicular lymphadenopathy. No carotid bruits   Heart: Regular rate and rhythm.  S1 and S2 normal.  No murmurs appreciated.  Respiratory: Normal respiratory effort.  Clear to auscultation bilaterally.  Skin: Warm, dry, no rashes.  Neurological: No tremors, sensation grossly intact, patellar and biceps reflexes 2+ symmetric, tone/strength normal, gait is normal, rapid movements normal, finger-to-nose intact, heel-knee-shin intact, CN2-12 intact, no pronator drift, romberg negative, no clonus.  Positive [place]-Hallpike maneuver to the left  Extremities: No leg edema.  Radial pulses 2+ symmetric  Psych:  Affect/mood is normal, judgement is good, memory is intact, grooming is appropriate.    Assessment/Plan:     Ailin was seen today for dizziness.    Diagnoses and all orders for this visit:    Benign paroxysmal positional vertigo, unspecified laterality  -She did have positive [place]-Hallpike maneuver.  Do believe that " there is definitely component of BPPV contributing to her dizziness.  Continue meclizine 3 times daily.  Referral placed to vestibular physical therapy.  Follow-up with ENT if no improvement.  -     Referral to Physical Therapy  -     meclizine (ANTIVERT) 25 MG Tab; Take 1 Tablet by mouth 3 times a day as needed for Dizziness.  -     Referral to ENT    Meningioma ([facility])  -Does have prior history of meningioma, did have craniotomy performed in May of this year.  Initially did have symptoms of dizziness then.  With new onset of dizziness that is consistent throughout the day CT head ordered to ensure no structural abnormalities.  However advised to only get this completed if being on the doxycycline after 3 to 4 days has not provided any benefit  -     CT-HEAD W/O; Future    Dizziness  -See above  -     Comp Metabolic Panel; Future  -     CBC WITH DIFFERENTIAL; Future  -     CT-HEAD W/O; Future  -     Referral to ENT    Acute non-recurrent ethmoidal sinusitis  -She has had consistent sinus pressure for the past 5 weeks.  Do believe this is also contributing to some of the dizziness.  Allergic to penicillin.  Starting on doxycycline for 7 days.  Continue with nasal sprays  -     doxycycline (VIBRAMYCIN) 100 MG Tab; Take 1 Tablet by mouth 2 times a day for 7 days.    Dyslipidemia  -Due for labs  -     Lipid Profile; Future    Secondary to the complexity of this patient's illnesses and their interactions.  All problems listed were discussed during the office visit, medications were evaluated and complexities were discussed as well as plan for the future.     Return in about 10 days (around 9/22/2024).

## 2024-09-19 ENCOUNTER — APPOINTMENT (OUTPATIENT)
Dept: LAB | Facility: MEDICAL CENTER | Age: 74
End: 2024-09-19
Payer: MEDICARE

## 2024-09-20 ENCOUNTER — HOSPITAL ENCOUNTER (OUTPATIENT)
Dept: LAB | Facility: MEDICAL CENTER | Age: 74
End: 2024-09-20
Attending: INTERNAL MEDICINE
Payer: MEDICARE

## 2024-09-20 PROCEDURE — 36415 COLL VENOUS BLD VENIPUNCTURE: CPT

## 2024-09-20 PROCEDURE — 84439 ASSAY OF FREE THYROXINE: CPT

## 2024-09-20 PROCEDURE — 84443 ASSAY THYROID STIM HORMONE: CPT

## 2024-09-21 LAB
T4 FREE SERPL-MCNC: 1.75 NG/DL (ref 0.93–1.7)
TSH SERPL-ACNC: 5.76 UIU/ML (ref 0.35–5.5)

## 2024-09-23 ENCOUNTER — OFFICE VISIT (OUTPATIENT)
Dept: MEDICAL GROUP | Age: 74
End: 2024-09-23
Payer: MEDICARE

## 2024-09-23 VITALS
HEIGHT: 61 IN | OXYGEN SATURATION: 94 % | WEIGHT: 137 LBS | TEMPERATURE: 97 F | HEART RATE: 66 BPM | DIASTOLIC BLOOD PRESSURE: 86 MMHG | SYSTOLIC BLOOD PRESSURE: 140 MMHG | BODY MASS INDEX: 25.86 KG/M2 | RESPIRATION RATE: 18 BRPM

## 2024-09-23 DIAGNOSIS — R42 DIZZINESS: ICD-10-CM

## 2024-09-23 DIAGNOSIS — J32.0 CHRONIC MAXILLARY SINUSITIS: ICD-10-CM

## 2024-09-23 DIAGNOSIS — H81.10 BENIGN PAROXYSMAL POSITIONAL VERTIGO, UNSPECIFIED LATERALITY: ICD-10-CM

## 2024-09-23 PROCEDURE — 3079F DIAST BP 80-89 MM HG: CPT | Performed by: PHYSICIAN ASSISTANT

## 2024-09-23 PROCEDURE — 99214 OFFICE O/P EST MOD 30 MIN: CPT | Performed by: PHYSICIAN ASSISTANT

## 2024-09-23 PROCEDURE — 3077F SYST BP >= 140 MM HG: CPT | Performed by: PHYSICIAN ASSISTANT

## 2024-09-23 RX ORDER — TRIAMCINOLONE ACETONIDE 40 MG/ML
60 INJECTION, SUSPENSION INTRA-ARTICULAR; INTRAMUSCULAR ONCE
Status: COMPLETED | OUTPATIENT
Start: 2024-09-23 | End: 2024-09-23

## 2024-09-23 RX ORDER — CEFIXIME 400 MG/1
1 CAPSULE ORAL DAILY
Qty: 7 CAPSULE | Refills: 0 | Status: SHIPPED | OUTPATIENT
Start: 2024-09-23 | End: 2024-09-30

## 2024-09-23 RX ORDER — FLUTICASONE PROPIONATE 50 MCG
1 SPRAY, SUSPENSION (ML) NASAL 2 TIMES DAILY
Qty: 16 ML | Refills: 1 | Status: SHIPPED | OUTPATIENT
Start: 2024-09-23

## 2024-09-23 RX ADMIN — TRIAMCINOLONE ACETONIDE 60 MG: 40 INJECTION, SUSPENSION INTRA-ARTICULAR; INTRAMUSCULAR at 15:40

## 2024-09-23 ASSESSMENT — FIBROSIS 4 INDEX: FIB4 SCORE: 0.88

## 2024-09-23 NOTE — PROGRESS NOTES
cc: Follow-up dizziness, sinusitis    Subjective:     HPI    History of Present Illness  The patient is a 74-year-old female presenting for a follow-up on dizziness.    She has experienced dizziness for the past 6 weeks. She was last seen in the clinic approximately 10 days ago, where a positive [place]-Hallpike maneuver was noted. She was advised to continue taking meclizine and was referred to vestibular physical therapy. She reports that her dizziness improves when she lies down but persists with head movement. She feels dizzy upon waking up but does not have a runny nose. She does, however, report itchy eyes. She has been taking meclizine, antihistamines and has found some relief from doxycycline.  However the sinus pressure has persisted.  She has also been seeing a chiropractor who performs the Epley maneuver, which provides temporary relief. She has an appointment scheduled with [facility] in 11/2024.  Physical therapy is booked out until December    Additionally, she has had persistent nasal congestion and sinus pressure for 3 to 4 weeks. She was initially prescribed a Z-Alfred by a previous provider, which did not provide any benefit, even when combined with steroids. She was subsequently started on doxycycline, which also did not alleviate her sinus pressure. She suspects her symptoms may be due to allergies, as antihistamines seem to slightly improve her condition and reduce her dizziness. She recalls experiencing similar symptoms at the age of 21, which were accompanied by severe dizziness and difficulty walking. At that time, she received allergy injections for over 20 years. She has been using an allergy spray, possibly Astelin, but is unsure of its effectiveness. She has tried various treatments including Zyrtec and Zicam, but none have provided significant relief.    She has a prior history of meningioma and underwent a craniotomy in 05/2024. Initially, she had symptoms related to this condition. A CT  "of the head was ordered but has not yet been completed. Labs were also ordered but have not been completed. She has a CT scan scheduled for 10/07/2024.              Review of systems:  See above.       Current Outpatient Medications:     Cefixime 400 MG Cap, Take 1 Capsule by mouth every day for 7 days., Disp: 7 Capsule, Rfl: 0    fluticasone (FLONASE) 50 MCG/ACT nasal spray, Administer 1 Spray into affected nostril(S) 2 times a day., Disp: 16 mL, Rfl: 1    meclizine (ANTIVERT) 25 MG Tab, Take 1 Tablet by mouth 3 times a day as needed for Dizziness., Disp: 60 Tablet, Rfl: 0    ondansetron (ZOFRAN) 8 MG Tab, Take 1 Tablet by mouth every 8 hours as needed for Nausea/Vomiting., Disp: 15 Tablet, Rfl: 0    aspirin 81 MG EC tablet, Take 81 mg by mouth every day., Disp: , Rfl:     levothyroxine (SYNTHROID) 100 MCG Tab, Take 100 mcg by mouth every morning on an empty stomach. Indications: Underactive Thyroid, Disp: , Rfl:     hydroCHLOROthiazide (HYDRODIURIL) 25 MG Tab, TAKE 1 TABLET BY MOUTH EVERY DAY, Disp: 100 Tablet, Rfl: 3    Allergies, past medical history, past surgical history, family history, social history reviewed and updated    Objective:     Vitals: BP (!) 140/86 (BP Location: Left arm, Patient Position: Sitting, BP Cuff Size: Adult)   Pulse 66   Temp 36.1 °C (97 °F) (Temporal)   Resp 18   Ht 1.549 m (5' 1\")   Wt 62.1 kg (137 lb)   LMP  (LMP Unknown)   SpO2 94%   BMI 25.89 kg/m²   General: Alert, pleasant, NAD  HEENT: Normocephalic. Neck supple.  Moderate maxillary and frontal sinus pressure tenderness.  TMs gray retracted bilaterally.  No thyromegaly or masses palpated. No cervical or supraclavicular lymphadenopathy. No carotid bruits   Heart: Regular rate and rhythm.  S1 and S2 normal.  No murmurs appreciated.  Respiratory: Normal respiratory effort.  Clear to auscultation bilaterally.  Skin: Warm, dry, no rashes.  Extremities: No leg edema.  Radial pulses 2+ symmetric  Psych:  Affect/mood is normal, " judgement is good, memory is intact, grooming is appropriate.    Assessment/Plan:     Ailin was seen today for sinusitis and dizziness.    Diagnoses and all orders for this visit:    Benign paroxysmal positional vertigo, unspecified laterality  To believe there is definitely still a component of BPPV.  Continue meclizine, Epley maneuvers.  Did advise to schedule appointment with physical therapy.  Does have a ENT appointment in about 4 weeks.  Does have CT scan of the head ordered to rule out secondary causes    Dizziness  Persistent for the past 6 weeks.  Slight improvement with doxycycline and meclizine.  Possibly combination sinusitis, allergies BPPV.  Did advise follow-up with ENT    Chronic maxillary sinusitis  -Do believe this is definitely contributing to some of her symptoms, possibly component of allergies.  Has persisted.  Given Kenalog injection today.  Continue with over-the-counter antihistamine.  Minimal improvement with doxycycline, allergic to penicillin, but this was when she was a baby.  Will try third-generation cephalosporin.  Would prefer to hold off on Levaquin due to age and side effect profile.  -     triamcinolone acetonide (Kenalog-40) injection 60 mg  -     Cefixime 400 MG Cap; Take 1 Capsule by mouth every day for 7 days.  -     fluticasone (FLONASE) 50 MCG/ACT nasal spray; Administer 1 Spray into affected nostril(S) 2 times a day.        Return in about 3 weeks (around 10/14/2024).

## 2024-10-01 ENCOUNTER — APPOINTMENT (OUTPATIENT)
Dept: MEDICAL GROUP | Age: 74
End: 2024-10-01
Payer: MEDICARE

## 2024-10-07 ENCOUNTER — HOSPITAL ENCOUNTER (OUTPATIENT)
Dept: RADIOLOGY | Facility: MEDICAL CENTER | Age: 74
End: 2024-10-07
Attending: PHYSICIAN ASSISTANT
Payer: MEDICARE

## 2024-10-07 ENCOUNTER — OFFICE VISIT (OUTPATIENT)
Dept: MEDICAL GROUP | Age: 74
End: 2024-10-07
Payer: MEDICARE

## 2024-10-07 VITALS
TEMPERATURE: 97 F | BODY MASS INDEX: 25.49 KG/M2 | OXYGEN SATURATION: 97 % | HEART RATE: 60 BPM | DIASTOLIC BLOOD PRESSURE: 90 MMHG | SYSTOLIC BLOOD PRESSURE: 126 MMHG | HEIGHT: 61 IN | WEIGHT: 135 LBS

## 2024-10-07 DIAGNOSIS — H81.10 BENIGN PAROXYSMAL POSITIONAL VERTIGO, UNSPECIFIED LATERALITY: ICD-10-CM

## 2024-10-07 DIAGNOSIS — D32.9 MENINGIOMA (HCC): ICD-10-CM

## 2024-10-07 DIAGNOSIS — J32.0 CHRONIC MAXILLARY SINUSITIS: ICD-10-CM

## 2024-10-07 DIAGNOSIS — R42 DIZZINESS: ICD-10-CM

## 2024-10-07 DIAGNOSIS — J30.2 SEASONAL ALLERGIES: ICD-10-CM

## 2024-10-07 PROCEDURE — 99214 OFFICE O/P EST MOD 30 MIN: CPT | Performed by: PHYSICIAN ASSISTANT

## 2024-10-07 PROCEDURE — 70450 CT HEAD/BRAIN W/O DYE: CPT

## 2024-10-07 PROCEDURE — 3080F DIAST BP >= 90 MM HG: CPT | Performed by: PHYSICIAN ASSISTANT

## 2024-10-07 PROCEDURE — 3074F SYST BP LT 130 MM HG: CPT | Performed by: PHYSICIAN ASSISTANT

## 2024-10-07 RX ORDER — MONTELUKAST SODIUM 10 MG/1
10 TABLET ORAL DAILY
Qty: 30 TABLET | Refills: 2 | Status: SHIPPED | OUTPATIENT
Start: 2024-10-07 | End: 2024-10-31

## 2024-10-07 RX ORDER — MECLIZINE HYDROCHLORIDE 25 MG/1
25 TABLET ORAL 3 TIMES DAILY PRN
Qty: 60 TABLET | Refills: 0 | Status: SHIPPED | OUTPATIENT
Start: 2024-10-07

## 2024-10-07 ASSESSMENT — FIBROSIS 4 INDEX: FIB4 SCORE: 0.88

## 2024-10-16 DIAGNOSIS — J32.0 CHRONIC MAXILLARY SINUSITIS: ICD-10-CM

## 2024-10-17 RX ORDER — FLUTICASONE PROPIONATE 50 MCG
1 SPRAY, SUSPENSION (ML) NASAL 2 TIMES DAILY
Qty: 48 ML | Refills: 1 | Status: SHIPPED | OUTPATIENT
Start: 2024-10-17

## 2024-10-30 DIAGNOSIS — J30.2 SEASONAL ALLERGIES: ICD-10-CM

## 2024-10-31 RX ORDER — MONTELUKAST SODIUM 10 MG/1
10 TABLET ORAL DAILY
Qty: 90 TABLET | Refills: 1 | Status: SHIPPED | OUTPATIENT
Start: 2024-10-31

## 2024-11-23 DIAGNOSIS — I10 ESSENTIAL HYPERTENSION: ICD-10-CM

## 2024-11-25 RX ORDER — HYDROCHLOROTHIAZIDE 25 MG/1
25 TABLET ORAL
Qty: 100 TABLET | Refills: 3 | Status: SHIPPED | OUTPATIENT
Start: 2024-11-25

## 2024-12-17 ENCOUNTER — HOSPITAL ENCOUNTER (OUTPATIENT)
Dept: LAB | Facility: MEDICAL CENTER | Age: 74
End: 2024-12-17
Attending: INTERNAL MEDICINE
Payer: MEDICARE

## 2024-12-17 ENCOUNTER — HOSPITAL ENCOUNTER (OUTPATIENT)
Dept: LAB | Facility: MEDICAL CENTER | Age: 74
End: 2024-12-17
Attending: PHYSICIAN ASSISTANT
Payer: MEDICARE

## 2024-12-17 DIAGNOSIS — E78.5 DYSLIPIDEMIA: ICD-10-CM

## 2024-12-17 DIAGNOSIS — R42 DIZZINESS: ICD-10-CM

## 2024-12-17 LAB
ALBUMIN SERPL BCP-MCNC: 4.3 G/DL (ref 3.2–4.9)
ALBUMIN/GLOB SERPL: 1.6 G/DL
ALP SERPL-CCNC: 56 U/L (ref 30–99)
ALT SERPL-CCNC: 32 U/L (ref 2–50)
ANION GAP SERPL CALC-SCNC: 11 MMOL/L (ref 7–16)
AST SERPL-CCNC: 32 U/L (ref 12–45)
BASOPHILS # BLD AUTO: 0.6 % (ref 0–1.8)
BASOPHILS # BLD: 0.04 K/UL (ref 0–0.12)
BILIRUB SERPL-MCNC: 0.3 MG/DL (ref 0.1–1.5)
BUN SERPL-MCNC: 19 MG/DL (ref 8–22)
CALCIUM ALBUM COR SERPL-MCNC: 8.9 MG/DL (ref 8.5–10.5)
CALCIUM SERPL-MCNC: 9.1 MG/DL (ref 8.5–10.5)
CHLORIDE SERPL-SCNC: 104 MMOL/L (ref 96–112)
CHOLEST SERPL-MCNC: 198 MG/DL (ref 100–199)
CO2 SERPL-SCNC: 26 MMOL/L (ref 20–33)
CREAT SERPL-MCNC: 0.87 MG/DL (ref 0.5–1.4)
EOSINOPHIL # BLD AUTO: 0.12 K/UL (ref 0–0.51)
EOSINOPHIL NFR BLD: 1.7 % (ref 0–6.9)
ERYTHROCYTE [DISTWIDTH] IN BLOOD BY AUTOMATED COUNT: 49.1 FL (ref 35.9–50)
GFR SERPLBLD CREATININE-BSD FMLA CKD-EPI: 71 ML/MIN/1.73 M 2
GLOBULIN SER CALC-MCNC: 2.7 G/DL (ref 1.9–3.5)
GLUCOSE SERPL-MCNC: 95 MG/DL (ref 65–99)
HCT VFR BLD AUTO: 46.8 % (ref 37–47)
HDLC SERPL-MCNC: 107 MG/DL
HGB BLD-MCNC: 14.9 G/DL (ref 12–16)
IMM GRANULOCYTES # BLD AUTO: 0.01 K/UL (ref 0–0.11)
IMM GRANULOCYTES NFR BLD AUTO: 0.1 % (ref 0–0.9)
LDLC SERPL CALC-MCNC: 84 MG/DL
LYMPHOCYTES # BLD AUTO: 1.68 K/UL (ref 1–4.8)
LYMPHOCYTES NFR BLD: 23.8 % (ref 22–41)
MCH RBC QN AUTO: 30.2 PG (ref 27–33)
MCHC RBC AUTO-ENTMCNC: 31.8 G/DL (ref 32.2–35.5)
MCV RBC AUTO: 94.7 FL (ref 81.4–97.8)
MONOCYTES # BLD AUTO: 0.6 K/UL (ref 0–0.85)
MONOCYTES NFR BLD AUTO: 8.5 % (ref 0–13.4)
NEUTROPHILS # BLD AUTO: 4.62 K/UL (ref 1.82–7.42)
NEUTROPHILS NFR BLD: 65.3 % (ref 44–72)
NRBC # BLD AUTO: 0 K/UL
NRBC BLD-RTO: 0 /100 WBC (ref 0–0.2)
PLATELET # BLD AUTO: 794 K/UL (ref 164–446)
PMV BLD AUTO: 9.3 FL (ref 9–12.9)
POTASSIUM SERPL-SCNC: 4.1 MMOL/L (ref 3.6–5.5)
PROT SERPL-MCNC: 7 G/DL (ref 6–8.2)
RBC # BLD AUTO: 4.94 M/UL (ref 4.2–5.4)
SODIUM SERPL-SCNC: 141 MMOL/L (ref 135–145)
T4 FREE SERPL-MCNC: 1.62 NG/DL (ref 0.93–1.7)
TRIGL SERPL-MCNC: 36 MG/DL (ref 0–149)
TSH SERPL-ACNC: 2.72 UIU/ML (ref 0.35–5.5)
WBC # BLD AUTO: 7.1 K/UL (ref 4.8–10.8)

## 2024-12-17 PROCEDURE — 84443 ASSAY THYROID STIM HORMONE: CPT

## 2024-12-17 PROCEDURE — 85025 COMPLETE CBC W/AUTO DIFF WBC: CPT

## 2024-12-17 PROCEDURE — 84439 ASSAY OF FREE THYROXINE: CPT

## 2024-12-17 PROCEDURE — 80053 COMPREHEN METABOLIC PANEL: CPT

## 2024-12-17 PROCEDURE — 80061 LIPID PANEL: CPT

## 2024-12-17 PROCEDURE — 36415 COLL VENOUS BLD VENIPUNCTURE: CPT

## 2024-12-20 ENCOUNTER — OFFICE VISIT (OUTPATIENT)
Dept: MEDICAL GROUP | Age: 74
End: 2024-12-20
Payer: MEDICARE

## 2024-12-20 VITALS
OXYGEN SATURATION: 97 % | BODY MASS INDEX: 27.19 KG/M2 | HEIGHT: 61 IN | HEART RATE: 68 BPM | SYSTOLIC BLOOD PRESSURE: 122 MMHG | WEIGHT: 144 LBS | TEMPERATURE: 96.9 F | DIASTOLIC BLOOD PRESSURE: 70 MMHG

## 2024-12-20 DIAGNOSIS — I10 ESSENTIAL HYPERTENSION: ICD-10-CM

## 2024-12-20 DIAGNOSIS — E89.0 POSTOPERATIVE HYPOTHYROIDISM: ICD-10-CM

## 2024-12-20 DIAGNOSIS — D47.3 ESSENTIAL THROMBOCYTHEMIA (HCC): ICD-10-CM

## 2024-12-20 DIAGNOSIS — Z12.31 ENCOUNTER FOR SCREENING MAMMOGRAM FOR MALIGNANT NEOPLASM OF BREAST: ICD-10-CM

## 2024-12-20 DIAGNOSIS — Z23 NEED FOR VACCINATION: ICD-10-CM

## 2024-12-20 DIAGNOSIS — E78.5 DYSLIPIDEMIA: ICD-10-CM

## 2024-12-20 DIAGNOSIS — Z00.00 MEDICARE ANNUAL WELLNESS VISIT, SUBSEQUENT: Primary | ICD-10-CM

## 2024-12-20 DIAGNOSIS — Z78.0 POSTMENOPAUSAL STATUS (AGE-RELATED) (NATURAL): ICD-10-CM

## 2024-12-20 DIAGNOSIS — D32.9 MENINGIOMA (HCC): ICD-10-CM

## 2024-12-20 DIAGNOSIS — N95.1 MENOPAUSAL STATE: ICD-10-CM

## 2024-12-20 DIAGNOSIS — I34.0 MODERATE MITRAL REGURGITATION: ICD-10-CM

## 2024-12-20 DIAGNOSIS — R73.01 ELEVATED FASTING GLUCOSE: ICD-10-CM

## 2024-12-20 DIAGNOSIS — G31.9 CEREBRAL ATROPHY (HCC): ICD-10-CM

## 2024-12-20 PROCEDURE — 3074F SYST BP LT 130 MM HG: CPT | Performed by: PHYSICIAN ASSISTANT

## 2024-12-20 PROCEDURE — G0439 PPPS, SUBSEQ VISIT: HCPCS | Mod: 25 | Performed by: PHYSICIAN ASSISTANT

## 2024-12-20 PROCEDURE — 3078F DIAST BP <80 MM HG: CPT | Performed by: PHYSICIAN ASSISTANT

## 2024-12-20 PROCEDURE — G0008 ADMIN INFLUENZA VIRUS VAC: HCPCS | Performed by: PHYSICIAN ASSISTANT

## 2024-12-20 PROCEDURE — 90662 IIV NO PRSV INCREASED AG IM: CPT | Performed by: PHYSICIAN ASSISTANT

## 2024-12-20 ASSESSMENT — ACTIVITIES OF DAILY LIVING (ADL): BATHING_REQUIRES_ASSISTANCE: 0

## 2024-12-20 ASSESSMENT — PATIENT HEALTH QUESTIONNAIRE - PHQ9: CLINICAL INTERPRETATION OF PHQ2 SCORE: 0

## 2024-12-20 ASSESSMENT — ENCOUNTER SYMPTOMS: GENERAL WELL-BEING: GOOD

## 2024-12-20 ASSESSMENT — FIBROSIS 4 INDEX: FIB4 SCORE: 0.51

## 2024-12-20 NOTE — PROGRESS NOTES
Chief Complaint   Patient presents with    Annual Wellness Visit       HPI:  Aiiln Good is a 74 y.o. here for Medicare Annual Wellness Visit      Latest Reference Range & Units 12/17/24 08:44 12/17/24 08:45   WBC 4.8 - 10.8 K/uL 7.1    RBC 4.20 - 5.40 M/uL 4.94    Hemoglobin 12.0 - 16.0 g/dL 14.9    Hematocrit 37.0 - 47.0 % 46.8    MCV 81.4 - 97.8 fL 94.7    MCH 27.0 - 33.0 pg 30.2    MCHC 32.2 - 35.5 g/dL 31.8 (L)    RDW 35.9 - 50.0 fL 49.1    Platelet Count 164 - 446 K/uL 794 (H)    MPV 9.0 - 12.9 fL 9.3    Neutrophils-Polys 44.00 - 72.00 % 65.30    Neutrophils (Absolute) 1.82 - 7.42 K/uL 4.62    Lymphocytes 22.00 - 41.00 % 23.80    Lymphs (Absolute) 1.00 - 4.80 K/uL 1.68    Monocytes 0.00 - 13.40 % 8.50    Monos (Absolute) 0.00 - 0.85 K/uL 0.60    Eosinophils 0.00 - 6.90 % 1.70    Eos (Absolute) 0.00 - 0.51 K/uL 0.12    Basophils 0.00 - 1.80 % 0.60    Baso (Absolute) 0.00 - 0.12 K/uL 0.04    Immature Granulocytes 0.00 - 0.90 % 0.10    Immature Granulocytes (abs) 0.00 - 0.11 K/uL 0.01    Nucleated RBC 0.00 - 0.20 /100 WBC 0.00    NRBC (Absolute) K/uL 0.00    Sodium 135 - 145 mmol/L 141    Potassium 3.6 - 5.5 mmol/L 4.1    Chloride 96 - 112 mmol/L 104    Co2 20 - 33 mmol/L 26    Anion Gap 7.0 - 16.0  11.0    Glucose 65 - 99 mg/dL 95    Bun 8 - 22 mg/dL 19    Creatinine 0.50 - 1.40 mg/dL 0.87    GFR (CKD-EPI) >60 mL/min/1.73 m 2 71    Calcium 8.5 - 10.5 mg/dL 9.1    Correct Calcium 8.5 - 10.5 mg/dL 8.9    AST(SGOT) 12 - 45 U/L 32    ALT(SGPT) 2 - 50 U/L 32    Alkaline Phosphatase 30 - 99 U/L 56    Total Bilirubin 0.1 - 1.5 mg/dL 0.3    Albumin 3.2 - 4.9 g/dL 4.3    Total Protein 6.0 - 8.2 g/dL 7.0    Globulin 1.9 - 3.5 g/dL 2.7    A-G Ratio g/dL 1.6    Cholesterol,Tot 100 - 199 mg/dL 198    Triglycerides 0 - 149 mg/dL 36    HDL >=40 mg/dL 107    LDL <100 mg/dL 84    TSH 0.350 - 5.500 uIU/mL  2.720   Free T-4 0.93 - 1.70 ng/dL  1.62   (L): Data is abnormally low  (H): Data is abnormally high    Patient Active  02-Oct-2019 22:00 Problem List    Diagnosis Date Noted    Dizziness 09/03/2024    Benign paroxysmal positional vertigo due to bilateral vestibular disorder 09/03/2024    Mitral valve prolapse 12/26/2023    Moderate mitral regurgitation 12/26/2023    Elevated fasting glucose 10/09/2023    Cerebral atrophy (HCC) 10/02/2023    BMI 24.0-24.9, adult 10/02/2023    Lumbar stenosis without neurogenic claudication 09/08/2023    Primary osteoarthritis of first carpometacarpal joint of right hand 05/27/2022    Primary osteoarthritis of first carpometacarpal joint of left hand 05/27/2022    Trigger little finger of right hand 05/27/2022    Ganglion cyst of dorsum of left wrist 05/27/2022    Meningioma ([facility]) 06/29/2021    Essential thrombocythemia (HCC) 01/13/2020    Arthritis 01/07/2020    Overactive bladder 09/16/2019    Essential hypertension 04/28/2017    Postoperative hypothyroidism 04/28/2017    Family history of coronary artery disease 04/28/2017    Family history of pancreatic cancer 04/28/2017    History of tobacco use disorder 04/28/2017    Environmental allergies 04/28/2017    Localized deposits of fat 04/28/2017       Current Outpatient Medications   Medication Sig Dispense Refill    hydroCHLOROthiazide 25 MG Tab TAKE 1 TABLET BY MOUTH EVERY  Tablet 3    montelukast (SINGULAIR) 10 MG Tab TAKE 1 TABLET BY MOUTH EVERY DAY 90 Tablet 1    fluticasone (FLONASE) 50 MCG/ACT nasal spray ADMINISTER 1 SPRAY INTO AFFECTED NOSTRIL(S) 2 TIMES A DAY. 48 mL 1    ondansetron (ZOFRAN) 8 MG Tab Take 1 Tablet by mouth every 8 hours as needed for Nausea/Vomiting. 15 Tablet 0    aspirin 81 MG EC tablet Take 81 mg by mouth every day.      levothyroxine (SYNTHROID) 100 MCG Tab Take 100 mcg by mouth every morning on an empty stomach. Indications: Underactive Thyroid       No current facility-administered medications for this visit.          Current supplements as per medication list.     Allergies: Penicillins, Dust mite extract, Pollen extract, and  Seasonal    Current social contact/activities: yes     She  reports that she quit smoking about 11 years ago. Her smoking use included cigarettes. She started smoking about 46 years ago. She has a 35 pack-year smoking history. She has never been exposed to tobacco smoke. She has never used smokeless tobacco. She reports current alcohol use of about 4.2 oz of alcohol per week. She reports that she does not use drugs.  Counseling given: Not Answered  Tobacco comments: do not recall dates      ROS:    Gait: Uses no assistive device  Ostomy: No  Other tubes: No  Amputations: No  Chronic oxygen use: No  Last eye exam: 2023  Wears hearing aids: No   : Denies any urinary leakage during the last 6 months    Screening:    Depression Screening  Little interest or pleasure in doing things?  0 - not at all  Feeling down, depressed , or hopeless? 0 - not at all  Patient Health Questionnaire Score: 0     If depressive symptoms identified deferred to follow up visit unless specifically addressed in assessment and plan.    Interpretation of PHQ-9 Total Score   Score Severity   1-4 No Depression   5-9 Mild Depression   10-14 Moderate Depression   15-19 Moderately Severe Depression   20-27 Severe Depression    Screening for Cognitive Impairment  Do you or any of your friends or family members have any concern about your memory? Yes  Three Minute Recall (Leader, Season, Table) 3/3    Best clock face with all 12 numbers and set the hands to show 10 minutes after 11.  Yes    Cognitive concerns identified deferred for follow up unless specifically addressed in assessment and plan.    Fall Risk Assessment  Has the patient had two or more falls in the last year or any fall with injury in the last year?  No    Safety Assessment  Do you always wear your seatbelt?  Yes  Any changes to home needed to function safely? No  Difficulty hearing.  No  Patient counseled about all safety risks that were identified.    Functional Assessment ADLs  Are  there any barriers preventing you from cooking for yourself or meeting nutritional needs?  No.    Are there any barriers preventing you from driving safely or obtaining transportation?  No.    Are there any barriers preventing you from using a telephone or calling for help?  No    Are there any barriers preventing you from shopping?  No.    Are there any barriers preventing you from taking care of your own finances?  No    Are there any barriers preventing you from managing your medications?  No    Are there any barriers preventing you from showering, bathing or dressing yourself? No    Are there any barriers preventing you from doing housework or laundry? No  Are there any barriers preventing you from using the toilet?No  Are you currently engaging in any exercise or physical activity?  Yes.      Self-Assessment of Health  What is your perception of your health? Good    Do you sleep more than six hours a night? Yes    In the past 7 days, how much did pain keep you from doing your normal work? None    Do you spend quality time with family or friends (virtually or in person)? Yes    Do you usually eat a heart healthy diet that constists of a variety of fruits, vegetables, whole grains and fiber? Yes    Do you eat foods high in fat and/or Fast Food more than three times per week? No    How concerned are you that your medical conditions are not being well managed? Not at all    Are you worried that in the next 2 months, you may not have stable housing that you own, rent, or stay in as part of a household? No      Advance Care Planning  Do you have an Advance Directive, Living Will, Durable Power of , or POLST? Yes    Living Will     is not on file - instructed patient to bring in a copy to scan into their chart      Health Maintenance Summary            Overdue - Zoster (Shingles) Vaccines (1 of 2) Never done      No completion history exists for this topic.              Overdue - Lung Cancer Screening Shared  Decision Making (Once) Never done      No completion history exists for this topic.              Ordered - Bone Density Scan (Every 5 Years) Ordered on 12/20/2024 03/23/2018  DS-BONE DENSITY STUDY (DEXA)    11/02/2007  DS-BONE DENSITY STUDY (DEXA)              Overdue - COVID-19 Vaccine (1 - 2024-25 season) Never done      No completion history exists for this topic.              Ordered - Mammogram (Every 2 Years) Ordered on 12/20/2024 01/13/2023  MA-SCREENING MAMMO BILAT W/TOMOSYNTHESIS W/CAD    12/10/2021  MA-SCREENING MAMMO BILAT W/TOMOSYNTHESIS W/CAD    11/18/2020  MA-SCREENING MAMMO BILAT W/TOMOSYNTHESIS W/CAD    05/06/2019  MA-SCREENING MAMMO BILAT W/TOMOSYNTHESIS W/CAD    05/03/2018  MA-MAMMO SCREENING BILAT W/ODALIS W/CAD    Only the first 5 history entries have been loaded, but more history exists.              Colorectal Cancer Screening (Colon Cancer Screening Cologuard Stool (FIT DNA) - Every 3 Years) Next due on 11/28/2025 11/28/2022  Colon Cancer Screening Cologuard Stool (FIT DNA) (Done)    11/21/2022  COLOGUARD COLON CANCER SCREENING    02/14/2020  OCCULT BLOOD FECES IMMUNOASSAY    06/04/2015  REFERRAL TO GI FOR COLONOSCOPY              Annual Wellness Visit (Yearly) Next due on 12/20/2025 12/20/2024  Level of Service: MS ANNUAL WELLNESS VISIT-INCLUDES PPPS SUBSEQUE*    12/20/2024  Visit Dx: Medicare annual wellness visit, subsequent    10/09/2023  Level of Service: MS ANNUAL WELLNESS VISIT-INCLUDES PPPS SUBSEQUE*    10/09/2023  Visit Dx: Medicare annual wellness visit, subsequent    10/02/2023  Level of Service: MS ANNUAL WELLNESS VISIT-INCLUDES PPPS SUBSEQUE*    Only the first 5 history entries have been loaded, but more history exists.              IMM DTaP/Tdap/Td Vaccine (2 - Td or Tdap) Next due on 9/16/2029 09/16/2019  Imm Admin: Tdap Vaccine    02/15/1996  Imm Admin: TD Vaccine              Hepatitis C Screening  Completed      02/10/2020  Hepatitis C Antibody  component of HEP C VIRUS ANTIBODY    01/10/2020  Hepatitis C Antibody component of HCV Scrn ( 2164-2627 1xLife)              Pneumococcal Vaccine: 65+ Years (Series Information) Completed      2022  Imm Admin: Pneumococcal polysaccharide vaccine (PPSV-23)    2018  Imm Admin: Pneumococcal Conjugate Vaccine (Prevnar/PCV-13)    2016  Imm Admin: Pneumococcal polysaccharide vaccine (PPSV-23)              Influenza Vaccine (Series Information) Completed      2024  Imm Admin: Influenza high-dose trivalent (PF)    10/09/2023  Imm Admin: Influenza Vaccine Adult HD    10/18/2022  Imm Admin: Influenza, unspecified formulation    10/11/2022  Imm Admin: Influenza Vaccine Adult HD    10/11/2022  Imm Admin: Influenza Vaccine Adult HD    Only the first 5 history entries have been loaded, but more history exists.              Hepatitis A Vaccine (Hep A) (Series Information) Aged Out      No completion history exists for this topic.              Hepatitis B Vaccine (Hep B) (Series Information) Aged Out      No completion history exists for this topic.              HPV Vaccines (Series Information) Aged Out      No completion history exists for this topic.              Polio Vaccine (Inactivated Polio) (Series Information) Aged Out      No completion history exists for this topic.              Meningococcal Immunization (Series Information) Aged Out      No completion history exists for this topic.              Discontinued - Cervical Cancer Screening  Discontinued        Frequency changed to Never automatically (Topic No Longer Applies)    11/10/2015  PAP IG (IMAGE GUIDED)                    Patient Care Team:  Jemma Martin P.A.-C. as PCP - General (Family Medicine)  Una Brito M.D. as PCP - [facility] Paneled  Dr. Cholo Colbert DDS as Medical Home Care Manager (Dentistry)  [facility] as Consulting Physician (Optometry)  Haley Decker M.D. (Inactive) as Consulting Physician  (Dermatology)        Social History     Tobacco Use    Smoking status: Former     Current packs/day: 0.00     Average packs/day: 1 pack/day for 35.0 years (35.0 ttl pk-yrs)     Types: Cigarettes     Start date: 1978     Quit date: 2013     Years since quittin.9     Passive exposure: Never    Smokeless tobacco: Never    Tobacco comments:     do not recall dates   Vaping Use    Vaping status: Never Used   Substance Use Topics    Alcohol use: Yes     Alcohol/week: 4.2 oz     Types: 7 Glasses of wine per week     Comment: glass of wine at dinner; 7 per week    Drug use: Never     Family History   Problem Relation Age of Onset    Heart Disease Mother     Diabetes Mother     Hypertension Mother     Cancer Father         pancreatic cancer    Stroke Father     Hypertension Father     Heart Disease Sister     Hypertension Sister     Hyperlipidemia Sister      She  has a past medical history of Allergy, Anesthesia, Arthritis, Blood dyscrasia, Essential (hemorrhagic) thrombocythemia (HCC), Fatty liver disease, nonalcoholic, Heart murmur (2024), Heart valve disease, Hypertension, Polyp of colon (2019), PONV (postoperative nausea and vomiting), Postoperative hypothyroidism, Psychiatric problem (2021), and Urinary incontinence.   Past Surgical History:   Procedure Laterality Date    CRANIOTOMY STEALTH Right 2024    Procedure: STEALTH RIGHT SUBOCCIPITAL CRANIECTOMY, MENINGIOMA EXCISION, TITANIUM CRANIOPLASTY;  Surgeon: Jaleel Dyer M.D.;  Location: [facility];  Service: Neurosurgery    FL INSJ STABLJ DEV W/DCMPRN 1 LVL  2023    Procedure: INSERTION, INTERSPINOUS PROCESS DEVICE;  Surgeon: Jaleel Dyer M.D.;  Location: [facility];  Service: Neurosurgery    LUMBAR LAMINECTOMY DISKECTOMY  2023    Procedure: POSTERIOR LEFT L4 TRANSPEDICULAR DECOMPRESSION, LEFT L5 LAMINECTOMY, L4-5 PLACEMENT OF INTRALAMINAR DEVICE;  Surgeon: Jaleel Dyer M.D.;  Location: [facility]  "[place];  Service: Neurosurgery    AR DX BONE MARROW ASPIRATIONS Left 03/10/2021    Procedure: ASPIRATION, BONE MARROW -;  Surgeon: Joe Black M.D.;  Location: [facility];  Service: Orthopedics    AR DX BONE MARROW BIOPSIES Left 03/10/2021    Procedure: BIOPSY, BONE MARROW, USING NEEDLE OR TROCAR-DR. PERRY;  Surgeon: Joe Black M.D.;  Location: ENDOSCOPY [facility];  Service: Orthopedics    THYROIDECTOMY  2016    TONSILLECTOMY  1969    TUBAL COAGULATION LAPAROSCOPIC BILATERAL         Exam:   /70 (BP Location: Left arm, Patient Position: Sitting, BP Cuff Size: Adult)   Pulse 68   Temp 36.1 °C (96.9 °F) (Temporal)   Ht 1.549 m (5' 1\")   Wt 65.3 kg (144 lb)   SpO2 97%  Body mass index is 27.21 kg/m².    Hearing good.    Dentition good  Alert, oriented in no acute distress.  Eye contact is good, speech goal directed, affect calm    Assessment and Plan. The following treatment and monitoring plan is recommended:      1. Medicare annual wellness visit, subsequent  Continue with regular physical activity as tolerated.  Reviewed diet control    2. Essential thrombocythemia (HCC)  Platelet count moderately elevated.  She is followed by hematology.  Has an appointment coming up in the next few weeks.  Continue 81 mg of aspirin    3. Meningioma (HCC)  Stable.  Currently followed by neurosurgery    4. Cerebral atrophy (HCC)  Incidental finding on imaging.  No cognitive concerns    5. Dyslipidemia  LDL is within normal limits.  Will remove diagnosis from problem list    6. Elevated fasting glucose  Minimally elevated.  Continue lifestyle medications.  Repeat labs again in 6 months    7. Moderate mitral regurgitation  Currently monitored by cardiology.  Has echo scheduled for next month    8. Postoperative hypothyroidism  Stable.  Currently followed by endocrinology.  Continue 100 mcg of levothyroxine    9. Essential hypertension  Controlled.  Continue 25 mg hydrochlorothiazide    10. " Postmenopausal status (age-related) (natural)  -Due for DEXA  - DS-BONE DENSITY STUDY (DEXA); Future    11. Menopausal state  - DS-BONE DENSITY STUDY (DEXA); Future    12. Encounter for screening mammogram for malignant neoplasm of breast  - MA-SCREENING MAMMO BILAT W/TOMOSYNTHESIS W/CAD; Future    13. Need for vaccination  -Immunization given in clinic today  - Influenza Vaccine, High Dose (65+ Only)      Services suggested: No services needed at this time  Health Care Screening: Age-appropriate preventive services recommended by USPTF and ACIP covered by Medicare were discussed today. Services ordered if indicated and agreed upon by the patient.  Referrals offered: Community-based lifestyle interventions to reduce health risks and promote self-management and wellness, fall prevention, nutrition, physical activity, tobacco-use cessation, weight loss, and mental health services as per orders if indicated.    Discussion today about general wellness and lifestyle habits:    Prevent falls and reduce trip hazards; Cautioned about securing or removing rugs.  Have a working fire alarm and carbon monoxide detector;   Engage in regular physical activity and social activities     Follow-up: Return in about 6 months (around 6/20/2025) for Medication Check, Lab Review.

## 2024-12-23 ENCOUNTER — APPOINTMENT (OUTPATIENT)
Dept: PHYSICAL THERAPY | Facility: MEDICAL CENTER | Age: 74
End: 2024-12-23
Attending: PHYSICIAN ASSISTANT
Payer: MEDICARE

## 2024-12-26 ENCOUNTER — APPOINTMENT (OUTPATIENT)
Dept: PHYSICAL THERAPY | Facility: MEDICAL CENTER | Age: 74
End: 2024-12-26
Attending: PHYSICIAN ASSISTANT
Payer: MEDICARE

## 2024-12-30 ENCOUNTER — APPOINTMENT (OUTPATIENT)
Dept: PHYSICAL THERAPY | Facility: MEDICAL CENTER | Age: 74
End: 2024-12-30
Attending: PHYSICIAN ASSISTANT
Payer: MEDICARE

## 2025-01-02 ENCOUNTER — APPOINTMENT (OUTPATIENT)
Dept: PHYSICAL THERAPY | Facility: MEDICAL CENTER | Age: 75
End: 2025-01-02
Attending: PHYSICIAN ASSISTANT
Payer: MEDICARE

## 2025-01-06 ENCOUNTER — APPOINTMENT (OUTPATIENT)
Dept: PHYSICAL THERAPY | Facility: MEDICAL CENTER | Age: 75
End: 2025-01-06
Attending: PHYSICIAN ASSISTANT
Payer: MEDICARE

## 2025-01-09 ENCOUNTER — APPOINTMENT (OUTPATIENT)
Dept: PHYSICAL THERAPY | Facility: MEDICAL CENTER | Age: 75
End: 2025-01-09
Attending: PHYSICIAN ASSISTANT
Payer: MEDICARE

## 2025-01-13 ENCOUNTER — APPOINTMENT (OUTPATIENT)
Dept: PHYSICAL THERAPY | Facility: MEDICAL CENTER | Age: 75
End: 2025-01-13
Attending: PHYSICIAN ASSISTANT
Payer: MEDICARE

## 2025-01-15 ENCOUNTER — HOSPITAL ENCOUNTER (OUTPATIENT)
Dept: CARDIOLOGY | Facility: MEDICAL CENTER | Age: 75
End: 2025-01-15
Attending: INTERNAL MEDICINE
Payer: MEDICARE

## 2025-01-15 DIAGNOSIS — I34.0 MODERATE MITRAL REGURGITATION: ICD-10-CM

## 2025-01-15 DIAGNOSIS — I34.1 MITRAL VALVE PROLAPSE: ICD-10-CM

## 2025-01-15 PROCEDURE — 93306 TTE W/DOPPLER COMPLETE: CPT

## 2025-01-16 ENCOUNTER — APPOINTMENT (OUTPATIENT)
Dept: PHYSICAL THERAPY | Facility: MEDICAL CENTER | Age: 75
End: 2025-01-16
Attending: PHYSICIAN ASSISTANT
Payer: MEDICARE

## 2025-01-17 LAB
LV EJECT FRACT  99904: 65
LV EJECT FRACT MOD 2C 99903: 66.76
LV EJECT FRACT MOD 4C 99902: 73.56
LV EJECT FRACT MOD BP 99901: 70.4

## 2025-01-17 PROCEDURE — 93306 TTE W/DOPPLER COMPLETE: CPT | Mod: 26 | Performed by: INTERNAL MEDICINE

## 2025-01-20 ENCOUNTER — APPOINTMENT (OUTPATIENT)
Dept: PHYSICAL THERAPY | Facility: MEDICAL CENTER | Age: 75
End: 2025-01-20
Attending: PHYSICIAN ASSISTANT
Payer: MEDICARE

## 2025-01-20 ENCOUNTER — TELEPHONE (OUTPATIENT)
Dept: CARDIOLOGY | Facility: MEDICAL CENTER | Age: 75
End: 2025-01-20
Payer: MEDICARE

## 2025-01-20 NOTE — TELEPHONE ENCOUNTER
ZORAN    Caller: Ailin Good     Topic/issue: Pt states that MK requested her to fv and the soonest avail we had was 3/3/25, pt feels that MK wants to see her sooner than that, she would like office to check with him if 3/3 is okay     Callback Number: [phone]    Thank You   Linda FAUSTIN

## 2025-01-20 NOTE — TELEPHONE ENCOUNTER
Phone Number Called: [phone]    Call outcome: Left detailed message for patient. Informed to call back with any additional questions.    Message: Called to per OV note with ZORAN 7/18/24     Return in about 1 year (around 7/18/2025)     LVM for to call back if she had any concerns but informed her that the March appt is fine.

## 2025-01-23 ENCOUNTER — APPOINTMENT (OUTPATIENT)
Dept: PHYSICAL THERAPY | Facility: MEDICAL CENTER | Age: 75
End: 2025-01-23
Attending: PHYSICIAN ASSISTANT
Payer: MEDICARE

## 2025-02-04 ENCOUNTER — OFFICE VISIT (OUTPATIENT)
Dept: CARDIOLOGY | Facility: MEDICAL CENTER | Age: 75
End: 2025-02-04
Attending: INTERNAL MEDICINE
Payer: MEDICARE

## 2025-02-04 ENCOUNTER — TELEPHONE (OUTPATIENT)
Dept: CARDIOLOGY | Facility: MEDICAL CENTER | Age: 75
End: 2025-02-04

## 2025-02-04 VITALS
SYSTOLIC BLOOD PRESSURE: 136 MMHG | WEIGHT: 145 LBS | RESPIRATION RATE: 16 BRPM | OXYGEN SATURATION: 94 % | DIASTOLIC BLOOD PRESSURE: 82 MMHG | BODY MASS INDEX: 27.38 KG/M2 | HEIGHT: 61 IN | HEART RATE: 75 BPM

## 2025-02-04 DIAGNOSIS — I10 ESSENTIAL HYPERTENSION: ICD-10-CM

## 2025-02-04 DIAGNOSIS — I34.0 SEVERE MITRAL REGURGITATION: ICD-10-CM

## 2025-02-04 DIAGNOSIS — I34.1 MITRAL VALVE PROLAPSE: ICD-10-CM

## 2025-02-04 PROCEDURE — 99215 OFFICE O/P EST HI 40 MIN: CPT | Performed by: INTERNAL MEDICINE

## 2025-02-04 PROCEDURE — G2211 COMPLEX E/M VISIT ADD ON: HCPCS | Performed by: INTERNAL MEDICINE

## 2025-02-04 PROCEDURE — 3079F DIAST BP 80-89 MM HG: CPT | Performed by: INTERNAL MEDICINE

## 2025-02-04 PROCEDURE — 99211 OFF/OP EST MAY X REQ PHY/QHP: CPT | Performed by: INTERNAL MEDICINE

## 2025-02-04 PROCEDURE — 3075F SYST BP GE 130 - 139MM HG: CPT | Performed by: INTERNAL MEDICINE

## 2025-02-04 ASSESSMENT — FIBROSIS 4 INDEX: FIB4 SCORE: 0.51

## 2025-02-04 NOTE — PROGRESS NOTES
Cardiology Follow Up Consultation Note    Date of note:   2/4/2025  Primary Care Provider: Jemma Martin P.A.-C.  Referring Provider: No ref. provider found    Patient Name: Ailin Good     YOB: 1950  MRN:              [id number]    Chief Complaint   Patient presents with    Hypertension    Dizziness       History of Present Illness: Ms. Ailin Good is a 74 year old woman with past medical history significant for hypertension, hypothyroidism, thrombocythemia, MVP mitral regurgitation who presents to the office for cardiac follow up.    Patient was initially seen in office back in 12/26/2023 to establish care after she was found to have echocardiogram showing moderate mitral regurgitation.  Echocardiogram on October 2020 showed normal LV function with presence of moderate mitral regurgitation and presence of posterior leaflet prolapse.    Interval history:  Since her last office visit, she had repeat echocardiogram performed for monitoring of her MVP with concomitant moderate mitral regurgitation.  Results shows progression in mitral regurgitation.  Noted to have posterior leaflet prolapse with severe eccentric mitral regurgitation.    She presents today for follow-up and is accompanied by her sister.  She denies having chest pain or lower extremity edema.  No orthopnea.  However she has been experiencing some exertional dyspnea.  Is able to perform ADLs without significant symptoms.  However when she walks her dog, has noticed that she has to take a few breaks due to shortness of breath.  Respiratory symptoms seem to be worse when walking up an incline.      Cardiovascular Risk Factors:  1. Smoking status: Denies  2. Type II Diabetes Mellitus: Prediabetes   Lab Results   Component Value Date/Time    HBA1C 5.5 11/17/2023 10:09 AM    HBA1C 6.3 (H) 07/18/2023 12:01 PM     3. Hypertension: Yes  4. Dyslipidemia: Denies   Cholesterol,Tot   Date Value Ref Range Status   11/17/2023 189 100 - 199 mg/dL  "Final     LDL   Date Value Ref Range Status   11/17/2023 90 <100 mg/dL Final     HDL   Date Value Ref Range Status   11/17/2023 91 >=40 mg/dL Final     Triglycerides   Date Value Ref Range Status   11/17/2023 40 0 - 149 mg/dL Final     5. Family history of early Coronary Artery Disease in a first degree relative (Male less than 55 years of age; Female less than 65 years of age): Denies      Review of Systems:  As per HPI. All other systems reviewed and are negative.      Past Medical History:   Diagnosis Date    Allergy     allergic to trees/grass    Anesthesia     PONV    Arthritis     osteoarthritis    Blood dyscrasia     \" too many platelets \"    Essential (hemorrhagic) thrombocythemia (HCC)     Fatty liver disease, nonalcoholic     pt states she was told by MD    Heart murmur 04/18/2024    was told as a child    Heart valve disease     was told by cardiologist    Hypertension     medicated    Polyp of colon 02/20/2019    PONV (postoperative nausea and vomiting)     Postoperative hypothyroidism     thyroidectomy; takes levothyroxine    Psychiatric problem 03/2021    situational depression ( loss of dog)    Urinary incontinence     minor         Past Surgical History:   Procedure Laterality Date    CRANIOTOMY STEALTH Right 5/2/2024    Procedure: STEALTH RIGHT SUBOCCIPITAL CRANIECTOMY, MENINGIOMA EXCISION, TITANIUM CRANIOPLASTY;  Surgeon: Jaleel Dyer M.D.;  Location: SURGERY [facility];  Service: Neurosurgery    GA INSJ STALAUROJ DEV W/DCMPRN 1 LVL  9/8/2023    Procedure: INSERTION, INTERSPINOUS PROCESS DEVICE;  Surgeon: Jaleel Dyer M.D.;  Location: SURGERY [facility];  Service: Neurosurgery    LUMBAR LAMINECTOMY DISKECTOMY  9/8/2023    Procedure: POSTERIOR LEFT L4 TRANSPEDICULAR DECOMPRESSION, LEFT L5 LAMINECTOMY, L4-5 PLACEMENT OF INTRALAMINAR DEVICE;  Surgeon: Jaleel Dyer M.D.;  Location: SURGERY [facility];  Service: Neurosurgery    GA DX BONE MARROW ASPIRATIONS Left 03/10/2021    Procedure: " ASPIRATION, BONE MARROW -;  Surgeon: Joe Black M.D.;  Location: ENDOSCOPY [facility];  Service: Orthopedics    MT DX BONE MARROW BIOPSIES Left 03/10/2021    Procedure: BIOPSY, BONE MARROW, USING NEEDLE OR TROCAR-DR. PERRY;  Surgeon: Joe Black M.D.;  Location: ENDOSCOPY [facility];  Service: Orthopedics    THYROIDECTOMY  2016    TONSILLECTOMY  1969    TUBAL COAGULATION LAPAROSCOPIC BILATERAL           Current Outpatient Medications   Medication Sig Dispense Refill    hydroCHLOROthiazide 25 MG Tab TAKE 1 TABLET BY MOUTH EVERY  Tablet 3    aspirin 81 MG EC tablet Take 81 mg by mouth every day.      levothyroxine (SYNTHROID) 100 MCG Tab Take 100 mcg by mouth every morning on an empty stomach. Indications: Underactive Thyroid       No current facility-administered medications for this visit.         Allergies   Allergen Reactions    Penicillins Unspecified     States she was told she was allergic as a child through allergy testing but has since tolerated e.g. ampicillin    Dust Mite Extract Runny Nose          Pollen Extract Runny Nose          Seasonal Runny Nose     Every tree, grass         Family History   Problem Relation Age of Onset    Heart Disease Mother     Diabetes Mother     Hypertension Mother     Cancer Father         pancreatic cancer    Stroke Father     Hypertension Father     Heart Disease Sister     Hypertension Sister     Hyperlipidemia Sister          Social History     Socioeconomic History    Marital status:      Spouse name: Not on file    Number of children: Not on file    Years of education: Not on file    Highest education level: Associate degree: occupational, technical, or vocational program   Occupational History    Not on file   Tobacco Use    Smoking status: Former     Current packs/day: 0.00     Average packs/day: 1 pack/day for 35.0 years (35.0 ttl pk-yrs)     Types: Cigarettes     Start date: 1978     Quit date: 2013     Years since quittin.0      Passive exposure: Never    Smokeless tobacco: Never    Tobacco comments:     do not recall dates   Vaping Use    Vaping status: Never Used   Substance and Sexual Activity    Alcohol use: Yes     Alcohol/week: 4.2 oz     Types: 7 Glasses of wine per week     Comment: glass of wine at dinner; 7 per week    Drug use: Never    Sexual activity: Not Currently     Partners: Male   Other Topics Concern    Not on file   Social History Narrative    Not on file     Social Drivers of Health     Financial Resource Strain: Low Risk  (10/9/2023)    Overall Financial Resource Strain (CARDIA)     Difficulty of Paying Living Expenses: Not very hard   Food Insecurity: No Food Insecurity (10/9/2023)    Hunger Vital Sign     Worried About Running Out of Food in the Last Year: Never true     Ran Out of Food in the Last Year: Never true   Transportation Needs: No Transportation Needs (10/9/2023)    PRAPARE - Transportation     Lack of Transportation (Medical): No     Lack of Transportation (Non-Medical): No   Physical Activity: Sufficiently Active (10/9/2023)    Exercise Vital Sign     Days of Exercise per Week: 2 days     Minutes of Exercise per Session: 80 min   Stress: Stress Concern Present (10/9/2023)    [employer] - Occupational Stress Questionnaire     Feeling of Stress : Rather much   Social Connections: Socially Isolated (10/9/2023)    Social Connection and Isolation Panel [NHANES]     Frequency of Communication with Friends and Family: More than three times a week     Frequency of Social Gatherings with Friends and Family: More than three times a week     Attends Sikh Services: Never     Active Member of Clubs or Organizations: No     Attends Club or Organization Meetings: Never     Marital Status:    Intimate Partner Violence: Not on file   Housing Stability: Low Risk  (10/9/2023)    Housing Stability Vital Sign     Unable to Pay for Housing in the Last Year: No     Number of  "Places Lived in the Last Year: 1     Unstable Housing in the Last Year: No         Physical Exam:  Ambulatory Vitals  /82 (BP Location: Left arm, Patient Position: Sitting, BP Cuff Size: Adult)   Pulse 75   Resp 16   Ht 1.549 m (5' 1\")   Wt 65.8 kg (145 lb)   SpO2 94%    Oxygen Therapy:  Pulse Oximetry: 94 %  BP Readings from Last 4 Encounters:   02/04/25 136/82   12/20/24 122/70   10/07/24 (!) 126/90   09/23/24 (!) 140/86       Weight/BMI: Body mass index is 27.4 kg/m².  Wt Readings from Last 4 Encounters:   02/04/25 65.8 kg (145 lb)   12/20/24 65.3 kg (144 lb)   10/07/24 61.2 kg (135 lb)   09/23/24 62.1 kg (137 lb)         General: Not in acute distress  HEENT: OP clear   Neck:  No carotid bruits, No JVD appreciated  CVS:  RRR, Normal S1, S2. +2/6 late systolic murmur at left MAL   Resp: Normal respiratory effort, lungs CTA bilaterally.  Abdomen: Soft, non-distended, non-tender to palpation  Skin: No obvious rashes, no cyanosis  Neurological: Alert and oriented x3, moves all extremities, no focal neurologic deficits  Psychiatric: Appropriate affect  Extremities:   Extremities warm, pulses intact, no significant lower extremity edema      Lab Data Review:  Lab Results   Component Value Date/Time    CHOLSTRLTOT 198 12/17/2024 08:44 AM    LDL 84 12/17/2024 08:44 AM     12/17/2024 08:44 AM    TRIGLYCERIDE 36 12/17/2024 08:44 AM       Lab Results   Component Value Date/Time    SODIUM 141 12/17/2024 08:44 AM    POTASSIUM 4.1 12/17/2024 08:44 AM    CHLORIDE 104 12/17/2024 08:44 AM    CO2 26 12/17/2024 08:44 AM    GLUCOSE 95 12/17/2024 08:44 AM    BUN 19 12/17/2024 08:44 AM    CREATININE 0.87 12/17/2024 08:44 AM     Lab Results   Component Value Date/Time    ALKPHOSPHAT 56 12/17/2024 08:44 AM    ASTSGOT 32 12/17/2024 08:44 AM    ALTSGPT 32 12/17/2024 08:44 AM    TBILIRUBIN 0.3 12/17/2024 08:44 AM      Lab Results   Component Value Date/Time    WBC 7.1 12/17/2024 08:44 AM    HEMOGLOBIN 14.9 12/17/2024 " 08:44 AM     Lab Results   Component Value Date/Time    HBA1C 5.5 11/17/2023 10:09 AM    HBA1C 6.3 (H) 07/18/2023 12:01 PM         Cardiac Imaging and Procedures Review:    EKG dated 9/18/2023:   My personal interpretation is sinus rhythm, PVC     Echo dated 10/11/2023:   Normal left ventricular size, thickness, and systolic function.  Normal right ventricular size and systolic function.  Normal left atrial size.  Prolapse of the posterior mitral leaflet.  Moderate mitral regurgitation.  Mild tricuspid regurgitation.  No pericardial effusion.     No prior study is available for comparison.     Echo dated 1/17/2025:  Echocardiogram results personally reviewed and interpreted by me which shows normal left trickle systolic function with a EF of 65%.  There is posterior mitral valve leaflet prolapse resulting in severe mitral regurgitation with eccentric, anteriorly directed jet.      Assessment & Plan     1. Essential hypertension        2. Mitral valve prolapse  EC-DAWOOD W/O CONT    Referral to Cardiothoracic Surgery      3. Severe mitral regurgitation  EC-DAWOOD W/O CONT    Referral to Cardiothoracic Surgery            Medical Decision Making:  Ms. Ailin Good is a 74 year old woman with past medical history significant for hypertension, hypothyroidism, thrombocythemia, MVP mitral regurgitation who presents to the office for cardiac follow up.    1. Mitral valve prolapse  2. Severe mitral regurgitation  -Findings of severe mitral regurgitation due to posterior leaflet prolapse.  She has no evidence of volume overload/congestive heart failure at this time.  However I am concerned that her exertional symptoms are related to her underlying MR.  As such, we discussed in detail, the management options including the need for surgical intervention.  Given that she has no significant comorbidities she is a good candidate for mitral valve repair with CTS.  Will refer to cardiothoracic surgeon for assessment.  -In the interim,  arrangements will be made for the patient to undergo transesophageal echocardiogram for better assessment and anatomic evaluation of her mitral valve.  -If deemed a poor candidate for surgery, could consider structural heart clinic for transcatheter intervention.    3. Essential hypertension  -Chronic, stable condition.  Continue HCTZ 25 mg daily.    A total of 42 minutes of time was spent on day of encounter reviewing medical record, performing history and examination, counseling, ordering medication/test/consults and documentation.      () Today's E/M visit is associated with medical care services that serve as the continuing focal point for all needed health care services and/or with medical care services that  are part of ongoing care related to a patient's single, serious condition, or a complex condition: This includes  furnishing services to patients on an ongoing basis that result in care that is personalized  to the patient. The services result in a comprehensive, longitudinal, and continuous  relationship with the patient and involve delivery of team-based care that is accessible, coordinated with other practitioners and providers, and integrated with the broader health care landscape.       It was a pleasure seeing Ms. Ailin Good in the office today. Return in about 4 months (around 6/4/2025). Patient is aware to call the cardiology clinic with any questions or concerns.      Clay Durham MD, [facility]  Cardiologist, [facility], [facility]  [address] [address]  Phone: [phone]  Fax: [phone]    Please note that this dictation was created using voice recognition software. I have made every reasonable attempt to correct obvious errors, but it is possible there are errors of grammar and possibly content that I did not discover before finalizing the note.

## 2025-02-06 NOTE — TELEPHONE ENCOUNTER
Patient is scheduled for a DAWOOD with anesthesia on 03- with Dr. Durham. Patient has been instructed to check in at 9:00 am for 11:00 procedure. No meds to hold. Patient has been advised they will need a  home and with them after since they are sedated. Message sent to authorizations. Sent [employer] message to pt with instructions.  NICOLLEI sent to Marva

## 2025-02-07 ENCOUNTER — TELEPHONE (OUTPATIENT)
Dept: SURGERY | Facility: MEDICAL CENTER | Age: 75
End: 2025-02-07
Payer: MEDICARE

## 2025-02-10 ENCOUNTER — APPOINTMENT (OUTPATIENT)
Dept: ADMISSIONS | Facility: MEDICAL CENTER | Age: 75
End: 2025-02-10
Attending: INTERNAL MEDICINE
Payer: MEDICARE

## 2025-02-18 ENCOUNTER — PRE-ADMISSION TESTING (OUTPATIENT)
Dept: ADMISSIONS | Facility: MEDICAL CENTER | Age: 75
End: 2025-02-18
Attending: INTERNAL MEDICINE
Payer: MEDICARE

## 2025-02-18 RX ORDER — DIPHENHYDRAMINE HCL 25 MG
25 TABLET ORAL EVERY 6 HOURS PRN
COMMUNITY

## 2025-02-18 RX ORDER — ACETAMINOPHEN 500 MG
500-1000 TABLET ORAL EVERY 6 HOURS PRN
COMMUNITY
End: 2025-03-21

## 2025-02-18 NOTE — PREADMIT AVS NOTE
Current Medications   Medication Instructions    acetaminophen (TYLENOL) 500 MG Tab As needed medication, may take if needed, including morning of procedure     diphenhydrAMINE (BENADRYL) 25 MG Tab As needed medication, may take if needed, including morning of procedure     Omega-3 Fatty Acids (OMEGA-3 FISH OIL PO) Stop 7 days before surgery    hydroCHLOROthiazide 25 MG Tab Hold medication day of procedure    aspirin 81 MG EC tablet Follow instructions from surgeon or specialist.    levothyroxine (SYNTHROID) 100 MCG Tab Continue taking medication as prescribed, including morning of procedure

## 2025-02-19 ENCOUNTER — HOSPITAL ENCOUNTER (OUTPATIENT)
Dept: LAB | Facility: MEDICAL CENTER | Age: 75
End: 2025-02-19
Attending: INTERNAL MEDICINE
Payer: MEDICARE

## 2025-02-19 PROCEDURE — 84439 ASSAY OF FREE THYROXINE: CPT

## 2025-02-19 PROCEDURE — 36415 COLL VENOUS BLD VENIPUNCTURE: CPT

## 2025-02-19 PROCEDURE — 84443 ASSAY THYROID STIM HORMONE: CPT

## 2025-02-20 LAB
T4 FREE SERPL-MCNC: 1.56 NG/DL (ref 0.93–1.7)
TSH SERPL-ACNC: 1.69 UIU/ML (ref 0.35–5.5)

## 2025-02-24 ENCOUNTER — RESULTS FOLLOW-UP (OUTPATIENT)
Dept: CARDIOLOGY | Facility: MEDICAL CENTER | Age: 75
End: 2025-02-24

## 2025-02-24 ENCOUNTER — TELEPHONE (OUTPATIENT)
Dept: CARDIOTHORACIC SURGERY | Facility: MEDICAL CENTER | Age: 75
End: 2025-02-24
Payer: MEDICARE

## 2025-02-24 ENCOUNTER — PRE-ADMISSION TESTING (OUTPATIENT)
Dept: ADMISSIONS | Facility: MEDICAL CENTER | Age: 75
End: 2025-02-24
Attending: INTERNAL MEDICINE
Payer: MEDICARE

## 2025-02-24 DIAGNOSIS — Z01.812 PRE-OPERATIVE LABORATORY EXAMINATION: ICD-10-CM

## 2025-02-24 DIAGNOSIS — Z01.810 PRE-OPERATIVE CARDIOVASCULAR EXAMINATION: ICD-10-CM

## 2025-02-24 LAB
ALBUMIN SERPL BCP-MCNC: 4.3 G/DL (ref 3.2–4.9)
ALBUMIN/GLOB SERPL: 1.5 G/DL
ALP SERPL-CCNC: 49 U/L (ref 30–99)
ALT SERPL-CCNC: 33 U/L (ref 2–50)
ANION GAP SERPL CALC-SCNC: 13 MMOL/L (ref 7–16)
AST SERPL-CCNC: 31 U/L (ref 12–45)
BILIRUB SERPL-MCNC: 0.4 MG/DL (ref 0.1–1.5)
BUN SERPL-MCNC: 23 MG/DL (ref 8–22)
CALCIUM ALBUM COR SERPL-MCNC: 8.7 MG/DL (ref 8.5–10.5)
CALCIUM SERPL-MCNC: 8.9 MG/DL (ref 8.5–10.5)
CHLORIDE SERPL-SCNC: 101 MMOL/L (ref 96–112)
CO2 SERPL-SCNC: 26 MMOL/L (ref 20–33)
CREAT SERPL-MCNC: 1 MG/DL (ref 0.5–1.4)
EKG IMPRESSION: NORMAL
ERYTHROCYTE [DISTWIDTH] IN BLOOD BY AUTOMATED COUNT: 48 FL (ref 35.9–50)
EST. AVERAGE GLUCOSE BLD GHB EST-MCNC: 134 MG/DL
GFR SERPLBLD CREATININE-BSD FMLA CKD-EPI: 60 ML/MIN/1.73 M 2
GLOBULIN SER CALC-MCNC: 2.8 G/DL (ref 1.9–3.5)
GLUCOSE SERPL-MCNC: 112 MG/DL (ref 65–99)
HBA1C MFR BLD: 6.3 % (ref 4–5.6)
HCT VFR BLD AUTO: 42.1 % (ref 37–47)
HGB BLD-MCNC: 13.8 G/DL (ref 12–16)
MCH RBC QN AUTO: 30 PG (ref 27–33)
MCHC RBC AUTO-ENTMCNC: 32.8 G/DL (ref 32.2–35.5)
MCV RBC AUTO: 91.5 FL (ref 81.4–97.8)
PLATELET # BLD AUTO: 669 K/UL (ref 164–446)
PMV BLD AUTO: 9.1 FL (ref 9–12.9)
POTASSIUM SERPL-SCNC: 3.8 MMOL/L (ref 3.6–5.5)
PROT SERPL-MCNC: 7.1 G/DL (ref 6–8.2)
RBC # BLD AUTO: 4.6 M/UL (ref 4.2–5.4)
SODIUM SERPL-SCNC: 140 MMOL/L (ref 135–145)
WBC # BLD AUTO: 6.4 K/UL (ref 4.8–10.8)

## 2025-02-24 PROCEDURE — 93005 ELECTROCARDIOGRAM TRACING: CPT | Mod: TC

## 2025-02-24 PROCEDURE — 85027 COMPLETE CBC AUTOMATED: CPT

## 2025-02-24 PROCEDURE — 83036 HEMOGLOBIN GLYCOSYLATED A1C: CPT

## 2025-02-24 PROCEDURE — 80053 COMPREHEN METABOLIC PANEL: CPT

## 2025-02-24 PROCEDURE — 93010 ELECTROCARDIOGRAM REPORT: CPT | Performed by: INTERNAL MEDICINE

## 2025-02-24 PROCEDURE — 36415 COLL VENOUS BLD VENIPUNCTURE: CPT

## 2025-02-25 ENCOUNTER — TELEPHONE (OUTPATIENT)
Dept: SURGERY | Facility: MEDICAL CENTER | Age: 75
End: 2025-02-25
Payer: MEDICARE

## 2025-02-28 ENCOUNTER — HOSPITAL ENCOUNTER (OUTPATIENT)
Dept: RADIOLOGY | Facility: MEDICAL CENTER | Age: 75
End: 2025-02-28
Attending: PHYSICIAN ASSISTANT
Payer: MEDICARE

## 2025-02-28 DIAGNOSIS — N95.1 MENOPAUSAL STATE: ICD-10-CM

## 2025-02-28 DIAGNOSIS — Z12.31 ENCOUNTER FOR SCREENING MAMMOGRAM FOR MALIGNANT NEOPLASM OF BREAST: ICD-10-CM

## 2025-02-28 DIAGNOSIS — Z78.0 POSTMENOPAUSAL STATUS (AGE-RELATED) (NATURAL): ICD-10-CM

## 2025-02-28 PROCEDURE — 77067 SCR MAMMO BI INCL CAD: CPT

## 2025-02-28 PROCEDURE — 77080 DXA BONE DENSITY AXIAL: CPT

## 2025-02-28 NOTE — PROGRESS NOTES
REFERRING PHYSICIAN: Clay Durham MD    CONSULTING PHYSICIAN: Estella Geller MD, FACS    CHIEF COMPLAINT: Shortness of breath    HISTORY OF PRESENT ILLNESS: The patient is a 74 y.o. female with history of hypothyroidism, meningioma, hypertension, thrombocythemia, and severe mitral regurgitation. Today she states she has some increasing shortness of breath for the last coupe of weeks. She denies  chest pain, lower extremity edema, dizziness, syncope, orthopnea, or PND. She remains active and can walk about 1/2 mile before having to stop and rest.    PAST MEDICAL HISTORY:   Active Ambulatory Problems     Diagnosis Date Noted    Essential hypertension 04/28/2017    Postoperative hypothyroidism 04/28/2017    Family history of coronary artery disease 04/28/2017    Family history of pancreatic cancer 04/28/2017    History of tobacco use disorder 04/28/2017    Environmental allergies 04/28/2017    Localized deposits of fat 04/28/2017    Overactive bladder 09/16/2019    Arthritis 01/07/2020    Essential thrombocythemia (HCC) 01/13/2020    Meningioma (HCC) 06/29/2021    Primary osteoarthritis of first carpometacarpal joint of right hand 05/27/2022    Primary osteoarthritis of first carpometacarpal joint of left hand 05/27/2022    Trigger little finger of right hand 05/27/2022    Ganglion cyst of dorsum of left wrist 05/27/2022    Lumbar stenosis without neurogenic claudication 09/08/2023    Cerebral atrophy (HCC) 10/02/2023    BMI 24.0-24.9, adult 10/02/2023    Elevated fasting glucose 10/09/2023    Mitral valve prolapse 12/26/2023    Moderate mitral regurgitation 12/26/2023    Dizziness 09/03/2024    Benign paroxysmal positional vertigo due to bilateral vestibular disorder 09/03/2024     Resolved Ambulatory Problems     Diagnosis Date Noted    Colon polyp 04/28/2017    Polyp of colon 02/20/2019    Dyslipidemia 10/02/2023    Prediabetes 10/02/2023     Past Medical History:   Diagnosis Date    Allergy     Anesthesia     Back  pain     Blood dyscrasia     Breath shortness     Delayed emergence from general anesthesia     Essential (hemorrhagic) thrombocythemia (HCC)     Fatty liver disease, nonalcoholic     Heart murmur 04/18/2024    Heart valve disease     Hypertension     Pain     PONV (postoperative nausea and vomiting)     Psychiatric problem 03/2021    Urinary incontinence      PAST SURGICAL HISTORY:   Past Surgical History:   Procedure Laterality Date    CRANIOTOMY STEALTH Right 5/2/2024    Procedure: STEALTH RIGHT SUBOCCIPITAL CRANIECTOMY, MENINGIOMA EXCISION, TITANIUM CRANIOPLASTY;  Surgeon: Jaleel Dyer M.D.;  Location: SURGERY [facility];  Service: Neurosurgery    DE INSJ STABLJ DEV W/DCMPRN 1 LVL  9/8/2023    Procedure: INSERTION, INTERSPINOUS PROCESS DEVICE;  Surgeon: Jaleel Dyer M.D.;  Location: SURGERY [facility];  Service: Neurosurgery    LUMBAR LAMINECTOMY DISKECTOMY  9/8/2023    Procedure: POSTERIOR LEFT L4 TRANSPEDICULAR DECOMPRESSION, LEFT L5 LAMINECTOMY, L4-5 PLACEMENT OF INTRALAMINAR DEVICE;  Surgeon: Jaleel Dyer M.D.;  Location: SURGERY [facility];  Service: Neurosurgery    DE DX BONE MARROW ASPIRATIONS Left 03/10/2021    Procedure: ASPIRATION, BONE MARROW -;  Surgeon: Joe Black M.D.;  Location: ENDOSCOPY [facility], [place];  Service: Orthopedics    DE DX BONE MARROW BIOPSIES Left 03/10/2021    Procedure: BIOPSY, BONE MARROW, USING NEEDLE OR TROCAR-DR. PERRY;  Surgeon: Joe Black M.D.;  Location: ENDOSCOPY [facility], [place];  Service: Orthopedics    THYROIDECTOMY  2016    TONSILLECTOMY  1969    TUBAL COAGULATION LAPAROSCOPIC BILATERAL       ALLERGIES:   Allergies   Allergen Reactions    Penicillins Unspecified     States she was told she was allergic as a child through allergy testing but has since tolerated e.g. ampicillin    Dust Mite Extract Runny Nose          Pollen Extract Runny Nose          Seasonal Runny Nose     Every tree, grass     CURRENT MEDICATIONS:   Current Outpatient Medications:      acetaminophen (TYLENOL) 500 MG Tab, Take 500-1,000 mg by mouth every 6 hours as needed., Disp: , Rfl:     diphenhydrAMINE (BENADRYL) 25 MG Tab, Take 25 mg by mouth every 6 hours as needed for Sleep., Disp: , Rfl:     Omega-3 Fatty Acids (OMEGA-3 FISH OIL PO), Take 2 Capsules by mouth every day., Disp: , Rfl:     hydroCHLOROthiazide 25 MG Tab, TAKE 1 TABLET BY MOUTH EVERY DAY, Disp: 100 Tablet, Rfl: 3    aspirin 81 MG EC tablet, Take 81 mg by mouth every day., Disp: , Rfl:     levothyroxine (SYNTHROID) 100 MCG Tab, Take 100 mcg by mouth every morning on an empty stomach. Indications: Underactive Thyroid, Disp: , Rfl:     FAMILY HISTORY:   Family History   Problem Relation Age of Onset    Heart Disease Mother     Diabetes Mother     Hypertension Mother     Cancer Father         pancreatic cancer    Stroke Father     Hypertension Father     Heart Disease Sister     Hypertension Sister     Hyperlipidemia Sister       SOCIAL HISTORY:   Social History     Socioeconomic History    Marital status: Single     Spouse name: Not on file    Number of children: Not on file    Years of education: Not on file    Highest education level: Associate degree: occupational, technical, or vocational program   Occupational History    Not on file   Tobacco Use    Smoking status: Former     Current packs/day: 0.00     Average packs/day: 1 pack/day for 35.0 years (35.0 ttl pk-yrs)     Types: Cigarettes     Start date: 1978     Quit date: 2013     Years since quittin.1     Passive exposure: Past    Smokeless tobacco: Never    Tobacco comments:     do not recall dates   Vaping Use    Vaping status: Never Used   Substance and Sexual Activity    Alcohol use: Yes     Alcohol/week: 4.2 oz     Types: 7 Glasses of wine per week     Comment: glass of wine at dinner; 7 per week    Drug use: Never    Sexual activity: Not Currently     Partners: Male   Other Topics Concern    Not on file   Social History Narrative    Not on file     Social  Drivers of Health     Financial Resource Strain: Low Risk  (10/9/2023)    Overall Financial Resource Strain (CARDIA)     Difficulty of Paying Living Expenses: Not very hard   Food Insecurity: No Food Insecurity (10/9/2023)    Hunger Vital Sign     Worried About Running Out of Food in the Last Year: Never true     Ran Out of Food in the Last Year: Never true   Transportation Needs: No Transportation Needs (10/9/2023)    PRAPARE - Transportation     Lack of Transportation (Medical): No     Lack of Transportation (Non-Medical): No   Physical Activity: Sufficiently Active (10/9/2023)    Exercise Vital Sign     Days of Exercise per Week: 2 days     Minutes of Exercise per Session: 80 min   Stress: Stress Concern Present (10/9/2023)    [employer] [place] [employer] - Occupational Stress Questionnaire     Feeling of Stress : Rather much   Social Connections: Socially Isolated (10/9/2023)    Social Connection and Isolation Panel [NHANES]     Frequency of Communication with Friends and Family: More than three times a week     Frequency of Social Gatherings with Friends and Family: More than three times a week     Attends Hoahaoism Services: Never     Active Member of Clubs or Organizations: No     Attends Club or Organization Meetings: Never     Marital Status:    Intimate Partner Violence: Not on file   Housing Stability: Low Risk  (10/9/2023)    Housing Stability Vital Sign     Unable to Pay for Housing in the Last Year: No     Number of Places Lived in the Last Year: 1     Unstable Housing in the Last Year: No     REVIEW OF SYSTEMS:  Review of Systems   Constitutional: Negative.    HENT:  Positive for hearing loss.    Eyes: Negative.    Respiratory:  Positive for shortness of breath.    Cardiovascular: Negative.    Gastrointestinal: Negative.    Genitourinary: Negative.    Musculoskeletal: Negative.    Skin: Negative.    Neurological: Negative.    Endo/Heme/Allergies: Negative.   "  Psychiatric/Behavioral: Negative.       PHYSICAL EXAMINATION:    BP (!) 144/82 (BP Location: Left arm, Patient Position: Sitting, BP Cuff Size: Adult)   Pulse 67   Temp 36.3 °C (97.4 °F) (Temporal)   Ht 1.575 m (5' 2\")   Wt 63 kg (139 lb)   LMP  (LMP Unknown)   SpO2 94%   BMI 25.42 kg/m²    Physical Exam  Constitutional:       General: She is not in acute distress.  HENT:      Head: Normocephalic.   Eyes:      Pupils: Pupils are equal, round, and reactive to light.   Cardiovascular:      Rate and Rhythm: Normal rate and regular rhythm.      Heart sounds: Murmur heard.      Systolic murmur is present with a grade of 3/6.      No gallop.   Pulmonary:      Effort: Pulmonary effort is normal. No respiratory distress.      Breath sounds: Normal breath sounds. No wheezing or rales.   Abdominal:      General: Bowel sounds are normal. There is no distension.      Palpations: Abdomen is soft.      Tenderness: There is no abdominal tenderness.   Musculoskeletal:         General: Normal range of motion.      Cervical back: Neck supple.   Skin:     General: Skin is warm and dry.   Neurological:      Mental Status: She is alert and oriented to person, place, and time.   Psychiatric:         Mood and Affect: Mood and affect normal.         Cognition and Memory: Memory normal.         Judgment: Judgment normal.     LABS REVIEWED:  Lab Results   Component Value Date/Time    SODIUM 140 02/24/2025 02:16 PM    POTASSIUM 3.8 02/24/2025 02:16 PM    CHLORIDE 101 02/24/2025 02:16 PM    CO2 26 02/24/2025 02:16 PM    GLUCOSE 112 (H) 02/24/2025 02:16 PM    BUN 23 (H) 02/24/2025 02:16 PM    CREATININE 1.00 02/24/2025 02:16 PM      Lab Results   Component Value Date/Time    PROTHROMBTM 13.5 04/19/2024 12:59 PM    INR 1.02 04/19/2024 12:59 PM      Lab Results   Component Value Date/Time    WBC 6.4 02/24/2025 02:16 PM    RBC 4.60 02/24/2025 02:16 PM    HEMOGLOBIN 13.8 02/24/2025 02:16 PM    HEMATOCRIT 42.1 02/24/2025 02:16 PM    MCV " 91.5 02/24/2025 02:16 PM    MCH 30.0 02/24/2025 02:16 PM    MCHC 32.8 02/24/2025 02:16 PM    MPV 9.1 02/24/2025 02:16 PM    NEUTSPOLYS 65.30 12/17/2024 08:44 AM    LYMPHOCYTES 23.80 12/17/2024 08:44 AM    MONOCYTES 8.50 12/17/2024 08:44 AM    EOSINOPHILS 1.70 12/17/2024 08:44 AM    BASOPHILS 0.60 12/17/2024 08:44 AM    ANISOCYTOSIS 2+ (A) 06/15/2021 10:36 AM      IMAGING REVIEWED AND INTERPRETED:    ECHOCARDIOGRAM 1/15/25 [facility]:  Normal left ventricular systolic function.  The left ventricular ejection fraction is visually estimated to be 65%.  Normal right ventricular size and systolic function.  Prolapse of the posterior mitral leaflet.  There is severe mitral regurgitation with eccentric, anteriorly   directed jet.  Mild to moderate tricuspid regurgitation.    CARDIAC CATHETERIZATION   None      IMPRESSION:  Severe symptomatic mitral regurgitation, mitral valve prolapse, hypothyroidism, s/p meningioma resection, hypertension, thrombocythemia    PLAN:  I recommend that she undergo mitral valve repair or replacement and intraoperative transesophageal echocardiography.    The procedure, its risks, benefits, potential complications and alternative treatments were discussed with the patient in detail including the risks should she decide not to undergo my recommended treatment. All of her questions were answered to her satisfaction and she is willing to proceed with the operation. The risks include death, stroke, infection: to include a rare bacterial infection related to the use of the heart/lung machine, arleen-operative myocardial infarction, dysrhythmias, diaphragmatic paralysis, chest wall paresthesia, tracheostomy, kidney or other organ failure, possible return to the operating room for bleeding, bleeding requiring transfusion with its attendant risks including AIDS or hepatitis, dehiscence of surgical incisions, respiratory complications including the need for prolonged ventilator support, Protamine or other drug  reaction, peripheral neuropathy, loss of limb, and miscount of surgical items. The operative mortality risk is approximately 1%. The STS mortality risk score is 1.1% and the morbidity and mortality risk score is 12% for mitral valve repair. The STS mortality risk score is 1.6% and the morbidity and mortality risk score is 21% for mitral valve replacement.The scores were discussed with patient.    The operation is scheduled for Friday, April 4, 2025 at 7:30 AM at [facility].  Cardiac catheterization will be performed prior to the operation    Findings and recommendations have been discussed with the patient’s cardiologist, Clay Durham MD.  Thank you for this very challenging consultation and participation in the patient’s care.  I will keep you apprised of all future developments.    Sincerely,    Estella Geller MD, FACS

## 2025-03-03 ENCOUNTER — RESULTS FOLLOW-UP (OUTPATIENT)
Dept: MEDICAL GROUP | Age: 75
End: 2025-03-03

## 2025-03-10 ENCOUNTER — APPOINTMENT (OUTPATIENT)
Dept: CARDIOLOGY | Facility: MEDICAL CENTER | Age: 75
End: 2025-03-10
Attending: INTERNAL MEDICINE
Payer: MEDICARE

## 2025-03-10 ENCOUNTER — ANESTHESIA EVENT (OUTPATIENT)
Dept: CARDIOLOGY | Facility: MEDICAL CENTER | Age: 75
End: 2025-03-10
Payer: MEDICARE

## 2025-03-10 ENCOUNTER — HOSPITAL ENCOUNTER (OUTPATIENT)
Facility: MEDICAL CENTER | Age: 75
End: 2025-03-10
Attending: INTERNAL MEDICINE | Admitting: INTERNAL MEDICINE
Payer: MEDICARE

## 2025-03-10 ENCOUNTER — ANESTHESIA (OUTPATIENT)
Dept: CARDIOLOGY | Facility: MEDICAL CENTER | Age: 75
End: 2025-03-10
Payer: MEDICARE

## 2025-03-10 VITALS
HEART RATE: 56 BPM | RESPIRATION RATE: 16 BRPM | OXYGEN SATURATION: 95 % | DIASTOLIC BLOOD PRESSURE: 84 MMHG | BODY MASS INDEX: 25.68 KG/M2 | SYSTOLIC BLOOD PRESSURE: 138 MMHG | WEIGHT: 139.55 LBS | HEIGHT: 62 IN | TEMPERATURE: 97.5 F

## 2025-03-10 DIAGNOSIS — I34.0 SEVERE MITRAL REGURGITATION: ICD-10-CM

## 2025-03-10 DIAGNOSIS — I34.1 MITRAL VALVE PROLAPSE: ICD-10-CM

## 2025-03-10 PROCEDURE — 160002 HCHG RECOVERY MINUTES (STAT)

## 2025-03-10 PROCEDURE — 93312 ECHO TRANSESOPHAGEAL: CPT | Mod: 26 | Performed by: INTERNAL MEDICINE

## 2025-03-10 PROCEDURE — 160046 HCHG PACU - 1ST 60 MINS PHASE II

## 2025-03-10 PROCEDURE — 700101 HCHG RX REV CODE 250: Performed by: ANESTHESIOLOGY

## 2025-03-10 PROCEDURE — 160015 HCHG STAT PREOP MINUTES

## 2025-03-10 PROCEDURE — 160035 HCHG PACU - 1ST 60 MINS PHASE I

## 2025-03-10 PROCEDURE — 93319 3D ECHO IMG CGEN CAR ANOMAL: CPT

## 2025-03-10 PROCEDURE — 700111 HCHG RX REV CODE 636 W/ 250 OVERRIDE (IP): Performed by: ANESTHESIOLOGY

## 2025-03-10 PROCEDURE — 700105 HCHG RX REV CODE 258: Performed by: ANESTHESIOLOGY

## 2025-03-10 RX ORDER — METOPROLOL TARTRATE 1 MG/ML
1 INJECTION, SOLUTION INTRAVENOUS
Status: DISCONTINUED | OUTPATIENT
Start: 2025-03-10 | End: 2025-03-10 | Stop reason: HOSPADM

## 2025-03-10 RX ORDER — ONDANSETRON 2 MG/ML
INJECTION INTRAMUSCULAR; INTRAVENOUS PRN
Status: DISCONTINUED | OUTPATIENT
Start: 2025-03-10 | End: 2025-03-10 | Stop reason: SURG

## 2025-03-10 RX ORDER — IPRATROPIUM BROMIDE AND ALBUTEROL SULFATE 2.5; .5 MG/3ML; MG/3ML
3 SOLUTION RESPIRATORY (INHALATION)
Status: DISCONTINUED | OUTPATIENT
Start: 2025-03-10 | End: 2025-03-10 | Stop reason: HOSPADM

## 2025-03-10 RX ORDER — OXYCODONE HCL 5 MG/5 ML
5 SOLUTION, ORAL ORAL
Status: DISCONTINUED | OUTPATIENT
Start: 2025-03-10 | End: 2025-03-10 | Stop reason: HOSPADM

## 2025-03-10 RX ORDER — HYDROMORPHONE HYDROCHLORIDE 2 MG/ML
0.1 INJECTION, SOLUTION INTRAMUSCULAR; INTRAVENOUS; SUBCUTANEOUS
Status: DISCONTINUED | OUTPATIENT
Start: 2025-03-10 | End: 2025-03-10 | Stop reason: HOSPADM

## 2025-03-10 RX ORDER — LIDOCAINE HYDROCHLORIDE 20 MG/ML
INJECTION, SOLUTION EPIDURAL; INFILTRATION; INTRACAUDAL; PERINEURAL PRN
Status: DISCONTINUED | OUTPATIENT
Start: 2025-03-10 | End: 2025-03-10 | Stop reason: SURG

## 2025-03-10 RX ORDER — HYDROMORPHONE HYDROCHLORIDE 2 MG/ML
0.4 INJECTION, SOLUTION INTRAMUSCULAR; INTRAVENOUS; SUBCUTANEOUS
Status: DISCONTINUED | OUTPATIENT
Start: 2025-03-10 | End: 2025-03-10 | Stop reason: HOSPADM

## 2025-03-10 RX ORDER — HALOPERIDOL 5 MG/ML
1 INJECTION INTRAMUSCULAR
Status: DISCONTINUED | OUTPATIENT
Start: 2025-03-10 | End: 2025-03-10 | Stop reason: HOSPADM

## 2025-03-10 RX ORDER — ONDANSETRON 2 MG/ML
4 INJECTION INTRAMUSCULAR; INTRAVENOUS
Status: DISCONTINUED | OUTPATIENT
Start: 2025-03-10 | End: 2025-03-10 | Stop reason: HOSPADM

## 2025-03-10 RX ORDER — HYDROMORPHONE HYDROCHLORIDE 2 MG/ML
0.2 INJECTION, SOLUTION INTRAMUSCULAR; INTRAVENOUS; SUBCUTANEOUS
Status: DISCONTINUED | OUTPATIENT
Start: 2025-03-10 | End: 2025-03-10 | Stop reason: HOSPADM

## 2025-03-10 RX ORDER — OXYCODONE HCL 5 MG/5 ML
10 SOLUTION, ORAL ORAL
Status: DISCONTINUED | OUTPATIENT
Start: 2025-03-10 | End: 2025-03-10 | Stop reason: HOSPADM

## 2025-03-10 RX ORDER — SODIUM CHLORIDE, SODIUM LACTATE, POTASSIUM CHLORIDE, CALCIUM CHLORIDE 600; 310; 30; 20 MG/100ML; MG/100ML; MG/100ML; MG/100ML
INJECTION, SOLUTION INTRAVENOUS CONTINUOUS
Status: DISCONTINUED | OUTPATIENT
Start: 2025-03-10 | End: 2025-03-10 | Stop reason: HOSPADM

## 2025-03-10 RX ORDER — DEXAMETHASONE SODIUM PHOSPHATE 4 MG/ML
INJECTION, SOLUTION INTRA-ARTICULAR; INTRALESIONAL; INTRAMUSCULAR; INTRAVENOUS; SOFT TISSUE PRN
Status: DISCONTINUED | OUTPATIENT
Start: 2025-03-10 | End: 2025-03-10 | Stop reason: SURG

## 2025-03-10 RX ORDER — HYDRALAZINE HYDROCHLORIDE 20 MG/ML
5 INJECTION INTRAMUSCULAR; INTRAVENOUS
Status: DISCONTINUED | OUTPATIENT
Start: 2025-03-10 | End: 2025-03-10 | Stop reason: HOSPADM

## 2025-03-10 RX ORDER — LABETALOL HYDROCHLORIDE 5 MG/ML
5 INJECTION, SOLUTION INTRAVENOUS
Status: DISCONTINUED | OUTPATIENT
Start: 2025-03-10 | End: 2025-03-10 | Stop reason: HOSPADM

## 2025-03-10 RX ORDER — EPHEDRINE SULFATE 50 MG/ML
5 INJECTION, SOLUTION INTRAVENOUS
Status: DISCONTINUED | OUTPATIENT
Start: 2025-03-10 | End: 2025-03-10 | Stop reason: HOSPADM

## 2025-03-10 RX ORDER — SODIUM CHLORIDE, SODIUM LACTATE, POTASSIUM CHLORIDE, CALCIUM CHLORIDE 600; 310; 30; 20 MG/100ML; MG/100ML; MG/100ML; MG/100ML
INJECTION, SOLUTION INTRAVENOUS
Status: DISCONTINUED | OUTPATIENT
Start: 2025-03-10 | End: 2025-03-10 | Stop reason: SURG

## 2025-03-10 RX ORDER — DIPHENHYDRAMINE HYDROCHLORIDE 50 MG/ML
12.5 INJECTION, SOLUTION INTRAMUSCULAR; INTRAVENOUS
Status: DISCONTINUED | OUTPATIENT
Start: 2025-03-10 | End: 2025-03-10 | Stop reason: HOSPADM

## 2025-03-10 RX ADMIN — ONDANSETRON 4 MG: 2 INJECTION INTRAMUSCULAR; INTRAVENOUS at 11:54

## 2025-03-10 RX ADMIN — LIDOCAINE HYDROCHLORIDE 50 MG: 20 INJECTION, SOLUTION EPIDURAL; INFILTRATION; INTRACAUDAL; PERINEURAL at 11:26

## 2025-03-10 RX ADMIN — PROPOFOL 30 MG: 10 INJECTION, EMULSION INTRAVENOUS at 11:30

## 2025-03-10 RX ADMIN — PROPOFOL 50 MG: 10 INJECTION, EMULSION INTRAVENOUS at 11:44

## 2025-03-10 RX ADMIN — FENTANYL CITRATE 50 MCG: 50 INJECTION, SOLUTION INTRAMUSCULAR; INTRAVENOUS at 11:21

## 2025-03-10 RX ADMIN — PROPOFOL 30 MG: 10 INJECTION, EMULSION INTRAVENOUS at 11:55

## 2025-03-10 RX ADMIN — DEXAMETHASONE SODIUM PHOSPHATE 4 MG: 4 INJECTION INTRA-ARTICULAR; INTRALESIONAL; INTRAMUSCULAR; INTRAVENOUS; SOFT TISSUE at 11:54

## 2025-03-10 RX ADMIN — PROPOFOL 100 MG: 10 INJECTION, EMULSION INTRAVENOUS at 11:26

## 2025-03-10 RX ADMIN — SODIUM CHLORIDE, POTASSIUM CHLORIDE, SODIUM LACTATE AND CALCIUM CHLORIDE: 600; 310; 30; 20 INJECTION, SOLUTION INTRAVENOUS at 11:16

## 2025-03-10 RX ADMIN — PROPOFOL 30 MG: 10 INJECTION, EMULSION INTRAVENOUS at 11:34

## 2025-03-10 ASSESSMENT — PAIN SCALES - GENERAL: PAIN_LEVEL: 0

## 2025-03-10 ASSESSMENT — FIBROSIS 4 INDEX: FIB4 SCORE: 0.58

## 2025-03-10 NOTE — ANESTHESIA PREPROCEDURE EVALUATION
Date/Time: 03/10/25 1100    Scheduled providers: Clay NELSON M.D.; Teddy Conley M.D.    Procedure: EC-DAWOOD W/O CONT    Diagnosis:       Mitral valve prolapse [I34.1]      Severe mitral regurgitation [I34.0]      Nonrheumatic mitral (valve) prolapse [I34.1]      Nonrheumatic mitral (valve) insufficiency [I34.0]    Indications: Mitral Regurgitation    Location: [facility]ocard[facility] - [facility]            Relevant Problems   CARDIAC   (positive) Essential hypertension   (positive) Mitral valve prolapse   (positive) Moderate mitral regurgitation      ENDO   (positive) Postoperative hypothyroidism      Other   (positive) Arthritis       Physical Exam    Airway   Mallampati: II  TM distance: >3 FB  Neck ROM: full       Cardiovascular - normal exam  Rhythm: regular  Rate: normal  (-) murmur     Dental - normal exam           Pulmonary - normal exam  Breath sounds clear to auscultation     Abdominal    Neurological - normal exam                   Anesthesia Plan    ASA 2       Plan - general       Airway plan will be mask          Induction: intravenous      Pertinent diagnostic labs and testing reviewed    Informed Consent:    Anesthetic plan and risks discussed with patient.          
Negative

## 2025-03-10 NOTE — ANESTHESIA POSTPROCEDURE EVALUATION
Patient: Ailin Good    Procedure Summary       Date: 03/10/25 Room / Location: [facility] - Echocardiology [facility]    Anesthesia Start: 1116 Anesthesia Stop:     Procedure: EC-DAWOOD W/O CONT Diagnosis:       Mitral valve prolapse      Severe mitral regurgitation      Nonrheumatic mitral (valve) prolapse      Nonrheumatic mitral (valve) insufficiency      (Mitral Regurgitation)    Scheduled Providers: Clay NELSON M.D.; Teddy Conley M.D. Responsible Provider: Teddy Conley M.D.    Anesthesia Type: general ASA Status: 2            Final Anesthesia Type: general  Last vitals  BP   Blood Pressure : 130/73    Temp   36 °C (96.8 °F)    Pulse   (!) 53   Resp   16    SpO2   95 %      Anesthesia Post Evaluation    Patient location during evaluation: PACU  Patient participation: complete - patient participated  Level of consciousness: awake and alert  Pain score: 0    Airway patency: patent  Anesthetic complications: no  Cardiovascular status: hemodynamically stable  Respiratory status: acceptable  Hydration status: euvolemic    PONV: none          There were no known notable events for this encounter.     Nurse Pain Score: 0 (NPRS)

## 2025-03-10 NOTE — OR NURSING
1203 Pt arrived to PACU with Anesthesiologist and Echo RN. AAOx4. Even, unlabored respirations. VSS. 0 pain. 0 nausea.     1217 POC update given to sister over the phone. All questions answered.     1240 Pt tolerated PO fluids and snack. No complication. Pt meets DC criteria. DC instructions reviewed with patient. All questions answered. PIV removed. Pt dressed and all belongings with pt. Pt transported to car with RN by S[facility]. DC instructions reviewed with sister. All questions answered.

## 2025-03-10 NOTE — H&P
"Cardiology H&P Note    Patient's PCP: Jemma Martin P.A.-C.    CC: No chief complaint on file.      HPI:    Ms. Ailin Good is a 74 year old woman with past medical history significant for hypertension, hypothyroidism, thrombocythemia, MVP with severe mitral regurgitation who presents for outpatient DAWOOD.    Recently, patient was seen in the office on 2/4/2025 after repeat echocardiogram for surveillance was checked and showed progression in her mitral valve regurgitation.  She has been experiencing some mild dyspnea with exertion and fatigue.  Otherwise has not experienced any heart failure symptoms without edema, orthopnea or PND.  Due to concern for her severe, symptomatic mitral regurgitation, arrangements were made for transesophageal echocardiogram to better characterize her valvular disease.      Medications / Drug list prior to admission:  No current facility-administered medications on file prior to encounter.     Current Outpatient Medications on File Prior to Encounter   Medication Sig Dispense Refill    hydroCHLOROthiazide 25 MG Tab TAKE 1 TABLET BY MOUTH EVERY  Tablet 3    aspirin 81 MG EC tablet Take 81 mg by mouth every day.      levothyroxine (SYNTHROID) 100 MCG Tab Take 100 mcg by mouth every morning on an empty stomach. Indications: Underactive Thyroid         Current list of administered Medications:    Current Facility-Administered Medications:     lidocaine (Xylocaine) 1 % injection 0.5 mL, 0.5 mL, Intradermal, Once PRN, Padilla Cueva M.D.    Past Medical History:   Diagnosis Date    Allergy     allergic to trees/grass    Anesthesia     PONV    Arthritis     osteoarthritis    Back pain     Blood dyscrasia     \" too many platelets \"    Breath shortness     upon exertion    Delayed emergence from general anesthesia     Diabetes (HCC)     pre-diabetic    Essential (hemorrhagic) thrombocythemia (HCC)     Fatty liver disease, nonalcoholic     pt states she was told by MD    Heart " murmur 04/18/2024    was told as a child    Heart valve disease     was told by cardiologist    Hypertension     takes HCTZ    Pain     Polyp of colon 02/20/2019    PONV (postoperative nausea and vomiting)     Postoperative hypothyroidism     thyroidectomy; takes levothyroxine    Psychiatric problem 03/2021    situational depression ( loss of dog)    Urinary incontinence     stress incontinence       Past Surgical History:   Procedure Laterality Date    CRANIOTOMY STEALTH Right 5/2/2024    Procedure: STEALTH RIGHT SUBOCCIPITAL CRANIECTOMY, MENINGIOMA EXCISION, TITANIUM CRANIOPLASTY;  Surgeon: Jaleel Dyer M.D.;  Location: SURGERY [facility];  Service: Neurosurgery    CO INSJ STABLJ DEV W/DCMPRN 1 LVL  9/8/2023    Procedure: INSERTION, INTERSPINOUS PROCESS DEVICE;  Surgeon: Jaleel Dyer M.D.;  Location: [facility];  Service: Neurosurgery    LUMBAR LAMINECTOMY DISKECTOMY  9/8/2023    Procedure: POSTERIOR LEFT L4 TRANSPEDICULAR DECOMPRESSION, LEFT L5 LAMINECTOMY, L4-5 PLACEMENT OF INTRALAMINAR DEVICE;  Surgeon: Jaleel Dyer M.D.;  Location: SURGERY [facility];  Service: Neurosurgery    CO DX BONE MARROW ASPIRATIONS Left 03/10/2021    Procedure: ASPIRATION, BONE MARROW -;  Surgeon: Joe Black M.D.;  Location: [facility];  Service: Orthopedics    CO DX BONE MARROW BIOPSIES Left 03/10/2021    Procedure: BIOPSY, BONE MARROW, USING NEEDLE OR TROCAR-DR. PERRY;  Surgeon: Joe Black M.D.;  Location: ENDOSCOPY [facility];  Service: Orthopedics    THYROIDECTOMY  2016    TONSILLECTOMY  1969    TUBAL COAGULATION LAPAROSCOPIC BILATERAL         Family History   Problem Relation Age of Onset    Heart Disease Mother     Diabetes Mother     Hypertension Mother     Cancer Father         pancreatic cancer    Stroke Father     Hypertension Father     Heart Disease Sister     Hypertension Sister     Hyperlipidemia Sister      Patient family history was personally reviewed, no pertinent family history  "to current presentation    Social History     Tobacco Use    Smoking status: Former     Current packs/day: 0.00     Average packs/day: 1 pack/day for 35.0 years (35.0 ttl pk-yrs)     Types: Cigarettes     Start date: 1978     Quit date: 2013     Years since quittin.1     Passive exposure: Past    Smokeless tobacco: Never    Tobacco comments:     do not recall dates   Vaping Use    Vaping status: Never Used   Substance Use Topics    Alcohol use: Yes     Alcohol/week: 4.2 oz     Types: 7 Glasses of wine per week     Comment: glass of wine at dinner; 7 per week    Drug use: Never       ALLERGIES:  Allergies   Allergen Reactions    Penicillins Unspecified     States she was told she was allergic as a child through allergy testing but has since tolerated e.g. ampicillin    Dust Mite Extract Runny Nose          Pollen Extract Runny Nose          Seasonal Runny Nose     Every tree, grass       Review of systems:  A complete review of symptoms was reviewed with the patient. This is reviewed in H&P and PMH. ALL OTHERS reviewed and negative    Physical exam:  Patient Vitals for the past 24 hrs:   BP Temp Temp src Pulse Resp SpO2 Height Weight   03/10/25 0920 (!) 152/88 36.1 °C (97 °F) Temporal 60 16 96 % 1.575 m (5' 2\") 63.3 kg (139 lb 8.8 oz)     General: Not in acute distress  EYES: No jaundice  HEENT: OP clear   Neck:  No carotid bruits, No JVD appreciated  CVS:  RRR, Normal S1, S2. 2/6 systolic murmur at LSB  Resp: Normal respiratory effort, lungs CTA bilaterally. No rales or rhonchi  Abdomen: Soft, non-distended, non-tender to palpation  Skin: No obvious rashes, no cyanosis  Neurological: Alert and oriented x3, moves all extremities, no focal neurologic deficits  Psychiatric: Appropriate affect  Extremities:   Extremities warm, 2+ bilateral radial pulses.  2+ bilateral dp pulses, no edema      Data:  Laboratory studies personally reviewed by me:  No results found for this or any previous visit (from the " past 24 hours).    Imaging:  EC-DAWOOD W/O CONT    (Results Pending)         Echo dated 1/17/2025:  Echocardiogram results personally reviewed and interpreted by me which shows normal left trickle systolic function with a EF of 65%.  There is posterior mitral valve leaflet prolapse resulting in severe mitral regurgitation with eccentric, anteriorly directed jet.    All pertinent features of laboratory and imaging reviewed including primary images where applicable      Active Problems:    * No active hospital problems. *  Resolved Problems:    * No resolved hospital problems. *      Assessment / Plan:  Severe mitral regurgitation  History of MVP  Hypertension    Recommendations:  Plan on DAWOOD today to better assess and characterize mitral regurgitation.  She has already been referred to our cardiothoracic surgeons for consideration of valve repair.  Will obtain additional imaging DAWOOD for better assessment and anatomic evaluation of mitral valve.  If deemed a poor candidate for surgery, can consider structural heart evaluation for transcatheter intervention.  Risk/benefits of DAWOOD explained to patient. She agrees to proceed.    The risks, benefits, and alternatives to transesophageal echocardiogram (DAWOOD) with IV sedation were discussed with the patient in specific detail, including oropharyngeal and esophageal traumas including hoarseness and dysphagia after the procedure. Rare cases demonstrating serious or fatal complications associated with DAWOOD have been reported in the adult population, including cardiac, pulmonary and bleeding complications in less than 1% of people. Patients with an identified intracardiac thrombus are at increased risk for embolic events (absolute risk uncertain, range 0%-38%), and this appears to be reduced with anticoagulation therapy (absolute risk reduction uncertain). The patient verbalized understanding about these possible complications, and wishes to proceed with this  procedure.        Future Appointments   Date Time Provider Department Center   3/10/2025 11:00 AM V EXAM 3 DAWOOD [facility] [place]   3/12/2025  1:00 PM Estella Geller M.D. [facility] None   6/20/2025 11:40 AM Jemma Martin P.A.-C. [facility] [place]       It is my pleasure to participate in the care of Ms. Good.  Please do not hesitate to contact me with questions or concerns.    Clay Durham MD  Cardiologist, [facility]    3/10/2025    Please note that this dictation was created using voice recognition software. I have made every reasonable attempt to correct obvious errors, but it is possible there are errors of grammar and possibly content that I did not discover before finalizing the note.

## 2025-03-10 NOTE — ANESTHESIA TIME REPORT
Anesthesia Start and Stop Event Times       Date Time Event    3/10/2025 1114 Ready for Procedure     1116 Anesthesia Start     1205 Anesthesia Stop          Responsible Staff  03/10/25      Name Role Begin End    Teddy Conley M.D. Anesth 1116 1205          Overtime Reason:  no overtime (within assigned shift)    Comments:

## 2025-03-10 NOTE — DISCHARGE INSTRUCTIONS
Discharge Instructions:    Transesophageal Echocardiogram  Transesophageal echocardiogram (DAWOOD) is a test that uses sound waves to take pictures of your heart. DAWOOD is done by passing a small probe attached to a flexible tube down the part of the body that moves food from your mouth to your stomach (esophagus). The pictures give clear images of your heart. This can help your doctor see if there are problems with your heart.  Tell a doctor about:  Any allergies you have.  All medicines you are taking. This includes vitamins, herbs, eye drops, creams, and over-the-counter medicines.  Any problems you or family members have had with anesthetic medicines.  Any blood disorders you have.  Any surgeries you have had.  Any medical conditions you have.  Any swallowing problems.  Whether you have or have had a blockage in the part of the body that moves food from your mouth to your stomach.  Whether you are pregnant or may be pregnant.  What are the risks?  In general, this is a safe procedure. But, problems may occur, such as:  Damage to nearby structures or organs.  A tear in the part of the body that moves food from your mouth to your stomach.  Irregular heartbeat.  Hoarse voice or trouble swallowing.  Bleeding.  What happens before the procedure?  Medicines  Ask your doctor about changing or stopping:  Your normal medicines.  Vitamins, herbs, and supplements.  Over-the-counter medicines.  Do not take aspirin or ibuprofen unless you are told to.  General instructions  Follow instructions from your doctor about what you cannot eat or drink.  You will take out any dentures or dental retainers.  Plan to have a responsible adult take you home from the hospital or clinic.  Plan to have a responsible adult care for you for the time you are told after you leave the hospital or clinic. This is important.  What happens during the procedure?  An IV will be put into one of your veins.  You may be given:  A sedative. This medicine  helps you relax.  A medicine to numb the back of your throat. This may be sprayed or gargled.  Your blood pressure, heart rate, and breathing will be watched.  You may be asked to lie on your left side.  A bite block will be placed in your mouth. This keeps you from biting the tube.  The tip of the probe will be placed into the back of your mouth.  You will be asked to swallow.  Your doctor will take pictures of your heart.  The probe and bite block will be taken out after the test is done.  The procedure may vary among doctors and hospitals.  What can I expect after the procedure?  You will be monitored until you leave the hospital or clinic. This includes checking your blood pressure, heart rate, breathing rate, and blood oxygen level.  Your throat may feel sore and numb. This will get better over time. You will not be allowed to eat or drink until the numbness has gone away.  It is common to have a sore throat for a day or two.  It is up to you to get the results of your procedure. Ask how to get your results when they are ready.  Follow these instructions at home:  If you were given a sedative during your procedure, do not drive or use machines until your doctor says that it is safe.  Return to your normal activities when your doctor says that it is safe.  Keep all follow-up visits.  Summary  DAWOOD is a test that uses sound waves to take pictures of your heart.  You will be given a medicine to help you relax.  Do not drive or use machines until your doctor says that it is safe.  This information is not intended to replace advice given to you by your health care provider. Make sure you discuss any questions you have with your health care provider.  Document Revised: 08/31/2022 Document Reviewed: 08/10/2021  Elsevier Patient Education © 2023 [employer]    What to Expect Post Anesthesia  Rest and take it easy for the first 24 hours.  A responsible adult is recommended to remain with you during that time.  It is  normal to feel sleepy.  We encourage you to not do anything that requires balance, judgment or coordination.    FOR 24 HOURS DO NOT:  Drive, operate machinery or run household appliances.  Drink beer or alcoholic beverages.  Make important decisions or sign legal documents.    To avoid nausea, slowly advance diet as tolerated, avoiding spicy or greasy foods for the first day.  Add more substantial food to your diet according to your provider's instructions. INCREASE FLUIDS AND FIBER TO AVOID CONSTIPATION.    MILD FLU-LIKE SYMPTOMS ARE NORMAL.  YOU MAY EXPERIENCE GENERALIZED MUSCLE ACHES, THROAT IRRITATION, HEADACHE AND/OR SOME NAUSEA.  If any questions arise, call your provider.  If your provider is not available, please feel free to call the Surgical Center at [phone].    MEDICATIONS: Resume taking daily medication.  Take prescribed pain medication with food.  If no medication is prescribed, you may take non-aspirin pain medication if needed.  PAIN MEDICATION CAN BE VERY CONSTIPATING.  Take a stool softener or laxative such as senokot, pericolace, or milk of magnesia if needed.    Diet  Resume your normal diet as tolerated.  A diet low in cholesterol, fat, and sodium is recommended for good health.     BOWEL FUNCTION:  If you are having problems, use what you normally would or call your provider for suggestions. It also helps to stay regular by including fiber in your diet (for example: bran or fruits and vegetables) and drink plenty of liquids (water, juice, etc.).

## 2025-03-11 ENCOUNTER — RESULTS FOLLOW-UP (OUTPATIENT)
Dept: CARDIOLOGY | Facility: MEDICAL CENTER | Age: 75
End: 2025-03-11
Payer: MEDICARE

## 2025-03-11 LAB — LV EJECT FRACT  99904: 65

## 2025-03-12 ENCOUNTER — OFFICE VISIT (OUTPATIENT)
Facility: MEDICAL CENTER | Age: 75
End: 2025-03-12
Payer: MEDICARE

## 2025-03-12 VITALS
TEMPERATURE: 97.4 F | DIASTOLIC BLOOD PRESSURE: 82 MMHG | HEART RATE: 67 BPM | OXYGEN SATURATION: 94 % | WEIGHT: 139 LBS | SYSTOLIC BLOOD PRESSURE: 144 MMHG | HEIGHT: 62 IN | BODY MASS INDEX: 25.58 KG/M2

## 2025-03-12 DIAGNOSIS — I34.0 SEVERE MITRAL REGURGITATION: ICD-10-CM

## 2025-03-12 ASSESSMENT — ENCOUNTER SYMPTOMS
CARDIOVASCULAR NEGATIVE: 1
CONSTITUTIONAL NEGATIVE: 1
GASTROINTESTINAL NEGATIVE: 1
MUSCULOSKELETAL NEGATIVE: 1
PSYCHIATRIC NEGATIVE: 1
EYES NEGATIVE: 1
SHORTNESS OF BREATH: 1
NEUROLOGICAL NEGATIVE: 1

## 2025-03-12 ASSESSMENT — FIBROSIS 4 INDEX: FIB4 SCORE: 0.58

## 2025-03-12 NOTE — PROGRESS NOTES
Problem: Prehabilitation    Goal: optimize identified modifiable risk factors prior to cardiac surgery.     Intervention: Screening and interventions for the following  risk factors with educational materials provided if indicated  and patient demonstrates readiness to participate.  Dentition, malnutrition, CAD and dietary cholesterol,  obesity, alcohol and tobacco abuse, illegal drug use,  recent eye surgery/procedures, A1C > 7.5 for pharmacotherapy referral,  and social support system for post discharge planning.    Additionally, home exercise regimen initiation or continuation  (if appropriate), pre surgery weight restriction for aneurysm patients,  and teaching of and participation of inspiratory muscle training   via incentive spirometer (provided).    Review of post-surgical physical limitations, upcoming  appointments & testing, ordered diagnostics, and medication review  to identify anticoagulants, antihypertensives, HF and DM medications  that need cessation prior to surgery.    The surgery instructions sheet,MAP, and education booklet   was provided for patient to read and review.    Surgical CHG wipes were also provided with instructions on use.    DENTITION:  Routine dental appointment prior to surgery  Is indicated for valve procedure.    she was not encouraged to get a dental   cleaning as she does get regular  dental cleaning and denies current dental   infection or issues that should be  addressed prior to surgery.    RECENT EYE SURGERY:   Patient recent eye surgery or procedures in in the last  6 months, surgeon was not notified.    INCENTIVE SPIROMETRY:  Discussed importance of incentive spirometry (IS) use,  20 times a day AT MINIMUM but more often if possible to  optimize cardio-pulmonary function prior to surgery.  They were instructed to allow a 5 minute rest in  between uses to achieve max IS volume.  Education with return demonstration performed.   Coaching was provided, patient is  effective.    Prehabilitation IS baseline is 1500.    HOME EXERCISE REGIMEN:  We discussed importance of preventing deconditioning and  muscle wasting in the time preceding surgery as this will occur  post surgery.    > 60 % left main disease present? Unknown-pending angio  EF-- 65%  Active sub lingual nitro use? NO  she is appropriate for initiation  of a home exercise regimen.    she is moderately physically active; current  exercise tolerance/level is moderate due to  symptoms of back pain with extensive activity.    she was educated to continue his/her current exercise regimen of walking her dog for an hour 7 days a week.     she was educated  that if chest pain or SOB occurs patient is to stop  immediately and if symptoms do not  resolve after stopping to call 911.    she was also encouraged to practice sit to stand  from a chair with one arm to assist or no arm assistance  12 times a day to strengthen quadriceps, improve balance,  and to mentally prepare for this restriction    Patient is not an aneurysm patient and a weight restriction is not indicated.    CAD:  Patient does not have known CAD; education on  cholesterol, diet, and the heart are not indicated  and were not provided.    OBESITY:  Patient's BMI is not over 30.  Education regarding portion  Sizing and diet are not indicated and were not provided.    MALNUTRITION:  Malnutrition screening tool (MST) shows she is  not at risk with a score of 0.  (0-1 not at risk; greater or equal to 2 is at risk).    Given MST score, functional capacity,   and no reported muscle loss, it was not  recommended they increase their protein  intake to 1.2 to 2.5 gram/kg/day.    SMOKING:  Patient denies current smoking history.  Smoking risks  and cessation education not indicated and was not provided.    ALCOHOL ABUSE:  Patient denies alcohol abuse.  Alcohol risks and cessation  education not indicated and was not provided.    ILLEGAL DRUG USE:  Patient denies illicit  drug use.  Illicit drug use risks  and cessation education not indicated and was not provided.    SOCIAL SUPPORT SYSTEM FOR DISCHARGE NEEDS:    Patient does have family, her sister Mary to stay for  2 days post discharge to assist with ADL's. No barriers  to hospital discharge anticipated.    PSYCHOLOGICAL PREPARATION:  We discussed the basics of physical limitation post op to include:               No driving for 4 weeks               No lifting, pushing or pulling > 10 lbs for 6 weeks  Sternal precautions to include moving  Within the tube and safe mobility in and  out of bed and the chair.    ANTIBIOTIC STEWARDSHIP:  MRSA swab will be collected by [facility] during pre-admit testing.    MEDICATION AN UPCOMING APPOINTMENTS REVIEW:  Current medications were reviewed that may need  specific stop dates prior to surgery. The patient was instructed   to stop the following medications after the indicated date.    Fish oil to stop now    Vascular scheduling sheet was provided and explained.    Walk test Times: unable to collect    A phone appointment for pre op education was made  for 3/31 between [phone] to review all provided education materials.

## 2025-03-13 ENCOUNTER — TELEPHONE (OUTPATIENT)
Dept: CARDIOLOGY | Facility: MEDICAL CENTER | Age: 75
End: 2025-03-13
Payer: MEDICARE

## 2025-03-13 ENCOUNTER — HOSPITAL ENCOUNTER (OUTPATIENT)
Facility: MEDICAL CENTER | Age: 75
End: 2025-03-13
Attending: INTERNAL MEDICINE | Admitting: INTERNAL MEDICINE
Payer: MEDICARE

## 2025-03-13 ENCOUNTER — APPOINTMENT (OUTPATIENT)
Dept: ADMISSIONS | Facility: MEDICAL CENTER | Age: 75
End: 2025-03-13
Attending: INTERNAL MEDICINE
Payer: MEDICARE

## 2025-03-13 ENCOUNTER — APPOINTMENT (OUTPATIENT)
Dept: ADMISSIONS | Facility: MEDICAL CENTER | Age: 75
End: 2025-03-13
Attending: THORACIC SURGERY (CARDIOTHORACIC VASCULAR SURGERY)
Payer: MEDICARE

## 2025-03-13 DIAGNOSIS — Z01.812 PRE-OPERATIVE LABORATORY EXAMINATION: ICD-10-CM

## 2025-03-13 DIAGNOSIS — Z01.810 PRE-OPERATIVE CARDIOVASCULAR EXAMINATION: ICD-10-CM

## 2025-03-13 NOTE — TELEPHONE ENCOUNTER
----- Message from Nurse Practitioner MELO Bradshaw sent at 3/12/2025  1:46 PM PDT -----  Patient needs pre-op cath- having surgery 4/4.     Thank you!

## 2025-03-13 NOTE — TELEPHONE ENCOUNTER
Patient is scheduled on 3-17-25 for a C w/aortic root with Dr.Jad Mcconnell at [facility]. No meds to stop and patient to check in at 6:30 for an 8:00 AM.H&P was done on 3-12-25 by Dr. Geller.Pre admit to call patient.

## 2025-03-14 ENCOUNTER — HOSPITAL ENCOUNTER (OUTPATIENT)
Dept: RADIOLOGY | Facility: MEDICAL CENTER | Age: 75
End: 2025-03-14
Attending: NURSE PRACTITIONER
Payer: MEDICARE

## 2025-03-14 ENCOUNTER — PRE-ADMISSION TESTING (OUTPATIENT)
Dept: ADMISSIONS | Facility: MEDICAL CENTER | Age: 75
End: 2025-03-14
Attending: INTERNAL MEDICINE
Payer: MEDICARE

## 2025-03-14 DIAGNOSIS — Z01.812 PRE-OPERATIVE LABORATORY EXAMINATION: ICD-10-CM

## 2025-03-14 DIAGNOSIS — I34.0 SEVERE MITRAL REGURGITATION: ICD-10-CM

## 2025-03-14 DIAGNOSIS — Z01.810 PRE-OPERATIVE CARDIOVASCULAR EXAMINATION: ICD-10-CM

## 2025-03-14 LAB
ALBUMIN SERPL BCP-MCNC: 4.2 G/DL (ref 3.2–4.9)
ALBUMIN/GLOB SERPL: 1.8 G/DL
ALP SERPL-CCNC: 42 U/L (ref 30–99)
ALT SERPL-CCNC: 27 U/L (ref 2–50)
ANION GAP SERPL CALC-SCNC: 11 MMOL/L (ref 7–16)
APTT PPP: 30.1 SEC (ref 24.7–36)
AST SERPL-CCNC: 27 U/L (ref 12–45)
BILIRUB SERPL-MCNC: 0.3 MG/DL (ref 0.1–1.5)
BUN SERPL-MCNC: 28 MG/DL (ref 8–22)
CALCIUM ALBUM COR SERPL-MCNC: 8.6 MG/DL (ref 8.5–10.5)
CALCIUM SERPL-MCNC: 8.8 MG/DL (ref 8.4–10.2)
CHLORIDE SERPL-SCNC: 103 MMOL/L (ref 96–112)
CO2 SERPL-SCNC: 26 MMOL/L (ref 20–33)
CREAT SERPL-MCNC: 0.72 MG/DL (ref 0.5–1.4)
EKG IMPRESSION: NORMAL
ERYTHROCYTE [DISTWIDTH] IN BLOOD BY AUTOMATED COUNT: 50.4 FL (ref 35.9–50)
GFR SERPLBLD CREATININE-BSD FMLA CKD-EPI: 88 ML/MIN/1.73 M 2
GLOBULIN SER CALC-MCNC: 2.4 G/DL (ref 1.9–3.5)
GLUCOSE SERPL-MCNC: 93 MG/DL (ref 65–99)
HCT VFR BLD AUTO: 42.3 % (ref 37–47)
HGB BLD-MCNC: 13.8 G/DL (ref 12–16)
INR PPP: 0.97 (ref 0.87–1.13)
MCH RBC QN AUTO: 30.6 PG (ref 27–33)
MCHC RBC AUTO-ENTMCNC: 32.6 G/DL (ref 32.2–35.5)
MCV RBC AUTO: 93.8 FL (ref 81.4–97.8)
PLATELET # BLD AUTO: 647 K/UL (ref 164–446)
PMV BLD AUTO: 9.4 FL (ref 9–12.9)
POTASSIUM SERPL-SCNC: 4 MMOL/L (ref 3.6–5.5)
PROT SERPL-MCNC: 6.6 G/DL (ref 6–8.2)
PROTHROMBIN TIME: 13.2 SEC (ref 12–14.6)
RBC # BLD AUTO: 4.51 M/UL (ref 4.2–5.4)
SODIUM SERPL-SCNC: 140 MMOL/L (ref 135–145)
WBC # BLD AUTO: 8.8 K/UL (ref 4.8–10.8)

## 2025-03-14 PROCEDURE — 93010 ELECTROCARDIOGRAM REPORT: CPT | Performed by: INTERNAL MEDICINE

## 2025-03-14 PROCEDURE — 80053 COMPREHEN METABOLIC PANEL: CPT

## 2025-03-14 PROCEDURE — 85027 COMPLETE CBC AUTOMATED: CPT

## 2025-03-14 PROCEDURE — 93880 EXTRACRANIAL BILAT STUDY: CPT

## 2025-03-14 PROCEDURE — 36415 COLL VENOUS BLD VENIPUNCTURE: CPT

## 2025-03-14 PROCEDURE — 85730 THROMBOPLASTIN TIME PARTIAL: CPT

## 2025-03-14 PROCEDURE — 85610 PROTHROMBIN TIME: CPT

## 2025-03-14 PROCEDURE — 93005 ELECTROCARDIOGRAM TRACING: CPT | Mod: TC

## 2025-03-17 ENCOUNTER — APPOINTMENT (OUTPATIENT)
Facility: MEDICAL CENTER | Age: 75
End: 2025-03-17
Attending: NURSE PRACTITIONER
Payer: MEDICARE

## 2025-03-17 NOTE — TELEPHONE ENCOUNTER
Patient has been rescheduled to 3-19-25 for a C w/aortic root. No meds to stop and patient to check in at 9:30 for an 11:30 procedure. H&P was done on 3-12-25 by . Pre admit to call patient.

## 2025-03-18 ENCOUNTER — PRE-ADMISSION TESTING (OUTPATIENT)
Dept: ADMISSIONS | Facility: MEDICAL CENTER | Age: 75
End: 2025-03-18
Attending: NURSE PRACTITIONER
Payer: MEDICARE

## 2025-03-19 ENCOUNTER — HOSPITAL ENCOUNTER (OUTPATIENT)
Facility: MEDICAL CENTER | Age: 75
End: 2025-03-19
Attending: INTERNAL MEDICINE | Admitting: INTERNAL MEDICINE
Payer: MEDICARE

## 2025-03-19 ENCOUNTER — APPOINTMENT (OUTPATIENT)
Dept: CARDIOLOGY | Facility: MEDICAL CENTER | Age: 75
End: 2025-03-19
Attending: NURSE PRACTITIONER
Payer: MEDICARE

## 2025-03-19 VITALS
SYSTOLIC BLOOD PRESSURE: 116 MMHG | BODY MASS INDEX: 25.72 KG/M2 | OXYGEN SATURATION: 92 % | HEIGHT: 62 IN | HEART RATE: 51 BPM | RESPIRATION RATE: 15 BRPM | WEIGHT: 139.77 LBS | DIASTOLIC BLOOD PRESSURE: 60 MMHG | TEMPERATURE: 97.8 F

## 2025-03-19 DIAGNOSIS — I34.0 SEVERE MITRAL REGURGITATION: ICD-10-CM

## 2025-03-19 PROCEDURE — 160002 HCHG RECOVERY MINUTES (STAT)

## 2025-03-19 PROCEDURE — 93567 NJX CAR CTH SPRVLV AORTGRPHY: CPT | Performed by: INTERNAL MEDICINE

## 2025-03-19 PROCEDURE — 700101 HCHG RX REV CODE 250

## 2025-03-19 PROCEDURE — 160015 HCHG STAT PREOP MINUTES

## 2025-03-19 PROCEDURE — 160035 HCHG PACU - 1ST 60 MINS PHASE I

## 2025-03-19 PROCEDURE — 99152 MOD SED SAME PHYS/QHP 5/>YRS: CPT | Performed by: INTERNAL MEDICINE

## 2025-03-19 PROCEDURE — 160036 HCHG PACU - EA ADDL 30 MINS PHASE I

## 2025-03-19 PROCEDURE — 700111 HCHG RX REV CODE 636 W/ 250 OVERRIDE (IP)

## 2025-03-19 PROCEDURE — 93458 L HRT ARTERY/VENTRICLE ANGIO: CPT | Mod: 26 | Performed by: INTERNAL MEDICINE

## 2025-03-19 PROCEDURE — C1769 GUIDE WIRE: HCPCS

## 2025-03-19 PROCEDURE — 700117 HCHG RX CONTRAST REV CODE 255: Performed by: INTERNAL MEDICINE

## 2025-03-19 RX ORDER — HEPARIN SODIUM 1000 [USP'U]/ML
INJECTION, SOLUTION INTRAVENOUS; SUBCUTANEOUS
Status: COMPLETED
Start: 2025-03-19 | End: 2025-03-19

## 2025-03-19 RX ORDER — MIDAZOLAM HYDROCHLORIDE 1 MG/ML
INJECTION INTRAMUSCULAR; INTRAVENOUS
Status: COMPLETED
Start: 2025-03-19 | End: 2025-03-19

## 2025-03-19 RX ORDER — ONDANSETRON 2 MG/ML
INJECTION INTRAMUSCULAR; INTRAVENOUS
Status: COMPLETED
Start: 2025-03-19 | End: 2025-03-19

## 2025-03-19 RX ORDER — HEPARIN SODIUM 200 [USP'U]/100ML
INJECTION, SOLUTION INTRAVENOUS
Status: COMPLETED
Start: 2025-03-19 | End: 2025-03-19

## 2025-03-19 RX ORDER — VERAPAMIL HYDROCHLORIDE 2.5 MG/ML
INJECTION, SOLUTION INTRAVENOUS
Status: COMPLETED
Start: 2025-03-19 | End: 2025-03-19

## 2025-03-19 RX ORDER — LIDOCAINE HYDROCHLORIDE 20 MG/ML
INJECTION, SOLUTION INFILTRATION; PERINEURAL
Status: COMPLETED
Start: 2025-03-19 | End: 2025-03-19

## 2025-03-19 RX ADMIN — HEPARIN SODIUM 2000 UNITS: 200 INJECTION, SOLUTION INTRAVENOUS at 11:12

## 2025-03-19 RX ADMIN — HEPARIN SODIUM: 1000 INJECTION, SOLUTION INTRAVENOUS; SUBCUTANEOUS at 11:12

## 2025-03-19 RX ADMIN — NITROGLYCERIN 10 ML: 20 INJECTION INTRAVENOUS at 11:13

## 2025-03-19 RX ADMIN — MIDAZOLAM HYDROCHLORIDE 1.5 MG: 1 INJECTION, SOLUTION INTRAMUSCULAR; INTRAVENOUS at 11:34

## 2025-03-19 RX ADMIN — ONDANSETRON 4 MG: 2 INJECTION INTRAMUSCULAR; INTRAVENOUS at 11:12

## 2025-03-19 RX ADMIN — VERAPAMIL HYDROCHLORIDE 5 MG: 2.5 INJECTION, SOLUTION INTRAVENOUS at 11:12

## 2025-03-19 RX ADMIN — IOHEXOL 30 ML: 350 INJECTION, SOLUTION INTRAVENOUS at 12:00

## 2025-03-19 RX ADMIN — LIDOCAINE HYDROCHLORIDE: 20 INJECTION, SOLUTION INFILTRATION; PERINEURAL at 11:12

## 2025-03-19 RX ADMIN — FENTANYL CITRATE 50 MCG: 50 INJECTION, SOLUTION INTRAMUSCULAR; INTRAVENOUS at 11:33

## 2025-03-19 ASSESSMENT — FIBROSIS 4 INDEX: FIB4 SCORE: 0.58

## 2025-03-19 NOTE — DISCHARGE INSTRUCTIONS
POST ANGIOGRAM  General Care Instructions  Maintain a bandage over the incision site for 24 hours.  It's normal to find a small bruise or dime-sized lump at the insertion site. This should disappear within a few weeks.  Do not apply lotions or powders to the site.  Do not immerse the catheter insertion site in water (bathtub/swimming) for five days. It is ok to shower 24 hours after the procedure.  You may resume your normal diet immediately; on the day of your procedure, drink 6-10 glasses of water to help flush the contrast liquid out of your system  If the doctor inserted the catheter in your arm:  For 3 days, DO NOT lift anything heavier than 10 pounds (approximately a gallon of milk). DO NOT do any heavy pushing, pulling, or twisting.    Medications  If your current medications need to be changed, you will be provided with an updated list of your medications prior to discharge.  If you take warfarin (Coumadin), resume taking your usual dose the evening after the procedure.  DO NOT STOP taking prescribed blood thinning (anti-platelet) medications unless instructed by your cardiologist.  These medications include:  Aspirin, Clopidogrel (Plavix), Ticagrelor (Brilinta), or Prasugrel (Effient)   If you take one of the following anticoagulants, RESUME 24 HOURS after your procedure:  Apixiban (Eliquis), Rivaroxaban (Xarelto), Dabigatran (Pradaxa), Edoxaban (Savaysa)  If you take metformin (Glucophage), RESUME 48 HOURS after your procedure.    When to call your healthcare provider  Call your cardiologist right away at [phone] if you have any of the following:   Problems/Concerns taking any of your prescribed heart medicines.   The insertion site has increasing pain, swelling, redness, bleeding, or drainage.   Your arm or leg below where the insertion site changes color, is cool, or is numb.   You have chest pain or shortness of breath that does not go away with rest.   Your pulse feels irregular -- very slow (less  than 60 beats/minute) or very fast (over 100 beats/minute).   You have dizziness, fainting, or you are very tired.   You are coughing up blood or yellow or green mucus.   You have chills or a fever over 101°F (38.3°C).    If there is bleeding at the catheter insertion site, apply pressure for 10 minutes.  If bleeding persists, call 911, and continue to hold pressure until advanced medical support arrives.        Exercising Safely After Percutaneous Coronary Intervention (PCI)  After percutaneous coronary intervention (PCI), which involves angioplasty and often stenting, it's important to focus on your heart health. Exercise can help strengthen your heart. It can also help you feel good and improve your overall health. Talk with your health care provider or cardiac rehab team member about good options for you.  Start slowly. Work up to more vigorous exercise as you get stronger. Aim for at least 150 minutes of exercise each week.  Include aerobic activities. These make the heart beat faster. They work the heart and lungs, and improve the body's ability to use oxygen. Good choices include walking, swimming, and biking .  Always follow your doctor's recommendation for exercise.   You have been referred to cardiac rehabilitation, which is important for your recovery.  You may contact [facility] at [phone] to learn more and schedule a visit.        Lifestyle Management After Percutaneous Coronary Intervention (PCI)  Percutaneous coronary intervention (PCI)  involves angioplasty and often stenting. This procedure can open arteries and relieve symptoms. But, it doesn't cure coronary artery disease. New blockages can still form. You need to take steps to prevent this by managing risk factors. Doing so will help make your heart and arteries healthier. Your doctor may prescribe cardiac rehabilitation to help with this lifelong process.  Understanding risk factors  Some risk factors for coronary  artery disease can be controlled. These include smoking, high blood pressure, cholesterol, diabetes, and obesity. They can be managed with medication, diet, and exercise. Support and counseling can also play a role. The effort will pay off! Managing risk factors can help you be more active, feel better, and reduce the risk of heart attack.    If you smoke, quit!  If your doctor has been urging you to quit smoking, it's for good reasons. Smoking damages your heart, blood vessels, and lungs. The good news is that quitting can halt or even reverse the damage of smoking. To quit now:  Get medical help. Ask your doctor for advice on stop-smoking programs. Also ask about medications or nicotine replacement therapy products that may help you quit smoking.  Get support. Join a support group. Ask for help from your family and friends.  Don't give up. It often takes several tries to succeed in quitting smoking.  Avoid secondhand smoke. Ask family and friends not to smoke around you.  What to Expect Post Sedation    Rest and take it easy for the first 24 hours.  A responsible adult is recommended to remain with you during that time.  It is normal to feel sleepy.  We encourage you to not do anything that requires balance, judgment or coordination.    FOR 24 HOURS DO NOT:  Drive, operate machinery or run household appliances.  Drink beer or alcoholic beverages.  Make important decisions or sign legal documents.    To avoid nausea, slowly advance diet as tolerated, avoiding spicy or greasy foods for the first day.  Add more substantial food to your diet according to your provider's instructions.  INCREASE FLUIDS AND FIBER TO AVOID CONSTIPATION.    MILD FLU-LIKE SYMPTOMS ARE NORMAL.  YOU MAY EXPERIENCE GENERALIZED MUSCLE ACHES, THROAT IRRITATION, HEADACHE AND/OR SOME NAUSEA.  If any questions arise, call your provider.  If your provider is not available, please feel free to call the [facility] at [phone].    MEDICATIONS: Resume taking daily medication.  Take prescribed pain medication with food.  If no medication is prescribed, you may take non-aspirin pain medication if needed.  PAIN MEDICATION CAN BE VERY CONSTIPATING.  Take a stool softener or laxative such as senokot, pericolace, or milk of magnesia if needed.    Diet    Resume your normal diet as tolerated.  A diet low in cholesterol, fat, and sodium is recommended for good health.     DRESSING: Keep dressing and incision dry and intact for 24 hours, may remove dressing and shower after 2 pm on 3/20, do not need to replace.  Do not submerge site in water for 7 days.     BOWEL FUNCTION:  If you are having problems, use what you normally would or call your provider for suggestions. It also helps to stay regular by including fiber in your diet (for example: bran or fruits and vegetables) and drink plenty of liquids (water, juice, etc.).

## 2025-03-19 NOTE — PROCEDURES
Cardiac Catheterization Laboratory Procedure Note    DATE: 3/19/2025    : Robert Yang MD, MPH    PROCEDURES PERFORMED:  Left heart catheterization  Coronary angiography  Supravalvular aortography  Moderate conscious sedation    INDICATIONS:  Severe MR, preop evaluation    CONSENT:  The complete alternatives, risks, and benefits of the procedure were explained to the patient. Informed consent was obtained prior to the procedure.    MEDICATIONS:  Lidocaine  Fentanyl  Midazolam  Nitroglycerin  Verapamil  Heparin    MODERATE CONSCIOUS SEDATION:  I personally supervised the administration of moderate conscious sedation by the nursing staff for 18 minutes.  Start time: 11:15 AM  End time: 11:33 AM    CONTRAST: Omnipaque 30 cc      ACCESS:  6-[place] Glidesheath in the right radial artery    ESTIMATED BLOOD LOSS: <10 cc    COMPLICATIONS: None    PROCEDURE IN DETAIL:  The patient was brought to the cardiac catheterization laboratory in the fasting state. The skin over the right wrist was prepped and draped in the usual sterile fashion. Lidocaine infiltration was used to anesthetize the tissue over the right radial artery. Using the micropuncture technique, a 6-[place] Glidesheath was inserted in the right radial artery. A 5-Fr Terumo Tiger diagnostic catheter was then advanced over a standard J-wire into the left ventricular cavity where it was gently aspirated, flushed, and then withdrawn across the aortic valve with sequential pressures measured. This catheter was then used to engage the ostium of the left coronary artery and cineangiograms were obtained in multiple projections for complete evaluation of the left coronary system. This catheter was then used to engage the ostium of the right coronary artery and cineangiograms were obtained in multiple projections for complete evaluation of the right coronary system. Following completion of coronary angiography, all wires, catheters, and sheaths were removed.  KT LIZAMA  band was placed over the right wrist using the patent hemostasis technique.     HEMODYNAMICS:  Aortic pressure: 100 /65 mmHg  LVEDP: 19 mmHg  LA pressure: 19mmHg  No significant aortic or mitral gradients on pullback    CORONARY ANGIOGRAPHY:  The left main coronary artery: Normal-appearing large-caliber vessel that bifurcates to LAD and left circumflex  The left anterior descending coronary artery : Normal-appearing large in caliber transapical vessel that gives rise to medium high first diagonal branch and a large second diagonal branch.  The left circumflex coronary artery : Large-caliber normal-appearing vessel that gives rise to large OM  The right coronary artery  : Large-caliber normal-appearing dominant vessel    Supravalvular aortography:  No AI  Aortic root 3.9 cm  Mid thoracic ascending aorta 3.8 cm    IMPRESSION:  1.  Normal-appearing epicardial coronary arteries.  Dominant RCA  2.  Elevated resting LVEDP and LAP 19 mmHg without significant transaortic or transmitral gradient on pullback  3.  Aortic root 3.9 cm, mid thoracic ascending aorta 3.8 cm. No AI      NOTIFICATION:  The patient's sister was attempted to be called and notified of the results.  Voicemail message left.    Referring cardiothoracic surgical team- Dr. Geller and Re CALHOUN - were notified.    Outpatient cardiologist- Dr. Durham - was notified.     Robert Yang MD, MPH [facility]  Interventional Cardiologist  [facility]   of Clinical Internal Medicine - [facility]

## 2025-03-19 NOTE — OR NURSING
1225 assumed care of patient. TR band CDI soft, patient resting comfortably.    1245 2 ml air removed from TR band, no bleeding to site.     1300 2 ml air removed from TR band, no bleeding to site.     1315 3 ml air removed from TR band, no bleeding to site.     1330 3 ml air removed from TR band, no bleeding to site.     1345 ml air removed from TR band, no bleeding to site. TR band removed dressing applied with gauze and tegaderm.       1400 Patient up to edge of bed, denies discomfort.  Access site CDI soft.  All lines and monitors discontinued. Reviewed discharge paperwork with pt and sister. Discussed diet, activity, medications, follow up care and worsening symptoms. No questions at this time.

## 2025-03-19 NOTE — PROGRESS NOTES
1145- Report received. Patient arrived to PACU. Attached to monitors. Verified parameters and alarms. TR band to right radial. TR band has 13 cc remaining per report. Site is [facility]. Patient denies pain, numbness, and tingling. Right radial pulse 1+    1150- Update provided to sisterHelen. All questions answered     1220- report to PONCHO Knowles    
Patient

## 2025-03-21 ENCOUNTER — PRE-ADMISSION TESTING (OUTPATIENT)
Dept: ADMISSIONS | Facility: MEDICAL CENTER | Age: 75
End: 2025-03-21
Attending: THORACIC SURGERY (CARDIOTHORACIC VASCULAR SURGERY)
Payer: MEDICARE

## 2025-03-31 ENCOUNTER — TELEPHONE (OUTPATIENT)
Facility: MEDICAL CENTER | Age: 75
End: 2025-03-31
Payer: MEDICARE

## 2025-03-31 NOTE — TELEPHONE ENCOUNTER
Patient was reminded that MV r vs R surgery with  Dr. Geller is on 4/4  at 07:30 in the morning. she  is are aware check in is at 05:15 am.    PAT date and time was reviewed with patient and  verified it is within 72 hours of surgery.    Vascular studies and procedures: Angiogram, Carotids,  were scheduled, completed,  and reviewed by myself for concerning  results did not need escalation to the SYDNEY/MD.    she did need a dental check/work and was compliant.    Baseline IS was 1500. she has been compliant   with the IS. Volume has improved to 1750.    she was prescribed a walking regimen or  told to continue she current regimen and  she has  been compliant. She has been riding her bike.   she has been practicing sit to stand.    she was reminded that ASA and HCTZ needs to stop after 4/3.    she was told that synthroid medication (s) are okay to  take the morning of surgery prior to check in.    The CHG wipes instructions were reviewed and understood.    she was reminded no food after midnight.    Call time 10 minutes

## 2025-04-02 ENCOUNTER — PRE-ADMISSION TESTING (OUTPATIENT)
Dept: ADMISSIONS | Facility: MEDICAL CENTER | Age: 75
DRG: 221 | End: 2025-04-02
Attending: THORACIC SURGERY (CARDIOTHORACIC VASCULAR SURGERY)
Payer: MEDICARE

## 2025-04-02 ENCOUNTER — HOSPITAL ENCOUNTER (OUTPATIENT)
Dept: RADIOLOGY | Facility: MEDICAL CENTER | Age: 75
DRG: 221 | End: 2025-04-02
Attending: THORACIC SURGERY (CARDIOTHORACIC VASCULAR SURGERY) | Admitting: THORACIC SURGERY (CARDIOTHORACIC VASCULAR SURGERY)
Payer: MEDICARE

## 2025-04-02 DIAGNOSIS — Z01.812 PRE-OPERATIVE LABORATORY EXAMINATION: ICD-10-CM

## 2025-04-02 DIAGNOSIS — Z01.810 PRE-OPERATIVE CARDIOVASCULAR EXAMINATION: ICD-10-CM

## 2025-04-02 DIAGNOSIS — Z01.811 PRE-OPERATIVE RESPIRATORY EXAMINATION: ICD-10-CM

## 2025-04-02 LAB
ABO GROUP BLD: NORMAL
ALBUMIN SERPL BCP-MCNC: 4.2 G/DL (ref 3.2–4.9)
ALBUMIN/GLOB SERPL: 1.4 G/DL
ALP SERPL-CCNC: 55 U/L (ref 30–99)
ALT SERPL-CCNC: 38 U/L (ref 2–50)
AMPHET UR QL SCN: NEGATIVE
ANION GAP SERPL CALC-SCNC: 13 MMOL/L (ref 7–16)
APPEARANCE UR: CLEAR
APTT PPP: 28.9 SEC (ref 24.7–36)
AST SERPL-CCNC: 33 U/L (ref 12–45)
BARBITURATES UR QL SCN: NEGATIVE
BASOPHILS # BLD AUTO: 0.4 % (ref 0–1.8)
BASOPHILS # BLD: 0.04 K/UL (ref 0–0.12)
BENZODIAZ UR QL SCN: NEGATIVE
BILIRUB SERPL-MCNC: 0.2 MG/DL (ref 0.1–1.5)
BILIRUB UR QL STRIP.AUTO: NEGATIVE
BLD GP AB SCN SERPL QL: NORMAL
BUN SERPL-MCNC: 38 MG/DL (ref 8–22)
BZE UR QL SCN: NEGATIVE
CALCIUM ALBUM COR SERPL-MCNC: 9.1 MG/DL (ref 8.5–10.5)
CALCIUM SERPL-MCNC: 9.3 MG/DL (ref 8.5–10.5)
CANNABINOIDS UR QL SCN: NEGATIVE
CHLORIDE SERPL-SCNC: 101 MMOL/L (ref 96–112)
CO2 SERPL-SCNC: 25 MMOL/L (ref 20–33)
COLOR UR: YELLOW
CREAT SERPL-MCNC: 0.85 MG/DL (ref 0.5–1.4)
EKG IMPRESSION: NORMAL
EOSINOPHIL # BLD AUTO: 0.07 K/UL (ref 0–0.51)
EOSINOPHIL NFR BLD: 0.7 % (ref 0–6.9)
ERYTHROCYTE [DISTWIDTH] IN BLOOD BY AUTOMATED COUNT: 50 FL (ref 35.9–50)
EST. AVERAGE GLUCOSE BLD GHB EST-MCNC: 120 MG/DL
FENTANYL UR QL: NEGATIVE
GFR SERPLBLD CREATININE-BSD FMLA CKD-EPI: 72 ML/MIN/1.73 M 2
GLOBULIN SER CALC-MCNC: 3 G/DL (ref 1.9–3.5)
GLUCOSE SERPL-MCNC: 100 MG/DL (ref 65–99)
GLUCOSE UR STRIP.AUTO-MCNC: NEGATIVE MG/DL
HBA1C MFR BLD: 5.8 % (ref 4–5.6)
HCT VFR BLD AUTO: 45.1 % (ref 37–47)
HGB BLD-MCNC: 14.8 G/DL (ref 12–16)
IMM GRANULOCYTES # BLD AUTO: 0.03 K/UL (ref 0–0.11)
IMM GRANULOCYTES NFR BLD AUTO: 0.3 % (ref 0–0.9)
INR PPP: 1.07 (ref 0.87–1.13)
KETONES UR STRIP.AUTO-MCNC: NEGATIVE MG/DL
LEUKOCYTE ESTERASE UR QL STRIP.AUTO: NEGATIVE
LYMPHOCYTES # BLD AUTO: 2.25 K/UL (ref 1–4.8)
LYMPHOCYTES NFR BLD: 23.6 % (ref 22–41)
MCH RBC QN AUTO: 30.8 PG (ref 27–33)
MCHC RBC AUTO-ENTMCNC: 32.8 G/DL (ref 32.2–35.5)
MCV RBC AUTO: 94 FL (ref 81.4–97.8)
METHADONE UR QL SCN: NEGATIVE
MICRO URNS: NORMAL
MONOCYTES # BLD AUTO: 0.7 K/UL (ref 0–0.85)
MONOCYTES NFR BLD AUTO: 7.4 % (ref 0–13.4)
NEUTROPHILS # BLD AUTO: 6.43 K/UL (ref 1.82–7.42)
NEUTROPHILS NFR BLD: 67.6 % (ref 44–72)
NITRITE UR QL STRIP.AUTO: NEGATIVE
NRBC # BLD AUTO: 0 K/UL
NRBC BLD-RTO: 0 /100 WBC (ref 0–0.2)
OPIATES UR QL SCN: NEGATIVE
OXYCODONE UR QL SCN: NEGATIVE
PCP UR QL SCN: NEGATIVE
PH UR STRIP.AUTO: 6 [PH] (ref 5–8)
PLATELET # BLD AUTO: 752 K/UL (ref 164–446)
PMV BLD AUTO: 9.9 FL (ref 9–12.9)
POTASSIUM SERPL-SCNC: 4.2 MMOL/L (ref 3.6–5.5)
PROPOXYPH UR QL SCN: NEGATIVE
PROT SERPL-MCNC: 7.2 G/DL (ref 6–8.2)
PROT UR QL STRIP: NEGATIVE MG/DL
PROTHROMBIN TIME: 13.9 SEC (ref 12–14.6)
RBC # BLD AUTO: 4.8 M/UL (ref 4.2–5.4)
RBC UR QL AUTO: NEGATIVE
RH BLD: NORMAL
SCCMEC + MECA PNL NOSE NAA+PROBE: NEGATIVE
SCCMEC + MECA PNL NOSE NAA+PROBE: NEGATIVE
SODIUM SERPL-SCNC: 139 MMOL/L (ref 135–145)
SP GR UR STRIP.AUTO: 1.01
UROBILINOGEN UR STRIP.AUTO-MCNC: 0.2 EU/DL
WBC # BLD AUTO: 9.5 K/UL (ref 4.8–10.8)

## 2025-04-02 PROCEDURE — 71046 X-RAY EXAM CHEST 2 VIEWS: CPT

## 2025-04-02 PROCEDURE — 93010 ELECTROCARDIOGRAM REPORT: CPT | Performed by: INTERNAL MEDICINE

## 2025-04-02 PROCEDURE — 80307 DRUG TEST PRSMV CHEM ANLYZR: CPT

## 2025-04-02 PROCEDURE — 83036 HEMOGLOBIN GLYCOSYLATED A1C: CPT

## 2025-04-02 PROCEDURE — 85610 PROTHROMBIN TIME: CPT

## 2025-04-02 PROCEDURE — 36415 COLL VENOUS BLD VENIPUNCTURE: CPT

## 2025-04-02 PROCEDURE — 80053 COMPREHEN METABOLIC PANEL: CPT

## 2025-04-02 PROCEDURE — 81003 URINALYSIS AUTO W/O SCOPE: CPT

## 2025-04-02 PROCEDURE — 87640 STAPH A DNA AMP PROBE: CPT

## 2025-04-02 PROCEDURE — 85730 THROMBOPLASTIN TIME PARTIAL: CPT

## 2025-04-02 PROCEDURE — 93005 ELECTROCARDIOGRAM TRACING: CPT | Mod: TC

## 2025-04-02 PROCEDURE — 85025 COMPLETE CBC W/AUTO DIFF WBC: CPT

## 2025-04-02 PROCEDURE — 86901 BLOOD TYPING SEROLOGIC RH(D): CPT

## 2025-04-02 PROCEDURE — 86850 RBC ANTIBODY SCREEN: CPT

## 2025-04-02 PROCEDURE — 87641 MR-STAPH DNA AMP PROBE: CPT

## 2025-04-02 PROCEDURE — 86900 BLOOD TYPING SEROLOGIC ABO: CPT

## 2025-04-04 ENCOUNTER — HOSPITAL ENCOUNTER (INPATIENT)
Facility: MEDICAL CENTER | Age: 75
LOS: 5 days | DRG: 221 | End: 2025-04-09
Attending: THORACIC SURGERY (CARDIOTHORACIC VASCULAR SURGERY) | Admitting: THORACIC SURGERY (CARDIOTHORACIC VASCULAR SURGERY)
Payer: MEDICARE

## 2025-04-04 ENCOUNTER — APPOINTMENT (OUTPATIENT)
Dept: CARDIOLOGY | Facility: MEDICAL CENTER | Age: 75
DRG: 221 | End: 2025-04-04
Attending: THORACIC SURGERY (CARDIOTHORACIC VASCULAR SURGERY)
Payer: MEDICARE

## 2025-04-04 ENCOUNTER — ANESTHESIA (OUTPATIENT)
Dept: SURGERY | Facility: MEDICAL CENTER | Age: 75
DRG: 221 | End: 2025-04-04
Payer: MEDICARE

## 2025-04-04 ENCOUNTER — ANESTHESIA EVENT (OUTPATIENT)
Dept: SURGERY | Facility: MEDICAL CENTER | Age: 75
DRG: 221 | End: 2025-04-04
Payer: MEDICARE

## 2025-04-04 ENCOUNTER — APPOINTMENT (OUTPATIENT)
Dept: RADIOLOGY | Facility: MEDICAL CENTER | Age: 75
DRG: 221 | End: 2025-04-04
Attending: THORACIC SURGERY (CARDIOTHORACIC VASCULAR SURGERY)
Payer: MEDICARE

## 2025-04-04 DIAGNOSIS — Z98.890 S/P MITRAL VALVE REPAIR: Primary | ICD-10-CM

## 2025-04-04 DIAGNOSIS — G89.18 POST-OP PAIN: ICD-10-CM

## 2025-04-04 DIAGNOSIS — R11.0 NAUSEA: ICD-10-CM

## 2025-04-04 PROBLEM — R11.2 PONV (POSTOPERATIVE NAUSEA AND VOMITING): Status: ACTIVE | Noted: 2025-04-04

## 2025-04-04 LAB
ABO + RH BLD: NORMAL
ACT BLD: 153 SEC (ref 74–137)
APTT PPP: 33.2 SEC (ref 24.7–36)
BASE EXCESS BLDA CALC-SCNC: -1 MMOL/L (ref -4–3)
BASE EXCESS BLDA CALC-SCNC: -2 MMOL/L (ref -4–3)
BASE EXCESS BLDA CALC-SCNC: -3 MMOL/L (ref -4–3)
BASE EXCESS BLDA CALC-SCNC: -4 MMOL/L (ref -4–3)
BASE EXCESS BLDA CALC-SCNC: 1 MMOL/L (ref -4–3)
BASE EXCESS BLDV CALC-SCNC: -1 MMOL/L (ref -2–3)
BODY TEMPERATURE: ABNORMAL DEGREES
CA-I BLD ISE-SCNC: 0.9 MMOL/L (ref 1.1–1.3)
CA-I BLD ISE-SCNC: 0.93 MMOL/L (ref 1.1–1.3)
CA-I BLD ISE-SCNC: 0.94 MMOL/L (ref 1.1–1.3)
CA-I BLD ISE-SCNC: 1.07 MMOL/L (ref 1.1–1.3)
CA-I BLD ISE-SCNC: 1.14 MMOL/L (ref 1.1–1.3)
CA-I BLD ISE-SCNC: 1.14 MMOL/L (ref 1.1–1.3)
CO2 BLDA-SCNC: 23 MMOL/L (ref 32–48)
CO2 BLDA-SCNC: 24 MMOL/L (ref 32–48)
CO2 BLDA-SCNC: 25 MMOL/L (ref 32–48)
CO2 BLDA-SCNC: 27 MMOL/L (ref 32–48)
CO2 BLDA-SCNC: 27 MMOL/L (ref 32–48)
CO2 BLDV-SCNC: 27 MMOL/L (ref 20–33)
DELSYS IDSYS: ABNORMAL
EKG IMPRESSION: NORMAL
END TIDAL CARBON DIOXIDE IECO2: 34 MMHG
END TIDAL CARBON DIOXIDE IECO2: 35 MMHG
END TIDAL CARBON DIOXIDE IECO2: 35 MMHG
GLUCOSE BLD STRIP.AUTO-MCNC: 106 MG/DL (ref 65–99)
GLUCOSE BLD STRIP.AUTO-MCNC: 144 MG/DL (ref 65–99)
GLUCOSE BLD STRIP.AUTO-MCNC: 152 MG/DL (ref 65–99)
GLUCOSE BLD STRIP.AUTO-MCNC: 156 MG/DL (ref 65–99)
GLUCOSE BLD STRIP.AUTO-MCNC: 161 MG/DL (ref 65–99)
GLUCOSE BLD STRIP.AUTO-MCNC: 95 MG/DL (ref 65–99)
HCO3 BLDA-SCNC: 21.8 MMOL/L (ref 21–28)
HCO3 BLDA-SCNC: 22.5 MMOL/L (ref 21–28)
HCO3 BLDA-SCNC: 23.4 MMOL/L (ref 21–28)
HCO3 BLDA-SCNC: 23.6 MMOL/L (ref 21–28)
HCO3 BLDA-SCNC: 23.6 MMOL/L (ref 21–28)
HCO3 BLDA-SCNC: 25.2 MMOL/L (ref 21–28)
HCO3 BLDA-SCNC: 25.5 MMOL/L (ref 21–28)
HCO3 BLDV-SCNC: 25.2 MMOL/L (ref 22–29)
HCT VFR BLD AUTO: 37.6 % (ref 37–47)
HCT VFR BLD CALC: 30 % (ref 37–47)
HCT VFR BLD CALC: 31 % (ref 37–47)
HCT VFR BLD CALC: 31 % (ref 37–47)
HCT VFR BLD CALC: 33 % (ref 37–47)
HCT VFR BLD CALC: 40 % (ref 37–47)
HGB BLD-MCNC: 10.2 G/DL (ref 12–16)
HGB BLD-MCNC: 10.5 G/DL (ref 12–16)
HGB BLD-MCNC: 10.5 G/DL (ref 12–16)
HGB BLD-MCNC: 11.2 G/DL (ref 12–16)
HGB BLD-MCNC: 12.4 G/DL (ref 12–16)
HGB BLD-MCNC: 13.6 G/DL (ref 12–16)
HOROWITZ INDEX BLDA+IHG-RTO: 149 MM[HG]
HOROWITZ INDEX BLDA+IHG-RTO: 163 MM[HG]
HOROWITZ INDEX BLDA+IHG-RTO: 293 MM[HG]
INR PPP: 1.35 (ref 0.87–1.13)
LACTATE BLD-SCNC: 0.7 MMOL/L (ref 0.5–2)
MAGNESIUM SERPL-MCNC: 2.6 MG/DL (ref 1.5–2.5)
MODE IMODE: ABNORMAL
O2/TOTAL GAS SETTING VFR VENT: 30 %
O2/TOTAL GAS SETTING VFR VENT: 60 %
O2/TOTAL GAS SETTING VFR VENT: 90 %
PATHOLOGY CONSULT NOTE: NORMAL
PCO2 BLDA: 40.2 MMHG (ref 32–48)
PCO2 BLDA: 40.6 MMHG (ref 32–48)
PCO2 BLDA: 41.2 MMHG (ref 32–48)
PCO2 BLDA: 42 MMHG (ref 32–48)
PCO2 BLDA: 42.2 MMHG (ref 32–48)
PCO2 BLDA: 42.8 MMHG (ref 32–48)
PCO2 BLDA: 46.7 MMHG (ref 32–48)
PCO2 BLDV: 46.7 MMHG (ref 38–54)
PCO2 TEMP ADJ BLDA: 37 MMHG (ref 32–48)
PCO2 TEMP ADJ BLDA: 37.8 MMHG (ref 32–48)
PCO2 TEMP ADJ BLDA: 39.5 MMHG (ref 32–48)
PCO2 TEMP ADJ BLDA: 40.1 MMHG (ref 32–48)
PCO2 TEMP ADJ BLDA: 40.2 MMHG (ref 32–48)
PCO2 TEMP ADJ BLDA: 40.6 MMHG (ref 32–48)
PCO2 TEMP ADJ BLDA: 45.1 MMHG (ref 32–48)
PCO2 TEMP ADJ BLDV: 43 MMHG (ref 38–54)
PEEP END EXPIRATORY PRESSURE IPEEP: 8 CMH20
PERCENT MINUTE VOLUME IPMV: 160
PERCENT MINUTE VOLUME IPMV: 160
PH BLDA: 7.32 [PH] (ref 7.35–7.45)
PH BLDA: 7.34 [PH] (ref 7.35–7.45)
PH BLDA: 7.34 [PH] (ref 7.35–7.45)
PH BLDA: 7.35 [PH] (ref 7.35–7.45)
PH BLDA: 7.35 [PH] (ref 7.35–7.45)
PH BLDA: 7.37 [PH] (ref 7.35–7.45)
PH BLDA: 7.41 [PH] (ref 7.35–7.45)
PH BLDV: 7.34 [PH] (ref 7.31–7.45)
PH TEMP ADJ BLDA: 7.34 [PH] (ref 7.35–7.45)
PH TEMP ADJ BLDA: 7.35 [PH] (ref 7.35–7.45)
PH TEMP ADJ BLDA: 7.35 [PH] (ref 7.35–7.45)
PH TEMP ADJ BLDA: 7.37 [PH] (ref 7.35–7.45)
PH TEMP ADJ BLDA: 7.37 [PH] (ref 7.35–7.45)
PH TEMP ADJ BLDA: 7.4 [PH] (ref 7.35–7.45)
PH TEMP ADJ BLDA: 7.43 [PH] (ref 7.35–7.45)
PH TEMP ADJ BLDV: 7.37 [PH] (ref 7.31–7.45)
PLATELET # BLD AUTO: 412 K/UL (ref 164–446)
PO2 BLDA: 134 MMHG (ref 83–108)
PO2 BLDA: 270 MMHG (ref 83–108)
PO2 BLDA: 316 MMHG (ref 83–108)
PO2 BLDA: 359 MMHG (ref 83–108)
PO2 BLDA: 424 MMHG (ref 83–108)
PO2 BLDA: 88 MMHG (ref 83–108)
PO2 BLDA: 98 MMHG (ref 83–108)
PO2 BLDV: 50 MMHG (ref 23–48)
PO2 TEMP ADJ BLDA: 130 MMHG (ref 83–108)
PO2 TEMP ADJ BLDA: 267 MMHG (ref 83–108)
PO2 TEMP ADJ BLDA: 309 MMHG (ref 83–108)
PO2 TEMP ADJ BLDA: 348 MMHG (ref 83–108)
PO2 TEMP ADJ BLDA: 416 MMHG (ref 83–108)
PO2 TEMP ADJ BLDA: 82 MMHG (ref 83–108)
PO2 TEMP ADJ BLDA: 91 MMHG (ref 83–108)
PO2 TEMP ADJ BLDV: 44 MMHG (ref 23–48)
POTASSIUM BLD-SCNC: 3.2 MMOL/L (ref 3.6–5.5)
POTASSIUM BLD-SCNC: 3.5 MMOL/L (ref 3.6–5.5)
POTASSIUM BLD-SCNC: 3.9 MMOL/L (ref 3.6–5.5)
POTASSIUM BLD-SCNC: 4 MMOL/L (ref 3.6–5.5)
POTASSIUM SERPL-SCNC: 3.9 MMOL/L (ref 3.6–5.5)
POTASSIUM SERPL-SCNC: 4 MMOL/L (ref 3.6–5.5)
POTASSIUM SERPL-SCNC: 4 MMOL/L (ref 3.6–5.5)
PRESSURE SUPPORT SETTING VENT: 5 CM[H2O]
PROTHROMBIN TIME: 16.7 SEC (ref 12–14.6)
SAO2 % BLDA: 100 % (ref 93–99)
SAO2 % BLDA: 96 % (ref 93–99)
SAO2 % BLDA: 97 % (ref 93–99)
SAO2 % BLDA: 99 % (ref 93–99)
SAO2 % BLDV: 82 % (ref 60–85)
SODIUM BLD-SCNC: 136 MMOL/L (ref 135–145)
SODIUM BLD-SCNC: 136 MMOL/L (ref 135–145)
SODIUM BLD-SCNC: 139 MMOL/L (ref 135–145)
SODIUM BLD-SCNC: 139 MMOL/L (ref 135–145)
SODIUM BLD-SCNC: 141 MMOL/L (ref 135–145)
SODIUM BLD-SCNC: 141 MMOL/L (ref 135–145)
SPECIMEN DRAWN FROM PATIENT: ABNORMAL

## 2025-04-04 PROCEDURE — 700111 HCHG RX REV CODE 636 W/ 250 OVERRIDE (IP): Mod: JZ

## 2025-04-04 PROCEDURE — 700105 HCHG RX REV CODE 258: Performed by: THORACIC SURGERY (CARDIOTHORACIC VASCULAR SURGERY)

## 2025-04-04 PROCEDURE — 02QG0ZZ REPAIR MITRAL VALVE, OPEN APPROACH: ICD-10-PCS | Performed by: THORACIC SURGERY (CARDIOTHORACIC VASCULAR SURGERY)

## 2025-04-04 PROCEDURE — 82962 GLUCOSE BLOOD TEST: CPT | Mod: 91

## 2025-04-04 PROCEDURE — C1898 LEAD, PMKR, OTHER THAN TRANS: HCPCS | Performed by: THORACIC SURGERY (CARDIOTHORACIC VASCULAR SURGERY)

## 2025-04-04 PROCEDURE — C1713 ANCHOR/SCREW BN/BN,TIS/BN: HCPCS | Performed by: THORACIC SURGERY (CARDIOTHORACIC VASCULAR SURGERY)

## 2025-04-04 PROCEDURE — C1729 CATH, DRAINAGE: HCPCS | Performed by: THORACIC SURGERY (CARDIOTHORACIC VASCULAR SURGERY)

## 2025-04-04 PROCEDURE — 160031 HCHG SURGERY MINUTES - 1ST 30 MINS LEVEL 5: Performed by: THORACIC SURGERY (CARDIOTHORACIC VASCULAR SURGERY)

## 2025-04-04 PROCEDURE — 85014 HEMATOCRIT: CPT | Mod: 91

## 2025-04-04 PROCEDURE — 700111 HCHG RX REV CODE 636 W/ 250 OVERRIDE (IP): Mod: JZ | Performed by: THORACIC SURGERY (CARDIOTHORACIC VASCULAR SURGERY)

## 2025-04-04 PROCEDURE — 36415 COLL VENOUS BLD VENIPUNCTURE: CPT

## 2025-04-04 PROCEDURE — P9045 ALBUMIN (HUMAN), 5%, 250 ML: HCPCS | Mod: JZ,TB | Performed by: NURSE PRACTITIONER

## 2025-04-04 PROCEDURE — 99291 CRITICAL CARE FIRST HOUR: CPT | Performed by: INTERNAL MEDICINE

## 2025-04-04 PROCEDURE — 700101 HCHG RX REV CODE 250: Performed by: STUDENT IN AN ORGANIZED HEALTH CARE EDUCATION/TRAINING PROGRAM

## 2025-04-04 PROCEDURE — C1768 GRAFT, VASCULAR: HCPCS | Performed by: THORACIC SURGERY (CARDIOTHORACIC VASCULAR SURGERY)

## 2025-04-04 PROCEDURE — C1751 CATH, INF, PER/CENT/MIDLINE: HCPCS | Performed by: THORACIC SURGERY (CARDIOTHORACIC VASCULAR SURGERY)

## 2025-04-04 PROCEDURE — P9047 ALBUMIN (HUMAN), 25%, 50ML: HCPCS | Mod: JZ

## 2025-04-04 PROCEDURE — 83605 ASSAY OF LACTIC ACID: CPT

## 2025-04-04 PROCEDURE — 5A1221Z PERFORMANCE OF CARDIAC OUTPUT, CONTINUOUS: ICD-10-PCS | Performed by: THORACIC SURGERY (CARDIOTHORACIC VASCULAR SURGERY)

## 2025-04-04 PROCEDURE — 93319 3D ECHO IMG CGEN CAR ANOMAL: CPT

## 2025-04-04 PROCEDURE — 110372 HCHG SHELL REV 278: Performed by: THORACIC SURGERY (CARDIOTHORACIC VASCULAR SURGERY)

## 2025-04-04 PROCEDURE — A9270 NON-COVERED ITEM OR SERVICE: HCPCS

## 2025-04-04 PROCEDURE — 700101 HCHG RX REV CODE 250

## 2025-04-04 PROCEDURE — 88305 TISSUE EXAM BY PATHOLOGIST: CPT | Performed by: PATHOLOGY

## 2025-04-04 PROCEDURE — 503001 HCHG PERFUSION: Performed by: THORACIC SURGERY (CARDIOTHORACIC VASCULAR SURGERY)

## 2025-04-04 PROCEDURE — 93005 ELECTROCARDIOGRAM TRACING: CPT | Mod: TC

## 2025-04-04 PROCEDURE — 5A1935Z RESPIRATORY VENTILATION, LESS THAN 24 CONSECUTIVE HOURS: ICD-10-PCS

## 2025-04-04 PROCEDURE — 02BG0ZZ EXCISION OF MITRAL VALVE, OPEN APPROACH: ICD-10-PCS | Performed by: THORACIC SURGERY (CARDIOTHORACIC VASCULAR SURGERY)

## 2025-04-04 PROCEDURE — 85018 HEMOGLOBIN: CPT

## 2025-04-04 PROCEDURE — 85610 PROTHROMBIN TIME: CPT

## 2025-04-04 PROCEDURE — 85049 AUTOMATED PLATELET COUNT: CPT

## 2025-04-04 PROCEDURE — 86901 BLOOD TYPING SEROLOGIC RH(D): CPT

## 2025-04-04 PROCEDURE — 700105 HCHG RX REV CODE 258

## 2025-04-04 PROCEDURE — C1894 INTRO/SHEATH, NON-LASER: HCPCS | Performed by: THORACIC SURGERY (CARDIOTHORACIC VASCULAR SURGERY)

## 2025-04-04 PROCEDURE — A9270 NON-COVERED ITEM OR SERVICE: HCPCS | Performed by: STUDENT IN AN ORGANIZED HEALTH CARE EDUCATION/TRAINING PROGRAM

## 2025-04-04 PROCEDURE — 82330 ASSAY OF CALCIUM: CPT | Mod: 91

## 2025-04-04 PROCEDURE — C9248 INJ, CLEVIDIPINE BUTYRATE: HCPCS | Mod: TB | Performed by: STUDENT IN AN ORGANIZED HEALTH CARE EDUCATION/TRAINING PROGRAM

## 2025-04-04 PROCEDURE — 85730 THROMBOPLASTIN TIME PARTIAL: CPT

## 2025-04-04 PROCEDURE — 84132 ASSAY OF SERUM POTASSIUM: CPT | Mod: 91

## 2025-04-04 PROCEDURE — 33427 REPAIR OF MITRAL VALVE: CPT | Performed by: THORACIC SURGERY (CARDIOTHORACIC VASCULAR SURGERY)

## 2025-04-04 PROCEDURE — 94002 VENT MGMT INPAT INIT DAY: CPT

## 2025-04-04 PROCEDURE — 700105 HCHG RX REV CODE 258: Performed by: STUDENT IN AN ORGANIZED HEALTH CARE EDUCATION/TRAINING PROGRAM

## 2025-04-04 PROCEDURE — 94799 UNLISTED PULMONARY SVC/PX: CPT

## 2025-04-04 PROCEDURE — 770022 HCHG ROOM/CARE - ICU (200)

## 2025-04-04 PROCEDURE — 84295 ASSAY OF SERUM SODIUM: CPT | Mod: 91

## 2025-04-04 PROCEDURE — 33427 REPAIR OF MITRAL VALVE: CPT | Mod: AS

## 2025-04-04 PROCEDURE — B24BZZ4 ULTRASONOGRAPHY OF HEART WITH AORTA, TRANSESOPHAGEAL: ICD-10-PCS | Performed by: THORACIC SURGERY (CARDIOTHORACIC VASCULAR SURGERY)

## 2025-04-04 PROCEDURE — 88305 TISSUE EXAM BY PATHOLOGIST: CPT | Mod: 26 | Performed by: PATHOLOGY

## 2025-04-04 PROCEDURE — 83735 ASSAY OF MAGNESIUM: CPT

## 2025-04-04 PROCEDURE — 700102 HCHG RX REV CODE 250 W/ 637 OVERRIDE(OP): Performed by: STUDENT IN AN ORGANIZED HEALTH CARE EDUCATION/TRAINING PROGRAM

## 2025-04-04 PROCEDURE — 86900 BLOOD TYPING SEROLOGIC ABO: CPT

## 2025-04-04 PROCEDURE — 85347 COAGULATION TIME ACTIVATED: CPT

## 2025-04-04 PROCEDURE — 700102 HCHG RX REV CODE 250 W/ 637 OVERRIDE(OP): Performed by: THORACIC SURGERY (CARDIOTHORACIC VASCULAR SURGERY)

## 2025-04-04 PROCEDURE — 82803 BLOOD GASES ANY COMBINATION: CPT | Mod: 91

## 2025-04-04 PROCEDURE — 160015 HCHG STAT PREOP MINUTES: Performed by: THORACIC SURGERY (CARDIOTHORACIC VASCULAR SURGERY)

## 2025-04-04 PROCEDURE — 93010 ELECTROCARDIOGRAM REPORT: CPT | Performed by: INTERNAL MEDICINE

## 2025-04-04 PROCEDURE — A9270 NON-COVERED ITEM OR SERVICE: HCPCS | Performed by: THORACIC SURGERY (CARDIOTHORACIC VASCULAR SURGERY)

## 2025-04-04 PROCEDURE — 160042 HCHG SURGERY MINUTES - EA ADDL 1 MIN LEVEL 5: Performed by: THORACIC SURGERY (CARDIOTHORACIC VASCULAR SURGERY)

## 2025-04-04 PROCEDURE — 37799 UNLISTED PX VASCULAR SURGERY: CPT

## 2025-04-04 PROCEDURE — 700102 HCHG RX REV CODE 250 W/ 637 OVERRIDE(OP)

## 2025-04-04 PROCEDURE — 700111 HCHG RX REV CODE 636 W/ 250 OVERRIDE (IP): Mod: JZ | Performed by: EMERGENCY MEDICINE

## 2025-04-04 PROCEDURE — 94150 VITAL CAPACITY TEST: CPT

## 2025-04-04 PROCEDURE — 160009 HCHG ANES TIME/MIN: Performed by: THORACIC SURGERY (CARDIOTHORACIC VASCULAR SURGERY)

## 2025-04-04 PROCEDURE — 160048 HCHG OR STATISTICAL LEVEL 1-5: Performed by: THORACIC SURGERY (CARDIOTHORACIC VASCULAR SURGERY)

## 2025-04-04 PROCEDURE — 700111 HCHG RX REV CODE 636 W/ 250 OVERRIDE (IP): Performed by: STUDENT IN AN ORGANIZED HEALTH CARE EDUCATION/TRAINING PROGRAM

## 2025-04-04 PROCEDURE — 700111 HCHG RX REV CODE 636 W/ 250 OVERRIDE (IP): Performed by: THORACIC SURGERY (CARDIOTHORACIC VASCULAR SURGERY)

## 2025-04-04 PROCEDURE — 700101 HCHG RX REV CODE 250: Performed by: THORACIC SURGERY (CARDIOTHORACIC VASCULAR SURGERY)

## 2025-04-04 PROCEDURE — 71045 X-RAY EXAM CHEST 1 VIEW: CPT

## 2025-04-04 PROCEDURE — 700111 HCHG RX REV CODE 636 W/ 250 OVERRIDE (IP): Mod: JZ,TB | Performed by: NURSE PRACTITIONER

## 2025-04-04 PROCEDURE — 02L70CK OCCLUSION OF LEFT ATRIAL APPENDAGE WITH EXTRALUMINAL DEVICE, OPEN APPROACH: ICD-10-PCS | Performed by: THORACIC SURGERY (CARDIOTHORACIC VASCULAR SURGERY)

## 2025-04-04 PROCEDURE — 700111 HCHG RX REV CODE 636 W/ 250 OVERRIDE (IP)

## 2025-04-04 DEVICE — BONE PUTTY HEMOSTATIC ABSORBABLE (12/BX): Type: IMPLANTABLE DEVICE | Site: HEART | Status: FUNCTIONAL

## 2025-04-04 DEVICE — ANNULO. RING MITRAL 4600-36 ---ALWAYS SHIP OVERNIGHT---: Type: IMPLANTABLE DEVICE | Site: HEART | Status: FUNCTIONAL

## 2025-04-04 RX ORDER — METOPROLOL TARTRATE 25 MG/1
12.5 TABLET, FILM COATED ORAL 2 TIMES DAILY
Status: DISCONTINUED | OUTPATIENT
Start: 2025-04-05 | End: 2025-04-05

## 2025-04-04 RX ORDER — ACETAMINOPHEN 500 MG
1000 TABLET ORAL EVERY 6 HOURS
Status: DISCONTINUED | OUTPATIENT
Start: 2025-04-04 | End: 2025-04-09 | Stop reason: HOSPADM

## 2025-04-04 RX ORDER — MAGNESIUM SULFATE 1 G/100ML
1 INJECTION INTRAVENOUS DAILY
Status: COMPLETED | OUTPATIENT
Start: 2025-04-04 | End: 2025-04-06

## 2025-04-04 RX ORDER — VANCOMYCIN HYDROCHLORIDE 1 G/20ML
INJECTION, POWDER, LYOPHILIZED, FOR SOLUTION INTRAVENOUS
Status: COMPLETED | OUTPATIENT
Start: 2025-04-04 | End: 2025-04-04

## 2025-04-04 RX ORDER — BISACODYL 10 MG
10 SUPPOSITORY, RECTAL RECTAL
Status: DISCONTINUED | OUTPATIENT
Start: 2025-04-04 | End: 2025-04-09 | Stop reason: HOSPADM

## 2025-04-04 RX ORDER — ETHYL ALCOHOL 62 %
1 SWAB, MEDICATED TOPICAL
Status: DISCONTINUED | OUTPATIENT
Start: 2025-04-04 | End: 2025-04-09 | Stop reason: HOSPADM

## 2025-04-04 RX ORDER — PROCHLORPERAZINE EDISYLATE 5 MG/ML
10 INJECTION INTRAMUSCULAR; INTRAVENOUS EVERY 6 HOURS PRN
Status: DISCONTINUED | OUTPATIENT
Start: 2025-04-04 | End: 2025-04-09 | Stop reason: HOSPADM

## 2025-04-04 RX ORDER — OXYMETAZOLINE HYDROCHLORIDE 0.05 G/100ML
2 SPRAY NASAL
COMMUNITY

## 2025-04-04 RX ORDER — SODIUM CHLORIDE, SODIUM GLUCONATE, SODIUM ACETATE, POTASSIUM CHLORIDE AND MAGNESIUM CHLORIDE 526; 502; 368; 37; 30 MG/100ML; MG/100ML; MG/100ML; MG/100ML; MG/100ML
INJECTION, SOLUTION INTRAVENOUS
Status: DISCONTINUED | OUTPATIENT
Start: 2025-04-04 | End: 2025-04-04

## 2025-04-04 RX ORDER — ACETAMINOPHEN 325 MG/1
650 TABLET ORAL EVERY 4 HOURS PRN
Status: DISCONTINUED | OUTPATIENT
Start: 2025-04-04 | End: 2025-04-09 | Stop reason: HOSPADM

## 2025-04-04 RX ORDER — INSULIN LISPRO 100 [IU]/ML
0-14 INJECTION, SOLUTION INTRAVENOUS; SUBCUTANEOUS
Status: DISCONTINUED | OUTPATIENT
Start: 2025-04-05 | End: 2025-04-05

## 2025-04-04 RX ORDER — OXYCODONE HYDROCHLORIDE 5 MG/1
5 TABLET ORAL
Status: DISCONTINUED | OUTPATIENT
Start: 2025-04-04 | End: 2025-04-09 | Stop reason: HOSPADM

## 2025-04-04 RX ORDER — NITROGLYCERIN 20 MG/100ML
0-100 INJECTION INTRAVENOUS CONTINUOUS
Status: DISCONTINUED | OUTPATIENT
Start: 2025-04-04 | End: 2025-04-05

## 2025-04-04 RX ORDER — DEXMEDETOMIDINE HYDROCHLORIDE 4 UG/ML
0-1.5 INJECTION, SOLUTION INTRAVENOUS CONTINUOUS
Status: DISCONTINUED | OUTPATIENT
Start: 2025-04-04 | End: 2025-04-04

## 2025-04-04 RX ORDER — OXYCODONE HYDROCHLORIDE 10 MG/1
10 TABLET ORAL
Status: DISCONTINUED | OUTPATIENT
Start: 2025-04-04 | End: 2025-04-09 | Stop reason: HOSPADM

## 2025-04-04 RX ORDER — CEFAZOLIN SODIUM 1 G/3ML
INJECTION, POWDER, FOR SOLUTION INTRAMUSCULAR; INTRAVENOUS PRN
Status: DISCONTINUED | OUTPATIENT
Start: 2025-04-04 | End: 2025-04-04 | Stop reason: SURG

## 2025-04-04 RX ORDER — ACETAMINOPHEN 650 MG/1
650 SUPPOSITORY RECTAL EVERY 4 HOURS PRN
Status: DISCONTINUED | OUTPATIENT
Start: 2025-04-04 | End: 2025-04-09 | Stop reason: HOSPADM

## 2025-04-04 RX ORDER — AMOXICILLIN 250 MG
2 CAPSULE ORAL 2 TIMES DAILY
Status: DISCONTINUED | OUTPATIENT
Start: 2025-04-04 | End: 2025-04-09 | Stop reason: HOSPADM

## 2025-04-04 RX ORDER — SODIUM CHLORIDE 9 MG/ML
INJECTION, SOLUTION INTRAVENOUS CONTINUOUS
Status: DISCONTINUED | OUTPATIENT
Start: 2025-04-04 | End: 2025-04-07

## 2025-04-04 RX ORDER — DIPHENHYDRAMINE HYDROCHLORIDE 50 MG/ML
12.5 INJECTION, SOLUTION INTRAMUSCULAR; INTRAVENOUS ONCE
Status: COMPLETED | OUTPATIENT
Start: 2025-04-04 | End: 2025-04-04

## 2025-04-04 RX ORDER — SODIUM CHLORIDE, SODIUM GLUCONATE, SODIUM ACETATE, POTASSIUM CHLORIDE AND MAGNESIUM CHLORIDE 526; 502; 368; 37; 30 MG/100ML; MG/100ML; MG/100ML; MG/100ML; MG/100ML
INJECTION, SOLUTION INTRAVENOUS PRN
Status: DISCONTINUED | OUTPATIENT
Start: 2025-04-04 | End: 2025-04-09 | Stop reason: HOSPADM

## 2025-04-04 RX ORDER — MIDAZOLAM HYDROCHLORIDE 1 MG/ML
INJECTION INTRAMUSCULAR; INTRAVENOUS PRN
Status: DISCONTINUED | OUTPATIENT
Start: 2025-04-04 | End: 2025-04-04

## 2025-04-04 RX ORDER — DEXMEDETOMIDINE HYDROCHLORIDE 4 UG/ML
0-1.5 INJECTION, SOLUTION INTRAVENOUS CONTINUOUS
Status: DISCONTINUED | OUTPATIENT
Start: 2025-04-04 | End: 2025-04-05

## 2025-04-04 RX ORDER — MORPHINE SULFATE 4 MG/ML
4 INJECTION INTRAVENOUS
Status: DISCONTINUED | OUTPATIENT
Start: 2025-04-04 | End: 2025-04-05

## 2025-04-04 RX ORDER — METOPROLOL TARTRATE 25 MG/1
12.5 TABLET, FILM COATED ORAL ONCE
Status: DISCONTINUED | OUTPATIENT
Start: 2025-04-04 | End: 2025-04-04 | Stop reason: HOSPADM

## 2025-04-04 RX ORDER — LEVOTHYROXINE SODIUM 100 UG/1
100 TABLET ORAL
Status: DISCONTINUED | OUTPATIENT
Start: 2025-04-05 | End: 2025-04-09 | Stop reason: HOSPADM

## 2025-04-04 RX ORDER — POTASSIUM CHLORIDE 7.45 MG/ML
10 INJECTION INTRAVENOUS ONCE
Status: COMPLETED | OUTPATIENT
Start: 2025-04-04 | End: 2025-04-04

## 2025-04-04 RX ORDER — TRAMADOL HYDROCHLORIDE 50 MG/1
50 TABLET ORAL EVERY 4 HOURS PRN
Status: DISCONTINUED | OUTPATIENT
Start: 2025-04-04 | End: 2025-04-09 | Stop reason: HOSPADM

## 2025-04-04 RX ORDER — ENOXAPARIN SODIUM 100 MG/ML
40 INJECTION SUBCUTANEOUS DAILY
Status: DISCONTINUED | OUTPATIENT
Start: 2025-04-05 | End: 2025-04-09 | Stop reason: HOSPADM

## 2025-04-04 RX ORDER — SCOPOLAMINE 1 MG/3D
PATCH, EXTENDED RELEASE TRANSDERMAL PRN
Status: DISCONTINUED | OUTPATIENT
Start: 2025-04-04 | End: 2025-04-04 | Stop reason: SURG

## 2025-04-04 RX ORDER — EPINEPHRINE HCL IN 0.9 % NACL 4MG/250ML
0-.5 PLASTIC BAG, INJECTION (ML) INTRAVENOUS CONTINUOUS
Status: DISCONTINUED | OUTPATIENT
Start: 2025-04-04 | End: 2025-04-04

## 2025-04-04 RX ORDER — ACETAMINOPHEN 500 MG
1000 TABLET ORAL EVERY 6 HOURS PRN
Status: DISCONTINUED | OUTPATIENT
Start: 2025-04-14 | End: 2025-04-09 | Stop reason: HOSPADM

## 2025-04-04 RX ORDER — EPHEDRINE SULFATE 50 MG/ML
INJECTION, SOLUTION INTRAVENOUS PRN
Status: DISCONTINUED | OUTPATIENT
Start: 2025-04-04 | End: 2025-04-04

## 2025-04-04 RX ORDER — PROTAMINE SULFATE 10 MG/ML
INJECTION, SOLUTION INTRAVENOUS PRN
Status: DISCONTINUED | OUTPATIENT
Start: 2025-04-04 | End: 2025-04-04 | Stop reason: SURG

## 2025-04-04 RX ORDER — ALUMINA, MAGNESIA, AND SIMETHICONE 2400; 2400; 240 MG/30ML; MG/30ML; MG/30ML
30 SUSPENSION ORAL EVERY 4 HOURS PRN
Status: DISCONTINUED | OUTPATIENT
Start: 2025-04-04 | End: 2025-04-09 | Stop reason: HOSPADM

## 2025-04-04 RX ORDER — ACETAMINOPHEN 500 MG
1000 TABLET ORAL ONCE
Status: COMPLETED | OUTPATIENT
Start: 2025-04-04 | End: 2025-04-04

## 2025-04-04 RX ORDER — SODIUM CHLORIDE 9 MG/ML
INJECTION, SOLUTION INTRAVENOUS CONTINUOUS
Status: DISCONTINUED | OUTPATIENT
Start: 2025-04-04 | End: 2025-04-05

## 2025-04-04 RX ORDER — OMEPRAZOLE 20 MG/1
20 CAPSULE, DELAYED RELEASE ORAL DAILY
Status: DISCONTINUED | OUTPATIENT
Start: 2025-04-05 | End: 2025-04-09 | Stop reason: HOSPADM

## 2025-04-04 RX ORDER — ONDANSETRON 2 MG/ML
INJECTION INTRAMUSCULAR; INTRAVENOUS PRN
Status: DISCONTINUED | OUTPATIENT
Start: 2025-04-04 | End: 2025-04-04

## 2025-04-04 RX ORDER — NOREPINEPHRINE BITARTRATE 0.03 MG/ML
0-1 INJECTION, SOLUTION INTRAVENOUS CONTINUOUS
Status: DISCONTINUED | OUTPATIENT
Start: 2025-04-04 | End: 2025-04-06

## 2025-04-04 RX ORDER — METOPROLOL TARTRATE 25 MG/1
25 TABLET, FILM COATED ORAL 2 TIMES DAILY
Status: DISCONTINUED | OUTPATIENT
Start: 2025-04-06 | End: 2025-04-05

## 2025-04-04 RX ORDER — SCOPOLAMINE 1 MG/3D
PATCH, EXTENDED RELEASE TRANSDERMAL
Status: COMPLETED
Start: 2025-04-04 | End: 2025-04-04

## 2025-04-04 RX ORDER — INDOMETHACIN 25 MG/1
50 CAPSULE ORAL
Status: DISCONTINUED | OUTPATIENT
Start: 2025-04-04 | End: 2025-04-09 | Stop reason: HOSPADM

## 2025-04-04 RX ORDER — EPINEPHRINE HCL IN 0.9 % NACL 4MG/250ML
0-.5 PLASTIC BAG, INJECTION (ML) INTRAVENOUS CONTINUOUS
Status: DISCONTINUED | OUTPATIENT
Start: 2025-04-04 | End: 2025-04-05

## 2025-04-04 RX ORDER — HEPARIN SODIUM,PORCINE 1000/ML
VIAL (ML) INJECTION PRN
Status: DISCONTINUED | OUTPATIENT
Start: 2025-04-04 | End: 2025-04-04

## 2025-04-04 RX ORDER — ASPIRIN 81 MG/1
81 TABLET ORAL DAILY
Status: DISCONTINUED | OUTPATIENT
Start: 2025-04-05 | End: 2025-04-09 | Stop reason: HOSPADM

## 2025-04-04 RX ORDER — POLYETHYLENE GLYCOL 3350 17 G/17G
1 POWDER, FOR SOLUTION ORAL DAILY
Status: DISCONTINUED | OUTPATIENT
Start: 2025-04-05 | End: 2025-04-09 | Stop reason: HOSPADM

## 2025-04-04 RX ORDER — HALOPERIDOL 5 MG/ML
2 INJECTION INTRAMUSCULAR ONCE
Status: COMPLETED | OUTPATIENT
Start: 2025-04-04 | End: 2025-04-04

## 2025-04-04 RX ORDER — ONDANSETRON 2 MG/ML
8 INJECTION INTRAMUSCULAR; INTRAVENOUS EVERY 6 HOURS PRN
Status: DISCONTINUED | OUTPATIENT
Start: 2025-04-04 | End: 2025-04-09 | Stop reason: HOSPADM

## 2025-04-04 RX ORDER — MIDAZOLAM HYDROCHLORIDE 1 MG/ML
2 INJECTION INTRAMUSCULAR; INTRAVENOUS
Status: DISCONTINUED | OUTPATIENT
Start: 2025-04-04 | End: 2025-04-05

## 2025-04-04 RX ORDER — ALBUMIN HUMAN 50 G/1000ML
25 SOLUTION INTRAVENOUS ONCE
Status: COMPLETED | OUTPATIENT
Start: 2025-04-04 | End: 2025-04-04

## 2025-04-04 RX ORDER — METHADONE HYDROCHLORIDE 10 MG/ML
20 INJECTION, SOLUTION INTRAMUSCULAR; INTRAVENOUS; SUBCUTANEOUS ONCE
Status: COMPLETED | OUTPATIENT
Start: 2025-04-04 | End: 2025-04-04

## 2025-04-04 RX ORDER — NOREPINEPHRINE BITARTRATE 0.03 MG/ML
0-1 INJECTION, SOLUTION INTRAVENOUS CONTINUOUS
Status: DISCONTINUED | OUTPATIENT
Start: 2025-04-04 | End: 2025-04-04

## 2025-04-04 RX ORDER — PHENYLEPHRINE HYDROCHLORIDE 10 MG/ML
INJECTION, SOLUTION INTRAMUSCULAR; INTRAVENOUS; SUBCUTANEOUS PRN
Status: DISCONTINUED | OUTPATIENT
Start: 2025-04-04 | End: 2025-04-04

## 2025-04-04 RX ORDER — DEXAMETHASONE SODIUM PHOSPHATE 4 MG/ML
4 INJECTION, SOLUTION INTRA-ARTICULAR; INTRALESIONAL; INTRAMUSCULAR; INTRAVENOUS; SOFT TISSUE ONCE
Status: COMPLETED | OUTPATIENT
Start: 2025-04-04 | End: 2025-04-04

## 2025-04-04 RX ORDER — DEXTROSE MONOHYDRATE 25 G/50ML
12.5-25 INJECTION, SOLUTION INTRAVENOUS PRN
Status: DISCONTINUED | OUTPATIENT
Start: 2025-04-04 | End: 2025-04-05

## 2025-04-04 RX ORDER — ACETAMINOPHEN 500 MG
500 TABLET ORAL EVERY 6 HOURS PRN
COMMUNITY

## 2025-04-04 RX ORDER — DEXAMETHASONE SODIUM PHOSPHATE 4 MG/ML
INJECTION, SOLUTION INTRA-ARTICULAR; INTRALESIONAL; INTRAMUSCULAR; INTRAVENOUS; SOFT TISSUE PRN
Status: DISCONTINUED | OUTPATIENT
Start: 2025-04-04 | End: 2025-04-04 | Stop reason: SURG

## 2025-04-04 RX ADMIN — ONDANSETRON 8 MG: 2 INJECTION INTRAMUSCULAR; INTRAVENOUS at 15:11

## 2025-04-04 RX ADMIN — PROPOFOL 25 MCG/KG/MIN: 10 INJECTION, EMULSION INTRAVENOUS at 07:46

## 2025-04-04 RX ADMIN — CEFAZOLIN 2 G: 1 INJECTION, POWDER, FOR SOLUTION INTRAMUSCULAR; INTRAVENOUS at 07:52

## 2025-04-04 RX ADMIN — PHENYLEPHRINE HYDROCHLORIDE 100 MCG: 10 INJECTION INTRAVENOUS at 08:26

## 2025-04-04 RX ADMIN — SENNOSIDES AND DOCUSATE SODIUM 2 TABLET: 50; 8.6 TABLET ORAL at 17:13

## 2025-04-04 RX ADMIN — ACETAMINOPHEN 1000 MG: 500 TABLET ORAL at 06:40

## 2025-04-04 RX ADMIN — SODIUM CHLORIDE, SODIUM GLUCONATE, SODIUM ACETATE, POTASSIUM CHLORIDE AND MAGNESIUM CHLORIDE: 526; 502; 368; 37; 30 INJECTION, SOLUTION INTRAVENOUS at 07:24

## 2025-04-04 RX ADMIN — SODIUM CHLORIDE: 9 INJECTION, SOLUTION INTRAVENOUS at 11:31

## 2025-04-04 RX ADMIN — SODIUM CHLORIDE 2 UNITS/HR: 9 INJECTION, SOLUTION INTRAVENOUS at 18:21

## 2025-04-04 RX ADMIN — METHADONE HYDROCHLORIDE 10 MG: 10 INJECTION, SOLUTION INTRAMUSCULAR; INTRAVENOUS; SUBCUTANEOUS at 07:26

## 2025-04-04 RX ADMIN — DEXAMETHASONE SODIUM PHOSPHATE 8 MG: 4 INJECTION INTRA-ARTICULAR; INTRALESIONAL; INTRAMUSCULAR; INTRAVENOUS; SOFT TISSUE at 07:44

## 2025-04-04 RX ADMIN — SODIUM CHLORIDE, SODIUM GLUCONATE, SODIUM ACETATE, POTASSIUM CHLORIDE AND MAGNESIUM CHLORIDE: 526; 502; 368; 37; 30 INJECTION, SOLUTION INTRAVENOUS at 11:00

## 2025-04-04 RX ADMIN — VASOPRESSIN 0.03 UNITS/MIN: 20 INJECTION INTRAVENOUS at 14:37

## 2025-04-04 RX ADMIN — DIPHENHYDRAMINE HYDROCHLORIDE 12.5 MG: 50 INJECTION, SOLUTION INTRAMUSCULAR; INTRAVENOUS at 18:14

## 2025-04-04 RX ADMIN — PHENYLEPHRINE HYDROCHLORIDE 200 MCG: 10 INJECTION INTRAVENOUS at 07:35

## 2025-04-04 RX ADMIN — CLEVIPIDINE 250 MCG: 0.5 EMULSION INTRAVENOUS at 10:07

## 2025-04-04 RX ADMIN — PHENYLEPHRINE HYDROCHLORIDE 300 MCG: 10 INJECTION INTRAVENOUS at 07:46

## 2025-04-04 RX ADMIN — DEXAMETHASONE SODIUM PHOSPHATE 4 MG: 4 INJECTION INTRA-ARTICULAR; INTRALESIONAL; INTRAMUSCULAR; INTRAVENOUS; SOFT TISSUE at 16:56

## 2025-04-04 RX ADMIN — HALOPERIDOL LACTATE 2 MG: 5 INJECTION, SOLUTION INTRAMUSCULAR at 18:15

## 2025-04-04 RX ADMIN — POTASSIUM CHLORIDE 10 MEQ: 10 INJECTION, SOLUTION INTRAVENOUS at 18:03

## 2025-04-04 RX ADMIN — EPINEPHRINE 0.02 MCG/KG/MIN: 1 INJECTION INTRAMUSCULAR; INTRAVENOUS; SUBCUTANEOUS at 09:35

## 2025-04-04 RX ADMIN — MAGNESIUM SULFATE IN DEXTROSE 1 G: 10 INJECTION, SOLUTION INTRAVENOUS at 11:32

## 2025-04-04 RX ADMIN — PHENYLEPHRINE HYDROCHLORIDE 100 MCG: 10 INJECTION INTRAVENOUS at 09:50

## 2025-04-04 RX ADMIN — MIDAZOLAM HYDROCHLORIDE 2 MG: 1 INJECTION, SOLUTION INTRAMUSCULAR; INTRAVENOUS at 07:26

## 2025-04-04 RX ADMIN — OXYCODONE HYDROCHLORIDE 10 MG: 10 TABLET ORAL at 17:13

## 2025-04-04 RX ADMIN — HEPARIN SODIUM 25000 UNITS: 1000 INJECTION, SOLUTION INTRAVENOUS; SUBCUTANEOUS at 08:17

## 2025-04-04 RX ADMIN — PHENYLEPHRINE HYDROCHLORIDE 50 MCG: 10 INJECTION INTRAVENOUS at 10:27

## 2025-04-04 RX ADMIN — SCOPOLAMINE 1 PATCH: 1.5 PATCH, EXTENDED RELEASE TRANSDERMAL at 07:44

## 2025-04-04 RX ADMIN — METHADONE HYDROCHLORIDE 2.5 MG: 10 INJECTION, SOLUTION INTRAMUSCULAR; INTRAVENOUS; SUBCUTANEOUS at 08:11

## 2025-04-04 RX ADMIN — AMINOCAPROIC ACID 1 G/HR: 250 INJECTION, SOLUTION INTRAVENOUS at 08:22

## 2025-04-04 RX ADMIN — CEFAZOLIN 2 G: 2 INJECTION, POWDER, FOR SOLUTION INTRAMUSCULAR; INTRAVENOUS at 15:45

## 2025-04-04 RX ADMIN — ONDANSETRON 4 MG: 2 INJECTION INTRAMUSCULAR; INTRAVENOUS at 10:14

## 2025-04-04 RX ADMIN — Medication 1 APPLICATOR: at 21:29

## 2025-04-04 RX ADMIN — Medication 1 APPLICATOR: at 11:37

## 2025-04-04 RX ADMIN — ROCURONIUM BROMIDE 100 MG: 10 INJECTION, SOLUTION INTRAVENOUS at 07:28

## 2025-04-04 RX ADMIN — CLEVIPIDINE 2 MG/HR: 0.5 EMULSION INTRAVENOUS at 10:06

## 2025-04-04 RX ADMIN — SODIUM CHLORIDE: 9 INJECTION, SOLUTION INTRAVENOUS at 18:57

## 2025-04-04 RX ADMIN — GLYCOPYRROLATE 0.4 MG: 0.2 INJECTION INTRAMUSCULAR; INTRAVENOUS at 07:36

## 2025-04-04 RX ADMIN — PROTAMINE SULFATE 250 MG: 10 INJECTION, SOLUTION INTRAVENOUS at 09:51

## 2025-04-04 RX ADMIN — PHENYLEPHRINE HYDROCHLORIDE 50 MCG: 10 INJECTION INTRAVENOUS at 10:09

## 2025-04-04 RX ADMIN — ALBUMIN (HUMAN) 25 G: 12.5 INJECTION, SOLUTION INTRAVENOUS at 14:15

## 2025-04-04 RX ADMIN — ACETAMINOPHEN 1000 MG: 500 TABLET, FILM COATED ORAL at 17:13

## 2025-04-04 RX ADMIN — NOREPINEPHRINE BITARTRATE 0.04 MCG/KG/MIN: 0.03 INJECTION, SOLUTION INTRAVENOUS at 07:46

## 2025-04-04 RX ADMIN — AMINOCAPROIC ACID 6340 MG: 250 INJECTION, SOLUTION INTRAVENOUS at 08:11

## 2025-04-04 RX ADMIN — DEXMEDETOMIDINE HYDROCHLORIDE 0.5 MCG/KG/HR: 100 INJECTION, SOLUTION INTRAVENOUS at 08:23

## 2025-04-04 RX ADMIN — POTASSIUM CHLORIDE 10 MEQ: 7.46 INJECTION, SOLUTION INTRAVENOUS at 11:34

## 2025-04-04 RX ADMIN — EPHEDRINE SULFATE 10 MG: 50 INJECTION, SOLUTION INTRAVENOUS at 07:47

## 2025-04-04 RX ADMIN — PROPOFOL 150 MG: 10 INJECTION, EMULSION INTRAVENOUS at 07:27

## 2025-04-04 ASSESSMENT — PAIN DESCRIPTION - PAIN TYPE
TYPE: ACUTE PAIN
TYPE: SURGICAL PAIN
TYPE: ACUTE PAIN;SURGICAL PAIN
TYPE: ACUTE PAIN
TYPE: SURGICAL PAIN
TYPE: ACUTE PAIN

## 2025-04-04 ASSESSMENT — COGNITIVE AND FUNCTIONAL STATUS - GENERAL
STANDING UP FROM CHAIR USING ARMS: A LOT
CLIMB 3 TO 5 STEPS WITH RAILING: A LOT
MOBILITY SCORE: 14
SUGGESTED CMS G CODE MODIFIER DAILY ACTIVITY: CK
MOVING TO AND FROM BED TO CHAIR: A LOT
DRESSING REGULAR UPPER BODY CLOTHING: A LITTLE
TURNING FROM BACK TO SIDE WHILE IN FLAT BAD: A LITTLE
TOILETING: A LITTLE
DRESSING REGULAR LOWER BODY CLOTHING: A LITTLE
HELP NEEDED FOR BATHING: A LOT
MOVING FROM LYING ON BACK TO SITTING ON SIDE OF FLAT BED: A LITTLE
SUGGESTED CMS G CODE MODIFIER MOBILITY: CL
DAILY ACTIVITIY SCORE: 19
WALKING IN HOSPITAL ROOM: A LOT

## 2025-04-04 ASSESSMENT — LIFESTYLE VARIABLES
ON A TYPICAL DAY WHEN YOU DRINK ALCOHOL HOW MANY DRINKS DO YOU HAVE: 1
EVER FELT BAD OR GUILTY ABOUT YOUR DRINKING: NO
HOW MANY TIMES IN THE PAST YEAR HAVE YOU HAD 5 OR MORE DRINKS IN A DAY: 0
TOTAL SCORE: 0
TOTAL SCORE: 0
ALCOHOL_USE: YES
AVERAGE NUMBER OF DAYS PER WEEK YOU HAVE A DRINK CONTAINING ALCOHOL: 7
TOTAL SCORE: 0
DOES PATIENT WANT TO STOP DRINKING: NO
EVER HAD A DRINK FIRST THING IN THE MORNING TO STEADY YOUR NERVES TO GET RID OF A HANGOVER: NO
HAVE YOU EVER FELT YOU SHOULD CUT DOWN ON YOUR DRINKING: NO
HAVE PEOPLE ANNOYED YOU BY CRITICIZING YOUR DRINKING: NO
CONSUMPTION TOTAL: NEGATIVE

## 2025-04-04 ASSESSMENT — FIBROSIS 4 INDEX: FIB4 SCORE: 0.51

## 2025-04-04 ASSESSMENT — COPD QUESTIONNAIRES
DO YOU EVER COUGH UP ANY MUCUS OR PHLEGM?: NO/ONLY WITH OCCASIONAL COLDS OR INFECTIONS
HAVE YOU SMOKED AT LEAST 100 CIGARETTES IN YOUR ENTIRE LIFE: YES
COPD SCREENING SCORE: 4
DURING THE PAST 4 WEEKS HOW MUCH DID YOU FEEL SHORT OF BREATH: NONE/LITTLE OF THE TIME

## 2025-04-04 ASSESSMENT — SOCIAL DETERMINANTS OF HEALTH (SDOH)
IN THE PAST 12 MONTHS, HAS THE ELECTRIC, GAS, OIL, OR WATER COMPANY THREATENED TO SHUT OFF SERVICE IN YOUR HOME?: NO
WITHIN THE LAST YEAR, HAVE YOU BEEN KICKED, HIT, SLAPPED, OR OTHERWISE PHYSICALLY HURT BY YOUR PARTNER OR EX-PARTNER?: NO
WITHIN THE PAST 12 MONTHS, THE FOOD YOU BOUGHT JUST DIDN'T LAST AND YOU DIDN'T HAVE MONEY TO GET MORE: NEVER TRUE
WITHIN THE LAST YEAR, HAVE YOU BEEN AFRAID OF YOUR PARTNER OR EX-PARTNER?: NO
IN THE PAST 12 MONTHS, HAS THE ELECTRIC, GAS, OIL, OR WATER COMPANY THREATENED TO SHUT OFF SERVICE IN YOUR HOME?: NO
WITHIN THE PAST 12 MONTHS, THE FOOD YOU BOUGHT JUST DIDN'T LAST AND YOU DIDN'T HAVE MONEY TO GET MORE: NEVER TRUE
WITHIN THE LAST YEAR, HAVE YOU BEEN HUMILIATED OR EMOTIONALLY ABUSED IN OTHER WAYS BY YOUR PARTNER OR EX-PARTNER?: NO
WITHIN THE PAST 12 MONTHS, YOU WORRIED THAT YOUR FOOD WOULD RUN OUT BEFORE YOU GOT THE MONEY TO BUY MORE: NEVER TRUE
WITHIN THE LAST YEAR, HAVE TO BEEN RAPED OR FORCED TO HAVE ANY KIND OF SEXUAL ACTIVITY BY YOUR PARTNER OR EX-PARTNER?: NO
WITHIN THE PAST 12 MONTHS, YOU WORRIED THAT YOUR FOOD WOULD RUN OUT BEFORE YOU GOT THE MONEY TO BUY MORE: NEVER TRUE

## 2025-04-04 ASSESSMENT — PULMONARY FUNCTION TESTS: FVC: 1.4

## 2025-04-04 ASSESSMENT — PATIENT HEALTH QUESTIONNAIRE - PHQ9
SUM OF ALL RESPONSES TO PHQ9 QUESTIONS 1 AND 2: 0
1. LITTLE INTEREST OR PLEASURE IN DOING THINGS: NOT AT ALL
2. FEELING DOWN, DEPRESSED, IRRITABLE, OR HOPELESS: NOT AT ALL

## 2025-04-04 NOTE — CONSULTS
Critical Care Consultation    Date of consult: 4/4/2025    Referring Physician  Estella Geller M.D.    Reason for Consultation  MVR, post-operative assistance    History of Presenting Illness  74 y.o. female with a pmhx of HTN, Hypothyroidism and severe mitral regurgitation with a who presented 4/4/2025 for an elective mitral valve repair and left atrial appendage ligation with Dr. Geller.  The patient had a radical mitral valve repair with a P2 quadrangular resection and a 36 mm annuloplasty band placed.  She also had a left atrial appendage occlusion and clipping with a atriclip.  The patient's preoperative and postoperative EF was normal.  She arrives in the ICU postoperatively on mechanical ventilator, sedated and paralyzed with 0.02 of norepinephrine infusing.  She has been bradycardic during her case however has not required pacing.    Code Status  Full Code    Review of Systems  Review of Systems   Unable to perform ROS: Critical illness       Past Medical History   has a past medical history of Allergy, Anesthesia, Arthritis, Back pain, Blood dyscrasia, Breath shortness, Delayed emergence from general anesthesia, Essential (hemorrhagic) thrombocythemia (HCC), Fatty liver disease, nonalcoholic, Heart murmur (04/18/2024), Heart valve disease, Hypertension, Pain, Polyp of colon (02/20/2019), PONV (postoperative nausea and vomiting), Postoperative hypothyroidism, Psychiatric problem (03/2021), and Urinary incontinence.    Surgical History   has a past surgical history that includes tubal coagulation laparoscopic bilateral; thyroidectomy (2016); tonsillectomy (1969); pr dx bone marrow aspirations (Left, 03/10/2021); pr dx bone marrow biopsies (Left, 03/10/2021); pr insj stablj dev w/dcmprn 1 lvl (09/08/2023); lumbar laminectomy diskectomy (09/08/2023); craniotomy stealth (Right, 05/02/2024); other neurological surg; and no pertinent past surgical history.    Family History  family history includes Arthritis in  her mother; Cancer in her father; Diabetes in her mother; Heart Disease in her mother and sister; Hyperlipidemia in her mother and sister; Hypertension in her father, mother, and sister; Stroke in her father.    Social History   reports that she quit smoking about 12 years ago. Her smoking use included cigarettes. She started smoking about 47 years ago. She has a 35 pack-year smoking history. She has been exposed to tobacco smoke. She has never used smokeless tobacco. She reports current alcohol use of about 4.2 oz of alcohol per week. She reports that she does not use drugs.    Medications  Home Medications       Reviewed by Pb Quigley (Pharmacy Tech) on 04/04/25 at 0625  Med List Status: Complete     Medication Last Dose Status   aspirin 81 MG EC tablet 4/3/2025 Active   diphenhydrAMINE (BENADRYL) 25 MG Tab 4/2/2025 Active   hydroCHLOROthiazide 25 MG Tab 4/3/2025 Active   levothyroxine (SYNTHROID) 100 MCG Tab 4/4/2025 Active                  Audit from Redirected Encounters    **Home medications have not yet been reviewed for this encounter**       Current Facility-Administered Medications   Medication Dose Route Frequency Provider Last Rate Last Admin    lidocaine (Xylocaine) 1 % injection 0.5 mL  0.5 mL Intradermal Once PRN Estella Geller M.D.        insulin regular (HumuLIN R/NovoLIN R) 100 Units in  mL infusion premix  1-6 Units/hr Intravenous Continuous Estella Geller M.D.        EPINEPHrine (Adrenalin) infusion 4 mg/250 mL (premix)  0-0.5 mcg/kg/min (Ideal) Intravenous Continuous Estella Geller M.D.   Stopped at 04/04/25 0948    norepinephrine (Levophed) 8 mg in 250 mL NS infusion (premix)  0-1 mcg/kg/min (Ideal) Intravenous Continuous Estella Geller M.D.   Stopped at 04/04/25 0854    dexmedetomidine (Precedex) 400 mcg/100mL infusion  0-1.5 mcg/kg/hr (Ideal) Intravenous Continuous Estella Geller M.D. 6.263 mL/hr at 04/04/25 0823 0.5 mcg/kg/hr at 04/04/25 0823    metoprolol tartrate  (Lopressor) tablet 12.5 mg  12.5 mg Oral Once Estella Geller M.D.        ceFAZolin (Ancef) 2 g in  mL IVPB  2 g Intravenous Once Estella Geller M.D.        vancomycin (Vancocin) injection    Intra-Op Continuous Estella Geller M.D.   1,000 mg at 04/04/25 0954     Facility-Administered Medications Ordered in Other Encounters   Medication Dose Route Frequency Provider Last Rate Last Admin    ceFAZolin (Ancef) injection   Intravenous PRN Maverick Barrett M.D.   2 g at 04/04/25 0752    electrolyte-A (Plasmalyte-A) infusion   Intravenous Intra-Op Continuous Maverick Barrett M.D.   New Bag at 04/04/25 0724    midazolam (Versed) injection   Intravenous PRN Maverick Barrett M.D.   2 mg at 04/04/25 0726    propofol (DIPRIVAN) injection   Intravenous PRNELLY Temple Ma, M.D. 7.515 mL/hr at 04/04/25 0746 25 mcg/kg/min at 04/04/25 0746    rocuronium (Zemuron) injection   Intravenous PRN Maverick Barrett M.D.   100 mg at 04/04/25 0728    heparin OR USE ONLY   Intravenous PRN Maverick Barrett M.D.   25,000 Units at 04/04/25 0817    phenylephrine (Jermaine-Synephrine) injection   Intravenous PRN Maverick Barrett M.D.   100 mcg at 04/04/25 0826    ePHEDrine injection   Intravenous PRNELLY Temple Ma, M.D.   10 mg at 04/04/25 0747    dexamethasone (Decadron) injection   Intravenous PRNELLY Temple Ma, M.D.   8 mg at 04/04/25 0744    scopolamine (Transderm-Scop) patch   Transdermal PRN Maverick Barrett M.D.   1 Patch at 04/04/25 0744    glycopyrrolate (Robinul) injection   Intravenous PRNELLY Temple Ma, M.D.   0.4 mg at 04/04/25 0736    protamine injection   Intravenous PRN Maverick Barrett M.D.   250 mg at 04/04/25 0951       Allergies  Allergies   Allergen Reactions    Penicillins Unspecified     States she was told she was allergic as a child through allergy testing but has since tolerated e.g. ampicillin    Dust Mite Extract Runny Nose          Pollen Extract Runny Nose          Seasonal Runny Nose     Every tree, grass       Vital Signs last 24 hours  Temp:  [35.9 °C (96.7 °F)] 35.9 °C (96.7 °F)  Pulse:   [58-61] 58  Resp:  [18] 18  BP: (141-151)/(75-80) 141/75  SpO2:  [94 %-95 %] 95 %    Physical Exam  Physical Exam  Vitals and nursing note reviewed.   Constitutional:       Appearance: She is ill-appearing.   HENT:      Head: Normocephalic and atraumatic.      Right Ear: External ear normal.      Left Ear: External ear normal.      Nose: Nose normal.      Mouth/Throat:      Mouth: Mucous membranes are moist.      Pharynx: Oropharynx is clear.      Comments: ETT in place  Eyes:      Conjunctiva/sclera: Conjunctivae normal.   Neck:      Comments: [facility] CVC  Cardiovascular:      Rate and Rhythm: Regular rhythm. Bradycardia present.      Pulses: Normal pulses.   Pulmonary:      Breath sounds: Normal breath sounds.   Chest:       Abdominal:      General: Bowel sounds are normal.      Palpations: Abdomen is soft.   Musculoskeletal:         General: Normal range of motion.      Cervical back: Neck supple.   Skin:     General: Skin is warm and dry.      Capillary Refill: Capillary refill takes less than 2 seconds.   Neurological:      Comments: Recently paralyzed   Psychiatric:      Comments: Unable to assess         Fluids    Intake/Output Summary (Last 24 hours) at 4/4/2025 0956  Last data filed at 4/4/2025 0912  Gross per 24 hour   Intake 1000 ml   Output 200 ml   Net 800 ml       Laboratory  Recent Results (from the past 48 hours)   CBC WITH DIFFERENTIAL    Collection Time: 04/02/25 11:42 AM   Result Value Ref Range    WBC 9.5 4.8 - 10.8 K/uL    RBC 4.80 4.20 - 5.40 M/uL    Hemoglobin 14.8 12.0 - 16.0 g/dL    Hematocrit 45.1 37.0 - 47.0 %    MCV 94.0 81.4 - 97.8 fL    MCH 30.8 27.0 - 33.0 pg    MCHC 32.8 32.2 - 35.5 g/dL    RDW 50.0 35.9 - 50.0 fL    Platelet Count 752 (H) 164 - 446 K/uL    MPV 9.9 9.0 - 12.9 fL    Neutrophils-Polys 67.60 44.00 - 72.00 %    Lymphocytes 23.60 22.00 - 41.00 %    Monocytes 7.40 0.00 - 13.40 %    Eosinophils 0.70 0.00 - 6.90 %    Basophils 0.40 0.00 - 1.80 %    Immature Granulocytes 0.30  0.00 - 0.90 %    Nucleated RBC 0.00 0.00 - 0.20 /100 WBC    Neutrophils (Absolute) 6.43 1.82 - 7.42 K/uL    Lymphs (Absolute) 2.25 1.00 - 4.80 K/uL    Monos (Absolute) 0.70 0.00 - 0.85 K/uL    Eos (Absolute) 0.07 0.00 - 0.51 K/uL    Baso (Absolute) 0.04 0.00 - 0.12 K/uL    Immature Granulocytes (abs) 0.03 0.00 - 0.11 K/uL    NRBC (Absolute) 0.00 K/uL   URINALYSIS    Collection Time: 04/02/25 11:42 AM    Specimen: Urine   Result Value Ref Range    Color Yellow     Character Clear     Specific Gravity 1.012 <1.035    Ph 6.0 5.0 - 8.0    Glucose Negative Negative mg/dL    Ketones Negative Negative mg/dL    Protein Negative Negative mg/dL    Bilirubin Negative Negative    Urobilinogen, Urine 0.2 <=1.0 EU/dL    Nitrite Negative Negative    Leukocyte Esterase Negative Negative    Occult Blood Negative Negative    Micro Urine Req see below    Urine Drug Screen    Collection Time: 04/02/25 11:42 AM   Result Value Ref Range    Amphetamines Urine Negative Negative    Barbiturates Negative Negative    Benzodiazepines Negative Negative    Cocaine Metabolite Negative Negative    Fentanyl, Urine Negative Negative    Methadone Negative Negative    Opiates Negative Negative    Oxycodone Negative Negative    Phencyclidine -Pcp Negative Negative    Propoxyphene Negative Negative    Cannabinoid Metab Negative Negative   PT    Collection Time: 04/02/25 11:42 AM   Result Value Ref Range    PT 13.9 12.0 - 14.6 sec    INR 1.07 0.87 - 1.13   APTT    Collection Time: 04/02/25 11:42 AM   Result Value Ref Range    APTT 28.9 24.7 - 36.0 sec   HEMOGLOBIN A1C    Collection Time: 04/02/25 11:42 AM   Result Value Ref Range    Glycohemoglobin 5.8 (H) 4.0 - 5.6 %    Est Avg Glucose 120 mg/dL   PreAdmit COD    Collection Time: 04/02/25 11:42 AM   Result Value Ref Range    ABO Grouping Only A     Rh Grouping Only POS     Antibody Screen Scrn NEG    S. Aureus By PCR, Nasal Complete    Collection Time: 04/02/25 11:42 AM    Specimen: Respirate   Result  Value Ref Range    Staph aureus by PCR Negative Negative    MRSA by PCR Negative Negative   Comp Metabolic Panel    Collection Time: 25 11:42 AM   Result Value Ref Range    Sodium 139 135 - 145 mmol/L    Potassium 4.2 3.6 - 5.5 mmol/L    Chloride 101 96 - 112 mmol/L    Co2 25 20 - 33 mmol/L    Anion Gap 13.0 7.0 - 16.0    Glucose 100 (H) 65 - 99 mg/dL    Bun 38 (H) 8 - 22 mg/dL    Creatinine 0.85 0.50 - 1.40 mg/dL    Calcium 9.3 8.5 - 10.5 mg/dL    Correct Calcium 9.1 8.5 - 10.5 mg/dL    AST(SGOT) 33 12 - 45 U/L    ALT(SGPT) 38 2 - 50 U/L    Alkaline Phosphatase 55 30 - 99 U/L    Total Bilirubin 0.2 0.1 - 1.5 mg/dL    Albumin 4.2 3.2 - 4.9 g/dL    Total Protein 7.2 6.0 - 8.2 g/dL    Globulin 3.0 1.9 - 3.5 g/dL    A-G Ratio 1.4 g/dL   ESTIMATED GFR    Collection Time: 25 11:42 AM   Result Value Ref Range    GFR (CKD-EPI) 72 >60 mL/min/1.73 m 2   EKG    Collection Time: 25  5:01 PM   Result Value Ref Range    Report       [facility]    Test Date:  2025  Pt Name:    WENDY AUGUSTE                  Department: [facility]  MRN:        [id number]                      Room:  Gender:     Female                       Technician: BETHEL  :        1950                   Requested By:TRELL WILCOX  Order #:    [id number]                    Reading MD: Al Williamson MD    Measurements  Intervals                                Axis  Rate:       63                           P:          91  GA:         160                          QRS:        264  QRSD:       94                           T:          99  QT:         460  QTc:        471    Interpretive Statements  Sinus rhythm  Left anterior fascicular block  Probable right ventricular hypertrophy  LVH by voltage  Abnormal T, consider ischemia, lateral leads  Minimal ST elevation, lateral leads  Compared to ECG 2025 11:37:49  Possible ischemia now present  ST (T wave) deviation now present  T-wave abnormality still present  Electronically Signed On  04-  17:01:28 PDT by Al Williamson MD     ABO Rh Confirm    Collection Time: 04/04/25  6:21 AM   Result Value Ref Range    ABO Rh Confirm A POS        Imaging  EC-DAWOOD W/O CONT    (Results Pending)   DX-CHEST-PORTABLE (1 VIEW)    (Results Pending)       Assessment/Plan  * Severe mitral regurgitation- (present on admission)  Assessment & Plan  S/P AVR on 4/4 by Dr Alvarez  POD #0  Wean ventilator per protocol based on ABG, goal to wean from ventilator in <4-6 hours  Wean vasopressor support as guided by hemodynamics  Post-operative pain control per CVS  Chest tube and pacemaker management per CVS  Optimize acid-base balance with vent and bicarbonate PRN  Insulin gtt  Optimize K and Mg  PT/OT consult    PONV (postoperative nausea and vomiting)- (present on admission)  Assessment & Plan  PRN zofran    Postoperative hypothyroidism- (present on admission)  Assessment & Plan  Continue Synthroid    Essential hypertension- (present on admission)  Assessment & Plan  On pressors, restart meds when able        Discussed patient condition and risk of morbidity and/or mortality with RN, RT, Pharmacy, Code status disscussed, Charge nurse / hot rounds, Patient, CVS, and anesthesiology  .      The patient remains critically ill.  Critical care time = 38 minutes in directly providing and coordinating critical care and extensive data review.  No time overlap and excludes procedures.    Please note that this dictation was created using voice recognition software. The accuracy of the dictation is limited to the abilities of the software. I have made every reasonable attempt to correct obvious errors, but I expect that there are errors of grammar and possibly content that I did not discover before finalizing the note.

## 2025-04-04 NOTE — H&P
The patient is 74 y.o. female with history of hypothyroidism, meningioma, hypertension, thrombocythemia, and severe mitral regurgitation     Pre op labs and imaging reviewed.Patient has obtained left heart catheterization since initial consultation on 3/12/25.    CARDIAC CATHETERIZATION 3/19/25 [facility] [place]:  HEMODYNAMICS:  Aortic pressure: 100 /65 mmHg  LVEDP: 19 mmHg  LA pressure: 19mmHg  No significant aortic or mitral gradients on pullback     CORONARY ANGIOGRAPHY:  The left main coronary artery: Normal-appearing large-caliber vessel that bifurcates to LAD and left circumflex  The left anterior descending coronary artery : Normal-appearing large in caliber transapical vessel that gives rise to medium high first diagonal branch and a large second diagonal branch.  The left circumflex coronary artery : Large-caliber normal-appearing vessel that gives rise to large OM  The right coronary artery  : Large-caliber normal-appearing dominant vessel     Supravalvular aortography:  No AI  Aortic root 3.9 cm  Mid thoracic ascending aorta 3.8 cm     IMPRESSION:  1.  Normal-appearing epicardial coronary arteries.  Dominant RCA  2.  Elevated resting LVEDP and LAP 19 mmHg without significant transaortic or transmitral gradient on pullback  3.  Aortic root 3.9 cm, mid thoracic ascending aorta 3.8 cm. No AI     Proceed with mitral valve repair vs replacement, and intraoperative transesophageal echocardiogram.

## 2025-04-04 NOTE — PROGRESS NOTES
Notified Maegan Ng APRN of patients continued hypotension. PRN 2L crystaloid infusion has been given. APRN ordered albumin and vasopressin if albumin not effective. Vasopressin to run with levophed until levophed is weaned off.

## 2025-04-04 NOTE — OR SURGEON
OPERATIVE REPORT    Operation date: 4/4/2025    Referring physician: Clay Durham MD     PreOp Diagnosis: Severe symptomatic mitral regurgitation, mitral valve prolapse, hypothyroidism, s/p meningioma resection, hypertension, thrombocythemia     PostOp Diagnosis: Severe symptomatic mitral regurgitation, mitral valve prolapse, hypothyroidism, s/p meningioma resection, hypertension, thrombocythemia     Procedure(s):  RADICAL MITRAL VALVE REPAIR (P2 quadrangular resection, 36 mm Long flexible annuloplasty band), LEFT ATRIAL APPENDAGE OCCLUSION/CLIPPING (35mm AtriClip) and intraoperative transesophageal echocardiography    Surgeon(s):  Estella Geller M.D.    Assistant:  Albert Tello PA-C    Anesthesiologist/Type of Anesthesia:  Anesthesiologist: Maverick Barrett M.D.  Perfusionist: Linn Ribera/General    Surgical Staff:  Assistant: Albert Tello P.A.-C.  Circulator: Arlene Perez R.N.; Mirella Hernández R.N.  Scrub Person: Ying Patterson; Pita Harris    Specimens removed if any:  ID Type Source Tests Collected by Time Destination   A : MITRAL VALVE Tissue Heart Valve PATHOLOGY SPECIMEN Estella Geller M.D. 4/4/2025  9:05 AM      Estimated Blood Loss: Minimal    Findings: Severe mitral regurgitation, severe P2 prolapse, LVEF approximately 60%    Complications: None    Indications:  Ms. Good is a 74-year-old female with severe symptomatic mitral regurgitation and mitral valve prolapse.    Procedure:  The patient was brought to the operating room and placed on the operating room table in the supine position.  After successful induction of general anesthesia endotracheal intubation, the patient was prepped and draped in the usual sterile fashion.  Albert Tello PA-C assisted with retraction and exposure during the operation and closed the sternal wound.  Intraoperative transesophageal echocardiography showed severe mitral regurgitation, severe P2 prolapse with ruptured primary cords and good left ventricular  ejection fraction of approximately 60%.    An incision was made from the sternal notch to the xiphoid.  The sternum was opened longitudinally with a sternal saw.  Hemostasis was obtained with electrocautery at the sternal edges.  The patient was systemically heparinized.  The pericardium was opened longitudinally and tented anteriorly with Ethibond stay stitches.  The aortic cannula was inserted first followed by a dual stage venous cannula.  An antegrade cardioplegia cannula was placed in the ascending aorta.  Cardiopulmonary bypass was instituted.  The aorta was crossclamped and the patient was given 1 L of cold KBC microplegia in an antegrade fashion.  There was prompt cardiac arrest.  Ice slush was placed on the heart for further myocardial protection.  A phrenic nerve protector pad was used.    The left atrial appendage was occluded at its base with a 35mm AtriClip.  A left atriotomy was performed just below the interatrial groove.  There was severe P2 prolapse with ruptured primary cords.  A quadrangular P2 resection was performed and the edges were reapproximated using #4-0 Ethibond stitches in a figure-of-eight fashion.  #2-0 Ethibond sutures were then placed around the posterior mitral valve annulus from trigone to trigone.  A 36 mm Long flexible annuloplasty band was then placed in the mitral valve annulus and secured in place with the Ethibond stitches utilizing the CorKnot device.  Testing of the repair with cold saline did not show any mitral regurgitation.  Rewarming of the patient was initiated.  The left atriotomy was closed in 2 layers using #4-0 Prolene sutures.    The aortic cross-clamp was removed.  Aortic cross-clamp time was 58 minutes and total cardiopulmonary bypass time was 71 minutes.  The left ventricle was de-aired in the usual fashion.  The carbon dioxide which had been released over the operative field during the operation was discontinued.  A straight and an angled 32 Vincentian chest  tubes were placed the mediastinum.  Temporary epicardial ventricular pacemaker wires were inserted.  There was spontaneous conversion into sinus bradycardia.  The antegrade cardioplegia cannula was removed.  When she was adequately warmed, she was slowly taken off cardiopulmonary bypass which she tolerated well.  The dual stage venous cannula was removed.  Protamine was given to reverse the effects of heparin.  The aortic cannula was removed.  When adequate hemostasis had been obtained, the sternum was reapproximated using size 5 sternal wires and the remainder of the incision was closed in several layers using Vicryl sutures.    Intraoperative transesophageal echocardiography did not show any residual mitral regurgitation.  The mean mitral transvalvular gradient was 1 mmHg.  Her left ventricular ejection fraction remained normal at approximately 60%.  There were no apparent complications.  The patient tolerated the procedure well and left the operating room in guarded condition.      4/4/2025 10:17 AM Estella Geller MD, FACS

## 2025-04-04 NOTE — DIETARY
Nutrition Services: Diet Education Consult   Day 0 of admit.  Ailin Good is a 74 y.o. female with admitting DX of Severe mitral regurgitation [I34.0]    RD received referral for cardiac diet education. Dx list includes severe mitral regurgitation, essential HTN. S/p radical mitral valve repair, L atrial appendage occlusion/clipping. A1c=5.8%, elevated. Pt currently intubated on vent support; not appropriate for bedside education at present. RD provided information via discharge instructions which includes nutrition recommendations and tips to support a heart healthy consistent CHO dietary pattern. This includes outpatient resources to [facility] affiliated Nutrition Program for continued nutrition education and guidance as desired.     No other education needs identified at this time. Please re-consult RD for supplemental education or at the request of patient.    Please re-consult RD PRN

## 2025-04-04 NOTE — ANESTHESIA PROCEDURE NOTES
Central Venous Line    Performed by: Maverick Barrett M.D.  Authorized by: Maverick Barrett M.D.    Start Time:  4/4/2025 7:35 AM  End Time:  4/4/2025 7:44 AM  Patient Location:  OR      provider hand hygiene performed prior to central venous catheter insertion, all 5 sterile barriers used (gloves, gown, cap, mask, large sterile drape) during central venous catheter insertion and skin prep agent completely dried prior to procedure    Patient Position:  Trendelenburg  Laterality:  Right  Site:  Internal jugular  Prep:  Chlorhexidine  Catheter Size:  7 Fr  Catheter Length (cm):  20  Number of Lumens:  Triple lumen  target vein identified, needle advanced into vein and blood aspirated and guidewire advanced into vein    Seldinger Technique?: Yes    Ultrasound-Guided: ultrasound-guided  Image captured, interpreted and electronically stored.  Sterile Gel and Probe Cover Used for Ultrasound?: Yes    Intravenous Verification: verified by ultrasound, venous blood return and chest x-ray pending    all ports aspirated, all ports flushed easily, guidewire was removed intact, biopatch was applied, line was sutured in place and dressing was applied    Events: patient tolerated procedure well with no complications

## 2025-04-04 NOTE — PROGRESS NOTES
Pt arrived s/p MVR repair and LAAL. Report received from Dr. Barrett, anesthesiologist. Pacer tested rate 80 Ma 10 Mv 2. Chest tubes placed to suction. No air leak noted. Chest x ray reviewed by intensivist. Darrell hugger applied for temp 36.0 C. Labs sent. Patient RASS -4, no responses to verbal or painful stimuli at this time.

## 2025-04-04 NOTE — DISCHARGE INSTR - DIET
Heart-Healthy Consistent Carbohydrate Nutrition Therapy    A heart-healthy and consistent carbohydrate diet is recommended to manage heart disease and diabetes.  To follow a heart-healthy and consistent carbohydrate diet,  Eat a balanced diet with whole grains, fruits and vegetables, and lean protein sources.  Choose heart-healthy unsaturated fats. Limit saturated fats, trans fats, and cholesterol intake. Eat more plant-based or vegetarian meals using beans and soy foods for protein.  Eat whole, unprocessed foods to limit the amount of sodium (salt) you eat.  Choose a consistent amount of carbohydrate at each meal and snack. Limit refined carbohydrates especially sugar, sweets and sugar-sweetened beverages.  If you drink alcohol, do so in moderation: one serving per day (women) and two servings per day (men).  o One serving is equivalent to 12 ounces beer, 5 ounces wine, or 1.5 ounces distilled spirits    Tips for Choosing Heart-Healthy Fats    Choose lean protein and low-fat dairy foods to reduce saturated fat intake.  Saturated fat is usually found in animal-based protein and is associated with certain health risks. Saturated fat is the biggest contributor to raise low-density lipoprotein (LDL) cholesterol levels. Research shows that limiting saturated fat lowers unhealthy cholesterol levels. Eat no more than 7% of your total calories each day from saturated fat. Ask your RDN to help you determine how much saturated fat is right for you.  There are many foods that do not contain large amounts of saturated fats. Swapping these foods to replace foods high in saturated fats will help you limit the saturated fat you eat and improve your cholesterol levels. You can also try eating more plant-based or vegetarian meals.    Instead of… Try:   Whole milk, cheese, yogurt, and ice cream 1% or skim milk, low-fat cheese, non-fat yogurt, and low-fat ice cream   Fatty, marbled beef and pork Lean beef, pork, or venison   Poultry  with skin Poultry without skin   Butter, stick margarine Reduced-fat, whipped, or liquid spreads   Coconut oil, palm oil Liquid vegetable oils: corn, canola, olive, soybean and safflower oils     Avoid foods that contain trans fats.  Trans fats increase levels of LDL-cholesterol. Hydrogenated fat in processed foods is the main source of trans fats in foods.   Trans fats can be found in stick margarine, shortening, processed sweets, baked goods, some fried foods, and packaged foods made with hydrogenated oils. Avoid foods with “partially hydrogenated oil” on the ingredient list such as: cookies, pastries, baked goods, biscuits, crackers, microwave popcorn, and frozen dinners.  Choose foods with heart healthy fats.  Polyunsaturated and monounsaturated fat are unsaturated fats that may help lower your blood cholesterol level when used in place of saturated fat in your diet.  Ask your RDN about taking a dietary supplement with plant sterols and stanols to help lower your cholesterol level.  Research shows that substituting saturated fats with unsaturated fats is beneficial to cholesterol levels. Try these easy swaps:     Instead of… Try:   Butter, stick margarine, or solid shortening Reduced-fat, whipped, or liquid spreads   Beef, pork, or poultry with skin    Fish and seafood   Chips, crackers, snack foods Raw or unsalted nuts and seeds or nut butters  Hummus with vegetables  Avocado on toast   Coconut oil, palm oil Liquid vegetable oils: corn, canola, olive, soybean and safflower oils      Limit the amount of cholesterol you eat to less than 200 milligrams per day.  Cholesterol is a substance carried through the bloodstream via lipoproteins, which are known as “transporters” of fat. Some body functions need cholesterol to work properly, but too much cholesterol in the bloodstream can damage arteries and build up blood vessel linings (which can lead to heart attack and stroke). You should eat less than 200 milligrams  cholesterol per day.  People respond differently to eating cholesterol. There is no test available right now that can figure out which people will respond more to dietary cholesterol and which will respond less. For individuals with high intake of dietary cholesterol, different types of increase (none, small, moderate, large) in LDL-cholesterol levels are all possible.    Food sources of cholesterol include egg yolks and organ meats such as liver, gizzards.  Limit egg yolks to two to four per week and avoid organ meats like liver and gizzards to control cholesterol intake.    Tips for Choosing Heart-Healthy Carbohydrates  Consume a consistent amount of carbohydrate  It is important to eat foods with carbohydrates in moderation because they impact your blood glucose level. Carbohydrates can be found in many foods such as:  Grains (breads, crackers, rice, pasta, and cereals)  Starchy Vegetables (potatoes, corn, and peas)  Beans and legumes  Milk, soy milk, and yogurt  Fruit and fruit juice  Sweets (cakes, cookies, ice cream, jam and jelly)  Your RDN will help you set a goal for how many carbohydrate servings to eat at your meals and snacks. For many adults, eating 3 to 5 servings of carbohydrate foods at each meal and 1 or 2 carbohydrate servings for each snack works well.  Check your blood glucose level regularly. It can tell you if you need to adjust when you eat carbohydrates.    Choose foods rich in viscous (soluble) fiber  Viscous, or soluble, is found in the walls of plant cells. Viscous fiber is found only in plant-based foods. Eating foods with fiber helps to lower your unhealthy cholesterol and keep your blood glucose in range  Rich sources of viscous fiber include vegetables (asparagus, [place] sprouts, sweet potatoes, turnips) fruit (apricots, mangoes, oranges), legumes, and whole grains (barley, oats, and oat bran).  As you increase your fiber intake gradually, also increase the amount of water you drink.  This will help prevent constipation.  If you have difficulty achieving this goal, ask your RDN about fiber laxatives. Choose fiber supplements made with viscous fibers such as psyllium seed husks or methylcellulose to help lower unhealthy cholesterol.     Limit refined carbohydrates  There are three types of carbohydrates: starches, sugar, and fiber. Some carbohydrates occur naturally in food, like the starches in rice or corn or the sugars in fruits and milk. Refined carbohydrates--foods with high amounts of simple sugars--can raise triglyceride levels. High triglyceride levels are associated with coronary heart disease.  Some examples of refined carbohydrate foods are table sugar, sweets, and beverages sweetened with added sugar.    Tips for Reducing Sodium (Salt)  Although sodium is important for your body to function, too much sodium can be harmful for people with high blood pressure. As sodium and fluid buildup in your tissues and bloodstream, your blood pressure increases. High blood pressure may cause damage to other organs and increase your risk for a stroke.  Even if you take a pill for blood pressure or a water pill (diuretic) to remove fluid, it is still important to have less salt in your diet. Ask your doctor and RDN what amount of sodium is right for you.  Avoid processed foods. Eat more fresh foods.  Fresh fruits and vegetables are naturally low in sodium, as well as frozen vegetables and fruits that have no added juices or sauces.  Fresh meats are lower in sodium than processed meats, such as smith, sausage, and hotdogs. Read the nutrition label or ask your  to help you find a fresh meat that is low in sodium.  Eat less salt--at the table and when cooking.  A single teaspoon of table salt has 2,300 mg of sodium.  Leave the salt out of recipes for pasta, casseroles, and soups.  Ask your RDN how to cook your favorite recipes without sodium  Be a smart .  Look for food packages that say  “salt-free” or “sodium-free.” These items contain less than 5 milligrams of sodium per serving.  “Very low-sodium” products contain less than 35 milligrams of sodium per serving.  “Low-sodium” products contain less than 140 milligrams of sodium per serving.  Beware for “Unsalted” or “No Added Salt” products. These items may still be high in sodium. Check the nutrition label.  Add flavors to your food without adding sodium.  Try lemon juice, lime juice, fruit juice or vinegar.  Dry or fresh herbs add flavor. Try basil, bay leaf, dill, rosemary, parsley, selene, dry mustard, nutmeg, thyme, and paprika.  Pepper, red pepper flakes, and cayenne pepper can add spice t your meals without adding sodium. Hot sauce contains sodium, but if you use just a drop or two, it will not add up to much.  Buy a sodium-free seasoning blend or make your own at home.    Additional Lifestyle Tips  Achieve and maintain a healthy weight  Talk with your RDN or your doctor about what is a healthy weight for you.  Set goals to reach and maintain that weight.   To lose weight, reduce your calorie intake along with increasing your physical activity. A weight loss of 10 to 15 pounds could reduce LDL-cholesterol by 5 milligrams per deciliter.    Participate in physical activity  Talk with your health care team to find out what types of physical activity are best for you. Set a plan to get about 30 minutes of exercise on most days.    Food Group Foods Recommended   Grains Whole grain breads and cereals, including whole wheat, barley, rye, buckwheat, corn, teff, quinoa, millet, amaranth, brown or wild rice, sorghum, and oats  Pasta, especially whole wheat or other whole grain types  Brown rice, quinoa or wild rice  Whole grain crackers, bread, rolls, pitas  Home-made bread with reduced-sodium baking soda   Protein Foods Lean cuts of beef and pork (loin, leg, round, extra lean hamburger)  Skinless poultry  Fish  Venison and other wild game  Dried beans  and peas  Nuts and nut butters  Meat alternatives made with soy or textured vegetable protein  Egg whites or egg substitute  Cold cuts made with lean meat or soy protein   Dairy Nonfat (skim), low-fat, or 1%-fat milk  Nonfat or low-fat yogurt or cottage cheese  Fat-free and low-fat cheese   Vegetables Fresh, frozen, or canned vegetables without added fat or salt   Fruits Fresh, frozen, canned, or dried fruit   Oils Unsaturated oils (corn, olive, peanut, soy, sunflower, canola)  Soft or liquid margarines and vegetable oil spreads  Salad dressings  Seeds and nuts  Avocado     Foods Not Recommended  Food Group Foods Not Recommended   Grains Breads or crackers topped with salt  Cereals (hot or cold) with more than 300 mg sodium per serving  Biscuits, cornbread, and other “quick” breads prepared with baking soda  Bread crumbs or stuffing mix from a store  High-fat bakery products, such as doughnuts, biscuits, croissants, Welsh pastries, pies, cookies  Instant cooking foods to which you add hot water and stir--potatoes, noodles, rice, etc.  Packaged starchy foods--seasoned noodle or rice dishes, stuffing mix, macaroni and cheese dinner  Snacks made with partially hydrogenated oils, including chips, cheese puffs, snack mixes, regular crackers, butter-flavored popcorn   Protein Foods Higher-fat cuts of meats (ribs, t-bone steak, regular hamburger)  Ocasio, sausage, or hot dogs  Cold cuts, such as salami or bologna, deli meats, cured meats, corned beef  Organ meats (liver, brains, gizzards, sweetbreads)  Poultry with skin  Fried or smoked meat, poultry, and fish  Whole eggs and egg yolks (more than 2-4 per week)  Salted legumes, nuts, seeds, or nut/seed butters  Meat alternatives with high levels of sodium (>300 mg per serving) or saturated fat (>5 g per serving)   Dairy Whole milk, 2% fat milk, buttermilk  Whole milk yogurt or ice cream  Cream  Half-&-half  Cream cheese  Sour cream  Cheese   Vegetables Canned or frozen  vegetables with salt, fresh vegetables prepared with salt, butter, cheese, or cream sauce  Fried vegetables  Pickled vegetables such as olives, pickles, or sauerkraut   Fruits Fried fruits  Fruits served with butter or cream   Oils Butter, stick margarine, shortening  Partially hydrogenated oils or trans fats  Tropical oils (coconut, palm, palm kernel oils)   Other Candy, sugar sweetened soft drinks and desserts  Salt, sea salt, garlic salt, and seasoning mixes containing salt  Bouillon cubes  Ketchup, barbecue sauce, Worcestershire sauce, soy sauce, teriyaki sauce  Miso  Salsa  Pickles, olives, relish     Heart Healthy Consistent Carbohydrate Sample 1-Day Menu   Breakfast 1 cup cooked oatmeal (2 carbohydrate servings)  3/4 cup blueberries (1 carbohydrate serving)  1 ounce almonds  1 cup skim milk (1 carbohydrate serving)  1 cup coffee   Morning Snack 1 cup sugar-free nonfat yogurt (1 carbohydrate serving)   Lunch 2 slices whole-wheat bread (2 carbohydrate servings)  2 ounces lean turkey breast  1 ounce low-fat Swiss cheese  1 teaspoon mustard  1 slice tomato  1 lettuce leaf  1 small pear (1 carbohydrate serving)  1 cup skim milk (1 carbohydrate serving)   Afternoon Snack 1 ounce trail mix with unsalted nuts, seeds, and raisins (1 carbohydrate serving)   Evening Meal 3 ounces salmon  2/3 cup cooked brown rice (2 carbohydrate servings)  1 teaspoon soft margarine  1 cup cooked broccoli with 1/2 cup cooked carrots (1 carbohydrate serving  Carrots, cooked, boiled, drained, without salt  1 cup lettuce  1 teaspoon olive oil with vinegar for dressing  1 small whole grain roll (1 carbohydrate serving)  1 teaspoon soft margarine  1 cup unsweetened tea   Evening Snack 1 extra-small banana (1 carbohydrate serving)         Nutrition Counseling  Our expert team offers:   Medical Nutrition Therapy for Chronic Conditions   Weight Management   Diabetes Education and Management   Wellness Services   Body Composition Measurements    Gastrointestinal Health    [facility] are located at:  [address], [place] [address]  For more information and to schedule a consultation, please call [phone].  A physician referral may be required by your insurance for coverage.

## 2025-04-04 NOTE — CARE PLAN
The patient is Watcher - Medium risk of patient condition declining or worsening    Shift Goals  Clinical Goals: Hemodynamic stability, wean pressor support, extubate, mobilize  Patient Goals: DEJA  Family Goals: DEJA    Progress made toward(s) clinical / shift goals:    Problem: Fall Risk  Goal: Patient will remain free from falls  Outcome: Progressing     Problem: Knowledge Deficit - Standard  Goal: Patient and family/care givers will demonstrate understanding of plan of care, disease process/condition, diagnostic tests and medications  Outcome: Progressing     Problem: Skin Integrity  Goal: Skin integrity is maintained or improved  Outcome: Progressing    Problem: Pain - Standard  Goal: Alleviation of pain or a reduction in pain to the patient’s comfort goal  Outcome: Progressing     Problem: Nutrition - Advanced  Goal: Patient will display progressive weight gain toward goal have adequate food and fluid intake  Outcome: Progressing    Problem: Day of surgery post CABG/Heart valve replacement  Goal: Stabilization in immediate post op period  Outcome: Progressing  Intervention: VS q 15 min x 4 hours, then q 1 hour. Include temperature immediately upon arrival. Check CO/CI q 2-4 hours and PRN  Note: Vitals completed per protocol, see flowsheets.  Intervention: If radial artery used, elevate arm, no BP checks or needle sticks from affected arm, monitor ulnar pulse and capillary refill  Note: N/A  Intervention: First post op hour labs and EKG per order  Note: Completed per protocol, see results review.   Intervention: Serum K q 6 hours x 24 hours.  ABG and CBC prn.  Note: Completed, see MAR and results review.  Intervention: Initiate post cardiac insulin infusion protocol orders for FSBS greater than 140 and check frequency per protocol  Note: Insulin infusion ordered per protocol.  Intervention: FSBS frequency as per Cardiac Surgery Insulin Drip Protocol  Note: FSBG checked per protocol. Insulin drip initated when  patient's FSBG >180,  Intervention: For patients on Beta Blockers: verify dose given prior to surgery or within 6 hours after arrival to the unit  Note: See MAR.  Intervention: Chest tube to 20 cm suction, record CT drainage with VS, and check for air leak  Note: Chest tubes applied to -20 cm of suction, no chest tube leak, total output: 100mL  Intervention: For CT drainage >300 mL in first hour post op and/or 150 mL in subsequent hours: Stat platelets, PT, INR, TEG, iSTAT, and H&H per order  Note: N/A  Intervention: Titrate and wean off vasoactive drips per patient's condition and per MD order while maintaining SBP  mmHg per MD order  Note: Patient pressors titrated per MAR.   Intervention: VAP protocol in place  Note: VAP protocol in place.   Intervention: Wean from Vent per protocol (see protocol), extubation goal within 6 hours post op  Note: Patient weaned from ventilator and met all extubation criteria. Patient extubated at 1524, total intubation time 5 hours and 19 minutes.  Intervention: IS q 1 hour while awake post extubation  Note: Patient reminded to complete IS 10x/hour. Patient's baseline goal is 7726-1254 mL.  Intervention: Bedrest until extubated and groin lines out  Note: N/A  Intervention: Dangle within 4 hours post extubation  Note: Patient unable to mobilize due to nausea/vomitting.  Intervention: Up in chair 4 hours, day of extubation  Note: N/A  Intervention: Maintain all original surgical dressings per provider orders and specifications  Note: Midline surgical island dressing in place, clean dry, and intact.   Intervention: Clear liquids post extubation, order carbohydrate free (post cardiac surgery) diet, advance as tolerated  Note: Swallow eval completed. Patient started on clear liquid CHO free diet.  Intervention: Discontinue Dayton juanito and arterial line 12-18 hours post op if hemodynamically stable and off vasoactive drips  Note: No Dayton-Juanito in place. Arterial line in place for  titration of vasopressors.   Intervention: A-Fib and DVT prophylaxis per MD order or contraindications documented (refer to DVT/VTE problem on Care Plan)  Note: N/A  Intervention: Amiodarone protocol per MD order  Note: N/A       Patient is not progressing towards the following goals:

## 2025-04-04 NOTE — ANESTHESIA PREPROCEDURE EVALUATION
Case: [id number] Anesthesia Start Date/Time: 04/04/25 0724    Procedures:       MITRAL VALVE REPAIR VERSUS REPLACEMENT, TRANSESOPHAGEAL ECHOCARDIOGRAM      REPLACEMENT, MITRAL VALVE      ECHOCARDIOGRAM, TRANSESOPHAGEAL, INTRAOPERATIVE    Pre-op diagnosis: MITRAL REGURGITATION    Location: [facility] 02 / SURGERY [facility]    Surgeons: Estella Geller M.D.            Relevant Problems   ANESTHESIA   (positive) PONV (postoperative nausea and vomiting)      CARDIAC   (positive) Essential hypertension   (positive) Mitral valve prolapse   (positive) Moderate mitral regurgitation      ENDO   (positive) Postoperative hypothyroidism      Other   (positive) Arthritis   (positive) Primary osteoarthritis of first carpometacarpal joint of left hand   (positive) Primary osteoarthritis of first carpometacarpal joint of right hand       Physical Exam    Airway   Mallampati: II  TM distance: >3 FB  Neck ROM: full       Cardiovascular - normal exam  Rhythm: regular  Rate: normal  (-) murmur     Dental - normal exam           Pulmonary - normal exam  Breath sounds clear to auscultation     Abdominal    Neurological - normal exam                   Anesthesia Plan    ASA 4 (severe mitral regurgitation)       Plan - general       Airway plan will be ETT  DAWOOD Planned  ([place], CVC  History of significant PONV, will apply scopolamine patch and run propofol infusion)      Induction: intravenous    Postoperative Plan: Postoperative administration of opioids is intended.    Pertinent diagnostic labs and testing reviewed    Informed Consent:    Anesthetic plan and risks discussed with patient.    Use of blood products discussed with: patient whom consented to blood products.

## 2025-04-04 NOTE — RESPIRATORY CARE
Pt extubated to 2L NC  1.4L, -30 nif, rsbi 23  Cuff leak detected no stridor noted   Tolerating well    Pep ordered

## 2025-04-04 NOTE — ASSESSMENT & PLAN NOTE
S/P AVR on 4/4 by Dr Alvarez  POD #2  Weaned from ventilator on POD #1  Weaned from vasopressor support  Post-operative pain control per [facility]  Chest tube and pacemaker management per [facility] - remove today  SSI  Optimize K and Mg  PT/OT consult

## 2025-04-04 NOTE — PROGRESS NOTES
Patient alert, RASS 0/-1. Patient responsive to verbal stimuli, following commands, moving all extremities, and nodding appropriately to questions.

## 2025-04-04 NOTE — ANESTHESIA TIME REPORT
Anesthesia Start and Stop Event Times       Date Time Event    4/4/2025 0715 Ready for Procedure     0724 Anesthesia Start     1048 Anesthesia Stop          Responsible Staff  04/04/25      Name Role Begin Liset Temple Ma, M.D. Anesth 0724 1048          Overtime Reason:  no overtime (within assigned shift)    Comments:

## 2025-04-04 NOTE — ANESTHESIA PROCEDURE NOTES
Arterial Line    Performed by: Maverick Barrett M.D.  Authorized by: Maverick Barrett M.D.    Start Time:  4/4/2025 7:35 AM  End Time:  4/4/2025 7:35 AM  Localization: ultrasound guidance  Image captured, interpreted and electronically stored.  Patient Location:  OR  Indication: continuous blood pressure monitoring        Catheter Size:  20 G  Seldinger Technique?: Yes    Laterality:  Right  Site:  Radial artery  Line Secured:  Transparent dressing and antimicrobial disc  Events: patient tolerated procedure well with no complications

## 2025-04-04 NOTE — ANESTHESIA PROCEDURE NOTES
Airway    Date/Time: 4/4/2025 7:31 AM    Performed by: Maverick Barrett M.D.  Authorized by: Maverick Barrett M.D.    Location:  OR  Urgency:  Elective  Indications for Airway Management:  Anesthesia      Spontaneous Ventilation: absent    Sedation Level:  Deep  Preoxygenated: Yes    Mask Difficulty Assessment:  0 - not attempted  Final Airway Type:  Endotracheal airway  Final Endotracheal Airway:  ETT  Cuffed: Yes    Technique Used for Successful ETT Placement:  Direct laryngoscopy    Insertion Site:  Oral  Blade Type:  Penny  Laryngoscope Blade/Videolaryngoscope Blade Size:  3  ETT Size (mm):  7.5  Measured from:  Lips  ETT to Lips (cm):  22  Placement Verified by: auscultation and capnometry    Cormack-Lehane Classification:  Grade IIa - partial view of glottis  Number of Attempts at Approach:  1

## 2025-04-04 NOTE — PROGRESS NOTES
Medication history reviewed with PT at bedside    [facility] is complete per PT reporting    Allergies reviewed.     Patient denies any outpatient antibiotics in the last 30 days.     Patient is not taking anticoagulants.    Dispense history is available.    Preferred pharmacy for this visit - [facility] ([phone])

## 2025-04-04 NOTE — ANESTHESIA POSTPROCEDURE EVALUATION
Patient: Ailin Good    Procedure Summary       Date: 04/04/25 Room / Location: [facility] 02 / SURGERY [facility]    Anesthesia Start: 0724 Anesthesia Stop: 1048    Procedures:       MITRAL VALVE REPAIR, RADICAL (Chest)      ECHOCARDIOGRAM, TRANSESOPHAGEAL, INTRAOPERATIVE (Mouth)      CLIPPING, LEFT ATRIAL APPENDAGE (Chest) Diagnosis: (SEVERE MITRAL REGURGITATION)    Surgeons: Estella Geller M.D. Responsible Provider: Maverick Barrett M.D.    Anesthesia Type: general ASA Status: 4            Final Anesthesia Type: general  Last vitals  BP   Blood Pressure : (!) 141/75    Temp   35.9 °C (96.7 °F)    Pulse   (!) 58   Resp   18    SpO2   95 %      Anesthesia Post Evaluation    Patient location during evaluation: ICU  Patient participation: complete - patient participated  Post-procedure mental status: sedated.    Airway patency: patent  Anesthetic complications: no  Cardiovascular status: hemodynamically stable  Respiratory status: acceptable and ETT  Hydration status: euvolemic    PONV: none          No notable events documented.     Nurse Pain Score: 0 (NPRS)

## 2025-04-04 NOTE — ANESTHESIA PROCEDURE NOTES
DAWOOD    Date/Time: 4/4/2025 7:32 AM    Performed by: Maverick Barrett M.D.  Authorized by: Maverick Barrett M.D.    Start Time:4/4/2025 7:32 AM  Preanesthetic Checklist: patient identified, IV checked, site marked, risks and benefits discussed, surgical consent, monitors and equipment checked, pre-op evaluation and timeout performed    Indication for DAWOOD: diagnostic   Patient Location: OR  Intubated: Yes  Bite Block: Yes  Heart Visualized: Yes  Insertion: atraumatic    **See FULL DAWOOD report in patient's chart via CV Synapse**    Briefly, s/p mitral valve repair with resection and annuloplasty and CHINYERE ligation    Biventricular systolic function is normal after bypass; EF ~65%  Mitral valve has no residual regurgitation, mean gradient 1 mmHg with no evidence of LVOTO  No effusion after chest closure  Aorta intact

## 2025-04-04 NOTE — PROGRESS NOTES
RSBI 23  Pinsp 5  RR 19  PEEP 8  FiO2 30%  NIF -30  VC 1400 mL/kg)  PACO2 40  pH 7.34  SpO2 98%    Informed Hernandez Gross, CTSx APRN of patient on both levoped and vasopressin, APRN approved extubation.     Patient extubated at 1524 to 2L/NC. Total intubation time 5 hours and 19 mins.

## 2025-04-05 ENCOUNTER — APPOINTMENT (OUTPATIENT)
Dept: RADIOLOGY | Facility: MEDICAL CENTER | Age: 75
DRG: 221 | End: 2025-04-05
Payer: MEDICARE

## 2025-04-05 LAB
ANION GAP SERPL CALC-SCNC: 9 MMOL/L (ref 7–16)
BUN SERPL-MCNC: 27 MG/DL (ref 8–22)
CA-I SERPL-SCNC: 1 MMOL/L (ref 1.1–1.3)
CALCIUM SERPL-MCNC: 7.7 MG/DL (ref 8.5–10.5)
CHLORIDE SERPL-SCNC: 108 MMOL/L (ref 96–112)
CO2 SERPL-SCNC: 23 MMOL/L (ref 20–33)
CREAT SERPL-MCNC: 0.64 MG/DL (ref 0.5–1.4)
EKG IMPRESSION: NORMAL
ERYTHROCYTE [DISTWIDTH] IN BLOOD BY AUTOMATED COUNT: 50.2 FL (ref 35.9–50)
GFR SERPLBLD CREATININE-BSD FMLA CKD-EPI: 93 ML/MIN/1.73 M 2
GLUCOSE BLD STRIP.AUTO-MCNC: 107 MG/DL (ref 65–99)
GLUCOSE BLD STRIP.AUTO-MCNC: 115 MG/DL (ref 65–99)
GLUCOSE BLD STRIP.AUTO-MCNC: 120 MG/DL (ref 65–99)
GLUCOSE BLD STRIP.AUTO-MCNC: 126 MG/DL (ref 65–99)
GLUCOSE BLD STRIP.AUTO-MCNC: 127 MG/DL (ref 65–99)
GLUCOSE BLD STRIP.AUTO-MCNC: 133 MG/DL (ref 65–99)
GLUCOSE BLD STRIP.AUTO-MCNC: 136 MG/DL (ref 65–99)
GLUCOSE BLD STRIP.AUTO-MCNC: 138 MG/DL (ref 65–99)
GLUCOSE BLD STRIP.AUTO-MCNC: 139 MG/DL (ref 65–99)
GLUCOSE BLD STRIP.AUTO-MCNC: 140 MG/DL (ref 65–99)
GLUCOSE BLD STRIP.AUTO-MCNC: 140 MG/DL (ref 65–99)
GLUCOSE BLD STRIP.AUTO-MCNC: 145 MG/DL (ref 65–99)
GLUCOSE BLD STRIP.AUTO-MCNC: 146 MG/DL (ref 65–99)
GLUCOSE BLD STRIP.AUTO-MCNC: 155 MG/DL (ref 65–99)
GLUCOSE BLD STRIP.AUTO-MCNC: 156 MG/DL (ref 65–99)
GLUCOSE BLD STRIP.AUTO-MCNC: 166 MG/DL (ref 65–99)
GLUCOSE BLD STRIP.AUTO-MCNC: 169 MG/DL (ref 65–99)
GLUCOSE BLD STRIP.AUTO-MCNC: 170 MG/DL (ref 65–99)
GLUCOSE BLD STRIP.AUTO-MCNC: 175 MG/DL (ref 65–99)
GLUCOSE BLD STRIP.AUTO-MCNC: 186 MG/DL (ref 65–99)
GLUCOSE SERPL-MCNC: 144 MG/DL (ref 65–99)
HCT VFR BLD AUTO: 33.3 % (ref 37–47)
HGB BLD-MCNC: 10.6 G/DL (ref 12–16)
INR PPP: 1.12 (ref 0.87–1.13)
MCH RBC QN AUTO: 29.9 PG (ref 27–33)
MCHC RBC AUTO-ENTMCNC: 31.8 G/DL (ref 32.2–35.5)
MCV RBC AUTO: 93.8 FL (ref 81.4–97.8)
PLATELET # BLD AUTO: 373 K/UL (ref 164–446)
PMV BLD AUTO: 9.8 FL (ref 9–12.9)
POTASSIUM SERPL-SCNC: 4.5 MMOL/L (ref 3.6–5.5)
PROTHROMBIN TIME: 14.4 SEC (ref 12–14.6)
RBC # BLD AUTO: 3.55 M/UL (ref 4.2–5.4)
SODIUM SERPL-SCNC: 140 MMOL/L (ref 135–145)
WBC # BLD AUTO: 11.4 K/UL (ref 4.8–10.8)

## 2025-04-05 PROCEDURE — 82962 GLUCOSE BLOOD TEST: CPT | Mod: 91

## 2025-04-05 PROCEDURE — 700102 HCHG RX REV CODE 250 W/ 637 OVERRIDE(OP)

## 2025-04-05 PROCEDURE — 700111 HCHG RX REV CODE 636 W/ 250 OVERRIDE (IP): Mod: JZ | Performed by: INTERNAL MEDICINE

## 2025-04-05 PROCEDURE — 770022 HCHG ROOM/CARE - ICU (200)

## 2025-04-05 PROCEDURE — 700111 HCHG RX REV CODE 636 W/ 250 OVERRIDE (IP): Performed by: THORACIC SURGERY (CARDIOTHORACIC VASCULAR SURGERY)

## 2025-04-05 PROCEDURE — A9270 NON-COVERED ITEM OR SERVICE: HCPCS | Performed by: NURSE PRACTITIONER

## 2025-04-05 PROCEDURE — 700105 HCHG RX REV CODE 258: Performed by: INTERNAL MEDICINE

## 2025-04-05 PROCEDURE — 99024 POSTOP FOLLOW-UP VISIT: CPT | Performed by: NURSE PRACTITIONER

## 2025-04-05 PROCEDURE — 93010 ELECTROCARDIOGRAM REPORT: CPT | Performed by: INTERNAL MEDICINE

## 2025-04-05 PROCEDURE — 700111 HCHG RX REV CODE 636 W/ 250 OVERRIDE (IP)

## 2025-04-05 PROCEDURE — 71045 X-RAY EXAM CHEST 1 VIEW: CPT

## 2025-04-05 PROCEDURE — 85610 PROTHROMBIN TIME: CPT

## 2025-04-05 PROCEDURE — 700102 HCHG RX REV CODE 250 W/ 637 OVERRIDE(OP): Performed by: NURSE PRACTITIONER

## 2025-04-05 PROCEDURE — 93005 ELECTROCARDIOGRAM TRACING: CPT | Mod: TC

## 2025-04-05 PROCEDURE — 80048 BASIC METABOLIC PNL TOTAL CA: CPT

## 2025-04-05 PROCEDURE — 85027 COMPLETE CBC AUTOMATED: CPT

## 2025-04-05 PROCEDURE — C9248 INJ, CLEVIDIPINE BUTYRATE: HCPCS | Mod: JZ,TB

## 2025-04-05 PROCEDURE — 99291 CRITICAL CARE FIRST HOUR: CPT | Performed by: INTERNAL MEDICINE

## 2025-04-05 PROCEDURE — A9270 NON-COVERED ITEM OR SERVICE: HCPCS

## 2025-04-05 RX ORDER — INSULIN LISPRO 100 [IU]/ML
2-9 INJECTION, SOLUTION INTRAVENOUS; SUBCUTANEOUS
Status: DISCONTINUED | OUTPATIENT
Start: 2025-04-05 | End: 2025-04-07

## 2025-04-05 RX ORDER — WARFARIN SODIUM 2.5 MG/1
2.5 TABLET ORAL DAILY
Status: DISCONTINUED | OUTPATIENT
Start: 2025-04-05 | End: 2025-04-08

## 2025-04-05 RX ORDER — DEXTROSE MONOHYDRATE 25 G/50ML
25 INJECTION, SOLUTION INTRAVENOUS
Status: DISCONTINUED | OUTPATIENT
Start: 2025-04-05 | End: 2025-04-07

## 2025-04-05 RX ORDER — METOPROLOL TARTRATE 25 MG/1
12.5 TABLET, FILM COATED ORAL 2 TIMES DAILY
Status: DISPENSED | OUTPATIENT
Start: 2025-04-05 | End: 2025-04-06

## 2025-04-05 RX ORDER — POTASSIUM CHLORIDE 7.45 MG/ML
10 INJECTION INTRAVENOUS ONCE
Status: COMPLETED | OUTPATIENT
Start: 2025-04-05 | End: 2025-04-05

## 2025-04-05 RX ORDER — METOPROLOL TARTRATE 25 MG/1
12.5 TABLET, FILM COATED ORAL 2 TIMES DAILY
Status: DISCONTINUED | OUTPATIENT
Start: 2025-04-06 | End: 2025-04-09 | Stop reason: HOSPADM

## 2025-04-05 RX ADMIN — Medication 1 APPLICATOR: at 21:05

## 2025-04-05 RX ADMIN — POTASSIUM CHLORIDE 10 MEQ: 7.46 INJECTION, SOLUTION INTRAVENOUS at 01:57

## 2025-04-05 RX ADMIN — MAGNESIUM SULFATE IN DEXTROSE 1 G: 10 INJECTION, SOLUTION INTRAVENOUS at 06:37

## 2025-04-05 RX ADMIN — OMEPRAZOLE 20 MG: 20 CAPSULE, DELAYED RELEASE ORAL at 05:19

## 2025-04-05 RX ADMIN — WARFARIN SODIUM 2.5 MG: 2.5 TABLET ORAL at 17:15

## 2025-04-05 RX ADMIN — METOPROLOL TARTRATE 12.5 MG: 25 TABLET, FILM COATED ORAL at 05:28

## 2025-04-05 RX ADMIN — CALCIUM CHLORIDE 1000 MG: 100 INJECTION, SOLUTION INTRAVENOUS at 09:13

## 2025-04-05 RX ADMIN — TRAMADOL HYDROCHLORIDE 50 MG: 50 TABLET, COATED ORAL at 05:49

## 2025-04-05 RX ADMIN — ENOXAPARIN SODIUM 40 MG: 100 INJECTION SUBCUTANEOUS at 17:14

## 2025-04-05 RX ADMIN — CLEVIPIDINE 2 MG/HR: 0.5 EMULSION INTRAVENOUS at 01:52

## 2025-04-05 RX ADMIN — ACETAMINOPHEN 1000 MG: 500 TABLET, FILM COATED ORAL at 00:11

## 2025-04-05 RX ADMIN — ACETAMINOPHEN 1000 MG: 500 TABLET, FILM COATED ORAL at 17:15

## 2025-04-05 RX ADMIN — LEVOTHYROXINE SODIUM 100 MCG: 0.1 TABLET ORAL at 05:19

## 2025-04-05 RX ADMIN — SENNOSIDES AND DOCUSATE SODIUM 2 TABLET: 50; 8.6 TABLET ORAL at 17:15

## 2025-04-05 RX ADMIN — TRAMADOL HYDROCHLORIDE 50 MG: 50 TABLET, COATED ORAL at 14:10

## 2025-04-05 RX ADMIN — ACETAMINOPHEN 1000 MG: 500 TABLET, FILM COATED ORAL at 12:56

## 2025-04-05 RX ADMIN — Medication 1 APPLICATOR: at 09:11

## 2025-04-05 RX ADMIN — TRAMADOL HYDROCHLORIDE 50 MG: 50 TABLET, COATED ORAL at 21:05

## 2025-04-05 RX ADMIN — SENNOSIDES AND DOCUSATE SODIUM 2 TABLET: 50; 8.6 TABLET ORAL at 05:18

## 2025-04-05 RX ADMIN — ASPIRIN 81 MG: 81 TABLET, COATED ORAL at 05:18

## 2025-04-05 RX ADMIN — TRAMADOL HYDROCHLORIDE 50 MG: 50 TABLET, COATED ORAL at 00:19

## 2025-04-05 RX ADMIN — ACETAMINOPHEN 1000 MG: 500 TABLET, FILM COATED ORAL at 23:12

## 2025-04-05 RX ADMIN — ACETAMINOPHEN 1000 MG: 500 TABLET, FILM COATED ORAL at 05:18

## 2025-04-05 RX ADMIN — TRAMADOL HYDROCHLORIDE 50 MG: 50 TABLET, COATED ORAL at 10:00

## 2025-04-05 ASSESSMENT — ENCOUNTER SYMPTOMS
CHILLS: 0
NAUSEA: 0
SENSORY CHANGE: 0
COUGH: 0
SPUTUM PRODUCTION: 0
FEVER: 0
ABDOMINAL PAIN: 0
MYALGIAS: 0
SHORTNESS OF BREATH: 1
BLURRED VISION: 0
VOMITING: 0
FOCAL WEAKNESS: 0
STRIDOR: 0
DIZZINESS: 0

## 2025-04-05 ASSESSMENT — PAIN DESCRIPTION - PAIN TYPE
TYPE: ACUTE PAIN
TYPE: ACUTE PAIN
TYPE: ACUTE PAIN;SURGICAL PAIN
TYPE: ACUTE PAIN
TYPE: ACUTE PAIN
TYPE: ACUTE PAIN;SURGICAL PAIN
TYPE: ACUTE PAIN
TYPE: ACUTE PAIN;SURGICAL PAIN
TYPE: ACUTE PAIN;SURGICAL PAIN
TYPE: ACUTE PAIN
TYPE: ACUTE PAIN;SURGICAL PAIN
TYPE: ACUTE PAIN;SURGICAL PAIN
TYPE: ACUTE PAIN
TYPE: ACUTE PAIN;SURGICAL PAIN

## 2025-04-05 ASSESSMENT — FIBROSIS 4 INDEX: FIB4 SCORE: 1.03

## 2025-04-05 NOTE — CARE PLAN
The patient is Watcher - Medium risk of patient condition declining or worsening    Shift Goals  Clinical Goals: Hemodynamic stability, pain management, mobility, encourage IS  Patient Goals: Pain management, IS  Family Goals: DEJA    Progress made toward(s) clinical / shift goals:    Problem: Fall Risk  Goal: Patient will remain free from falls  Outcome: Progressing     Problem: Knowledge Deficit - Standard  Goal: Patient and family/care givers will demonstrate understanding of plan of care, disease process/condition, diagnostic tests and medications  Outcome: Progressing     Problem: Skin Integrity  Goal: Skin integrity is maintained or improved  Outcome: Progressing     Problem: Post Op Day 1 CABG/Heart Valve Replacement  Goal: Optimal care of the post op CABG/heart valve replacement Post Op Day 1  Outcome: Progressing  Intervention: EKG and CXR completed  Note: Completed  Intervention: Daily weights in the morning  Note: Completed in am per NOC RN  Intervention: Up in chair for all meals  Note: Completed. Patient up to chair for breakfast and lunch  Intervention: Ambulate in am if stable. First ambulation 25 feet. Repeat x 3 as tolerated  Note: Tolerated ambulation for 30 feet x2  Intervention: Discontinue morrell catheter unless documented reason for continuation  Note: Morrell to remain in place per APRN  Intervention: Assess surgical dressing and check provider orders for potential removal  Note: Island dressing removed per protocol. Patient educated on proper cleansing  Intervention: Ensure referal to intensive cardiac rehab is ordered, and smoking cessation education if appropriate  Note: N/A  Intervention: [facility] trained RN to remove chest tubes if ordered by provider  Note: Chest tubes to remain in place per APRN. Output 150  Intervention: IS q 1 hour while awake and record best IS volume  Note: Best IS 1000. Patient demonstrates strong effort and is self-motivated  Intervention: Knee high DANIEL hose, on during the  day, off at night  Note: DANIEL hose remain on during day.   Intervention: Saline lock IV  Note: Completed per protocol.   Intervention: After 24th hour post-anesthesia end time, transition patient to Cardiac Surgery SQ Insulin Protocol  Note: Completed per protocol  Intervention: If patient is CABG or on home beta-blocker, start/resume beta-blocker on POD 1 or POD 2 or document contraindication  Note: Patient on metoprolol     Problem: Hemodynamics  Goal: Patient's hemodynamics, fluid balance and neurologic status will be stable or improve  Outcome: Progressing     Problem: Nutrition - Advanced  Goal: Patient will display progressive weight gain toward goal have adequate food and fluid intake  Outcome: Progressing     Problem: Self Care  Goal: Patient will have the ability to perform ADLs independently or with assistance (bathe, groom, dress, toilet and feed)  Outcome: Progressing     Problem: Pain - Standard  Goal: Alleviation of pain or a reduction in pain to the patient’s comfort goal  Outcome: Progressin

## 2025-04-05 NOTE — HOSPITAL COURSE
"\"74 y.o. female with a pmhx of HTN, Hypothyroidism and severe mitral regurgitation with a who presented 4/4/2025 for an elective mitral valve repair and left atrial appendage ligation with Dr. Geller.  The patient had a radical mitral valve repair with a P2 quadrangular resection and a 36 mm annuloplasty band placed.  She also had a left atrial appendage occlusion and clipping with a atriclip.  The patient's preoperative and postoperative EF was normal.  She arrives in the ICU postoperatively on mechanical ventilator, sedated and paralyzed with 0.02 of norepinephrine infusing.  She has been bradycardic during her case however has not required pacing.\"   "

## 2025-04-05 NOTE — PROGRESS NOTES
Inpatient Anticoagulation Service Note for 4/5/2025    Reason for Anticoagulation: Mitral Valve Repair      Hemoglobin Value: (Abnormal) 10.6  Hematocrit Value: (Abnormal) 33.3  Lab Platelet Value: 373  Target INR: 2.0 to 3.0     Date/Time INR Value    04/05/25 0404 1.12     04/04/25 1055 (Abnormal) 1.35            Date/Time Dose (mg)    04/05/25 1101 2.5           Average Dose (mg):  (new start)  Significant Interactions: Antiplatelet Medications  Bridge Therapy: No (DVT prophylaxis with enoxaparin)   Reversal Agent Administered: Not Applicable    A/P  New start warfarin following MV repair. Chest tubes and pacer wires remain in place. Will start warfarin 2.5 mg PO daily.  INR tomorrow to guide subsequent dosing.    Education Material Provided?: No    Pharmacist suggested discharge dosing: TBD pending INR trends, perhaps warfarin 2.5 mg PO daily.  Recommend a follow-up PT/INR within 48-72 hours of discharge.    Mitzi Quevedo, PharmD, BCPS, BCCCP     right normal/left normal

## 2025-04-05 NOTE — CARE PLAN
The patient is Watcher - Medium risk of patient condition declining or worsening    Shift Goals  Clinical Goals: Hemodynamic stability, wean pressor support, mobilize  Patient Goals: Rest, pain control  Family Goals: DEJA    Progress made toward(s) clinical / shift goals:      Problem: Fall Risk  Goal: Patient will remain free from falls  Outcome: Progressing     Problem: Knowledge Deficit - Standard  Goal: Patient and family/care givers will demonstrate understanding of plan of care, disease process/condition, diagnostic tests and medications  Outcome: Progressing     Problem: Pain - Standard  Goal: Alleviation of pain or a reduction in pain to the patient’s comfort goal  Outcome: Progressing         Problem: Day of surgery post CABG/Heart valve replacement  Goal: Stabilization in immediate post op period  Outcome: Progressing  Intervention: VS q 15 min x 4 hours, then q 1 hour. Include temperature immediately upon arrival. Check CO/CI q 2-4 hours and PRN  Note: Done  Intervention: If radial artery used, elevate arm, no BP checks or needle sticks from affected arm, monitor ulnar pulse and capillary refill  Note: N/A  Intervention: First post op hour labs and EKG per order  Note: Done on day shift  Intervention: Serum K q 6 hours x 24 hours.  ABG and CBC prn.  Note: Done and replaced per protocol  Intervention: Initiate post cardiac insulin infusion protocol orders for FSBS greater than 140 and check frequency per protocol  Note: Initiated on day shift  Intervention: FSBS frequency as per Cardiac Surgery Insulin Drip Protocol  Note: Done, insulin titrated per protocol  Intervention: Chest tube to 20 cm suction, record CT drainage with VS, and check for air leak  Note: CT to suction, no air leak, total output: 150  Intervention: For CT drainage >300 mL in first hour post op and/or 150 mL in subsequent hours: Stat platelets, PT, INR, TEG, iSTAT, and H&H per order  Note: N/A  Intervention: Titrate and wean off  vasoactive drips per patient's condition and per MD order while maintaining SBP  mmHg per MD order  Note: Pt weaned off Levo and Vaso  Intervention: VAP protocol in place  Note: N/A. Pt already extubated  Intervention: Wean from Vent per protocol (see protocol), extubation goal within 6 hours post op  Note: N/A, Pt extubated  Intervention: IS q 1 hour while awake post extubation  Note: Best   Intervention: Bedrest until extubated and groin lines out  Note: Pt already extubated  Intervention: Dangle within 4 hours post extubation  Note: dangled to EOB   Intervention: Up in chair 4 hours, day of extubation  Note: Pt sitting up in chair for meals  Intervention: Maintain all original surgical dressings per provider orders and specifications  Note: Island dressing to midline incision   Intervention: Clear liquids post extubation, order carbohydrate free (post cardiac surgery) diet, advance as tolerated  Note: Diet already ordered  Intervention: Discontinue Palatine anurag and arterial line 12-18 hours post op if hemodynamically stable and off vasoactive drips  Note: Chao dc'd per protocol  Intervention: A-Fib and DVT prophylaxis per MD order or contraindications documented (refer to DVT/VTE problem on Care Plan)  Note: N/A  Intervention: Amiodarone protocol per MD order  Note: N/A       Patient is not progressing towards the following goals:

## 2025-04-05 NOTE — CARE PLAN
Problem: Hyperinflation  Goal: Prevent or improve atelectasis  Description: 1. Instruct incentive spirometry usage2.  Perform hyperinflation therapy as indicated  Outcome: Progressing  Pep qid  Is 1100

## 2025-04-05 NOTE — PROGRESS NOTES
Informed LJ Ng of patient's nausea after 8mg of zofran, LJ informed of Qtc of 525. APRN put one time dose of dexamethasone for patient. APRN agreeable to give compazine if needed.     1750 - informed LJ Ng patient is still nauseated/vomitting after decadron. LJ had me reach out to Dr. Quinones, intensivist, to see if patient needed NGT. Dr. Quinones ordered benadryl and haldol, no NGT needed at this time. LJ Ng informed and agreeable.

## 2025-04-05 NOTE — PROGRESS NOTES
"Critical Care Progress Note    Date of admission  4/4/2025    Chief Complaint  74 y.o. female admitted 4/4/2025 with MVR    Hospital Course  \"74 y.o. female with a pmhx of HTN, Hypothyroidism and severe mitral regurgitation with a who presented 4/4/2025 for an elective mitral valve repair and left atrial appendage ligation with Dr. Geller.  The patient had a radical mitral valve repair with a P2 quadrangular resection and a 36 mm annuloplasty band placed.  She also had a left atrial appendage occlusion and clipping with a atriclip.  The patient's preoperative and postoperative EF was normal.  She arrives in the ICU postoperatively on mechanical ventilator, sedated and paralyzed with 0.02 of norepinephrine infusing.  She has been bradycardic during her case however has not required pacing.\"     Interval Problem Update  Reviewed last 24 hour events:   - POD #1, doing well   - pain controlled   - CT output 150 mL overnight    - Neuro: AOx4, non-focal   - HR: 60s   - SBP: 90s-110s on clevi   - GI: tolerating diet, last BM PTA   - UOP: 2.9L since admit   - Johnson: yes   - Tm: 37.5   - Lines: CVC, Art, CTs   - PPx: GI PPI, DVT warfarin/lovenox   - 2L NC, 1250 mL IS, PEP QID   - CXR (personally reviewed and compared to prior): atx slightly worse   - insulin -> SSI    Review of Systems  Review of Systems   Constitutional:  Negative for chills and fever.   Eyes:  Negative for blurred vision.   Respiratory:  Positive for shortness of breath (mild, improving). Negative for cough, sputum production and stridor.    Cardiovascular:  Positive for chest pain (controlled).   Gastrointestinal:  Negative for abdominal pain, nausea and vomiting.   Genitourinary:  Negative for dysuria.   Musculoskeletal:  Negative for myalgias.   Skin:  Negative for rash.   Neurological:  Negative for dizziness, sensory change and focal weakness.        Vital Signs for last 24 hours   Temp:  [35.8 °C (96.4 °F)-37.4 °C (99.3 °F)] 36.9 °C (98.4 " °F)  Pulse:  [48-72] 60  Resp:  [7-46] 13  BP: ()/(48-76) 102/62  SpO2:  [91 %-99 %] 97 %    Hemodynamic parameters for last 24 hours  CVP:  [-2 MM HG-7 MM HG] 6 MM HG    Respiratory Information for the last 24 hours  Vent Mode: Spont  PEEP/CPAP: 8  P Support (PS + PEEP): 13  MAP: 9  Control VTE (exp VT): 678    Physical Exam   Physical Exam  Vitals and nursing note reviewed.   Constitutional:       Appearance: She is ill-appearing.   HENT:      Head: Normocephalic and atraumatic.      Right Ear: External ear normal.      Left Ear: External ear normal.      Nose: Nose normal.      Mouth/Throat:      Mouth: Mucous membranes are moist.      Pharynx: Oropharynx is clear.   Eyes:      Conjunctiva/sclera: Conjunctivae normal.   Neck:      Comments: RI CVC  Cardiovascular:      Rate and Rhythm: Normal rate and regular rhythm.      Pulses: Normal pulses.   Pulmonary:      Breath sounds: Normal breath sounds.   Chest:       Abdominal:      General: Bowel sounds are normal.      Palpations: Abdomen is soft.   Musculoskeletal:         General: Normal range of motion.      Cervical back: Neck supple.   Skin:     General: Skin is warm and dry.      Capillary Refill: Capillary refill takes less than 2 seconds.   Neurological:      General: No focal deficit present.      Mental Status: She is alert and oriented to person, place, and time. Mental status is at baseline.      Cranial Nerves: No cranial nerve deficit.      Sensory: No sensory deficit.      Motor: No weakness.   Psychiatric:         Mood and Affect: Mood normal.         Behavior: Behavior normal.         Medications  Current Facility-Administered Medications   Medication Dose Route Frequency Provider Last Rate Last Admin    norepinephrine (Levophed) 8 mg in 250 mL NS infusion (premix)  0-1 mcg/kg/min (Ideal) Intravenous Continuous Estella Geller M.D.   Stopped at 04/04/25 2138    EPINEPHrine (Adrenalin) infusion 4 mg/250 mL (premix)  0-0.5 mcg/kg/min (Ideal)  Intravenous Continuous Estella Geller M.D.   Held at 04/04/25 1100    Respiratory Therapy Consult   Nebulization Continuous RT Albert Tello P.A.-C.        NS infusion   Intravenous Continuous Albert Tello P.A.-C. 10 mL/hr at 04/05/25 0000 Rate Verify at 04/05/25 0000    enoxaparin (Lovenox) inj 40 mg  40 mg Subcutaneous DAILY AT 1800 Albert Tello P.A.-C.        Nozin nasal  swab  1 Applicator Each Nostril BID Albert Tello P.A.-C.   1 Applicator at 04/04/25 2129    calcium CHLORIDE 10 % 1,000 mg in dextrose 5% 100 mL IVPB  1,000 mg Intravenous Once PRN Albert Tello P.A.-C.        magnesium sulfate in D5W IVPB premix 1 g  1 g Intravenous DAILY Albert Tello P.A.-C. 100 mL/hr at 04/05/25 0637 1 g at 04/05/25 0637    metoprolol tartrate (Lopressor) tablet 12.5 mg  12.5 mg Oral BID Albert Tello P.A.-C.   12.5 mg at 04/05/25 0528    Followed by    [START ON 4/6/2025] metoprolol tartrate (Lopressor) tablet 25 mg  25 mg Oral BID Albert Tello P.A.-C. MD Alert...Warfarin per Pharmacy   Other PHARMACY TO DOSE Albert Tello P.A.-C.        clevidipine (Cleviprex) IV emulsion  0-21 mg/hr Intravenous Continuous Albert Tello P.A.-C.   Stopped at 04/05/25 0305    nitroglycerin 50 mg in D5W 250 ml infusion  0-100 mcg/min Intravenous Continuous Albert Tello P.A.-C.   Held at 04/04/25 1100    Pharmacy Consult Request ...Pain Management Review 1 Each  1 Each Other PHARMACY TO DOSE Albert Tello P.A.-C.        acetaminophen (Tylenol) tablet 1,000 mg  1,000 mg Oral Q6HRS Albert Tello P.A.-C.   1,000 mg at 04/05/25 0518    Followed by    [START ON 4/14/2025] acetaminophen (Tylenol) tablet 1,000 mg  1,000 mg Oral Q6HRS PRN JOSESITO Hyatt.KT.-C.        oxyCODONE immediate-release (Roxicodone) tablet 5 mg  5 mg Oral Q3HRS PRN MITZI Hyatt.-C.        Or    oxyCODONE immediate release (Roxicodone) tablet 10 mg  10 mg Oral Q3HRS PRN JOSESITO Hyatt.A.-C.   10 mg at 04/04/25 1713    Or    fentaNYL (Sublimaze)  injection 50 mcg  50 mcg Intravenous Q3HRS PRN ANGEL HyattAOpal-C.        traMADol (Ultram) 50 MG tablet 50 mg  50 mg Oral Q4HRS PRN DEISY Hyatt-C.   50 mg at 04/05/25 0549    dexmedetomidine (Precedex) 400 mcg/100mL infusion  0-1.5 mcg/kg/hr (Ideal) Intravenous Continuous DEISY Hyatt-C.   Stopped at 04/04/25 1143    sodium bicarbonate 8.4 % injection 50 mEq  50 mEq Intravenous Q HOUR PRN JOSESITO Hyatt.A.-C.        ondansetron (Zofran) syringe/vial injection 8 mg  8 mg Intravenous Q6HRS PRN DEISY Hyatt-C.   8 mg at 04/04/25 1511    Or    prochlorperazine (Compazine) injection 10 mg  10 mg Intravenous Q6HRS PRN JOSESITO Hyatt.A.-C.        acetaminophen (Tylenol) tablet 650 mg  650 mg Oral Q4HRS PRN MITZI Hyatt.-MARY.        Or    acetaminophen (Tylenol) suppository 650 mg  650 mg Rectal Q4HRS PRN MITZI Hyatt.-C.        senna-docusate (Pericolace Or Senokot S) 8.6-50 MG per tablet 2 Tablet  2 Tablet Oral BID Albert Tello P.A.-C.   2 Tablet at 04/05/25 0518    And    polyethylene glycol/lytes (Miralax) Packet 1 Packet  1 Packet Oral DAILY Albert Tello P.A.-C.        And    [START ON 4/6/2025] magnesium hydroxide (Milk Of Magnesia) suspension 30 mL  30 mL Oral DAILY MITZI Hyatt.LovelyC.        And    bisacodyl (Dulcolax) suppository 10 mg  10 mg Rectal QDAY PRN DEISY Hyatt-MARY.        omeprazole (PriLOSEC) capsule 20 mg  20 mg Oral DAILY ANGEL HyattA.-C.   20 mg at 04/05/25 0519    mag hydrox-al hydrox-simeth (Maalox Plus Es Or Mylanta Ds) suspension 30 mL  30 mL Oral Q4HRS PRN ANGEL HyattA.-C.        electrolyte-A (Plasmalyte-A) infusion   Intravenous PRN DEISY Hyatt-CARLOS 999 mL/hr at 04/04/25 1100 New Bag at 04/04/25 1100    aspirin EC tablet 81 mg  81 mg Oral DAILY DEISY Hyatt-CARLOS   81 mg at 04/05/25 0518    levothyroxine (Synthroid) tablet 100 mcg  100 mcg Oral AM ES Albert Tello P.A.-C.   100 mcg at 04/05/25 0519    vasopressin (Vasostrict) 20 Units  in  mL Infusion  0.03 Units/min Intravenous Continuous Estella Geller M.D.   Stopped at 04/04/25 2246    K+ Scale: Goal of 4.5  1 Each Intravenous Q6HRS Estella Geller M.D.   1 Each at 04/05/25 0126    insulin lispro (HumaLOG,AdmeLOG) subcutaneous injection  0-14 Units Subcutaneous TID AC Estella Geller M.D.        insulin regular (HumuLIN R/NovoLIN R) 100 Units in  mL infusion premix  0-29 Units/hr Intravenous Continuous Estella Geller M.D.   Stopped. (Insulin or Heparin) at 04/04/25 2331    dextrose 50 % (D50W) injection 12.5-25 g  12.5-25 g Intravenous PRN Estella Geller M.D.        MD Alert...Pharmacy to initiate transition from insulin infusion to subcutaneous insulin for cardiothoracic surgery 1 Each  1 Each Other Continuous Estella Geller M.D.           Fluids    Intake/Output Summary (Last 24 hours) at 4/5/2025 0643  Last data filed at 4/5/2025 0600  Gross per 24 hour   Intake 7551.16 ml   Output 3230 ml   Net 4321.16 ml       Laboratory  Recent Labs     04/04/25  1054 04/04/25  1153 04/04/25  1516   ISTATAPH 7.340* 7.349* 7.322*   ISTATAPCO2 46.7 42.8 42.2   ISTATAPO2 134* 98 88   ISTATATCO2 27* 25* 23*   [id number] 99 97 96   ISTATARTHCO3 25.2 23.6 21.8   ISTATARTBE -1 -2 -4   ISTATTEMP 36.2 C 35.8 C 35.9 C   ISTATFIO2 90 60 30   ISTATSPEC Arterial Arterial Arterial   ISTATAPHTC 7.352 7.366 7.337*   [id number] 130* 91 82*         Recent Labs     04/02/25  1142 04/04/25  1055 04/04/25  1705 04/04/25  2230 04/05/25  0404   SODIUM 139  --   --   --  140   POTASSIUM 4.2 4.0 3.9 4.0 4.5   CHLORIDE 101  --   --   --  108   CO2 25  --   --   --  23   BUN 38*  --   --   --  27*   CREATININE 0.85  --   --   --  0.64   MAGNESIUM  --  2.6*  --   --   --    CALCIUM 9.3  --   --   --  7.7*     Recent Labs     04/02/25 1142 04/05/25  0404   ALTSGPT 38  --    ASTSGOT 33  --    ALKPHOSPHAT 55  --    TBILIRUBIN 0.2  --    GLUCOSE 100* 144*     Recent Labs     04/02/25 1142 04/05/25  0404   WBC 9.5  11.4*   NEUTSPOLYS 67.60  --    LYMPHOCYTES 23.60  --    MONOCYTES 7.40  --    EOSINOPHILS 0.70  --    BASOPHILS 0.40  --    ASTSGOT 33  --    ALTSGPT 38  --    ALKPHOSPHAT 55  --    TBILIRUBIN 0.2  --      Recent Labs     04/02/25  1142 04/04/25  1055 04/05/25  0404   RBC 4.80  --  3.55*   HEMOGLOBIN 14.8 12.4 10.6*   HEMATOCRIT 45.1 37.6 33.3*   PLATELETCT 752* 412 373   PROTHROMBTM 13.9 16.7* 14.4   APTT 28.9 33.2  --    INR 1.07 1.35* 1.12       Imaging  X-Ray:  I have personally reviewed the images and compared with prior images.    Assessment/Plan  * Severe mitral regurgitation- (present on admission)  Assessment & Plan  S/P AVR on 4/4 by Dr Alvarez  POD #1  Weaned from ventilator on POD #1  Weaned from vasopressor support  Post-operative pain control per CVS  Chest tube and pacemaker management per CVS  Insulin gtt -> SSI  Optimize K and Mg  PT/OT consult    PONV (postoperative nausea and vomiting)- (present on admission)  Assessment & Plan  PRN zofran    Postoperative hypothyroidism- (present on admission)  Assessment & Plan  Continue Synthroid    Essential hypertension- (present on admission)  Assessment & Plan  Off pressors, start BB today         VTE:  Lovenox  Ulcer: PPI  Lines: Central Line  Ongoing indication addressed, Arterial Line  Ongoing indication addressed, and Johnson Catheter  Ongoing indication addressed    I have performed a physical exam and reviewed and updated ROS and Plan today (4/5/2025). In review of yesterday's note (4/4/2025), there are no changes except as documented above.     Discussed patient condition and risk of morbidity and/or mortality with RN, RT, Pharmacy, Code status disscussed, Charge nurse / hot rounds, Patient, and CVS    The patient remains critically ill.  Critical care time = 33 minutes in directly providing and coordinating critical care and extensive data review.  No time overlap and excludes procedures.    Please note that this dictation was created using voice  recognition software. The accuracy of the dictation is limited to the abilities of the software. I have made every reasonable attempt to correct obvious errors, but I expect that there are errors of grammar and possibly content that I did not discover before finalizing the note.

## 2025-04-05 NOTE — PROGRESS NOTES
Cardiovascular Surgery Progress Note    Name: Ailin Good  MRN: [id number]  : 1950  Admit Date: 2025  5:01 AM  1 Day Post-Op     Procedure:  Procedure(s) and Anesthesia Type:     * MITRAL VALVE REPAIR, RADICAL - General     * ECHOCARDIOGRAM, TRANSESOPHAGEAL, INTRAOPERATIVE - General     * CLIPPING, LEFT ATRIAL APPENDAGE - General    Vitals:  Vitals:    25 0615 25 0630 25 0642 25 0645   BP: 102/62 109/66  109/58   Pulse: 60 (!) 59 (!) 59 (!) 59   Resp: 13 (!) 9 16 12   Temp:       TempSrc:       SpO2:   97%    Weight:       Height:          Temp (24hrs), Av.4 °C (97.5 °F), Min:35.8 °C (96.4 °F), Max:37.4 °C (99.3 °F)      Respiratory:  Vent Mode: Spont, PEEP/CPAP: 8, PIP: 14, MAP: 9 Respiration: 12, Pulse Oximetry: 97 %       Fluids:    Intake/Output Summary (Last 24 hours) at 2025 0818  Last data filed at 2025 0600  Gross per 24 hour   Intake 6551.16 ml   Output 3230 ml   Net 3321.16 ml     Admit weight: Weight: 62.4 kg (137 lb 9.1 oz)  Current weight: Weight: 67 kg (147 lb 11.3 oz) (25 0530)    Labs:  Recent Labs     25  1142 25  1055 25  0404   WBC 9.5  --  11.4*   RBC 4.80  --  3.55*   HEMOGLOBIN 14.8 12.4 10.6*   HEMATOCRIT 45.1 37.6 33.3*   MCV 94.0  --  93.8   MCH 30.8  --  29.9   MCHC 32.8  --  31.8*   RDW 50.0  --  50.2*   PLATELETCT 752* 412 373   MPV 9.9  --  9.8     Recent Labs     25  1142 25  1055 25  1705 25  2230 25  0404   SODIUM 139  --   --   --  140   POTASSIUM 4.2   < > 3.9 4.0 4.5   CHLORIDE 101  --   --   --  108   CO2 25  --   --   --  23   GLUCOSE 100*  --   --   --  144*   BUN 38*  --   --   --  27*   CREATININE 0.85  --   --   --  0.64   CALCIUM 9.3  --   --   --  7.7*    < > = values in this interval not displayed.     Recent Labs     25  1142 25  1055 25  0404   APTT 28.9 33.2  --    INR 1.07 1.35* 1.12       HgbA1c:  5.8    Diabetic Educator  Consult:  N/A    Medications:  Scheduled Medications   Medication Dose Frequency    insulin lispro  2-9 Units 4X/DAY ACHS    calcium CHLORIDE 10 % 1,000 mg in dextrose 5% 100 mL IVPB  1,000 mg Once    enoxaparin (LOVENOX) injection  40 mg DAILY AT 1800    Nozin nasal  swab  1 Applicator BID    magnesium sulfate  1 g DAILY    metoprolol tartrate  12.5 mg BID    Followed by    [START ON 4/6/2025] metoprolol tartrate  25 mg BID    MD Alert...Warfarin per Pharmacy   PHARMACY TO DOSE    Pharmacy Consult Request  1 Each PHARMACY TO DOSE    acetaminophen  1,000 mg Q6HRS    senna-docusate  2 Tablet BID    And    polyethylene glycol/lytes  1 Packet DAILY    And    [START ON 4/6/2025] magnesium hydroxide  30 mL DAILY    omeprazole  20 mg DAILY    aspirin  81 mg DAILY    levothyroxine  100 mcg AM ES    K+ Scale: Goal of 4.5  1 Each Q6HRS        Exam:   Physical Exam  Vitals and nursing note reviewed.   Constitutional:       General: She is not in acute distress.     Appearance: She is normal weight. She is not ill-appearing.   HENT:      Head: Normocephalic and atraumatic.      Right Ear: External ear normal.      Left Ear: External ear normal.      Nose: Nose normal.      Mouth/Throat:      Mouth: Mucous membranes are moist.   Eyes:      Pupils: Pupils are equal, round, and reactive to light.   Cardiovascular:      Rate and Rhythm: Normal rate and regular rhythm.      Pulses: Normal pulses.   Pulmonary:      Effort: Pulmonary effort is normal.   Abdominal:      General: Abdomen is flat. There is no distension.      Palpations: Abdomen is soft.   Musculoskeletal:         General: Normal range of motion.      Cervical back: Normal range of motion and neck supple.   Skin:     General: Skin is warm and dry.      Capillary Refill: Capillary refill takes less than 2 seconds.   Neurological:      General: No focal deficit present.      Mental Status: She is alert and oriented to person, place, and time. Mental status is at  baseline.      Sensory: No sensory deficit.      Motor: No weakness.   Psychiatric:         Mood and Affect: Mood normal.         Behavior: Behavior normal.         Thought Content: Thought content normal.         Cardiac Medications:    ASA - Yes    Plavix - No; contraindicated because of On Coumadin / NOAC    Post-operative Beta Blockers - Yes    Ace/ARB- No; contraindicated because of Normal EF    ARNI-  No; contraindicated because of Normal EF    Statin - No; contraindicated because of No CAD    Aldactone- No; contraindicated because of Normal EF    SGLT2i-  No; contraindicated because of Normal EF    Ejection Fraction:  65%    Telemetry:   4/5 SR    Assessment/Plan:  POD 1 Extubated.  Off gtts. Neuro intact.  Wounds CDI. Abdomen soft nontender.  Cr 0.64  Hgb 10.6.  Plan: Encourage IS/ambulation.  Wean oxygen as tolerated.  Diurese when bp improved.  Keep mediastinal tubes/morrell for strict I/Os.     Disposition:  [facility]

## 2025-04-05 NOTE — PROGRESS NOTES
12 hour chart check complete.     Monitoring Summary:  Rhythm: SB-NSR   Rate: 40-60s bpm  Ectopy: PVC(r)

## 2025-04-05 NOTE — PROGRESS NOTES
4 Eyes Skin Assessment Completed by PONCHO Villarreal and PONCHO Zarate.    Head WDL  Ears WDL  Nose WDL  Mouth WDL  Neck  Right CVC  Breast/Chest Midline incision  Shoulder Blades WDL  Spine WDL  (R) Arm/Elbow/Hand bruising  (L) Arm/Elbow/Hand Bruising  Abdomen Chest tubes  Groin WDL  Scrotum/Coccyx/Buttocks WDL  (R) Leg Bruising  (L) Leg Bruising  (R) Heel/Foot/Toe Red, blanching  (L) Heel/Foot/Toe Red blanching          Devices In Places ECG, Blood Pressure Cuff, Pulse Ox, Johnson, Arterial Line, SCD's, and Central Line, Nasal cannula, PIV X1      Interventions In Place NC W/Ear Foams, Pillows, Q2 Turns, Low Air Loss Mattress, and Heels Loaded W/Pillows    Possible Skin Injury No    Pictures Uploaded Into Epic N/A  Wound Consult Placed N/A  RN Wound Prevention Protocol Ordered No

## 2025-04-06 LAB
ANION GAP SERPL CALC-SCNC: 8 MMOL/L (ref 7–16)
BUN SERPL-MCNC: 20 MG/DL (ref 8–22)
CALCIUM SERPL-MCNC: 8.2 MG/DL (ref 8.5–10.5)
CHLORIDE SERPL-SCNC: 102 MMOL/L (ref 96–112)
CO2 SERPL-SCNC: 25 MMOL/L (ref 20–33)
CREAT SERPL-MCNC: 0.58 MG/DL (ref 0.5–1.4)
ERYTHROCYTE [DISTWIDTH] IN BLOOD BY AUTOMATED COUNT: 50.1 FL (ref 35.9–50)
GFR SERPLBLD CREATININE-BSD FMLA CKD-EPI: 96 ML/MIN/1.73 M 2
GLUCOSE BLD STRIP.AUTO-MCNC: 104 MG/DL (ref 65–99)
GLUCOSE BLD STRIP.AUTO-MCNC: 108 MG/DL (ref 65–99)
GLUCOSE BLD STRIP.AUTO-MCNC: 115 MG/DL (ref 65–99)
GLUCOSE BLD STRIP.AUTO-MCNC: 132 MG/DL (ref 65–99)
GLUCOSE BLD STRIP.AUTO-MCNC: 146 MG/DL (ref 65–99)
GLUCOSE SERPL-MCNC: 117 MG/DL (ref 65–99)
HCT VFR BLD AUTO: 31.5 % (ref 37–47)
HGB BLD-MCNC: 10.4 G/DL (ref 12–16)
INR PPP: 1.13 (ref 0.87–1.13)
MCH RBC QN AUTO: 30.9 PG (ref 27–33)
MCHC RBC AUTO-ENTMCNC: 33 G/DL (ref 32.2–35.5)
MCV RBC AUTO: 93.5 FL (ref 81.4–97.8)
PLATELET # BLD AUTO: 375 K/UL (ref 164–446)
PMV BLD AUTO: 9.9 FL (ref 9–12.9)
POTASSIUM SERPL-SCNC: 4.3 MMOL/L (ref 3.6–5.5)
PROTHROMBIN TIME: 14.5 SEC (ref 12–14.6)
RBC # BLD AUTO: 3.37 M/UL (ref 4.2–5.4)
SODIUM SERPL-SCNC: 135 MMOL/L (ref 135–145)
WBC # BLD AUTO: 10.7 K/UL (ref 4.8–10.8)

## 2025-04-06 PROCEDURE — 700102 HCHG RX REV CODE 250 W/ 637 OVERRIDE(OP)

## 2025-04-06 PROCEDURE — 97535 SELF CARE MNGMENT TRAINING: CPT

## 2025-04-06 PROCEDURE — 700111 HCHG RX REV CODE 636 W/ 250 OVERRIDE (IP)

## 2025-04-06 PROCEDURE — 700102 HCHG RX REV CODE 250 W/ 637 OVERRIDE(OP): Performed by: NURSE PRACTITIONER

## 2025-04-06 PROCEDURE — 85610 PROTHROMBIN TIME: CPT

## 2025-04-06 PROCEDURE — 99233 SBSQ HOSP IP/OBS HIGH 50: CPT | Performed by: INTERNAL MEDICINE

## 2025-04-06 PROCEDURE — 770020 HCHG ROOM/CARE - TELE (206)

## 2025-04-06 PROCEDURE — 700111 HCHG RX REV CODE 636 W/ 250 OVERRIDE (IP): Mod: JZ | Performed by: NURSE PRACTITIONER

## 2025-04-06 PROCEDURE — A9270 NON-COVERED ITEM OR SERVICE: HCPCS | Performed by: NURSE PRACTITIONER

## 2025-04-06 PROCEDURE — 82962 GLUCOSE BLOOD TEST: CPT

## 2025-04-06 PROCEDURE — 85027 COMPLETE CBC AUTOMATED: CPT

## 2025-04-06 PROCEDURE — A9270 NON-COVERED ITEM OR SERVICE: HCPCS

## 2025-04-06 PROCEDURE — 80048 BASIC METABOLIC PNL TOTAL CA: CPT

## 2025-04-06 PROCEDURE — 99024 POSTOP FOLLOW-UP VISIT: CPT | Performed by: NURSE PRACTITIONER

## 2025-04-06 RX ORDER — FUROSEMIDE 10 MG/ML
20 INJECTION INTRAMUSCULAR; INTRAVENOUS 2 TIMES DAILY
Status: DISCONTINUED | OUTPATIENT
Start: 2025-04-06 | End: 2025-04-07

## 2025-04-06 RX ORDER — POTASSIUM CHLORIDE 1500 MG/1
20 TABLET, EXTENDED RELEASE ORAL 2 TIMES DAILY
Status: DISCONTINUED | OUTPATIENT
Start: 2025-04-06 | End: 2025-04-07

## 2025-04-06 RX ADMIN — TRAMADOL HYDROCHLORIDE 50 MG: 50 TABLET, COATED ORAL at 09:02

## 2025-04-06 RX ADMIN — TRAMADOL HYDROCHLORIDE 50 MG: 50 TABLET, COATED ORAL at 01:51

## 2025-04-06 RX ADMIN — ACETAMINOPHEN 1000 MG: 500 TABLET, FILM COATED ORAL at 05:07

## 2025-04-06 RX ADMIN — FUROSEMIDE 20 MG: 10 INJECTION, SOLUTION INTRAVENOUS at 08:53

## 2025-04-06 RX ADMIN — ASPIRIN 81 MG: 81 TABLET, COATED ORAL at 05:07

## 2025-04-06 RX ADMIN — Medication 1 APPLICATOR: at 08:53

## 2025-04-06 RX ADMIN — POTASSIUM CHLORIDE 20 MEQ: 1500 TABLET, EXTENDED RELEASE ORAL at 08:53

## 2025-04-06 RX ADMIN — LEVOTHYROXINE SODIUM 100 MCG: 0.1 TABLET ORAL at 05:07

## 2025-04-06 RX ADMIN — OMEPRAZOLE 20 MG: 20 CAPSULE, DELAYED RELEASE ORAL at 05:06

## 2025-04-06 RX ADMIN — WARFARIN SODIUM 2.5 MG: 2.5 TABLET ORAL at 17:50

## 2025-04-06 RX ADMIN — ACETAMINOPHEN 1000 MG: 500 TABLET, FILM COATED ORAL at 17:49

## 2025-04-06 RX ADMIN — Medication 1 APPLICATOR: at 20:39

## 2025-04-06 RX ADMIN — POTASSIUM CHLORIDE 20 MEQ: 1500 TABLET, EXTENDED RELEASE ORAL at 17:50

## 2025-04-06 RX ADMIN — FUROSEMIDE 20 MG: 10 INJECTION, SOLUTION INTRAVENOUS at 17:46

## 2025-04-06 RX ADMIN — ACETAMINOPHEN 1000 MG: 500 TABLET, FILM COATED ORAL at 11:24

## 2025-04-06 RX ADMIN — SENNOSIDES AND DOCUSATE SODIUM 2 TABLET: 50; 8.6 TABLET ORAL at 05:06

## 2025-04-06 RX ADMIN — ENOXAPARIN SODIUM 40 MG: 100 INJECTION SUBCUTANEOUS at 17:50

## 2025-04-06 RX ADMIN — MAGNESIUM SULFATE IN DEXTROSE 1 G: 10 INJECTION, SOLUTION INTRAVENOUS at 05:45

## 2025-04-06 RX ADMIN — METOPROLOL TARTRATE 12.5 MG: 25 TABLET, FILM COATED ORAL at 17:47

## 2025-04-06 ASSESSMENT — ENCOUNTER SYMPTOMS
MYALGIAS: 0
VOMITING: 0
DIZZINESS: 0
STRIDOR: 0
FOCAL WEAKNESS: 0
COUGH: 0
BLURRED VISION: 0
SPUTUM PRODUCTION: 0
CHILLS: 0
FEVER: 0
SENSORY CHANGE: 0
SHORTNESS OF BREATH: 1
NAUSEA: 0
ABDOMINAL PAIN: 0

## 2025-04-06 ASSESSMENT — PATIENT HEALTH QUESTIONNAIRE - PHQ9
1. LITTLE INTEREST OR PLEASURE IN DOING THINGS: NOT AT ALL
2. FEELING DOWN, DEPRESSED, IRRITABLE, OR HOPELESS: NOT AT ALL
SUM OF ALL RESPONSES TO PHQ9 QUESTIONS 1 AND 2: 0

## 2025-04-06 ASSESSMENT — PAIN DESCRIPTION - PAIN TYPE
TYPE: ACUTE PAIN
TYPE: ACUTE PAIN
TYPE: ACUTE PAIN;SURGICAL PAIN
TYPE: ACUTE PAIN
TYPE: ACUTE PAIN
TYPE: ACUTE PAIN;SURGICAL PAIN
TYPE: SURGICAL PAIN
TYPE: ACUTE PAIN
TYPE: ACUTE PAIN;SURGICAL PAIN
TYPE: ACUTE PAIN
TYPE: SURGICAL PAIN
TYPE: ACUTE PAIN
TYPE: ACUTE PAIN

## 2025-04-06 ASSESSMENT — FIBROSIS 4 INDEX
FIB4 SCORE: 1.03
FIB4 SCORE: 1.03

## 2025-04-06 NOTE — PROGRESS NOTES
Inpatient Anticoagulation Service Note for 4/6/2025    Reason for Anticoagulation: Mitral Valve Repair      Hemoglobin Value: (Abnormal) 10.4  Hematocrit Value: (Abnormal) 31.5  Lab Platelet Value: 375  Target INR: 2.0 to 3.0     Date/Time INR Value    04/06/25 0140 1.13     04/05/25 0404 1.12     04/04/25 1055 (Abnormal) 1.35            Date/Time Dose (mg)    04/06/25 0643 2.5     04/05/25 1101 2.5           Average Dose (mg):  (new start)  Significant Interactions: Antiplatelet Medications  Bridge Therapy: No (DVT prophylaxis with enoxaparin)  Reversal Agent Administered: Not Applicable    A/P  Sub-therapeutic INR as expected after starting warfarin yesterday.  Still have not seen impact of this dosing.  Continue with warfarin 2.5 mg PO daily.  INR tomorrow.    Education Material Provided?: No    Pharmacist suggested discharge dosing: TBD pending INR trends, perhaps warfarin 2.5 mg PO daily.  Recommend a follow-up PT/INR within 48-72 hours of discharge.    Mitzi Quevedo, PharmD, BCPS, BCCCP

## 2025-04-06 NOTE — PROGRESS NOTES
4 Eyes Skin Assessment Completed by PONCHO Alejo and PONCHO Uriostegui.     Head WDL  Ears WDL  Nose WDL  Mouth WDL  Neck CVC incision  Breast/Chest Midline incision - bruising, redness  Shoulder Blades WDL  Spine WDL  (R) Arm/Elbow/Hand bruising  (L) Arm/Elbow/Hand Bruising  Abdomen Incision - chest tube sites, pacer wires sit  Groin WDL  Scrotum/Coccyx/Buttocks WDL  (R) Leg Bruising  (L) Leg Bruising  (R) Heel/Foot/Toe Red, blanching  (L) Heel/Foot/Toe Red blanching              Devices In Places ECG, Blood Pressure Cuff, Pulse Ox, Johnson, Arterial Line, SCD's, and Central Line, Nasal cannula, PIV X1        Interventions In Place NC W/Ear Foams, Pillows, Q2 Turns, Low Air Loss Mattress, and Heels Loaded W/Pillows     Possible Skin Injury No     Pictures Uploaded Into Epic N/A  Wound Consult Placed N/A  RN Wound Prevention Protocol Ordered No

## 2025-04-06 NOTE — THERAPY
Physical Therapy Contact Note    Patient Name: Ailin Good  Age:  74 y.o., Sex:  female  Medical Record #: [id number]  Today's Date: 4/6/2025    73 y/o female with Pmhx of HTN, Hypothyroidism and severe mitral regurgitation with a who presented 4/4/2025 for an elective mitral valve repair and left atrial appendage ligation.     04/06/25 1541   Time In/Time Out   Therapy Start Time 1503   Therapy End Time 1541   Total Therapy Time 38   Initial Contact Note    Initial Contact Note Order Received and Verified, Physical Therapy Evaluation in Progress with Full Report to Follow.   Precautions   Precautions Fall Risk;Cardiac Precautions (See Comments);Sternal Precautions (See Comments)   Comments Mitral Valve Repair/ left atrial appendage clipping   Prior Living Situation   Prior Services Home-Independent   Housing / Facility 1 Story House   Steps Into Home 1  (threshold)   Steps In Home 0   Bathroom Set up Walk In Shower;Grab Bars   Equipment Owned Front-Wheel Walker   Lives with - Patient's Self Care Capacity Alone and Able to Care For Self   Comments pt's sister is going to assist her once home   Prior Level of Functional Mobility   Ambulation Independent   Ambulation Distance community   Comments walked dogs 10-30 minutes a day   History of Falls   History of Falls Yes   Date of Last Fall   (recently tripped over a rock while gardening, no injuries)   Cognition    Cognition / Consciousness WDL   Level of Consciousness Alert   Comments pleasant, cooperative, receptive to education   Education Group   Education Provided Cardiac Precautions;Sternal Precautions;Role of Physical Therapist   Cardiac Precautions Patient Response Patient;Acceptance;Explanation;Verbal Demonstration;Handout;Teach Back   Sternal Precautions Patient Response Patient;Acceptance;Explanation;Handout;Teach Back;Verbal Demonstration   Role of Physical Therapist Patient Response Patient;Acceptance;Explanation;Verbal Demonstration   Additional Comments pt  "able to state all sternal precautions   Interdisciplinary Plan of Care Collaboration   IDT Collaboration with  Nursing   Patient Position at End of Therapy Seated;Chair Alarm On;Call Light within Reach;Tray Table within Reach;Phone within Reach   Pt stated she just walked prior to transferring to T7 and was just up to bathroom. RN stated that the pt is doing well with amb with FWW. Pt educated on the following information from the \"Recovering from Heart Surgery\" handout:  *Hospital Exercise Program  *Home Exercise Program  *Normal Expectations after Open Heart Surgery  *Sternal Precautions  *RPE  *Talk Test  *Activity Schedule Following Discharge after Open Heart Surgery  *Cardiac Surgery Discharge Concerns Decision Tree  *Heart Healthy Program Cardiac Rehab Phase II  *Heart Surgery Log    Pt will benefit from review of the above information and initiation of the following education at next treatment:  *Sternal Precautions  *Talk Test  *Fall prevention with positional changes    Will follow up with mobility assessment at a later date.   "

## 2025-04-06 NOTE — CARE PLAN
The patient is Unstable - High likelihood or risk of patient condition declining or worsening    Shift Goals  Clinical Goals: hemodynamic stability, mobility, incision care, I/Os  Patient Goals: comfort, rest  Family Goals: DEJA    Progress made toward(s) clinical / shift goals:      Problem: Post Op Day 1 CABG/Heart Valve Replacement  Goal: Optimal care of the post op CABG/heart valve replacement Post Op Day 1  Outcome: Progressing  Intervention: EKG and CXR completed  Note: Completed on previous shift.  Intervention: All valve patients: PT/INR daily  Note: PT 14.5, INR 1.13  Intervention: Antibiotics are discontinued within 24 hours of anesthesia end time unless indication documented for continuation beyond 24 hours  Note: Completed prior to shift.  Intervention: Daily weights in the morning  Note: 67.8kg  Intervention: Up in chair for all meals  Note: Up to chair for all meals during shift.  Intervention: Ambulate in am if stable. First ambulation 25 feet. Repeat x 3 as tolerated  Note: Ambulated x2 during shift, 40ft and 60ft, tolerated well.  Intervention: Discontinue morrell catheter unless documented reason for continuation  Note: Morrell for strict I/Os  Intervention: Assess surgical dressing and check provider orders for potential removal  Note: Surgical sight ABHI  Intervention: [facility] trained RN to remove chest tubes if ordered by provider  Note: N/A  Intervention: IS q 1 hour while awake and record best IS volume  Note: Best IS during shift 1000  Intervention: Knee high DANIEL hose, on during the day, off at night  Note: DANIEL hose on during day, off at night  Intervention: Saline lock IV  Note: IV d/c  Intervention: Transfer to [facility] status, begin VS q 4 hours  Note: N/A  Intervention: If patient is CABG or on home beta-blocker, start/resume beta-blocker on POD 1 or POD 2 or document contraindication  Note: Beta blocker resumed     Problem: Pain - Standard  Goal: Alleviation of pain or a reduction in pain to the  patient’s comfort goal  Outcome: Progressing  Note: Pt c/o mild surgical pain, see flowsheets for complete documentation. PRN tramadol given per MAR.       Patient is not progressing towards the following goals:

## 2025-04-06 NOTE — PROGRESS NOTES
Cardiovascular Surgery Progress Note    Name: Ailin Good  MRN: [id number]  : 1950  Admit Date: 2025  5:01 AM  2 Days Post-Op     Procedure:  Procedure(s) and Anesthesia Type:     * MITRAL VALVE REPAIR, RADICAL - General     * ECHOCARDIOGRAM, TRANSESOPHAGEAL, INTRAOPERATIVE - General     * CLIPPING, LEFT ATRIAL APPENDAGE - General    Vitals:  Vitals:    25 0400 25 0500 25 0511 25 0600   BP: 96/57 96/55 (!) 87/52 109/66   Pulse: (!) 57 (!) 57  (!) 56   Resp: 18 (!) 26  12   Temp: 36.8 °C (98.2 °F) 36.8 °C (98.2 °F)  36.9 °C (98.4 °F)   TempSrc: Bladder Bladder  Bladder   SpO2: 94% 95%  96%   Weight: 67.8 kg (149 lb 7.6 oz)      Height:          Temp (24hrs), Av °C (98.6 °F), Min:36.8 °C (98.2 °F), Max:37.3 °C (99.1 °F)      Respiratory:    Respiration: 12, Pulse Oximetry: 96 %       Fluids:    Intake/Output Summary (Last 24 hours) at 2025 0808  Last data filed at 2025 0600  Gross per 24 hour   Intake 698.62 ml   Output 555 ml   Net 143.62 ml     Admit weight: Weight: 62.4 kg (137 lb 9.1 oz)  Current weight: Weight: 67.8 kg (149 lb 7.6 oz) (25 0400)    Labs:  Recent Labs     25  1055 25  0404 25  0140   WBC  --  11.4* 10.7   RBC  --  3.55* 3.37*   HEMOGLOBIN 12.4 10.6* 10.4*   HEMATOCRIT 37.6 33.3* 31.5*   MCV  --  93.8 93.5   MCH  --  29.9 30.9   MCHC  --  31.8* 33.0   RDW  --  50.2* 50.1*   PLATELETCT 412 373 375   MPV  --  9.8 9.9     Recent Labs     25  2230 25  0404 25  0140   SODIUM  --  140 135   POTASSIUM 4.0 4.5 4.3   CHLORIDE  --  108 102   CO2  --  23 25   GLUCOSE  --  144* 117*   BUN  --  27* 20   CREATININE  --  0.64 0.58   CALCIUM  --  7.7* 8.2*     Recent Labs     25  1055 25  0404 25  0140   APTT 33.2  --   --    INR 1.35* 1.12 1.13       HgbA1c:  5.8    Diabetic Educator Consult:  N/A    Medications:  Scheduled Medications   Medication Dose Frequency    insulin lispro  2-9 Units 4X/DAY ACHS     metoprolol tartrate  12.5 mg BID    warfarin  2.5 mg DAILY AT 1800    enoxaparin (LOVENOX) injection  40 mg DAILY AT 1800    Nozin nasal  swab  1 Applicator BID    MD Alert...Warfarin per Pharmacy   PHARMACY TO DOSE    Pharmacy Consult Request  1 Each PHARMACY TO DOSE    acetaminophen  1,000 mg Q6HRS    senna-docusate  2 Tablet BID    And    polyethylene glycol/lytes  1 Packet DAILY    And    magnesium hydroxide  30 mL DAILY    omeprazole  20 mg DAILY    aspirin  81 mg DAILY    levothyroxine  100 mcg AM ES        Exam:   Physical Exam  Vitals and nursing note reviewed.   Constitutional:       General: She is not in acute distress.     Appearance: She is normal weight. She is not ill-appearing.   HENT:      Head: Normocephalic and atraumatic.      Right Ear: External ear normal.      Left Ear: External ear normal.      Nose: Nose normal.      Mouth/Throat:      Mouth: Mucous membranes are moist.   Eyes:      Pupils: Pupils are equal, round, and reactive to light.   Cardiovascular:      Rate and Rhythm: Normal rate and regular rhythm.      Pulses: Normal pulses.   Pulmonary:      Effort: Pulmonary effort is normal.   Abdominal:      General: Abdomen is flat. There is no distension.      Palpations: Abdomen is soft.   Musculoskeletal:         General: Normal range of motion.      Cervical back: Normal range of motion and neck supple.   Skin:     General: Skin is warm and dry.      Capillary Refill: Capillary refill takes less than 2 seconds.   Neurological:      General: No focal deficit present.      Mental Status: She is alert and oriented to person, place, and time. Mental status is at baseline.      Sensory: No sensory deficit.      Motor: No weakness.   Psychiatric:         Mood and Affect: Mood normal.         Behavior: Behavior normal.         Thought Content: Thought content normal.         Cardiac Medications:    ASA - Yes    Plavix - No; contraindicated because of On Coumadin / NOAC    Post-operative  Beta Blockers - Yes    Ace/ARB- No; contraindicated because of Normal EF    ARNI-  No; contraindicated because of Normal EF    Statin - No; contraindicated because of No CAD    Aldactone- No; contraindicated because of Normal EF    SGLT2i-  No; contraindicated because of Normal EF    Ejection Fraction:  65%    Telemetry:   4/5 SR  4/6 SB/SR    Assessment/Plan:  POD 1 Extubated.  Off gtts. Neuro intact.  Wounds CDI. Abdomen soft nontender.  Cr 0.64  Hgb 10.6.  Plan: Encourage IS/ambulation.  Wean oxygen as tolerated.  Diurese when bp improved.  Keep mediastinal tubes/morrell for strict I/Os.     POD 2 HDS. SB/SR.  1LNC.  Neuro intact.  Hematoma superior aspect of mediastinal incision.  Cr 0.58 Hgb 10.4.  Pacer wires removed.  Plan: Diurese.  Remove mediastinal tubes and morrell.  Wean oxygen.  BB when bradycardia improves.  Transfer to [facility].    Disposition:  TBD

## 2025-04-06 NOTE — PROGRESS NOTES
Handoff report had been received from CICU nurse. Pt care is assumed. Pt is currently resting in bed. POC discussed with pt. Pt verbalizes no questions at this time. Pt is A&Ox4 on 1L NC on tele monitoring and VSS. Call light/belongings are within reach. Fall precautions are in place. Pt verbalizes needs are met at this time.

## 2025-04-06 NOTE — PROGRESS NOTES
4 Eyes Skin Assessment Completed by PONCHO Alejo and PONCHO Huston.     Head WDL  Ears WDL  Nose WDL  Mouth WDL  Neck CVC incision  Breast/Chest Midline incision - bruising, redness  Shoulder Blades WDL  Spine WDL  (R) Arm/Elbow/Hand bruising, scab  (L) Arm/Elbow/Hand Bruising  Abdomen Incision - chest tube sites, pacer wires site  Groin WDL  Scrotum/Coccyx/Buttocks WDL  (R) Leg Bruising  (L) Leg Bruising  (R) Heel/Foot/Toe Red, blanching, dry and cracked heels  (L) Heel/Foot/Toe Red blanching, dry and cracked heels              Devices In Places ECG, Blood Pressure Cuff, Pulse Ox, Johnson, Arterial Line, SCD's, and Central Line, Nasal cannula     Interventions In Place NC W/Ear Foams, Pillows, Q2 Turns, Low Air Loss Mattress, and Heels Loaded W/Pillows     Possible Skin Injury No     Pictures Uploaded Into Epic N/A  Wound Consult Placed N/A  RN Wound Prevention Protocol Ordered No

## 2025-04-06 NOTE — PROGRESS NOTES
4 Eyes Skin Assessment Completed by David Robles, RN and Betzaida Gonzales, ACT.    Head WDL  Ears WDL  Nose WDL  Mouth WDL  Neck WDL, R IJ triple lumen  Breast/Chest surgical incisions; midline sternal incision and chest tube sites  Shoulder Blades WDL  Spine WDL  (R) Arm/Elbow/Hand WDL  (L) Arm/Elbow/Hand WDL  Abdomen WDL  Groin WDL  Scrotum/Coccyx/Buttocks WDL  (R) Leg swelling  (L) Leg swelling  (R) Heel/Foot/Toe Redness and Blanching  (L) Heel/Foot/Toe Redness and Blanching          Devices In Places Tele Box, Pulse Ox, Nasal Cannula, and DANIEL's      Interventions In Place NC W/Ear Foams and Pillows    Possible Skin Injury No    Pictures Uploaded Into Epic N/A  Wound Consult Placed N/A  RN Wound Prevention Protocol Ordered No

## 2025-04-06 NOTE — PROGRESS NOTES
"Critical Care Progress Note    Date of admission  4/4/2025    Chief Complaint  74 y.o. female admitted 4/4/2025 with MVR    Hospital Course  \"74 y.o. female with a pmhx of HTN, Hypothyroidism and severe mitral regurgitation with a who presented 4/4/2025 for an elective mitral valve repair and left atrial appendage ligation with Dr. Geller.  The patient had a radical mitral valve repair with a P2 quadrangular resection and a 36 mm annuloplasty band placed.  She also had a left atrial appendage occlusion and clipping with a atriclip.  The patient's preoperative and postoperative EF was normal.  She arrives in the ICU postoperatively on mechanical ventilator, sedated and paralyzed with 0.02 of norepinephrine infusing.  She has been bradycardic during her case however has not required pacing.\"     Interval Problem Update  Reviewed last 24 hour events:   - doing quite well   - 80 mL from chest tubes -> rmove today   - Neuro: AOx4   - HR: 50s-60s, SR   - SBP: 100s-110s   - GI: tolerating diet, last BM PTA   - UOP: 625 mL/24 hrs -> start diuresis   - Johnson: yes   - Tm: 37.3   - Lines: CVC, chest tubes   - PPx: GI PPI, DVT lovenox   - 1L NC, 1250 mL IS   - CXR (personally reviewed and compared to prior): increase BB atx    Yesterday   - POD #1, doing well   - pain controlled   - CT output 150 mL overnight    - Neuro: AOx4, non-focal   - HR: 60s   - SBP: 90s-110s on clevi   - GI: tolerating diet, last BM PTA   - UOP: 2.9L since admit   - Johnson: yes   - Tm: 37.5   - Lines: CVC, Art, CTs   - PPx: GI PPI, DVT warfarin/lovenox   - 2L NC, 1250 mL IS, PEP QID   - CXR (personally reviewed and compared to prior): atx slightly worse   - insulin -> SSI    Review of Systems  Review of Systems   Constitutional:  Negative for chills and fever.   Eyes:  Negative for blurred vision.   Respiratory:  Positive for shortness of breath (mild, improving). Negative for cough, sputum production and stridor.    Cardiovascular:  Positive for chest " pain (controlled).   Gastrointestinal:  Negative for abdominal pain, nausea and vomiting.   Genitourinary:  Negative for dysuria.   Musculoskeletal:  Negative for myalgias.   Skin:  Negative for rash.   Neurological:  Negative for dizziness, sensory change and focal weakness.        Vital Signs for last 24 hours   Temp:  [36.8 °C (98.2 °F)-37.3 °C (99.1 °F)] 36.9 °C (98.4 °F)  Pulse:  [56-65] 56  Resp:  [11-27] 12  BP: ()/(52-75) 109/66  SpO2:  [88 %-98 %] 96 %    Hemodynamic parameters for last 24 hours       Respiratory Information for the last 24 hours       Physical Exam   Physical Exam  Vitals and nursing note reviewed.   Constitutional:       Appearance: She is ill-appearing.   HENT:      Head: Normocephalic and atraumatic.      Right Ear: External ear normal.      Left Ear: External ear normal.      Nose: Nose normal.      Mouth/Throat:      Mouth: Mucous membranes are moist.      Pharynx: Oropharynx is clear.   Eyes:      Conjunctiva/sclera: Conjunctivae normal.   Neck:      Comments: RIJ CVC  Cardiovascular:      Rate and Rhythm: Normal rate and regular rhythm.      Pulses: Normal pulses.   Pulmonary:      Breath sounds: Normal breath sounds.   Chest:       Abdominal:      General: Bowel sounds are normal.      Palpations: Abdomen is soft.   Musculoskeletal:         General: Normal range of motion.      Cervical back: Neck supple.   Skin:     General: Skin is warm and dry.      Capillary Refill: Capillary refill takes less than 2 seconds.   Neurological:      General: No focal deficit present.      Mental Status: She is alert and oriented to person, place, and time. Mental status is at baseline.      Cranial Nerves: No cranial nerve deficit.      Sensory: No sensory deficit.      Motor: No weakness.   Psychiatric:         Mood and Affect: Mood normal.         Behavior: Behavior normal.         Medications  Current Facility-Administered Medications   Medication Dose Route Frequency Provider Last Rate  Last Admin    insulin lispro (HumaLOG,AdmeLOG) subcutaneous injection  2-9 Units Subcutaneous 4X/DAY ACHS Antwon Celestin Jr., D.O.        And    dextrose 50 % (D50W) injection 25 g  25 g Intravenous Q15 MIN PRN Antwon Celestin Jr., D.O.        metoprolol tartrate (Lopressor) tablet 12.5 mg  12.5 mg Oral BID ANGEL SandersP.R.N.        warfarin (Coumadin) tablet 2.5 mg  2.5 mg Oral DAILY AT 1800 ANGEL SandersP.R.N.   2.5 mg at 04/05/25 1715    Respiratory Therapy Consult   Nebulization Continuous RT Albert Tello P.A.-C.        NS infusion   Intravenous Continuous Albert Tello P.A.-C. 10 mL/hr at 04/05/25 0000 Rate Verify at 04/05/25 0000    enoxaparin (Lovenox) inj 40 mg  40 mg Subcutaneous DAILY AT 1800 Albert Tello P.A.-C.   40 mg at 04/05/25 1714    Nozin nasal  swab  1 Applicator Each Nostril BID Albert Tello P.A.-C.   1 Applicator at 04/05/25 2105    MD Alert...Warfarin per Pharmacy   Other PHARMACY TO DOSE Albert Tello P.A.-C.        Pharmacy Consult Request ...Pain Management Review 1 Each  1 Each Other PHARMACY TO DOSE Albert Tello P.A.-C.        acetaminophen (Tylenol) tablet 1,000 mg  1,000 mg Oral Q6HRS Albert Tello P.A.-C.   1,000 mg at 04/06/25 0507    Followed by    [START ON 4/14/2025] acetaminophen (Tylenol) tablet 1,000 mg  1,000 mg Oral Q6HRS PRN Albert Tello P.A.-C.        oxyCODONE immediate-release (Roxicodone) tablet 5 mg  5 mg Oral Q3HRS PRN Albert Tello P.A.-C.        Or    oxyCODONE immediate release (Roxicodone) tablet 10 mg  10 mg Oral Q3HRS PRN Albert Tello P.A.-C.   10 mg at 04/04/25 1713    Or    fentaNYL (Sublimaze) injection 50 mcg  50 mcg Intravenous Q3HRS PRN Albert Tello P.A.-C.        traMADol (Ultram) 50 MG tablet 50 mg  50 mg Oral Q4HRS PRN Albert Tello P.A.-C.   50 mg at 04/06/25 0151    sodium bicarbonate 8.4 % injection 50 mEq  50 mEq Intravenous Q HOUR PRN Albert Tlelo P.A.-C.        ondansetron (Zofran) syringe/vial injection 8 mg  8 mg  Intravenous Q6HRS PRN DEISY Hyatt-COpal   8 mg at 04/04/25 1511    Or    prochlorperazine (Compazine) injection 10 mg  10 mg Intravenous Q6HRS PRN DEISY Hyatt-C.        acetaminophen (Tylenol) tablet 650 mg  650 mg Oral Q4HRS PRN PAMELA HyattCOpal        Or    acetaminophen (Tylenol) suppository 650 mg  650 mg Rectal Q4HRS PRN DEISY Hyatt-MARY.        senna-docusate (Pericolace Or Senokot S) 8.6-50 MG per tablet 2 Tablet  2 Tablet Oral BID Albert Tello P.A.-C.   2 Tablet at 04/06/25 0506    And    polyethylene glycol/lytes (Miralax) Packet 1 Packet  1 Packet Oral DAILY Albert Tello P.A.-C.        And    magnesium hydroxide (Milk Of Magnesia) suspension 30 mL  30 mL Oral DAILY Albert Tello P.A.-C.        And    bisacodyl (Dulcolax) suppository 10 mg  10 mg Rectal QDAY PRN ANGEL HyattA.-C.        omeprazole (PriLOSEC) capsule 20 mg  20 mg Oral DAILY Albert Tello P.A.-C.   20 mg at 04/06/25 0506    mag hydrox-al hydrox-simeth (Maalox Plus Es Or Mylanta Ds) suspension 30 mL  30 mL Oral Q4HRS PRN DEISY Hyatt-COpal        electrolyte-A (Plasmalyte-A) infusion   Intravenous PRN Albert Tello P.A.-C. 999 mL/hr at 04/04/25 1100 New Bag at 04/04/25 1100    aspirin EC tablet 81 mg  81 mg Oral DAILY DEISY Hyatt-COpal   81 mg at 04/06/25 0507    levothyroxine (Synthroid) tablet 100 mcg  100 mcg Oral AM ES PAMELA HyattCOpal   100 mcg at 04/06/25 0507       Fluids    Intake/Output Summary (Last 24 hours) at 4/6/2025 0742  Last data filed at 4/6/2025 0600  Gross per 24 hour   Intake 1058.62 ml   Output 705 ml   Net 353.62 ml       Laboratory  Recent Labs     04/04/25  1054 04/04/25  1153 04/04/25  1516   ISTATAPH 7.340* 7.349* 7.322*   ISTATAPCO2 46.7 42.8 42.2   ISTATAPO2 134* 98 88   ISTATATCO2 27* 25* 23*   HTBGRCO1FWZ 99 97 96   ISTATARTHCO3 25.2 23.6 21.8   ISTATARTBE -1 -2 -4   ISTATTEMP 36.2 C 35.8 C 35.9 C   ISTATFIO2 90 60 30   ISTATSPEC Arterial Arterial Arterial   ISTATAPHTC 7.352  7.366 7.337*   FWYURXYV4GN 130* 91 82*         Recent Labs     04/04/25  1055 04/04/25  1705 04/04/25  2230 04/05/25  0404 04/06/25  0140   SODIUM  --   --   --  140 135   POTASSIUM 4.0   < > 4.0 4.5 4.3   CHLORIDE  --   --   --  108 102   CO2  --   --   --  23 25   BUN  --   --   --  27* 20   CREATININE  --   --   --  0.64 0.58   MAGNESIUM 2.6*  --   --   --   --    CALCIUM  --   --   --  7.7* 8.2*    < > = values in this interval not displayed.     Recent Labs     04/05/25  0404 04/06/25  0140   GLUCOSE 144* 117*     Recent Labs     04/05/25  0404 04/06/25  0140   WBC 11.4* 10.7     Recent Labs     04/04/25  1055 04/05/25  0404 04/06/25  0140   RBC  --  3.55* 3.37*   HEMOGLOBIN 12.4 10.6* 10.4*   HEMATOCRIT 37.6 33.3* 31.5*   PLATELETCT 412 373 375   PROTHROMBTM 16.7* 14.4 14.5   APTT 33.2  --   --    INR 1.35* 1.12 1.13       Imaging  X-Ray:  I have personally reviewed the images and compared with prior images.    Assessment/Plan  * Severe mitral regurgitation- (present on admission)  Assessment & Plan  S/P AVR on 4/4 by Dr Alvarez  POD #2  Weaned from ventilator on POD #1  Weaned from vasopressor support  Post-operative pain control per CVS  Chest tube and pacemaker management per CVS - remove today  SSI  Optimize K and Mg  PT/OT consult    PONV (postoperative nausea and vomiting)- (present on admission)  Assessment & Plan  PRN zofran    Postoperative hypothyroidism- (present on admission)  Assessment & Plan  Continue Synthroid    Essential hypertension- (present on admission)  Assessment & Plan  BB, diuresis         VTE:  Lovenox  Ulcer: PPI  Lines: Central Line  Ongoing indication addressed, Arterial Line  Ongoing indication addressed, and Johnson Catheter  Ongoing indication addressed    I have performed a physical exam and reviewed and updated ROS and Plan today (4/6/2025). In review of yesterday's note (4/5/2025), there are no changes except as documented above.     Discussed patient condition and risk of  morbidity and/or mortality with RN, RT, Pharmacy, Code status disscussed, Charge nurse / hot rounds, Patient, and CVS    Patient stable to be transferred out of ICU today to tele.  I have discussed this case with [facility] Dr. Gomez/Hernandez Gross, they will remain primary at this time. [facility] will sign off. Please call with any questions.    Please note that this dictation was created using voice recognition software. The accuracy of the dictation is limited to the abilities of the software. I have made every reasonable attempt to correct obvious errors, but I expect that there are errors of grammar and possibly content that I did not discover before finalizing the note.

## 2025-04-06 NOTE — PROGRESS NOTES
Monitor Summary:    Rhythm & Rate: SR/SB (59-64)  Ectopies: PVC (R)  Measurements: 0.14/0.07/0.41

## 2025-04-06 NOTE — CARE PLAN
The patient is Stable - Low risk of patient condition declining or worsening    Shift Goals    Clinical Goals: hemodynamic stability, wean O2, ambulate, manage any pain  Patient Goals: comfort, rest, sleep, updates  Family Goals: tanvi    Progress made toward(s) clinical / shift goals:      Problem: Post op day 2 CABG/Heart Valve Replacement  Goal: Optimal care of the post op CABG/heart valve replacement post op day 2  Outcome: Progressing  Intervention: FSBS: when 2 consecutive BS < 130 after post op day 2, discontinue FSBS unless patient is insulin dependent diabetic  Note: Continuing with BS checks.  Intervention: Daily weights in the morning  Note: 67.8 kg  Intervention: Up in chair for all meals  Note: Pt has been up to chair for all meals.  Intervention: Ambulate 4 times daily, increasing the distance each time  Note: She has walked three out of four walks today.   Intervention: Stand at sink and wash up with assistance.  Clean incisions twice daily with soap and water.  Note: Pt had bed bath and one incision cleaning today.  Intervention: IS q 1 hour while awake and record best IS volume  Note: Pt knows to use the IS. Pt is currently at 1000 and best is 1500.  Intervention: Consider pacer wire removal by MD  Note: Pacer wires have been removed.    Intervention: Consider removal of morrell and chest tube if not already done  Note: All drains have been removed prior to transfer.      Problem: Hemodynamics  Goal: Patient's hemodynamics, fluid balance and neurologic status will be stable or improve  Description: Target End Date:  Prior to discharge or change in level of careDocument on Assessment and I/O flowsheet templates1.  Monitor vital signs, pulse oximetry and cardiac monitor per provider order and/or policy2.  Maintain blood pressure per provider order3.  Hemodynamic monitoring per provider order4.  Manage IV fluids and IV infusions5.  Monitor intake and output6.  Daily weights per unit policy or provider  order7.  Assess peripheral pulses and capillary refill8.  Assess color and body temperature9.  Position patient for maximum circulation/cardiac tzygyv80. Monitor for signs/symptoms of excessive anjqdthf63. Assess mental status, restlessness and changes in level of [id number]. Monitor temperature and report fever or hypothermia to provider immediately. Consideration of targeted temperature management.  Outcome: Progressing     Problem: Respiratory  Goal: Patient will achieve/maintain optimum respiratory ventilation and gas exchange  Description: Target End Date:  Prior to discharge or change in level of careDocument on Assessment flowsheet1.  Assess and monitor rate, rhythm, depth and effort of respiration2.  Breath sounds assessed qshift and/or as needed3.  Assess O2 saturation, administer/titrate oxygen as ordered4.  Position patient for maximum ventilatory efficiency5.  Turn, cough, and deep breath with splinting to improve effectiveness6.  Collaborate with RT to administer medication/treatments per order7.  Encourage use of incentive spirometer and encourage patient to cough after use and utilize splinting techniques if applicable8.  Airway suctioning9.  Monitor sputum production for changes in color, consistency and focnprzzg24. Perform frequent oral jpwmnoo15. Alternate physical activity with rest periods  Outcome: Progressing     Problem: Self Care  Goal: Patient will have the ability to perform ADLs independently or with assistance (bathe, groom, dress, toilet and feed)  Description: Target End Date:  Prior to discharge or change in level of careDocument on ADL flowsheet1.  Assess the capability and level of deficiency to perform ADLs2.  Encourage family/care giver involvement3.  Provide assistive devices4.  Consider PT/OT evaluations5.  Maintain support, give positive feedback, encourage self-care allowing extra time and verbal cuing as needed6.  Avoid doing something for patients they can do  themselves, but provide assistance as needed7.  Assist in anticipating/planning individual needs8.  Collaborate with Case Management and  to meet discharge needs  Outcome: Progressing     Problem: Bowel Elimination - Post Surgical  Goal: Patient will resume regular bowel sounds and function with no discomfort or distention  Description: Target End Date:  1 - 3 days post op1.  Monitor bowel sounds every shift2.  Assess for flatus3.  Monitor for abdominal distention4.  Monitor for abdominal discomfort5.  Initiate bowel protocol on post op day 2 if no bowel movement6.  Note date of last BM7.  Educate about diet, fluid intake, medication and activity to promote bowel function8.  Educate signs and symptoms of constipation and interventions to implement9.  Pharmacologic bowel management per provider order10. Regular toileting limslzjh66. Upright position for olufrngrs15. High fiber diet13. Encourage phqpvvuek55. Collaborate with Clinical Wakqcxctt74. Care and maintenance of ostomy if applicable  Outcome: Progressing       Patient is not progressing towards the following goals:

## 2025-04-07 ENCOUNTER — HOME HEALTH ADMISSION (OUTPATIENT)
Dept: HOME HEALTH SERVICES | Facility: HOME HEALTHCARE | Age: 75
End: 2025-04-07
Payer: MEDICARE

## 2025-04-07 LAB
ACT BLD: 112 SEC (ref 74–137)
ACT BLD: 429 SEC (ref 74–137)
ACT BLD: 504 SEC (ref 74–137)
ACT BLD: 527 SEC (ref 74–137)
ACT BLD: 625 SEC (ref 74–137)
ANION GAP SERPL CALC-SCNC: 8 MMOL/L (ref 7–16)
BASE EXCESS BLDA CALC-SCNC: -2 MMOL/L (ref -4–3)
BODY TEMPERATURE: ABNORMAL DEGREES
BUN SERPL-MCNC: 19 MG/DL (ref 8–22)
CA-I BLD ISE-SCNC: 1.06 MMOL/L (ref 1.1–1.3)
CALCIUM SERPL-MCNC: 8.1 MG/DL (ref 8.5–10.5)
CHLORIDE SERPL-SCNC: 101 MMOL/L (ref 96–112)
CO2 BLDA-SCNC: 23 MMOL/L (ref 32–48)
CO2 SERPL-SCNC: 27 MMOL/L (ref 20–33)
CREAT SERPL-MCNC: 0.74 MG/DL (ref 0.5–1.4)
ERYTHROCYTE [DISTWIDTH] IN BLOOD BY AUTOMATED COUNT: 49.4 FL (ref 35.9–50)
GFR SERPLBLD CREATININE-BSD FMLA CKD-EPI: 86 ML/MIN/1.73 M 2
GLUCOSE BLD STRIP.AUTO-MCNC: 79 MG/DL (ref 65–99)
GLUCOSE BLD STRIP.AUTO-MCNC: 93 MG/DL (ref 65–99)
GLUCOSE SERPL-MCNC: 120 MG/DL (ref 65–99)
HCO3 BLDA-SCNC: 22.4 MMOL/L (ref 21–28)
HCT VFR BLD AUTO: 33.1 % (ref 37–47)
HCT VFR BLD CALC: 38 % (ref 37–47)
HGB BLD-MCNC: 10.8 G/DL (ref 12–16)
HGB BLD-MCNC: 12.9 G/DL (ref 12–16)
INR PPP: 1.19 (ref 0.87–1.13)
LV EJECT FRACT  99904: 65
MCH RBC QN AUTO: 30.7 PG (ref 27–33)
MCHC RBC AUTO-ENTMCNC: 32.6 G/DL (ref 32.2–35.5)
MCV RBC AUTO: 94 FL (ref 81.4–97.8)
PCO2 BLDA: 35.7 MMHG (ref 32–48)
PCO2 TEMP ADJ BLDA: 35.7 MMHG (ref 32–48)
PH BLDA: 7.4 [PH] (ref 7.35–7.45)
PH TEMP ADJ BLDA: 7.4 [PH] (ref 7.35–7.45)
PLATELET # BLD AUTO: 411 K/UL (ref 164–446)
PMV BLD AUTO: 10.2 FL (ref 9–12.9)
PO2 BLDA: 403 MMHG (ref 83–108)
PO2 TEMP ADJ BLDA: 403 MMHG (ref 83–108)
POTASSIUM BLD-SCNC: 3.5 MMOL/L (ref 3.6–5.5)
POTASSIUM SERPL-SCNC: 4.1 MMOL/L (ref 3.6–5.5)
PROTHROMBIN TIME: 15.1 SEC (ref 12–14.6)
RBC # BLD AUTO: 3.52 M/UL (ref 4.2–5.4)
SAO2 % BLDA: 100 % (ref 93–99)
SODIUM BLD-SCNC: 138 MMOL/L (ref 135–145)
SODIUM SERPL-SCNC: 136 MMOL/L (ref 135–145)
SPECIMEN DRAWN FROM PATIENT: ABNORMAL
WBC # BLD AUTO: 8.9 K/UL (ref 4.8–10.8)

## 2025-04-07 PROCEDURE — 97166 OT EVAL MOD COMPLEX 45 MIN: CPT

## 2025-04-07 PROCEDURE — 770020 HCHG ROOM/CARE - TELE (206)

## 2025-04-07 PROCEDURE — A9270 NON-COVERED ITEM OR SERVICE: HCPCS

## 2025-04-07 PROCEDURE — 99024 POSTOP FOLLOW-UP VISIT: CPT

## 2025-04-07 PROCEDURE — 85610 PROTHROMBIN TIME: CPT

## 2025-04-07 PROCEDURE — 80048 BASIC METABOLIC PNL TOTAL CA: CPT

## 2025-04-07 PROCEDURE — 700111 HCHG RX REV CODE 636 W/ 250 OVERRIDE (IP): Mod: JZ

## 2025-04-07 PROCEDURE — 85027 COMPLETE CBC AUTOMATED: CPT

## 2025-04-07 PROCEDURE — A9270 NON-COVERED ITEM OR SERVICE: HCPCS | Performed by: NURSE PRACTITIONER

## 2025-04-07 PROCEDURE — 700102 HCHG RX REV CODE 250 W/ 637 OVERRIDE(OP): Performed by: NURSE PRACTITIONER

## 2025-04-07 PROCEDURE — 700102 HCHG RX REV CODE 250 W/ 637 OVERRIDE(OP)

## 2025-04-07 PROCEDURE — 97535 SELF CARE MNGMENT TRAINING: CPT

## 2025-04-07 PROCEDURE — 94669 MECHANICAL CHEST WALL OSCILL: CPT

## 2025-04-07 PROCEDURE — 700111 HCHG RX REV CODE 636 W/ 250 OVERRIDE (IP): Mod: JZ | Performed by: NURSE PRACTITIONER

## 2025-04-07 PROCEDURE — 82962 GLUCOSE BLOOD TEST: CPT | Mod: 91

## 2025-04-07 RX ORDER — FUROSEMIDE 10 MG/ML
40 INJECTION INTRAMUSCULAR; INTRAVENOUS 2 TIMES DAILY
Status: DISCONTINUED | OUTPATIENT
Start: 2025-04-07 | End: 2025-04-09 | Stop reason: HOSPADM

## 2025-04-07 RX ORDER — POTASSIUM CHLORIDE 1500 MG/1
40 TABLET, EXTENDED RELEASE ORAL 2 TIMES DAILY
Status: DISCONTINUED | OUTPATIENT
Start: 2025-04-07 | End: 2025-04-09 | Stop reason: HOSPADM

## 2025-04-07 RX ADMIN — FUROSEMIDE 40 MG: 10 INJECTION, SOLUTION INTRAVENOUS at 18:19

## 2025-04-07 RX ADMIN — ENOXAPARIN SODIUM 40 MG: 100 INJECTION SUBCUTANEOUS at 18:19

## 2025-04-07 RX ADMIN — LEVOTHYROXINE SODIUM 100 MCG: 0.1 TABLET ORAL at 05:23

## 2025-04-07 RX ADMIN — METOPROLOL TARTRATE 12.5 MG: 25 TABLET, FILM COATED ORAL at 18:19

## 2025-04-07 RX ADMIN — ACETAMINOPHEN 1000 MG: 500 TABLET, FILM COATED ORAL at 18:19

## 2025-04-07 RX ADMIN — ACETAMINOPHEN 1000 MG: 500 TABLET, FILM COATED ORAL at 12:09

## 2025-04-07 RX ADMIN — TRAMADOL HYDROCHLORIDE 50 MG: 50 TABLET, COATED ORAL at 22:58

## 2025-04-07 RX ADMIN — WARFARIN SODIUM 2.5 MG: 2.5 TABLET ORAL at 18:19

## 2025-04-07 RX ADMIN — ACETAMINOPHEN 1000 MG: 500 TABLET, FILM COATED ORAL at 00:15

## 2025-04-07 RX ADMIN — FUROSEMIDE 20 MG: 10 INJECTION, SOLUTION INTRAVENOUS at 05:23

## 2025-04-07 RX ADMIN — OMEPRAZOLE 20 MG: 20 CAPSULE, DELAYED RELEASE ORAL at 05:23

## 2025-04-07 RX ADMIN — ASPIRIN 81 MG: 81 TABLET, COATED ORAL at 05:23

## 2025-04-07 RX ADMIN — Medication 1 APPLICATOR: at 09:39

## 2025-04-07 RX ADMIN — Medication 1 APPLICATOR: at 22:55

## 2025-04-07 RX ADMIN — POTASSIUM CHLORIDE 40 MEQ: 1500 TABLET, EXTENDED RELEASE ORAL at 18:19

## 2025-04-07 RX ADMIN — POTASSIUM CHLORIDE 20 MEQ: 1500 TABLET, EXTENDED RELEASE ORAL at 05:23

## 2025-04-07 RX ADMIN — ACETAMINOPHEN 1000 MG: 500 TABLET, FILM COATED ORAL at 05:22

## 2025-04-07 ASSESSMENT — PAIN DESCRIPTION - PAIN TYPE
TYPE: ACUTE PAIN;SURGICAL PAIN
TYPE: ACUTE PAIN;SURGICAL PAIN
TYPE: ACUTE PAIN
TYPE: SURGICAL PAIN
TYPE: ACUTE PAIN;SURGICAL PAIN
TYPE: SURGICAL PAIN

## 2025-04-07 ASSESSMENT — COGNITIVE AND FUNCTIONAL STATUS - GENERAL
HELP NEEDED FOR BATHING: A LITTLE
DRESSING REGULAR LOWER BODY CLOTHING: A LITTLE
SUGGESTED CMS G CODE MODIFIER DAILY ACTIVITY: CJ
DAILY ACTIVITIY SCORE: 22

## 2025-04-07 ASSESSMENT — ACTIVITIES OF DAILY LIVING (ADL): TOILETING: INDEPENDENT

## 2025-04-07 ASSESSMENT — FIBROSIS 4 INDEX: FIB4 SCORE: 0.94

## 2025-04-07 NOTE — THERAPY
"Occupational Therapy   Initial Evaluation     Patient Name: Ailin Good  Age:  74 y.o., Sex:  female  Medical Record #: [id number]  Today's Date: 4/7/2025     Precautions  Precautions: Fall Risk, Sternal Precautions (See Comments), Swallow Precautions  Comments: Mitral Valve Repair/ left atrial appendage clipping    Assessment    Patient is 74 y.o. female with a PMHx significant for HTN, hypothyroidism and severe mitral valve regurgitation. Presented to the hospital 4/4 for an elective mitral valve repair and L atrial appendage ligation. Pt lives alone in a H and was independent with ADLs/IADLs prior to admission. Completed ADLs/transfers with no more than SBA and functional ambulation without AD. PT with more difficulty dressing L UE and R LE; provided with strategies to improve occupational independence.     Pt educated on the following information and reviewed the \"Recovering from Heart Surgery\" handout:  *Normal Expectations after Open Heart Surgery  *Sternal Precautions  *Talk Test  *Activity Schedule Following Discharge after Open Heart Surgery  *Cardiac Surgery Discharge Concerns Decision Tree  *Fall prevention with positional changes  *Compensatory strategies for ADLs to assist with maintaining precautions  *Energy conservation techniques  *Home safety - recommended shower chair for fall reduction    Patient will not be actively followed for occupational therapy services at this time, however may be seen if requested by physician for 1 more visit within 30 days to address any discharge or equipment needs.       Plan    Occupational Therapy Initial Treatment Plan   Duration: (P) Discharge Needs Only    DC Equipment Recommendations: (P) Tub / Shower Seat  Discharge Recommendations: (P) Anticipate that the patient will have no further occupational therapy needs after discharge from the hospital      Objective     04/07/25 0849   Prior Living Situation   Prior Services Home-Independent   Housing / Facility 1 " [facility]   Steps Into Home 1  (threshold)   Steps In Home 0   Bathroom Set up Walk In Shower  (Ledge in shower that she will use as a grab bar)   Equipment Owned Front-Wheel Walker   Lives with - Patient's Self Care Capacity Alone and Able to Care For Self   Comments Pt lives alone with her dogs but reports he sister lives locally (3 minutes away) and will be able/available to assist PRN. Reports sister can stay if needed. Has someone coming to assist with the dogs PRN.   Prior Level of ADL Function   Self Feeding Independent   Grooming / Hygiene Independent   Bathing Independent   Dressing Independent   Toileting Independent   Prior Level of IADL Function   Medication Management Independent   Laundry Independent   Kitchen Mobility Independent   Finances Independent   Home Management Independent   Shopping Independent   Prior Level Of Mobility Independent Without Device in Community;Independent Without Device in Home   Driving / Transportation Driving Independent   Occupation (Pre-Hospital Vocational) Retired Due To Age   Leisure Interests Gardening;Pets   History of Falls   History of Falls Yes   Date of Last Fall   (Endorses x1 recent fall d/t tripping over rock while gardening; no injuries)   Precautions   Precautions Fall Risk;Sternal Precautions (See Comments);Swallow Precautions   Comments Mitral Valve Repair/ left atrial appendage clipping   Vitals   Pulse Oximetry 93 %   O2 Delivery Device None - Room Air   Vitals Comments VSS throughout. Mild dizziness upon initial stand but resolved quickly   Pain 0 - 10 Group   Location Sternum   Location Orientation Mid   Therapist Pain Assessment During Activity;Post Activity Pain Same as Prior to Activity;Nurse Notified  (Increased pain with mobility; not quantified)   Cognition    Cognition / Consciousness WDL   Level of Consciousness Alert   Comments Very pleasant and participatory. Receptive to education   Active ROM Upper Body   Active ROM Upper Body  X    Dominant Hand Right   Comments Limited by pain and precautions; L more limited than R. Functional for ADLs   Strength Upper Body   Upper Body Strength  X   Comments Limited by pain and precautions   Sensation Upper Body   Comments Denies N/T   Coordination Upper Body   Coordination WDL   Balance Assessment   Sitting Balance (Static) Fair +   Sitting Balance (Dynamic) Fair +   Standing Balance (Static) Fair   Standing Balance (Dynamic) Fair   Weight Shift Sitting Good   Weight Shift Standing Fair   Comments No AD in standing   Bed Mobility    Comments Up in recliner pre and post   ADL Assessment   Eating Supervision   Grooming Supervision;Seated   Lower Body Dressing Standby Assist   Toileting   (Declined need but reports no difficulty)   Functional Mobility   Sit to Stand Standby Assist   Bed, Chair, Wheelchair Transfer Standby Assist   Toilet Transfers   (Declined need; reports no difficulty)   Transfer Method Stand Step   Mobility No AD; cardiac chair > recliner d/t c/o original chair being uncomfortable   Visual Perception   Comments Denies changes   Edema / Skin Assessment   Comments Incision CDI   Activity Tolerance   Comments Limited by weakness, fatigue and pain   Education Group   Education Provided Cardiac Precautions;Sternal Precautions;Energy Conservation;Home Safety;Transfers;Role of Occupational Therapist;Activities of Daily Living;Adaptive Equipment;Pathology of bedrest   Role of Occupational Therapist Patient Response Patient;Acceptance;Explanation;Verbal Demonstration   Cardiac Precautions Patient Response Patient;Acceptance;Explanation;Verbal Demonstration   Sternal Precautions Patient Response Patient;Acceptance;Explanation;Verbal Demonstration;Action Demonstration   Energy Conservation Patient Response Patient;Acceptance;Explanation;Verbal Demonstration   Home Safety Patient Response Patient;Acceptance;Explanation;Verbal Demonstration   Transfers Patient Response  Patient;Acceptance;Explanation;Verbal Demonstration;Action Demonstration   ADL Patient Response Patient;Acceptance;Explanation;Verbal Demonstration;Action Demonstration;Demonstration   Adaptive Equipment Patient Response Patient;Acceptance;Explanation;Verbal Demonstration   Pathology of Bedrest Patient Response Patient;Acceptance;Explanation;Verbal Demonstration;Action Demonstration   Occupational Therapy Initial Treatment Plan    Duration Discharge Needs Only   Anticipated Discharge Equipment and Recommendations   DC Equipment Recommendations Tub / Shower Seat   Discharge Recommendations Anticipate that the patient will have no further occupational therapy needs after discharge from the hospital   Interdisciplinary Plan of Care Collaboration   IDT Collaboration with  Nursing   Patient Position at End of Therapy Seated;Call Light within Reach;Tray Table within Reach;Phone within Reach  (No alarm on entry; RN aware)   Collaboration Comments OT report and recs; RN updated   Session Information   Date / Session Number  4/7 - DC needs only

## 2025-04-07 NOTE — DISCHARGE PLANNING
ATTN: Case Management  RE: Referral for Home Health    As of 4/7/25, we have accepted the Home Health referral for the patient listed above.    A [facility]  will contact the patient within 48 hours. If you have any questions or concerns regarding the patient’s transition to Home Health, please do not hesitate to contact us at [phone].      We look forward to collaborating with you,  [facility] Team

## 2025-04-07 NOTE — PROGRESS NOTES
Bedside report received, assumed care of patient at change of shift. Chart, labs, and orders reviewed. Pt resting in bed, breathing even and unlabored on RA, pain tolerable and in no acute distress. A&O x4. Tele monitoring in place. Fall precautions including bed alarm in place and education provided. Call light within reach, bed locked and in lowest position, denies other needs at this time.

## 2025-04-07 NOTE — PROGRESS NOTES
Cardiovascular Surgery Progress Note    Name: Ailin Good  MRN: [id number]  : 1950  Admit Date: 2025  5:01 AM  3 Days Post-Op     Procedure:  Procedure(s) and Anesthesia Type:  Dr Geller; 2025     * MITRAL VALVE REPAIR, RADICAL - General     * ECHOCARDIOGRAM, TRANSESOPHAGEAL, INTRAOPERATIVE - General     * CLIPPING, LEFT ATRIAL APPENDAGE - General    Vitals:  Vitals:    25 0622 25 0715 25 0750 25 0924   BP:  108/64     Pulse:  (!) 57 63    Resp:  15 16    Temp:  36.3 °C (97.3 °F)     TempSrc:  Temporal     SpO2:  93% 93% 91%   Weight: 65.8 kg (145 lb 1 oz)      Height:          Temp (24hrs), Av.5 °C (97.7 °F), Min:36.2 °C (97.2 °F), Max:37 °C (98.6 °F)      Respiratory:    Respiration: 16, Pulse Oximetry: 91 %       Fluids:    Intake/Output Summary (Last 24 hours) at 2025 0951  Last data filed at 2025 0926  Gross per 24 hour   Intake 800 ml   Output 1680 ml   Net -880 ml     Admit weight: Weight: 62.4 kg (137 lb 9.1 oz)  Current weight: Weight: 65.8 kg (145 lb 1 oz) (25 0622)    Labs:  Recent Labs     25  0404 25  0140 25  0025   WBC 11.4* 10.7 8.9   RBC 3.55* 3.37* 3.52*   HEMOGLOBIN 10.6* 10.4* 10.8*   HEMATOCRIT 33.3* 31.5* 33.1*   MCV 93.8 93.5 94.0   MCH 29.9 30.9 30.7   MCHC 31.8* 33.0 32.6   RDW 50.2* 50.1* 49.4   PLATELETCT 373 375 411   MPV 9.8 9.9 10.2     Recent Labs     25  0404 25  0140 25  0025   SODIUM 140 135 136   POTASSIUM 4.5 4.3 4.1   CHLORIDE 108 102 101   CO2 23 25 27   GLUCOSE 144* 117* 120*   BUN 27* 20 19   CREATININE 0.64 0.58 0.74   CALCIUM 7.7* 8.2* 8.1*     Recent Labs     25  1055 25  0404 25  0140 25  0530   APTT 33.2  --   --   --    INR 1.35* 1.12 1.13 1.19*       HgbA1c:  5.8    Diabetic Educator Consult:  N/A    Medications:  Scheduled Medications   Medication Dose Frequency    furosemide  20 mg BID    potassium chloride SA  20 mEq BID    insulin lispro  2-9 Units  4X/DAY ACHS    metoprolol tartrate  12.5 mg BID    warfarin  2.5 mg DAILY AT 1800    enoxaparin (LOVENOX) injection  40 mg DAILY AT 1800    Nozin nasal  swab  1 Applicator BID    MD Alert...Warfarin per Pharmacy   PHARMACY TO DOSE    Pharmacy Consult Request  1 Each PHARMACY TO DOSE    acetaminophen  1,000 mg Q6HRS    senna-docusate  2 Tablet BID    And    polyethylene glycol/lytes  1 Packet DAILY    And    magnesium hydroxide  30 mL DAILY    omeprazole  20 mg DAILY    aspirin  81 mg DAILY    levothyroxine  100 mcg AM ES        Exam:   Physical Exam  Vitals and nursing note reviewed.   Constitutional:       General: She is not in acute distress.     Appearance: She is normal weight. She is not ill-appearing.   HENT:      Head: Normocephalic and atraumatic.      Right Ear: External ear normal.      Left Ear: External ear normal.      Nose: Nose normal.      Mouth/Throat:      Mouth: Mucous membranes are moist.   Eyes:      Pupils: Pupils are equal, round, and reactive to light.   Cardiovascular:      Rate and Rhythm: Normal rate and regular rhythm.      Pulses: Normal pulses.   Pulmonary:      Effort: Pulmonary effort is normal.   Abdominal:      General: Abdomen is flat. There is no distension.      Palpations: Abdomen is soft.   Musculoskeletal:         General: Normal range of motion.      Cervical back: Normal range of motion and neck supple.   Skin:     General: Skin is warm and dry.      Capillary Refill: Capillary refill takes less than 2 seconds.   Neurological:      General: No focal deficit present.      Mental Status: She is alert and oriented to person, place, and time. Mental status is at baseline.      Sensory: No sensory deficit.      Motor: No weakness.   Psychiatric:         Mood and Affect: Mood normal.         Behavior: Behavior normal.         Thought Content: Thought content normal.       Cardiac Medications:    ASA - Yes    Plavix - No; contraindicated because of On Coumadin /  NOAC    Post-operative Beta Blockers - Yes    Ace/ARB- No; contraindicated because of Normal EF    ARNI-  No; contraindicated because of Normal EF    Statin - No; contraindicated because of No CAD    Aldactone- No; contraindicated because of Normal EF    SGLT2i-  No; contraindicated because of Normal EF    Ejection Fraction:  65%    Telemetry:   4/5 SR  4/6 SB/SR  4/7 SB/SR 50-60s    Assessment/Plan:  POD 1 Extubated.  Off gtts. Neuro intact.  Wounds CDI. Abdomen soft nontender.  Cr 0.64  Hgb 10.6.  Plan: Encourage IS/ambulation.  Wean oxygen as tolerated.  Diurese when bp improved.  Keep mediastinal tubes/morrell for strict I/Os.     POD 2 HDS. SB/SR.  1LNC.  Neuro intact.  Hematoma superior aspect of mediastinal incision.  Cr 0.58 Hgb 10.4.  Pacer wires removed.  Plan: Diurese.  Remove mediastinal tubes and morrell.  Wean oxygen.  BB when bradycardia improves.  Transfer to [facility].    POD 3 HDS, SR, Room air. Neuro intact. Wounds- hematoma to prox pole, non drainage. Fluid up 3L, wt up 3kg, Cr: 0.74. Abd soft, NT, BM+.  Plan: Increase diuresis. Shower. Therapy evals pending, d/w RN. Encourage Is/Ambulation. Anticipate home in next 1-2 days.     Disposition:  PT-eval pending, patient has FWW at home  OT-rec home  INR draws- HH ordered 4/7/25

## 2025-04-07 NOTE — PROGRESS NOTES
Inpatient Anticoagulation Service Note for 4/7/2025      Reason for Anticoagulation: Mitral Valve Repair           Hemoglobin Value: (!) 10.8  Hematocrit Value: (!) 33.1  Lab Platelet Value: 411  Target INR: 2.0 to 3.0     Date/Time INR Value    04/07/25 0530 1.19     04/06/25 0140 1.13     04/05/25 0404 1.12     04/04/25 1055 1.35            Date/Time Dose (mg)    04/07/25 1447 2.5     04/06/25 0643 2.5     04/05/25 1101 2.5           Average Dose (mg):  (new start)  Significant Interactions: Antiplatelet Medications  Bridge Therapy: No (DVT prophylaxis with enoxaparin)   Reversal Agent Administered: Not Applicable    Comments: INR remains sub-therapeutic at 1.19 today. Expect to see increase in INR in the next 2-3 days. No significant DDI noted. H/H stable. Plan to continue Warfarin 2.5 mg daily + lovenox 40 mg daily until INR is therapeutic x2.    Plan:  Warfarin 2.5 mg + Lovenox 40 mg daily   Education Material Provided?: No    Pharmacist suggested discharge dosing: Pending INR trends, patient likely able to discharge home on Warfarin 2.5 mg daily with close outpatient follow up.     Kaela Crane, PharmD

## 2025-04-07 NOTE — DISCHARGE PLANNING
Discharge Appointments/outpatient referrals/ and HH set up:    Cardiac surgery follow up appointment made for 4-5 weeks out    Hospital discharge team/schedulers called for cardiology f/u appointment for MD or APRN within 3-4 weeks if possible.    Aortic surveillance program/vascular medicine referral not needed, orders not placed.    Anticoagulation referral/ coumadin clinic referral needed and orders placed.    INR draws are indicated for MV repair procedure.    HH and CM referral placed by Albert Tello today for home INR draw plan.    Will await PT/OT notes and medical progression to determine further discharge needs.

## 2025-04-07 NOTE — PROGRESS NOTES
Received report from day shift RN. Pt care assumed. Pt found resting comfortably in bed. AA&Ox4 with no complaints of 3/10 sternal incision pain. 1L O2 Nasal Cannula. Tele box on. POC discussed. No further questions. Call bell in reach.

## 2025-04-07 NOTE — DISCHARGE PLANNING
Care Transition Team Assessment    Patient, Ailin Good, is a 74-year-old admitted for severe mitral regurgitation. Patient is alert and oriented x4; information was given by the patient. Please see pt's H&P for prior medical history. Patient reports living alone in a [facility]; [address] [place] [address]. Pt confirmed contacts on facesheet, sister Helen [phone]. Patient does have a PCP, Jemma Martin. Confirmed medical insurance is [facility].     Patient gave choice for 1) [facility] , 2) [facility] , and 3) [facility]  for INR draws.     Identified DC needs at this time:    HH    Information Source  Orientation Level: Oriented X4  Information Given By: Patient  Who is responsible for making decisions for patient? : Patient    Readmission Evaluation  Is this a readmission?: No    Elopement Risk  Legal Hold: No  Ambulatory or Self Mobile in Wheelchair: Yes  Disoriented: No  Psychiatric Symptoms: None  History of Wandering: No  Elopement this Admit: No  Vocalizing Wanting to Leave: No  Displays Behaviors, Body Language Wanting to Leave: No-Not at Risk for Elopement  Elopement Risk: Not at Risk for Elopement    Interdisciplinary Discharge Planning  Primary Care Physician: Jemma Martin  Lives with - Patient's Self Care Capacity: Alone and Able to Care For Self  Patient or legal guardian wants to designate a caregiver: No  Support Systems: Family Member(s), Friends / Neighbors  Housing / Facility: [address] [facility]""  Prior Services: Home-Independent    Discharge Preparedness  What is your plan after discharge?: Home health care  What are your discharge supports?: Sibling  Prior Functional Level: Ambulatory, Independent with Activities of Daily Living, Independent with Medication Management    Functional Assesment  Prior Functional Level: Ambulatory, Independent with Activities of Daily Living, Independent with Medication Management    Vision / Hearing Impairment  Vision Impairment : Yes  Right Eye Vision: Impaired,  Wears Glasses  Left Eye Vision: Impaired, Wears Glasses    Domestic Abuse  Physical Abuse or Sexual Abuse: No  Verbal Abuse or Emotional Abuse: No  Possible Abuse/Neglect Reported to:: Not Applicable    Anticipated Discharge Information  Discharge Disposition: D/T to home under HHA care in anticipation of covered skilled care (06)

## 2025-04-07 NOTE — CARE PLAN
"The patient is Stable - Low risk of patient condition declining or worsening    Shift Goals  Clinical Goals: imprve mobility, wean O2, pain management, vss  Patient Goals: rest  Family Goals: tanvi    Progress made toward(s) clinical / shift goals:    Problem: Fall Risk  Goal: Patient will remain free from falls  Outcome: Progressing     Problem: Knowledge Deficit - Standard  Goal: Patient and family/care givers will demonstrate understanding of plan of care, disease process/condition, diagnostic tests and medications  Outcome: Progressing     Problem: Skin Integrity  Goal: Skin integrity is maintained or improved  Outcome: Progressing     Problem: Post Op Day 3 CABG/Heart Valve replacement  Goal: Optimal care of the post op CABG/Heart Valve replacement post op day 3  Outcome: Progressing  Intervention: Daily weights in the morning  Note: Weight completed this am  Intervention: Shower daily and clean incisions twice daily with soap and water  Note: Pt agreeable for shower today with day shift  Intervention: Up in chair for all meals  Note: Pt up In chair for all meals  Intervention: Ambulate 4 times daily, increasing the distance each time  Note: Pt agreeable and cooperative with walking QID. This am states feeling very dizzy and \"lousy\". Pt walked from bed, to bathroom and then to chair.   Intervention: IS q 1 hour while awake and record best IS volume  Note: BEST IS 1000. Encouraged to complete every hour while awake  Intervention: Consider removal of morrell, chest tube and pacer wires if not already done  Note: All tubes have been removed     Problem: Hemodynamics  Goal: Patient's hemodynamics, fluid balance and neurologic status will be stable or improve  Outcome: Progressing  Notes: BP and HR remain stable     Problem: Respiratory  Goal: Patient will achieve/maintain optimum respiratory ventilation and gas exchange  Outcome: Progressing  Note: Pt being weaned from O2. Currently sating 90-92% on room air at rest.   "   Problem: Bowel Elimination - Post Surgical  Goal: Patient will resume regular bowel sounds and function with no discomfort or distention  Outcome: Progressing  Notes: Pt states having bowel movement since surgery     Problem: Pain - Standard  Goal: Alleviation of pain or a reduction in pain to the patient’s comfort goal  Outcome: Progressing  Note: Pt states good pain relief with tylenol       Patient is not progressing towards the following goals:

## 2025-04-07 NOTE — DISCHARGE PLANNING
Good afternoon,  This referral has been escalated to a Clinical Supervisor for review in order to determine Home Health appropriateness.  This issue will be resolved as quickly as possible. Thank you!

## 2025-04-07 NOTE — CARE PLAN
The patient is Stable - Low risk of patient condition declining or worsening    Shift Goals  Clinical Goals: imprve mobility, wean O2, pain management, vss  Patient Goals: rest  Family Goals: tanvi    Progress made toward(s) clinical / shift goals:    Problem: Fall Risk  Goal: Patient will remain free from falls  Outcome: Progressing     Problem: Knowledge Deficit - Standard  Goal: Patient and family/care givers will demonstrate understanding of plan of care, disease process/condition, diagnostic tests and medications  Outcome: Progressing     Problem: Skin Integrity  Goal: Skin integrity is maintained or improved  Outcome: Progressing     Problem: Post Op Day 3 CABG/Heart Valve replacement  Goal: Optimal care of the post op CABG/Heart Valve replacement post op day 3  Outcome: Progressing  Intervention: Daily weights in the morning  Note: Patient weighed in AM  Intervention: Shower daily and clean incisions twice daily with soap and water  Note: Patient showered and incision care provided   Intervention: Up in chair for all meals  Note: Patient out of bed and in chair for three meals   Intervention: Ambulate 4 times daily, increasing the distance each time  Note: Patient ambulated twice during day shift  Intervention: IS q 1 hour while awake and record best IS volume  Note: Patient using incentive spirometer hourly. Able to reach 1000  Intervention: Consider removal of morrell, chest tube and pacer wires if not already done  Note: All removed   Intervention: Assess need for case management and  for discharge planning  Note: Pt/Ot recommendations. OT does not recommend any needs   Intervention: Discharge planning (See discharge barriers/planning problem on care plan)  Note: Discharge planning ongoing      Problem: Hemodynamics  Goal: Patient's hemodynamics, fluid balance and neurologic status will be stable or improve  Outcome: Progressing     Problem: Respiratory  Goal: Patient will achieve/maintain  optimum respiratory ventilation and gas exchange  Outcome: Progressing     Problem: Self Care  Goal: Patient will have the ability to perform ADLs independently or with assistance (bathe, groom, dress, toilet and feed)  Outcome: Progressing     Problem: Pain - Standard  Goal: Alleviation of pain or a reduction in pain to the patient’s comfort goal  Outcome: Progressing       Patient is not progressing towards the following goals:

## 2025-04-07 NOTE — DISCHARGE PLANNING
HTH/SCP TCN chart review completed. The most current review of medical record, knowledge of pt's PLOF and social support, LACE+ score of 9, 6 clicks scores of 22 ADL and 14 mobility were considered.  Note pt currently admitted to [facility] telemetry floor in the setting of MVR.  Please see CM assessment 4/7/25 regarding patient previous level of function. As noted, anticipated discharge plan is home with [facility] services for recommended (INR draws) and appreciate choice for HH obtained during SW visit.     Current OT consult 4/7 anticipating that the patient will have no further occupational therapy needs after discharge. Given current chart review, anticipating that pt will dc to home with home healthcare services.      Please reach out to TCN via VOALTE if post acute transitional care needs are warranted for dc planning.     Completed:  OT consult 4/7 anticipating that the patient will have no further occupational therapy needs after discharge. Orders noted for PT consult as appropriate  Choice obtained: Please see CM assessment note 4/7  SCP with [facility] PCP. Current follow ups noted to Hospital Follow Up with Nurse Practitioner SHANNON Soto. Tuesday Apr 29, 2025 12:45 PM and Post Op Monday May 5, 2025 1:15 PM    *Noted patient acceptance to [facility]

## 2025-04-08 LAB
ANION GAP SERPL CALC-SCNC: 9 MMOL/L (ref 7–16)
BUN SERPL-MCNC: 17 MG/DL (ref 8–22)
CALCIUM SERPL-MCNC: 7.9 MG/DL (ref 8.5–10.5)
CHLORIDE SERPL-SCNC: 103 MMOL/L (ref 96–112)
CO2 SERPL-SCNC: 28 MMOL/L (ref 20–33)
COMPONENT CELLULAR 8504CLL: NORMAL
CREAT SERPL-MCNC: 0.54 MG/DL (ref 0.5–1.4)
ERYTHROCYTE [DISTWIDTH] IN BLOOD BY AUTOMATED COUNT: 49.1 FL (ref 35.9–50)
GFR SERPLBLD CREATININE-BSD FMLA CKD-EPI: 97 ML/MIN/1.73 M 2
GLUCOSE BLD STRIP.AUTO-MCNC: 126 MG/DL (ref 65–99)
GLUCOSE BLD STRIP.AUTO-MCNC: 148 MG/DL (ref 65–99)
GLUCOSE BLD STRIP.AUTO-MCNC: 63 MG/DL (ref 65–99)
GLUCOSE SERPL-MCNC: 108 MG/DL (ref 65–99)
HCT VFR BLD AUTO: 32.7 % (ref 37–47)
HGB BLD-MCNC: 10.5 G/DL (ref 12–16)
INR PPP: 1.17 (ref 0.87–1.13)
MCH RBC QN AUTO: 30.3 PG (ref 27–33)
MCHC RBC AUTO-ENTMCNC: 32.1 G/DL (ref 32.2–35.5)
MCV RBC AUTO: 94.2 FL (ref 81.4–97.8)
PLATELET # BLD AUTO: 446 K/UL (ref 164–446)
PMV BLD AUTO: 10.1 FL (ref 9–12.9)
POTASSIUM SERPL-SCNC: 4 MMOL/L (ref 3.6–5.5)
PROTHROMBIN TIME: 15 SEC (ref 12–14.6)
RBC # BLD AUTO: 3.47 M/UL (ref 4.2–5.4)
SODIUM SERPL-SCNC: 140 MMOL/L (ref 135–145)
WBC # BLD AUTO: 7.9 K/UL (ref 4.8–10.8)

## 2025-04-08 PROCEDURE — 770020 HCHG ROOM/CARE - TELE (206)

## 2025-04-08 PROCEDURE — 700102 HCHG RX REV CODE 250 W/ 637 OVERRIDE(OP)

## 2025-04-08 PROCEDURE — 97530 THERAPEUTIC ACTIVITIES: CPT

## 2025-04-08 PROCEDURE — 85610 PROTHROMBIN TIME: CPT

## 2025-04-08 PROCEDURE — 85027 COMPLETE CBC AUTOMATED: CPT

## 2025-04-08 PROCEDURE — RXMED WILLOW AMBULATORY MEDICATION CHARGE: Performed by: NURSE PRACTITIONER

## 2025-04-08 PROCEDURE — A9270 NON-COVERED ITEM OR SERVICE: HCPCS

## 2025-04-08 PROCEDURE — 99024 POSTOP FOLLOW-UP VISIT: CPT | Performed by: NURSE PRACTITIONER

## 2025-04-08 PROCEDURE — A9270 NON-COVERED ITEM OR SERVICE: HCPCS | Performed by: NURSE PRACTITIONER

## 2025-04-08 PROCEDURE — 700111 HCHG RX REV CODE 636 W/ 250 OVERRIDE (IP): Mod: JZ

## 2025-04-08 PROCEDURE — 80048 BASIC METABOLIC PNL TOTAL CA: CPT

## 2025-04-08 PROCEDURE — 82962 GLUCOSE BLOOD TEST: CPT

## 2025-04-08 PROCEDURE — 97162 PT EVAL MOD COMPLEX 30 MIN: CPT

## 2025-04-08 PROCEDURE — 700102 HCHG RX REV CODE 250 W/ 637 OVERRIDE(OP): Performed by: NURSE PRACTITIONER

## 2025-04-08 RX ORDER — TRAMADOL HYDROCHLORIDE 50 MG/1
50 TABLET ORAL EVERY 6 HOURS PRN
Qty: 56 TABLET | Refills: 0 | Status: SHIPPED | OUTPATIENT
Start: 2025-04-08 | End: 2025-04-23

## 2025-04-08 RX ORDER — WARFARIN SODIUM 5 MG/1
5 TABLET ORAL
Status: COMPLETED | OUTPATIENT
Start: 2025-04-08 | End: 2025-04-08

## 2025-04-08 RX ORDER — FUROSEMIDE 20 MG/1
20 TABLET ORAL DAILY
Qty: 30 TABLET | Refills: 0 | Status: SHIPPED | OUTPATIENT
Start: 2025-04-08 | End: 2025-04-29

## 2025-04-08 RX ORDER — WARFARIN SODIUM 5 MG/1
5 TABLET ORAL DAILY
Qty: 30 TABLET | Refills: 3 | Status: ACTIVE | OUTPATIENT
Start: 2025-04-08

## 2025-04-08 RX ORDER — METOPROLOL TARTRATE 25 MG/1
12.5 TABLET, FILM COATED ORAL 2 TIMES DAILY
Qty: 60 TABLET | Refills: 2 | Status: SHIPPED | OUTPATIENT
Start: 2025-04-08 | End: 2025-04-09

## 2025-04-08 RX ORDER — POTASSIUM CHLORIDE 1500 MG/1
20 TABLET, EXTENDED RELEASE ORAL DAILY
Qty: 30 TABLET | Refills: 0 | Status: SHIPPED | OUTPATIENT
Start: 2025-04-08 | End: 2025-04-29

## 2025-04-08 RX ADMIN — WARFARIN SODIUM 5 MG: 5 TABLET ORAL at 17:40

## 2025-04-08 RX ADMIN — OMEPRAZOLE 20 MG: 20 CAPSULE, DELAYED RELEASE ORAL at 06:05

## 2025-04-08 RX ADMIN — POTASSIUM CHLORIDE 40 MEQ: 1500 TABLET, EXTENDED RELEASE ORAL at 06:06

## 2025-04-08 RX ADMIN — METOPROLOL TARTRATE 12.5 MG: 25 TABLET, FILM COATED ORAL at 17:34

## 2025-04-08 RX ADMIN — FUROSEMIDE 40 MG: 10 INJECTION, SOLUTION INTRAVENOUS at 17:34

## 2025-04-08 RX ADMIN — ENOXAPARIN SODIUM 40 MG: 100 INJECTION SUBCUTANEOUS at 17:33

## 2025-04-08 RX ADMIN — ONDANSETRON 8 MG: 2 INJECTION INTRAMUSCULAR; INTRAVENOUS at 10:45

## 2025-04-08 RX ADMIN — FUROSEMIDE 40 MG: 10 INJECTION, SOLUTION INTRAVENOUS at 06:07

## 2025-04-08 RX ADMIN — POTASSIUM CHLORIDE 40 MEQ: 1500 TABLET, EXTENDED RELEASE ORAL at 17:35

## 2025-04-08 RX ADMIN — ACETAMINOPHEN 1000 MG: 500 TABLET, FILM COATED ORAL at 23:14

## 2025-04-08 RX ADMIN — Medication 1 APPLICATOR: at 09:31

## 2025-04-08 RX ADMIN — ACETAMINOPHEN 1000 MG: 500 TABLET, FILM COATED ORAL at 00:16

## 2025-04-08 RX ADMIN — ACETAMINOPHEN 1000 MG: 500 TABLET, FILM COATED ORAL at 17:34

## 2025-04-08 RX ADMIN — LEVOTHYROXINE SODIUM 100 MCG: 0.1 TABLET ORAL at 06:05

## 2025-04-08 RX ADMIN — ALUMINUM HYDROXIDE, MAGNESIUM HYDROXIDE, AND DIMETHICONE 30 ML: 400; 400; 40 SUSPENSION ORAL at 19:58

## 2025-04-08 RX ADMIN — ACETAMINOPHEN 1000 MG: 500 TABLET, FILM COATED ORAL at 13:36

## 2025-04-08 RX ADMIN — ASPIRIN 81 MG: 81 TABLET, COATED ORAL at 06:05

## 2025-04-08 RX ADMIN — ACETAMINOPHEN 1000 MG: 500 TABLET, FILM COATED ORAL at 06:05

## 2025-04-08 RX ADMIN — Medication 1 APPLICATOR: at 21:21

## 2025-04-08 ASSESSMENT — COGNITIVE AND FUNCTIONAL STATUS - GENERAL
SUGGESTED CMS G CODE MODIFIER MOBILITY: CI
MOBILITY SCORE: 23
CLIMB 3 TO 5 STEPS WITH RAILING: A LITTLE

## 2025-04-08 ASSESSMENT — PAIN DESCRIPTION - PAIN TYPE
TYPE: ACUTE PAIN

## 2025-04-08 ASSESSMENT — PATIENT HEALTH QUESTIONNAIRE - PHQ9
2. FEELING DOWN, DEPRESSED, IRRITABLE, OR HOPELESS: NOT AT ALL
SUM OF ALL RESPONSES TO PHQ9 QUESTIONS 1 AND 2: 0
1. LITTLE INTEREST OR PLEASURE IN DOING THINGS: NOT AT ALL

## 2025-04-08 ASSESSMENT — FIBROSIS 4 INDEX: FIB4 SCORE: 0.86

## 2025-04-08 ASSESSMENT — GAIT ASSESSMENTS
DISTANCE (FEET): 200
ASSISTIVE DEVICE: FRONT WHEEL WALKER
GAIT LEVEL OF ASSIST: SUPERVISED
DEVIATION: DECREASED HEEL STRIKE;DECREASED TOE OFF

## 2025-04-08 NOTE — PROGRESS NOTES
Pt started complaining of feeling nauseous and lightheaded. Vitals obtained 107/63 HR 60s confirmed by monitor room, Spo2 90-91% on RA. Pt afebrile. Notified Hernandez CALHOUN at 1045. No new orders    Pt then complained of numbness, NIH negative, blood sugar checked 63. Provided 8oz of orange juice. Hernandez CALHOUN notified at 1108. Per Hernandez CALHOUN monitor for couple hours before discharge.     1148: Repeat .     1606: Hernandez CALHOUN notified pt staying one more night. All symptoms resolved. Repeat FSBS at 1539 148.

## 2025-04-08 NOTE — DISCHARGE PLANNING
"Discharge Discussion and home care recap prior to discharge:      Discharge review was completed with patient and sister Helen.    Patient was reminded that outpatient cardiac rehab beginning 6 weeks post op. They were told it is highly recommended and available to them if desired, but not required. They were told to look at the provided flyer for the contact number and additional information.    Patient was reminded to utilize the vitals log book provided to take his/her weight daily and record.    Patient was told to call me with weight gain > 3 lbs in 1 day or 5 lbs in 1 week  Patient was also told to record heart rate and blood pressure morning and night; and to call for concerning trends.    Patient was reminded to review the \"recovery after heart surgery\" packet with information on normal expectations such as clicking in the chest, loud/pounding heart/ hot and cold flashes, weight loss, constipation, insomnia, tingling on left side of chest (CABG with LIMA).  I also reviewed the decision tree of whom to call and when with concerns after going home. RN navigator Monday through Friday during business hours for any questions or urgent concerns, and the office for urgent concerns at night or on the weekends.    I briefly recapped the discharge instructions that their primary RN will review in depth prior to discharge   1) appointments   2) medication reconciliation (start, continue, change, stop)   3) care instructions    All additional questions were answered or deferred to the bedside RN.    Patient may stay today due to hypoglycemia event and general malaise.    Event time 40 minutes      "

## 2025-04-08 NOTE — DISCHARGE INSTRUCTIONS
DIVISION OF CARDIAC SURGERY   DISCHARGE INSTRUCTIONS    Activity:    NO driving for 4 weeks after surgery. You may ride as a passenger.  NO lifting, pushing, or pulling more than 10 pounds for 6 weeks.  For the next 6 weeks, keep your elbows close to your body and move within a pain-free motion when lifting, pushing or pulling.  Do not stretch both arms backwards at the same time.    Walk at least 4 times per day, there is no maximum. The goal is to increase your distance over time.  Continue using incentive spirometer for 2 weeks or until your baseline volume is reached.  If you are going home on oxygen and you were not on oxygen prior to surgery, keep using until you are oxygen free.  Weigh yourself daily.  Call your Cardiologist for a weight gain of 3 or more pounds in 1 day or more than 5 pounds in 7 days.  Take all of your medications as prescribed. Do not use a pill box for the first month at home. If you have questions, please call your nurse navigator at [phone].  Continue to wear the DANIEL (compression) stockings for 2-4 weeks or until all swelling is gone. You may take them off when you are in bed or when your legs are elevated.    Incision Care:    Make sure to clean your incision(s) TWICE DAILY.  Once by showering AND once using the no rinse Foam cleanser provided in the hospital.  During the shower, cleanse the incision(s) with a perfume and dye free soap (Dial, Dove, [place])  Use gentle pressure and rub up and down over incision with your hands or a washcloth. Rinse off and pat incision(s) dry with clean towel.  Keep the incision open to air. No creams or lotions on your incision(s). No baths.  If there is any increased redness or swelling, separation of the incision line, or thick drainage from any of your incisions, call the Cardiac Surgeons ([phone]).      General Instructions:    You have been referred to Cardiac Rehab.  You can start Cardiac Rehab 30 days after surgery.  If you do  not have an appointment at the time of discharge call [phone] to schedule an appointment.  Your Primary Care Doctor typically handles home oxygen. Oxygen may be stopped when your oxygen level is consistently greater than 90.  Check with your Primary Care Doctor if you are unsure.  Take all of your medications (including pain medications) as prescribed.  Taking medications other than prescribed can result in serious injury.    For Patients Discharged with Narcotic Pain Medication:     If a refill is needed, understand that only 1 refill will be provided and you must come to the Cardiac Surgeons’ office for an appointment (72 hours’ notice is required to schedule and there are no weekend appointments).  If the pain medications you are discharged on are not working, you will need to bring your remaining prescription into the office in order to receive a new prescription.  If you were taking narcotics prior to your heart surgery, the Cardiac Surgeons will provide you with one prescription and additional medications will need to be provided by your pain management doctor.  Do not drink alcohol while taking narcotics.  Lost or stolen medications will not be refilled.  If medications are stolen, report to law enforcement.    Contact Cardiac Surgery at [phone] if you have any questions.

## 2025-04-08 NOTE — CARE PLAN
The patient is Stable - Low risk of patient condition declining or worsening    Shift Goals  Clinical Goals: Pain control, VSS, Ambulate  Patient Goals: Rest  Family Goals: DEJA    Progress made toward(s) clinical / shift goals:      Problem: Knowledge Deficit - Standard  Goal: Patient and family/care givers will demonstrate understanding of plan of care, disease process/condition, diagnostic tests and medications  Outcome: Progressing     Problem: Skin Integrity  Goal: Skin integrity is maintained or improved  Outcome: Progressing  Note: Incision healing well     Problem: Post Op Day 4 CABG/Heart Valve Replacement  Goal: Optimal care of the Post Op CABG/Heart Valve replacement Post Op Day 4  Outcome: Progressing  Intervention: Daily weights in the morning  Note: Weight completed this AM (65.6 kg)  Intervention: Shower daily and clean incisions twice daily with soap and water  Note: Incision care provided  Intervention: Up in chair for all meals  Note: Up in chair for all meals  Intervention: Ambulate 4 times daily, increasing the distance each time  Note: Patient ambulated 4 times in the whole day (twice during night shift, increasing distance the second walk)  Intervention: IS q 1 hour while awake and record best IS volume  Note: IS done each hour while awake. Best volume 1000  Intervention: Consider removal of morrell, chest tube and pacer wires if not already done  Note: Done     Problem: Hemodynamics  Goal: Patient's hemodynamics, fluid balance and neurologic status will be stable or improve  Outcome: Progressing  Note: Patient remains hemodynamically stable     Problem: Respiratory  Goal: Patient will achieve/maintain optimum respiratory ventilation and gas exchange  Outcome: Progressing  Note: Patient weaned to room air     Problem: Pain - Standard  Goal: Alleviation of pain or a reduction in pain to the patient’s comfort goal  Outcome: Progressing  Note: Patient's good pain relief with tylenol

## 2025-04-08 NOTE — CARE PLAN
The patient is Stable - Low risk of patient condition declining or worsening    Shift Goals  Clinical Goals: pain control, Ambulate  Patient Goals: rest  Family Goals: DEJA    Progress made toward(s) clinical / shift goals:    Problem: Fall Risk  Goal: Patient will remain free from falls  Outcome: Progressing     Problem: Knowledge Deficit - Standard  Goal: Patient and family/care givers will demonstrate understanding of plan of care, disease process/condition, diagnostic tests and medications  Outcome: Progressing     Problem: Skin Integrity  Goal: Skin integrity is maintained or improved  Outcome: Progressing     Problem: Post Op Day 4 CABG/Heart Valve Replacement  Goal: Optimal care of the Post Op CABG/Heart Valve replacement Post Op Day 4  Outcome: Progressing  Intervention: Discharge Education  Note: Hernandez CALHOUN and Gertrudis RN provided education  Intervention: Add special instructions for cardiac surgery discharge instructions to after visit summary and review with patient  Note: Education added for discharge paperwork       Patient is not progressing towards the following goals:

## 2025-04-08 NOTE — DISCHARGE SUMMARY
DISCHARGE SUMMARY    ADMISSION DATE: 4/4/2025    DISCHARGE DATE: 4/8/25    ADMITTING DIAGNOSES:     DISCHARGE DIAGNOSES: Severe symptomatic mitral regurgitation, mitral valve prolapse, hypothyroidism, s/p meningioma resection, hypertension, thrombocythemia     PROCEDURES PERFORMED: Severe symptomatic mitral regurgitation, mitral valve prolapse, hypothyroidism, s/p meningioma resection, hypertension, thrombocythemia     HISTORY OF PRESENT ILLNESS:  The patient is a 74 y.o. female with history of hypothyroidism, meningioma, hypertension, thrombocythemia, and severe mitral regurgitation. Today she states she has some increasing shortness of breath for the last coupe of weeks. She denies  chest pain, lower extremity edema, dizziness, syncope, orthopnea, or PND. She remains active and can walk about 1/2 mile before having to stop and rest.     HOSPITAL COURSE:   POD 1 Extubated.  Off gtts. Neuro intact.  Wounds CDI. Abdomen soft nontender.  Cr 0.64  Hgb 10.6.  Plan: Encourage IS/ambulation.  Wean oxygen as tolerated.  Diurese when bp improved.  Keep mediastinal tubes/morrell for strict I/Os.      POD 2 HDS. SB/SR.  1LNC.  Neuro intact.  Hematoma superior aspect of mediastinal incision.  Cr 0.58 Hgb 10.4.  Pacer wires removed.  Plan: Diurese.  Remove mediastinal tubes and morrell.  Wean oxygen.  BB when bradycardia improves.  Transfer to [facility].     POD 3 HDS, SR, Room air. Neuro intact. Wounds- hematoma to prox pole, non drainage. Fluid up 3L, wt up 3kg, Cr: 0.74. Abd soft, NT, BM+.  Plan: Increase diuresis. Shower. Therapy frank hernandez, d/w RN. Encourage Is/Ambulation. Anticipate home in next 1-2 days.     POD 4 HDS. SB. RA.  Neuro intact.  Wounds CDI.  Abdomen soft nontender.  Cr 0.54 Hgb 10.5.  Plan: DC home with HH INR draws.  Decrease diuretic.  Encourage IS/ambulation.  Increase coumadin to 5mg daily.  Parameters given for BB use at home.    TELEMETRY:  4/5 SR  4/6 SB/SR  4/7 SB/SR 50-60s  4/8 SB    RECENT LABS:      Lab Results   Component Value Date/Time    SODIUM 140 04/08/2025 12:00 AM    POTASSIUM 4.0 04/08/2025 12:00 AM    CHLORIDE 103 04/08/2025 12:00 AM    CO2 28 04/08/2025 12:00 AM    GLUCOSE 108 (H) 04/08/2025 12:00 AM    BUN 17 04/08/2025 12:00 AM    CREATININE 0.54 04/08/2025 12:00 AM      Lab Results   Component Value Date/Time    WBC 7.9 04/08/2025 12:00 AM    RBC 3.47 (L) 04/08/2025 12:00 AM    HEMOGLOBIN 10.5 (L) 04/08/2025 12:00 AM    HEMATOCRIT 32.7 (L) 04/08/2025 12:00 AM    MCV 94.2 04/08/2025 12:00 AM    MCH 30.3 04/08/2025 12:00 AM    MCHC 32.1 (L) 04/08/2025 12:00 AM    MPV 10.1 04/08/2025 12:00 AM    NEUTSPOLYS 67.60 04/02/2025 11:42 AM    LYMPHOCYTES 23.60 04/02/2025 11:42 AM    MONOCYTES 7.40 04/02/2025 11:42 AM    EOSINOPHILS 0.70 04/02/2025 11:42 AM    BASOPHILS 0.40 04/02/2025 11:42 AM    ANISOCYTOSIS 2+ (A) 06/15/2021 10:36 AM      Lab Results   Component Value Date/Time    PROTHROMBTM 15.0 (H) 04/08/2025 06:00 AM    INR 1.17 (H) 04/08/2025 06:00 AM        Fluids:    Intake/Output Summary (Last 24 hours) at 4/8/2025 0956  Last data filed at 4/8/2025 0828  Gross per 24 hour   Intake --   Output 3425 ml   Net -3425 ml     Admit weight: Weight: 62.4 kg (137 lb 9.1 oz)  Current weight: Weight: 65.6 kg (144 lb 10 oz) (04/08/25 0500)    ALLERGIES:     Penicillins, Dust mite extract, Pollen extract, and Seasonal    EJECTION FRACTION:  65%    CARDIAC MEDICATIONS:    ASA - Yes     Plavix - No; contraindicated because of On Coumadin / NOAC     Post-operative Beta Blockers - Yes     Ace/ARB- No; contraindicated because of Normal EF     ARNI-  No; contraindicated because of Normal EF     Statin - No; contraindicated because of No CAD     Aldactone- No; contraindicated because of Normal EF     SGLT2i-  No; contraindicated because of Normal EF    DISCHARGE MEDICATIONS:      Medication List        ASK your doctor about these medications        Instructions   acetaminophen 500 MG Tabs  Commonly known as:  Tylenol   Take 500 mg by mouth every 6 hours as needed for Mild Pain.  Dose: 500 mg     aspirin 81 MG EC tablet   Take 81 mg by mouth every day.  Dose: 81 mg     diphenhydrAMINE 25 MG Tabs  Commonly known as: Benadryl   Take 25 mg by mouth every 6 hours as needed (for sinus congestion).  Dose: 25 mg     hydroCHLOROthiazide 25 MG Tabs   TAKE 1 TABLET BY MOUTH EVERY DAY  Dose: 25 mg     levothyroxine 100 MCG Tabs  Commonly known as: Synthroid   Take 100 mcg by mouth every morning on an empty stomach. Indications: Underactive Thyroid  Dose: 100 mcg     oxymetazoline 0.05 % Soln  Commonly known as: Afrin   Administer 2 Sprays into affected nostril(S) 1 time a day as needed for Congestion.  Dose: 2 Spray              NARCOTIC PAIN MEDICATIONS:   In prescribing controlled substances to this patient, I certify that I have obtained and reviewed their medical history. I have also made a good ana laura effort to obtain applicable records from other providers who have treated the patient .    I have conducted a physical exam and documented it. I have reviewed the patient's prescription history as maintained by the [place] Prescription Monitoring Program.     I have assessed the patient’s risk for abuse, dependency, and addiction using the validated Opioid Risk Tool.    Given the above, I believe the benefits of controlled substance therapy outweigh the risks. The reasons for prescribing controlled substances include non-narcotic, oral analgesic alternatives have been inadequate for pain control. Accordingly, I have discussed the risk and benefits, treatment plan, and alternative therapies with the patient.     Pt understands this prescription is a controlled substance which is potentially habit-forming and its use is regulated by the [employer]. It must be submitted to the pharmacy within 5 days of the date written and can not be called in or faxed to the pharmacy. Refills are subject to terms of a medicine agreement. Any refill requires  a new prescription that must be obtained from this office during regular office hours Monday through Thursday 7 am to 4 pm. We ask for 72 hours notice to get an appointment for a narcotic pain medication refill. This medicine can cause nausea, significant constipation, sedation, confusion.     DIET:   Cardiac diet    DISCHARGE INSTRUCTIONS DISCUSSED WITH THE PATIENT:      1. NO driving for 4 weeks after surgery. You may ride as a passenger.  2. NO lifting of any item over 10 lbs (e.g. gallon of milk) for 6 weeks after surgery.  3. DO walk as much as possible! Walk a minimum of once a day. Depending on your fatigue and comfort level, you may walk as much as you wish. There is no maximum.  4. Other physical activities (sex, housework, gardening, etc.) are OK after 4 weeks   5. Continue using incentive spirometer for 2 weeks, especially if going home on oxygen.    Incision Care:  1. SHOWER ONLY - no baths. Clean incision daily with plain Ivory ® soap or any other dye or perfume free soap. Then pat incision dry with clean towel. Avoid creams or lotions on the incision(s).  a. If there is any increase in redness or swelling, or separation of the incision line, or thick drainage* from any of the incisions, call right away  * Clear, thin drainage is not abnormal especially from the leg incision and/or                         chest tube sites.  2. Continue to wear your DANIEL Stockings for 4 weeks. You may take off the stockings when in bed or when the legs are elevated.    Patient instructed to call [facility] at [phone]  if any increased shortness of breath, uncontrolled pain, weight gain greater than 3 pounds in 1 day or 5 pounds in 1 week, SBP >140, HR <60 or redness swelling or drainage of incisions.      FOLLOW-UP:   Future Appointments   Date Time Provider Department Center   4/29/2025 12:45 PM GRANT Horn [facility] None   5/5/2025  1:15 PM CT RESOURCE PROVIDER [facility] None   6/20/2025 11:40 AM  Jemma Martin P.A.-C. [facility] [place]

## 2025-04-08 NOTE — PROGRESS NOTES
Received bedside report from RN, pt care assumed, VSS, pt assessment complete. Pt AAOx4, on RA, with no complaints of pain at this time. No signs of acute distress noted at this time. Plan of care discussed with pt and verbalizes no questions. Pt denies any additional needs at this time. Bed locked/in lowest position, bed alarm on, pt educated on fall risk and verbalized understanding, call light within reach, hourly rounding initiated.

## 2025-04-08 NOTE — THERAPY
Physical Therapy   Initial Evaluation     Patient Name: Ailin Good  Age:  74 y.o., Sex:  female  Medical Record #: [id number]  Today's Date: 4/8/2025     Precautions  Precautions: Fall Risk;Sternal Precautions (See Comments);Cardiac Precautions (See Comments);Swallow Precautions (swallow per SLP)    Assessment  Patient is 74 y.o. female with Mitral valve repair with left atrial appendage ligation 4/4/25. PMH includes HTN, hypothyroidism, severe mitral regurgitation. Lives alone, 1 JOSHUA, FWW, Wi +GB, I dogs daily. Pt reports she lives alone and was independent with self care and mobility at baseline sans AD. Today, pt demonstrated satisfactory ability with bed mobility, tranfers, ambulation, and stairs with FWW. Pt demonstrating good awareness of sternal precautions throughout mobility. Pt with no further need for skilled PT in the acute setting at this time. Anticipate pt to be able to D/C home without further PT needs.                                                                     Plan    Physical Therapy Initial Treatment Plan   Duration: (P) Evaluation only    DC Equipment Recommendations: (P) None (DME needs met at home)  Discharge Recommendations: (P) Anticipate that the patient will have no further physical therapy needs after discharge from the hospital       Subjective    Pt stating she had hypoglycemic incident this morning, but starting to feel better now.     Objective       04/08/25 1448   Precautions   Precautions Fall Risk;Sternal Precautions (See Comments);Cardiac Precautions (See Comments);Swallow Precautions  (swallow per SLP)   Vitals   Pulse 65   Pulse Oximetry 92 %   O2 (LPM) 0   O2 Delivery Device None - Room Air   Pain 0 - 10 Group   Location Chest;Incision   Description Sore   Therapist Pain Assessment 2;Post Activity Pain Same as Prior to Activity   Prior Living Situation   Prior Services Home-Independent   Housing / Facility [facility]   Steps Into Home 1  (platform)   Steps In Home 0    Bathroom Set up Walk In Shower   Equipment Owned Front-Wheel Walker   Lives with - Patient's Self Care Capacity Alone and Able to Care For Self   Comments sister will stay and usman staying for a while; sister lives 3 min away   Prior Level of Functional Mobility   Bed Mobility Independent   Transfer Status Independent   Ambulation Independent   Ambulation Distance community   Assistive Devices Used None   Stairs Independent   Comments walks dogs daily 10-30 min   History of Falls   History of Falls Yes   Date of Last Fall   (per notes trip and fall while gardening)   Cognition    Cognition / Consciousness WDL   Comments pleasant, cooperative   Strength Lower Body   Comments WFL for mobility   Sensation Lower Body   Comments no N/T reported   Lower Body Muscle Tone   Comments WFL for mobility   Coordination Lower Body    Comments WFL for mobility   Balance Assessment   Sitting Balance (Static) Good   Sitting Balance (Dynamic) Good   Standing Balance (Static) Fair +   Standing Balance (Dynamic) Fair   Weight Shift Sitting Good   Weight Shift Standing Fair   Comments with FWW   Bed Mobility    Supine to Sit Supervised   Sit to Supine Supervised   Scooting Supervised   Rolling Supervised   Comments reviewed log roll; flat bed, no rails; good application of precautions   Gait Analysis   Gait Level Of Assist Supervised   Assistive Device Front Wheel Walker   Distance (Feet) 200   # of Times Distance was Traveled 1   Deviation Decreased Heel Strike;Decreased Toe Off   # of Stairs Climbed 2   Level of Assist with Stairs Supervised   Weight Bearing Status no restriction   Comments verbal instruction and demo for platform step versus staircase   Functional Mobility   Sit to Stand Supervised   Bed, Chair, Wheelchair Transfer Supervised   Toilet Transfers Standby Assist   Transfer Method Stand Step   Mobility chair->stand->amb->toilet->stand->amb->stairs->amb->sit EOB->supine->sit EOB->supine   Comments no LOB   6 Clicks  Assessment - How much HELP from from another person do you currently need... (If the patient hasn't done an activity recently, how much help from another person do you think he/she would need if he/she tried?)   Turning from your back to your side while in a flat bed without using bedrails? 4   Moving from lying on your back to sitting on the side of a flat bed without using bedrails? 4   Moving to and from a bed to a chair (including a wheelchair)? 4   Standing up from a chair using your arms (e.g., wheelchair, or bedside chair)? 4   Walking in hospital room? 4   Climbing 3-5 steps with a railing? 3   6 clicks Mobility Score 23   Activity Tolerance   Sitting in Chair found up in chair   Sitting Edge of Bed 2 min approx   Standing 8 min approx   Comments tolerated well   Edema / Skin Assessment   Comments purple brusing at incision   Education Group   Education Provided Cardiac Precautions;Sternal Precautions;Gait Training;Stair Training;Role of Physical Therapist   Cardiac Precautions Patient Response Patient;Acceptance;Explanation;Action Demonstration   Sternal Precautions Patient Response Patient;Acceptance;Explanation;Verbal Demonstration;Action Demonstration   Role of Physical Therapist Patient Response Patient;Acceptance;Explanation;Verbal Demonstration   Gait Training Patient Response Patient;Acceptance;Explanation;Verbal Demonstration;Action Demonstration   Stair Training Patient Response Patient;Acceptance;Explanation;Demonstration;Verbal Demonstration;Action Demonstration   Physical Therapy Initial Treatment Plan    Duration Evaluation only   Problem List    Problems Pain;Decreased Activity Tolerance   Anticipated Discharge Equipment and Recommendations   DC Equipment Recommendations None  (DME needs met at home)   Discharge Recommendations Anticipate that the patient will have no further physical therapy needs after discharge from the hospital

## 2025-04-08 NOTE — PROGRESS NOTES
Inpatient Anticoagulation Service Note for 2025      Reason for Anticoagulation: Mitral Valve Repair           Hemoglobin Value: (!) 10.5  Hematocrit Value: (!) 32.7  Lab Platelet Value: 446  Target INR: 2.0 to 3.0     Date/Time INR Value    25 0600 1.17     25 0530 1.19     25 0140 1.13     25 0404 1.12     25 1055 1.35            Date/Time Dose (mg)    25 1012 5     25 1447 2.5     25 0643 2.5     25 1101 2.5           Average Dose (mg):  (New Start)  Significant Interactions: Antiplatelet Medications  Bridge Therapy: No (DVT prophylaxis with lovenox)     Reversal Agent Administered: Not Applicable  Comments: INR remains sub-therapeutic at 1.17 today. Will increase dose to 5mg tonight. No significant DDI noted. H/H stable. Plan to continue Warfarin 5 mg daily + lovenox 40 mg daily until INR is therapeutic x2.    Plan:  Increase dose to 5mg and continue DVT prophylaxis with lovenox until INR therapeutic x2  Education Material Provided?: No    Pharmacist suggested discharge dosinmg PO QD. If discharging prior to therapeutic level, recommend INR follow up within 48 hours of discharge.     tSefani Haile, PharmD

## 2025-04-08 NOTE — PROGRESS NOTES
Monitor Summary:     Rhythm: SB-SR  Rate: 58-64  Ectopy: (F) PVC (O) PAC (R) couplets/bigem  Measurements: 0.14/0.10/0.38                      12 Hour Chart Check

## 2025-04-08 NOTE — PROGRESS NOTES
Bedside report received and patient care assumed. Pt is resting in bed, A&O 4, with complaints of mild incision pain, and is on room air. Tele box on. All fall precautions are in place, belongings at bedside table.  Pt was updated on POC, no questions or concerns. Pt educated on use of call light for assistance.

## 2025-04-09 ENCOUNTER — PHARMACY VISIT (OUTPATIENT)
Dept: PHARMACY | Facility: MEDICAL CENTER | Age: 75
End: 2025-04-09
Payer: COMMERCIAL

## 2025-04-09 ENCOUNTER — TELEPHONE (OUTPATIENT)
Dept: HOME HEALTH SERVICES | Facility: HOME HEALTHCARE | Age: 75
End: 2025-04-09
Payer: MEDICARE

## 2025-04-09 VITALS
RESPIRATION RATE: 16 BRPM | OXYGEN SATURATION: 91 % | SYSTOLIC BLOOD PRESSURE: 106 MMHG | HEART RATE: 56 BPM | BODY MASS INDEX: 26.37 KG/M2 | HEIGHT: 62 IN | DIASTOLIC BLOOD PRESSURE: 66 MMHG | TEMPERATURE: 97.3 F | WEIGHT: 143.3 LBS

## 2025-04-09 LAB
ANION GAP SERPL CALC-SCNC: 9 MMOL/L (ref 7–16)
BUN SERPL-MCNC: 22 MG/DL (ref 8–22)
CALCIUM SERPL-MCNC: 7.8 MG/DL (ref 8.5–10.5)
CHLORIDE SERPL-SCNC: 100 MMOL/L (ref 96–112)
CO2 SERPL-SCNC: 30 MMOL/L (ref 20–33)
CREAT SERPL-MCNC: 0.65 MG/DL (ref 0.5–1.4)
ERYTHROCYTE [DISTWIDTH] IN BLOOD BY AUTOMATED COUNT: 48 FL (ref 35.9–50)
GFR SERPLBLD CREATININE-BSD FMLA CKD-EPI: 93 ML/MIN/1.73 M 2
GLUCOSE BLD STRIP.AUTO-MCNC: 119 MG/DL (ref 65–99)
GLUCOSE SERPL-MCNC: 134 MG/DL (ref 65–99)
HCT VFR BLD AUTO: 32.5 % (ref 37–47)
HGB BLD-MCNC: 10.5 G/DL (ref 12–16)
INR PPP: 1.28 (ref 0.87–1.13)
MCH RBC QN AUTO: 30.1 PG (ref 27–33)
MCHC RBC AUTO-ENTMCNC: 32.3 G/DL (ref 32.2–35.5)
MCV RBC AUTO: 93.1 FL (ref 81.4–97.8)
PLATELET # BLD AUTO: 488 K/UL (ref 164–446)
PMV BLD AUTO: 9.9 FL (ref 9–12.9)
POTASSIUM SERPL-SCNC: 4.2 MMOL/L (ref 3.6–5.5)
PROTHROMBIN TIME: 16.1 SEC (ref 12–14.6)
RBC # BLD AUTO: 3.49 M/UL (ref 4.2–5.4)
SODIUM SERPL-SCNC: 139 MMOL/L (ref 135–145)
WBC # BLD AUTO: 8.8 K/UL (ref 4.8–10.8)

## 2025-04-09 PROCEDURE — 700102 HCHG RX REV CODE 250 W/ 637 OVERRIDE(OP)

## 2025-04-09 PROCEDURE — 700111 HCHG RX REV CODE 636 W/ 250 OVERRIDE (IP)

## 2025-04-09 PROCEDURE — A9270 NON-COVERED ITEM OR SERVICE: HCPCS

## 2025-04-09 PROCEDURE — 82962 GLUCOSE BLOOD TEST: CPT

## 2025-04-09 PROCEDURE — 85610 PROTHROMBIN TIME: CPT

## 2025-04-09 PROCEDURE — 80048 BASIC METABOLIC PNL TOTAL CA: CPT

## 2025-04-09 PROCEDURE — 85027 COMPLETE CBC AUTOMATED: CPT

## 2025-04-09 PROCEDURE — RXMED WILLOW AMBULATORY MEDICATION CHARGE: Performed by: NURSE PRACTITIONER

## 2025-04-09 PROCEDURE — 99024 POSTOP FOLLOW-UP VISIT: CPT | Performed by: NURSE PRACTITIONER

## 2025-04-09 RX ORDER — ONDANSETRON 4 MG/1
4 TABLET, ORALLY DISINTEGRATING ORAL EVERY 6 HOURS PRN
Qty: 20 TABLET | Refills: 0 | Status: SHIPPED | OUTPATIENT
Start: 2025-04-09 | End: 2025-04-29

## 2025-04-09 RX ADMIN — POTASSIUM CHLORIDE 40 MEQ: 1500 TABLET, EXTENDED RELEASE ORAL at 05:31

## 2025-04-09 RX ADMIN — LEVOTHYROXINE SODIUM 100 MCG: 0.1 TABLET ORAL at 05:31

## 2025-04-09 RX ADMIN — FUROSEMIDE 40 MG: 10 INJECTION, SOLUTION INTRAVENOUS at 05:34

## 2025-04-09 RX ADMIN — Medication 1 APPLICATOR: at 08:28

## 2025-04-09 RX ADMIN — OMEPRAZOLE 20 MG: 20 CAPSULE, DELAYED RELEASE ORAL at 05:31

## 2025-04-09 RX ADMIN — ACETAMINOPHEN 1000 MG: 500 TABLET, FILM COATED ORAL at 05:31

## 2025-04-09 RX ADMIN — ASPIRIN 81 MG: 81 TABLET, COATED ORAL at 05:31

## 2025-04-09 ASSESSMENT — PAIN DESCRIPTION - PAIN TYPE: TYPE: ACUTE PAIN

## 2025-04-09 ASSESSMENT — FIBROSIS 4 INDEX: FIB4 SCORE: 0.79

## 2025-04-09 NOTE — DISCHARGE SUMMARY
DISCHARGE SUMMARY    ADMISSION DATE: 4/4/2025    DISCHARGE DATE: 4/8/25    ADMITTING DIAGNOSES:     DISCHARGE DIAGNOSES: Severe symptomatic mitral regurgitation, mitral valve prolapse, hypothyroidism, s/p meningioma resection, hypertension, thrombocythemia     PROCEDURES PERFORMED: Severe symptomatic mitral regurgitation, mitral valve prolapse, hypothyroidism, s/p meningioma resection, hypertension, thrombocythemia     HISTORY OF PRESENT ILLNESS:  The patient is a 74 y.o. female with history of hypothyroidism, meningioma, hypertension, thrombocythemia, and severe mitral regurgitation. Today she states she has some increasing shortness of breath for the last coupe of weeks. She denies  chest pain, lower extremity edema, dizziness, syncope, orthopnea, or PND. She remains active and can walk about 1/2 mile before having to stop and rest.     HOSPITAL COURSE:   POD 1 Extubated.  Off gtts. Neuro intact.  Wounds CDI. Abdomen soft nontender.  Cr 0.64  Hgb 10.6.  Plan: Encourage IS/ambulation.  Wean oxygen as tolerated.  Diurese when bp improved.  Keep mediastinal tubes/morrell for strict I/Os.      POD 2 HDS. SB/SR.  1LNC.  Neuro intact.  Hematoma superior aspect of mediastinal incision.  Cr 0.58 Hgb 10.4.  Pacer wires removed.  Plan: Diurese.  Remove mediastinal tubes and morrell.  Wean oxygen.  BB when bradycardia improves.  Transfer to [facility].     POD 3 HDS, SR, Room air. Neuro intact. Wounds- hematoma to prox pole, non drainage. Fluid up 3L, wt up 3kg, Cr: 0.74. Abd soft, NT, BM+.  Plan: Increase diuresis. Shower. Therapy frank hernandez, d/w RN. Encourage Is/Ambulation. Anticipate home in next 1-2 days.     POD 4 HDS. SB. RA.  Neuro intact.  Wounds CDI.  Abdomen soft nontender.  Cr 0.54 Hgb 10.5.  Plan: DC home with HH INR draws.  Decrease diuretic.  Encourage IS/ambulation.  Increase coumadin to 5mg daily.  Parameters given for BB use at home.    POD 5 HDS. SB/SR. RA. Neuro intact.  Wounds CDI.  Abdomen softnontender. Cr  0.65 Hgb 10.5.  Hypoglycemic yesterday, SB.  Improved today. Plan: DC BB, resume as outpatient if tolerated. Add PRN zofran.  DC home today.     TELEMETRY:  4/5 SR  4/6 SB/SR  4/7 SB/SR 50-60s  4/8 SB  4/9 SB/SR    RECENT LABS:     Lab Results   Component Value Date/Time    SODIUM 139 04/09/2025 12:58 AM    POTASSIUM 4.2 04/09/2025 12:58 AM    CHLORIDE 100 04/09/2025 12:58 AM    CO2 30 04/09/2025 12:58 AM    GLUCOSE 134 (H) 04/09/2025 12:58 AM    BUN 22 04/09/2025 12:58 AM    CREATININE 0.65 04/09/2025 12:58 AM      Lab Results   Component Value Date/Time    WBC 8.8 04/09/2025 12:58 AM    RBC 3.49 (L) 04/09/2025 12:58 AM    HEMOGLOBIN 10.5 (L) 04/09/2025 12:58 AM    HEMATOCRIT 32.5 (L) 04/09/2025 12:58 AM    MCV 93.1 04/09/2025 12:58 AM    MCH 30.1 04/09/2025 12:58 AM    MCHC 32.3 04/09/2025 12:58 AM    MPV 9.9 04/09/2025 12:58 AM    NEUTSPOLYS 67.60 04/02/2025 11:42 AM    LYMPHOCYTES 23.60 04/02/2025 11:42 AM    MONOCYTES 7.40 04/02/2025 11:42 AM    EOSINOPHILS 0.70 04/02/2025 11:42 AM    BASOPHILS 0.40 04/02/2025 11:42 AM    ANISOCYTOSIS 2+ (A) 06/15/2021 10:36 AM      Lab Results   Component Value Date/Time    PROTHROMBTM 16.1 (H) 04/09/2025 03:30 AM    INR 1.28 (H) 04/09/2025 03:30 AM        Fluids:    Intake/Output Summary (Last 24 hours) at 4/9/2025 1012  Last data filed at 4/9/2025 0843  Gross per 24 hour   Intake 950 ml   Output 1100 ml   Net -150 ml     Admit weight: Weight: 62.4 kg (137 lb 9.1 oz)  Current weight: Weight: 65 kg (143 lb 4.8 oz) (04/09/25 0544)    ALLERGIES:     Penicillins, Dust mite extract, Pollen extract, and Seasonal    EJECTION FRACTION:  65%    CARDIAC MEDICATIONS:    ASA - Yes     Plavix - No; contraindicated because of On Coumadin / NOAC     Post-operative Beta Blockers - No; contraindicated because of bradycardia     Ace/ARB- No; contraindicated because of Normal EF     ARNI-  No; contraindicated because of Normal EF     Statin - No; contraindicated because of No CAD     Aldactone-  No; contraindicated because of Normal EF     SGLT2i-  No; contraindicated because of Normal EF    DISCHARGE MEDICATIONS:      Medication List        START taking these medications        Instructions   furosemide 20 MG Tabs  Commonly known as: Lasix   Take 1 Tablet by mouth every day.  Dose: 20 mg     ondansetron 4 MG Tbdp  Commonly known as: Zofran ODT   Take 1 Tablet by mouth every 6 hours as needed for Nausea/Vomiting.  Dose: 4 mg     potassium chloride SA 20 MEQ Tbcr  Commonly known as: Kdur   Take 1 Tablet by mouth every day.  Dose: 20 mEq     traMADol 50 MG Tabs  Commonly known as: Ultram   Take 1 Tablet by mouth every 6 hours as needed for Moderate Pain or Severe Pain for up to 14 days.  Dose: 50 mg     warfarin 5 MG Tabs  Commonly known as: Coumadin   Take 1 Tablet by mouth every day.  Dose: 5 mg            CONTINUE taking these medications        Instructions   acetaminophen 500 MG Tabs  Commonly known as: Tylenol   Take 500 mg by mouth every 6 hours as needed for Mild Pain.  Dose: 500 mg     aspirin 81 MG EC tablet   Take 81 mg by mouth every day.  Dose: 81 mg     diphenhydrAMINE 25 MG Tabs  Commonly known as: Benadryl   Take 25 mg by mouth every 6 hours as needed (for sinus congestion).  Dose: 25 mg     levothyroxine 100 MCG Tabs  Commonly known as: Synthroid   Take 100 mcg by mouth every morning on an empty stomach. Indications: Underactive Thyroid  Dose: 100 mcg     oxymetazoline 0.05 % Soln  Commonly known as: Afrin   Administer 2 Sprays into affected nostril(S) 1 time a day as needed for Congestion.  Dose: 2 Spray            STOP taking these medications      hydroCHLOROthiazide 25 MG Tabs              NARCOTIC PAIN MEDICATIONS:   In prescribing controlled substances to this patient, I certify that I have obtained and reviewed their medical history. I have also made a good ana laura effort to obtain applicable records from other providers who have treated the patient .    I have conducted a physical exam  and documented it. I have reviewed the patient's prescription history as maintained by the [place] Prescription Monitoring Program.     I have assessed the patient’s risk for abuse, dependency, and addiction using the validated Opioid Risk Tool.    Given the above, I believe the benefits of controlled substance therapy outweigh the risks. The reasons for prescribing controlled substances include non-narcotic, oral analgesic alternatives have been inadequate for pain control. Accordingly, I have discussed the risk and benefits, treatment plan, and alternative therapies with the patient.     Pt understands this prescription is a controlled substance which is potentially habit-forming and its use is regulated by the [employer]. It must be submitted to the pharmacy within 5 days of the date written and can not be called in or faxed to the pharmacy. Refills are subject to terms of a medicine agreement. Any refill requires a new prescription that must be obtained from this office during regular office hours Monday through Thursday 7 am to 4 pm. We ask for 72 hours notice to get an appointment for a narcotic pain medication refill. This medicine can cause nausea, significant constipation, sedation, confusion.     DIET:   Cardiac diet    DISCHARGE INSTRUCTIONS DISCUSSED WITH THE PATIENT:      1. NO driving for 4 weeks after surgery. You may ride as a passenger.  2. NO lifting of any item over 10 lbs (e.g. gallon of milk) for 6 weeks after surgery.  3. DO walk as much as possible! Walk a minimum of once a day. Depending on your fatigue and comfort level, you may walk as much as you wish. There is no maximum.  4. Other physical activities (sex, housework, gardening, etc.) are OK after 4 weeks   5. Continue using incentive spirometer for 2 weeks, especially if going home on oxygen.    Incision Care:  1. SHOWER ONLY - no baths. Clean incision daily with plain Ivory ® soap or any other dye or perfume free soap. Then pat incision dry  with clean towel. Avoid creams or lotions on the incision(s).  a. If there is any increase in redness or swelling, or separation of the incision line, or thick drainage* from any of the incisions, call right away  * Clear, thin drainage is not abnormal especially from the leg incision and/or                         chest tube sites.  2. Continue to wear your DANIEL Stockings for 4 weeks. You may take off the stockings when in bed or when the legs are elevated.    Patient instructed to call [facility] cardiac surgery at [phone]  if any increased shortness of breath, uncontrolled pain, weight gain greater than 3 pounds in 1 day or 5 pounds in 1 week, SBP >140, HR <60 or redness swelling or drainage of incisions.      FOLLOW-UP:   Future Appointments   Date Time Provider Department Center   4/29/2025 12:45 PM GRANT Horn [facility] None   5/5/2025  1:15 PM CT RESOURCE PROVIDER [facility] None   6/20/2025 11:40 AM ELIANE PeñaM Feliz

## 2025-04-09 NOTE — DISCHARGE PLANNING
TCN following. HTH/SCP chart reviewed. No new TCN needs identified. Please see prior TCN note from 4/7/25 for most recent discharge planning considerations if indicated. Current discharge considerations are anticipating that pt will dc to home with [facility]  services (accepted) when medically cleared.  Per chart review, patient has a discharge order.  Noted current 6 click scores 22 ADL's and 23 mobility and per kardex mobility documented at 10 feet X 2 with FWW.  PT/OT recommend no needs.      Please reach out to TCN via voalte if any additional transitional discharge planning needs are indicated. Thank you.    Completed:  Anticipate that the patient will have no further physical therapy needs on 4/8.  DC Equipment Recommendations: None (DME needs met at home).    OT consult 4/7 anticipating that the patient will have no further occupational therapy needs after discharge. Orders noted for PT consult as appropriate  Choice obtained: Please see CM assessment note 4/7  SCP with [facility] PCP. Current follow ups noted to Hospital Follow Up with Nurse Practitioner SHANNON Soto. Tuesday Apr 29, 2025 12:45 PM and Post Op Monday May 5, 2025 1:15 PM.

## 2025-04-09 NOTE — TELEPHONE ENCOUNTER
Sister Helen called to schedule, I let her know we were likely sending referral over to [facility] since the patient should be seen by tomorrow and we do not have the capacity. Sister is agreeable and would like to be called for scheduling at [phone] .  Pending confirmation from PONCHO Mueller whether to send patient to [place] since she is a TCM patient. Sent to and accepted by to [facility], non-admitted from [facility].

## 2025-04-09 NOTE — DOCUMENTATION QUERY
"                                                                         [facility]                                                                       Query Response Note      PATIENT:               WENDY AUGUSTE  ACCT #:                  [id number]  MRN:                     [id number]  :                      1950  ADMIT DATE:       2025 5:01 AM  DISCH DATE:          RESPONDING  PROVIDER #:        [id number]           QUERY TEXT:    Hematoma is documented in the Medical Record.    Please clarify the clinical relevance if applicable    The patient's clinical indicators include:  Discharge summary, PN , : \" Hematoma superior aspect of mediastinal incision.  Wounds- hematoma to prox pole, non drainage...\"    DCS: thrombocythemia    CONS : PMH:  Blood dyscrasia    H/H:  12.4/37.6           10.6/33.3           10.4/31.5           10.5/32.7  Options provided:   -- Hematoma is an insignificant finding; an inherent and expected outcome related to surgical procedure   -- Hematoma was a complication related to surgical procedure   -- Other explanation, (Please specify the other explanation)      Query created by: Kayleigh Smith on 2025 12:04 PM    RESPONSE TEXT:    Hematoma is an insignificant finding; an inherent and expected outcome related to surgical procedure          Electronically signed by:  TRELL WILCOX MD 2025 9:03 AM              "

## 2025-04-09 NOTE — PROGRESS NOTES
Bedside report received and patient care assumed. Pt is resting in bed, A&O 4, with no complaints of pain, and is on room air. Tele box on. All fall precautions are in place, belongings at bedside table.  Pt was updated on POC, no questions or concerns. Pt educated on use of call light for assistance.

## 2025-04-09 NOTE — PROGRESS NOTES
Monitor Summary  Rhythm: SR  Rate: 56-62  Ectopy: (F) PVC, (rarely bigeminal)  Measurements: .15/.07/.45

## 2025-04-09 NOTE — PROGRESS NOTES
Monitor Summary:     Rhythm: SR  Rate: 60-63  Ectopy: (O) PVC  Measurements: 0.14/0.08/0.44              12 Hour Chart Check

## 2025-04-09 NOTE — PROGRESS NOTES
Bedside report received, assumed care of patient at change of shift. Chart, labs, and orders reviewed. Pt resting in bed, breathing even and unlabored on RA, denies pain and in no acute distress. A&O x4. Tele monitoring in place. Fall precautions in place and education provided. Call light within reach, bed locked and in lowest position, denies other needs at this time.

## 2025-04-09 NOTE — CARE PLAN
"The patient is Stable - Low risk of patient condition declining or worsening    Shift Goals  Clinical Goals: Ambulate, VSS, Safety  Patient Goals: Rest  Family Goals: DEJA    Progress made toward(s) clinical / shift goals:      Problem: Fall Risk  Goal: Patient will remain free from falls  Outcome: Progressing  Note: Patient free from falls, all fall precautions in place.     Problem: Knowledge Deficit - Standard  Goal: Patient and family/care givers will demonstrate understanding of plan of care, disease process/condition, diagnostic tests and medications  Outcome: Progressing     Problem: Skin Integrity  Goal: Skin integrity is maintained or improved  Outcome: Progressing       Problem: Post Op Day 5 CABG/Heart Valve Replacement  Goal: Optimal care of the Post Op CABG/Heart Valve replacement Post Op Day 5  Outcome: Progressing  Intervention: Daily weights in the morning  Note: Daily weight in AM complete  Intervention: Shower daily and clean incisions twice daily with soap and water  Note: CHG bath during night shift and partial shower due to weakness. Incision care performed twice throughout day.  Intervention: Up in chair for all meals  Note: Pt up in chair for all meals  Intervention: Ambulate 4 times daily, increasing the distance each time  Note: Ambulated pt 4 times throughout the day, increasing distance each time. Pt reports mild dizziness but no dyspnea and minimal pain with walking with FWW.  Intervention: IS q 1 hour while awake and record best IS volume  Note: Best IS still 1000, pt performs correctly with strong effort 5-10 times per hour while awake.  Intervention: Complete \"Home O2 Assessment' in vitals flowsheets if unable to wean off O2  Note: Pt does not need O2 at home  Intervention: Consider removal of morrell, chest tube and pacer wires if not already done  Note: Done     Problem: Hemodynamics  Goal: Patient's hemodynamics, fluid balance and neurologic status will be stable or improve  Outcome: " Progressing  Note: Pt continues to be hemodynamically stable       Problem: Respiratory  Goal: Patient will achieve/maintain optimum respiratory ventilation and gas exchange  Outcome: Progressing  Note: Optimum respiratory effort achieved and WOB normal     Problem: Risk for Aspiration  Goal: Patient's risk for aspiration will be absent or decrease  Note: No risk for aspiration  Outcome: Progressing       Problem: Self Care  Goal: Patient will have the ability to perform ADLs independently or with assistance (bathe, groom, dress, toilet and feed)  Outcome: Progressing     Problem: Bowel Elimination - Post Surgical  Goal: Patient will resume regular bowel sounds and function with no discomfort or distention  Outcome: Progressing  Note: Pt having episodes of diarrhea x2 during night shift.       Problem: Pain - Standard  Goal: Alleviation of pain or a reduction in pain to the patient’s comfort goal  Outcome: Progressing  Note: Pt's pain relieved with Tylenol.

## 2025-04-10 ENCOUNTER — DOCUMENTATION (OUTPATIENT)
Dept: CARDIOLOGY | Facility: MEDICAL CENTER | Age: 75
End: 2025-04-10
Payer: MEDICARE

## 2025-04-10 ENCOUNTER — ANTICOAGULATION MONITORING (OUTPATIENT)
Dept: VASCULAR LAB | Facility: MEDICAL CENTER | Age: 75
End: 2025-04-10
Payer: MEDICARE

## 2025-04-10 ENCOUNTER — TELEPHONE (OUTPATIENT)
Dept: HOME HEALTH SERVICES | Facility: HOME HEALTHCARE | Age: 75
End: 2025-04-10
Payer: MEDICARE

## 2025-04-10 ENCOUNTER — TELEPHONE (OUTPATIENT)
Dept: VASCULAR LAB | Facility: MEDICAL CENTER | Age: 75
End: 2025-04-10
Payer: MEDICARE

## 2025-04-10 DIAGNOSIS — I34.0 SEVERE MITRAL REGURGITATION: Primary | ICD-10-CM

## 2025-04-10 DIAGNOSIS — Z98.890 S/P MITRAL VALVE REPAIR: ICD-10-CM

## 2025-04-10 LAB — INR PPP: 1.4 (ref 2–3.5)

## 2025-04-10 NOTE — TELEPHONE ENCOUNTER
PT Name: .Ailin Good     PT Reports INR Value: 1.4    Date of INR Results: 4-    Concerns/Questions Reported: or N/A: N/A    Callback Number: [phone]    Thank you,     Nam GUZMAN

## 2025-04-10 NOTE — TELEPHONE ENCOUNTER
[facility]  Caller: Helen - Sister     Topic/issue: Patient wanted to advise that [facility] is coming to take her blood.     Callback Number: [phone]    Thank you,  Olivia ROGERS

## 2025-04-10 NOTE — TELEPHONE ENCOUNTER
[facility]     Received anticoagulation referral from Dr. Geller on 4/7/25.     Per chart review unclear if [facility] or [facility] will be establishing with patient. Called and s/w pt. She states that she is unable to travel at this time to be able to schedule for appointment in clinic. Called and s/w pt's sister Mary (Emergency Contact, Cell: [phone], Home: [phone]) as she has been helping to coordinate HH. Pt's sister was unsure which agency will be establishing with patient at this time, but is hoping to hear back later today. Provided [facility] phone number and requested that she call us to let us know which HH agency will be taking over care so that we can know who to send the INR order to. Pt's sister verbalized understanding.    Of note: patient is semi-familiar with warfarin as her mother was on it in the past.     1st attempt     [facility]  PCP: [facility]  Locations to be seen: Any    If no response by 5/10/25 OR 2 no shows/cancellations, will remove from referral list    Star Torres, PharmD  [facility]tico[facility]armacotherapy [facility]  Phone [phone]

## 2025-04-10 NOTE — PROGRESS NOTES
Anticoagulation Summary  As of 4/10/2025      INR goal:  2.0-3.0   TTR:  --   INR used for dosin.40 (4/10/2025)   Warfarin maintenance plan:  5 mg (5 mg x 1) every day   Weekly warfarin total:  35 mg   Plan last modified:  Jaki Martini, Pharmacy Intern (4/10/2025)   Next INR check:  2025   Target end date:  2025    Indications    Severe mitral regurgitation [I34.0]                 Anticoagulation Episode Summary       INR check location:  --    Preferred lab:  --    Send INR reminders to:  --    Comments:  --          Anticoagulation Care Providers       Provider Role Specialty Phone number    [facility] Responsible  [phone]            Refer to Anticoagulation Patient Findings for HPI  Patient Findings       Negatives:  Signs/symptoms of thrombosis, Signs/symptoms of bleeding, Laboratory test error suspected, Change in health, Change in alcohol use, Change in activity, Upcoming invasive procedure, Emergency department visit, Upcoming dental procedure, Missed doses, Extra doses, Change in medications, Change in diet/appetite, Hospital admission, Bruising, Other complaints          Clinical Outcomes       Negatives:  Major bleeding event, Thromboembolic event, Anticoagulation-related hospital admission, Anticoagulation-related ED visit, Anticoagulation-related fatality            PCP: Jemma Martin P.A.-C.  Cardiologist: Dr. Durham  Dx:  mitral valve repair  CHADSVASC = N/A  HAS-BLED = N/A  Target end date = 3 months per cardiology (2025)    Pt Hx: Pt underwent mitral valve repair on 2025. She denies history of clots, major cardiac events, or strokes    Pt is new to warfarin and new to [facility]. Discussed:   Indication for warfarin therapy and INR goal range.   Importance of monitoring and compliance.   Monitoring parameters, signs and symptoms of bleeding or clotting.  Warfarin therapy, side effects, potential DDIs, OTC medications  Hormonal therapy: No  Pregnancy:  No  DDI: No  Pt is on antiplatelet/NSAID therapy: Aspirin 81mg per cardiology as reported by patient.   Importance of diet consistency, ie vitamin K intake, supplements  Lifestyle safety, ie smoking, ETOH, hobby safety, fall safety/prevention  Procedures for missed doses or suspected missed doses, surgeries/procedures, travel, dental work, any medication changes    INR is subtherapeutic today.   Will continue to titrate pt's warfarin dose. Pt has been taking 5 mg daily including today.  Warfarin Plan:  Take 2.5 mg Friday, Sunday and 5 mg on Saturday    Recheck INR in 4 days via [facility]    Darrius Gaspar PharmD    Pt signed Anticoagulation Contract - verbally gave consent  Added [facility] to care team - Yes    CC:  Michael Bloch, MD

## 2025-04-10 NOTE — TELEPHONE ENCOUNTER
Faxed INR lab order to [facility] ([phone])    Star Torres PharmD  Authorized Nuclear Pharmacist (ANP)

## 2025-04-10 NOTE — TELEPHONE ENCOUNTER
"Home Transitional Care Management (TCM) Initial Contact Within 48 Hours of Discharge From Inpatient Setting:    This RN verfied the patient has [facility] ([facility]) and [facility] ([facility]).    This RN contacted patient Ailin via telephone. Ailin gave this RN permission to speak with sister Helen since [facility] nurse was in the home during the call.   This RN introduced self and provided a brief discription of [facility] TCM program and explained what services Home TCM provides.  This RN asked if they would like to utilize Home TCM for optimal care.   Malinda verbally consented to proceed with Home TCM services.     Discharge date from inpatient settin2025  Business date Home TCM began: 2025. Home TCM 30 day service period ends: 2025    Now that you are home, how are you feelings: Ailin replied, \"painful.\"  Do you have a follow up appointment scheduled with your PCP and or specialists: yes  Appointment date(s): 2025 cardiology  Are you able to get to your appointment(s): yes  Assistance needed scheduling follow up appointments or establishing with PCP: Will schedule with PCP.  Has [facility] contacted you: no SOC with [facility] on 4/10/2025.      Did you receive any new prescriptions: yes  Where you able to get your new prescriptions: yes  Medication reconciliation completed. Medications reviewed with Helen. Helen has received instruction on special precautions for all high risk medications and has been instructed on how and when to report problems that may occur. [facility] will check INR and report to [facility] Anticoagulation clinic.   Medication questions answered: yes    Do you have and barriers to health care such as transportation, food, paying for medications, lack of durable medical equipment or lack of caregivers: no  Who is helping you at home: sister Helen and her daughter    Do you have questions or concerns regarding your reason for admission " to the hospital: no  Do you have any questions regarding your discharge instructions: no  Education provided regarding signs and symptoms for post operative complications and when to contact Home TCM before next appointment with questions or concerns.    TCM home care contact information provided.    Current or previous attempts completed within 2 business days of discharge: 1

## 2025-04-11 ENCOUNTER — TELEPHONE (OUTPATIENT)
Dept: HOME HEALTH SERVICES | Facility: HOME HEALTHCARE | Age: 75
End: 2025-04-11
Payer: MEDICARE

## 2025-04-11 ENCOUNTER — TELEPHONE (OUTPATIENT)
Dept: HEALTH INFORMATION MANAGEMENT | Facility: OTHER | Age: 75
End: 2025-04-11
Payer: MEDICARE

## 2025-04-11 NOTE — PROGRESS NOTES
Initial anticoagulation clinic note and discharge summary reviewed  Patient started on anticoagulation status post mitral valve repair with left atrial appendage clipping    Pending further recommendations from cardiology, we will continue with 3 months of oral anti-coagulation with warfarin and indefinite low-dose aspirin.    Will defer all other cardiovascular care, aside from anticoagulation, to cardiology    Michael J. Bloch, MD  Anticoagulation Center

## 2025-04-11 NOTE — TELEPHONE ENCOUNTER
"Louann called to clarify that the appointment with Linda Davis LJ is in Ailin's home. Louann also reported that Ailin's blood pressure was 153/100 prior to taking her morning medications and 133/81 prior to this call. Ailin's weight is \"down one pound today.\" Ailin will continue to monitor her weight and vitals signs and keep a log.   "

## 2025-04-14 ENCOUNTER — TELEPHONE (OUTPATIENT)
Dept: VASCULAR LAB | Facility: MEDICAL CENTER | Age: 75
End: 2025-04-14
Payer: MEDICARE

## 2025-04-14 ENCOUNTER — OFF SITE VISIT (OUTPATIENT)
Dept: PALLIATIVE MEDICINE | Facility: HOSPICE | Age: 75
End: 2025-04-14
Payer: MEDICARE

## 2025-04-14 VITALS
HEART RATE: 64 BPM | TEMPERATURE: 98 F | DIASTOLIC BLOOD PRESSURE: 72 MMHG | SYSTOLIC BLOOD PRESSURE: 136 MMHG | BODY MASS INDEX: 25.94 KG/M2 | WEIGHT: 141.8 LBS | OXYGEN SATURATION: 95 % | RESPIRATION RATE: 16 BRPM

## 2025-04-14 DIAGNOSIS — Z98.890 S/P MVR (MITRAL VALVE REPAIR): ICD-10-CM

## 2025-04-14 ASSESSMENT — FIBROSIS 4 INDEX: FIB4 SCORE: 0.79

## 2025-04-14 ASSESSMENT — PAIN SCALES - GENERAL: PAINLEVEL_OUTOF10: 3=SLIGHT PAIN

## 2025-04-14 NOTE — PROGRESS NOTES
[facility] Transitional Care Management Home Visit      Ailin Good  74 y.o.  female  MRN [id number]  PCP Jemma Martin P.A.-C.  Location of visit: Home     History of Present Illness:    Hospital Course: Admitted 4/4/25-4/9/25 for planned mitral valve repair for severe mitral valve regurgitation, prolapse. PMH: hypothyroid, hypertension, thrombocythemia. Patient remained sinus conor/ sinus rhythm throughout hospital stay. On Warfarin 5 mg daily at this time. EF 65%    Today: Visit occurred with patient and her sister. Patient reports feeling a bit better everyday. She denies shortness of breath, cough, fevers/chills, chest pain, palpitations. She is not using her DANIEL hose but does have a massager on recline and is elevating regularly throughout the day. She is using IS and splinting with heart pillow. Milka DERAS is coming to check INRs.She had an incident of symptomatic low blood sugar (40) in the hospital with no clear etiology. Blood glucose 100-130s inpatient outside of this event. She has no history of diabetes. She felt similarly after eating a chicken salad sandwich a few days ago but drank orange juice and felt fine again. She reports right shoulder pain 3/10 and pain 1/10 at surgical site. Blood pressure was high yesterday 140s/100 they reported to LJ Bradshaw who had patient take a dose of amlodipine. She has only taken it once as her blood pressures have been back to 130s/60s. Weight is stable at 141.8 lbs today.     Assessment and Plan:    Primary Problem #1: S/p MVR    Plan: INR goal is 2.0-3.0. Patient took 2.5 mg 4/11 and 4/13, otherwise 5 mg. Recheck 4/14 with Milka DERAS. Patient reminded of bleeding precautions. She is following all post-op precautions. She has tramadol for pain but has only needed once. She states pain is not preventing her from cough and deep breathing, tylenol is adequate. Vascular/Anticoag clinic following for INRs. Post-op visit scheduled 5/5. Advised patient to monitor the  circumstances and factors leading up to any feelings like she had in the hospital with the hypoglycemia. Advised to always add protein to every meal to keep blood sugars from spiking.     TCM f/u in 1 week    Cardiology 4/29  Cardiac surgical team 5/5  PCP 6/20  Other major issues not addressed during today's visit: Osteoarthritis, BPPV, hypothyroidism    Pertinent Physical Exam Findings:  Vital Signs: /72 (BP Location: Left arm, Patient Position: Sitting, BP Cuff Size: Large adult)   Pulse 64   Temp 36.7 °C (98 °F) (Temporal)   Resp 16   Wt 64.3 kg (141 lb 12.8 oz)   SpO2 95%    Physical Exam  Constitutional:       General: She is not in acute distress.     Appearance: Normal appearance. She is not ill-appearing.   Cardiovascular:      Rate and Rhythm: Normal rate and regular rhythm.      Heart sounds: Normal heart sounds. No murmur heard.  Pulmonary:      Effort: Pulmonary effort is normal.      Breath sounds: Normal breath sounds.   Abdominal:      General: Bowel sounds are normal. There is no distension.      Palpations: Abdomen is soft.      Tenderness: There is no abdominal tenderness.   Musculoskeletal:      Right lower leg: No edema.      Left lower leg: No edema.   Skin:     General: Skin is warm and dry.      Comments: Sternal incision closed with minimal scabbing, no drainage, open to air    Neurological:      Mental Status: She is alert.   Psychiatric:         Mood and Affect: Mood normal.        Current Medications:    Current Outpatient Medications:     ondansetron (ZOFRAN ODT) 4 MG TABLET DISPERSIBLE, Dissolve 1 Tablet by mouth every 6 hours as needed for Nausea/Vomiting., Disp: 20 Tablet, Rfl: 0    furosemide (LASIX) 20 MG Tab, Take 1 Tablet by mouth every day., Disp: 30 Tablet, Rfl: 0    potassium chloride SA (KDUR) 20 MEQ Tab CR, Take 1 Tablet by mouth every day., Disp: 30 Tablet, Rfl: 0    traMADol (ULTRAM) 50 MG Tab, Take 1 Tablet by mouth every 6 hours as needed for Moderate Pain or  Severe Pain for up to 14 days., Disp: 56 Tablet, Rfl: 0    warfarin (COUMADIN) 5 MG Tab, Take 1 Tablet by mouth every day., Disp: 30 Tablet, Rfl: 3    acetaminophen (TYLENOL) 500 MG Tab, Take 500 mg by mouth every 6 hours as needed for Mild Pain., Disp: , Rfl:     oxymetazoline (AFRIN) 0.05 % Solution, Administer 2 Sprays into affected nostril(S) 1 time a day as needed for Congestion., Disp: , Rfl:     diphenhydrAMINE (BENADRYL) 25 MG Tab, Take 25 mg by mouth every 6 hours as needed (for sinus congestion)., Disp: , Rfl:     aspirin 81 MG EC tablet, Take 81 mg by mouth every day., Disp: , Rfl:     levothyroxine (SYNTHROID) 100 MCG Tab, Take 100 mcg by mouth every morning on an empty stomach. Indications: Underactive Thyroid, Disp: , Rfl:     Medication Allergies:  Penicillins, Dust mite extract, Pollen extract, and Seasonal    Thank you for allowing me the opportunity to participate in the care of Ailin Good    This visit was conducted within 7 days from hospital discharge. This is a high medical complexity visit, which in addition to above, included, if applicable, medication reconciliation and management, obtaining and reviewing discharge information, reviewing the need for diagnostic tests/treatments and/or follow-up on pending diagnostic tests/treatments, medical education, establishing or re-establishing referrals with community providers and services and assistance with scheduling follow-up visits with providers and services.    [facility]  [id number] [address] [address]  P. [phone]  F. [phone]  Available on Voalte

## 2025-04-15 ENCOUNTER — ANTICOAGULATION MONITORING (OUTPATIENT)
Dept: VASCULAR LAB | Facility: MEDICAL CENTER | Age: 75
End: 2025-04-15
Payer: MEDICARE

## 2025-04-15 ENCOUNTER — TELEPHONE (OUTPATIENT)
Dept: VASCULAR LAB | Facility: MEDICAL CENTER | Age: 75
End: 2025-04-15
Payer: MEDICARE

## 2025-04-15 DIAGNOSIS — I34.0 SEVERE MITRAL REGURGITATION: Primary | ICD-10-CM

## 2025-04-15 LAB — INR PPP: 2.1 (ref 2–3.5)

## 2025-04-15 NOTE — TELEPHONE ENCOUNTER
INR REPORTING  PT Name: Ailin Good    PT Reports INR Value: 2.1    Date of INR Results: 04/15/2025    Concerns/Questions Reported: or N/A: N/A    Callback Number: [phone]    Thank you,  Olivia ROGERS

## 2025-04-15 NOTE — PROGRESS NOTES
Anticoagulation Summary  As of 4/15/2025      INR goal:  2.0-3.0   TTR:  --   INR used for dosin.10 (4/15/2025)   Warfarin maintenance plan:  5 mg (5 mg x 1) every day   Weekly warfarin total:  35 mg   Plan last modified:  Jaki Martini, Pharmacy Intern (4/10/2025)   Next INR check:  2025   Target end date:  2025    Indications    Severe mitral regurgitation [I34.0]                 Anticoagulation Episode Summary       INR check location:  --    Preferred lab:  --    Send INR reminders to:  --    Comments:  --          Anticoagulation Care Providers       Provider Role Specialty Phone number    [facility] Responsible  [phone]          Anticoagulation Patient Findings  Patient Findings       Negatives:  Signs/symptoms of thrombosis, Signs/symptoms of bleeding, Laboratory test error suspected, Change in health, Change in alcohol use, Change in activity, Upcoming invasive procedure, Emergency department visit, Upcoming dental procedure, Missed doses, Extra doses, Change in medications, Change in diet/appetite, Hospital admission, Bruising, Other complaints            Called and spoke to patient's sister. Patient to call Milka DERAS for additional INR check later this week. Requested patient to contact the clinic for any s/sx of unusual bleeding, bruising, clotting or any changes to diet or medications.    INR therapeutic  Reason(s) for out of range INR today: N/A, determining doses    Warfarin Plan: Take 2.5 mg today, then 5 mg Wednesday.    Next INR in 2 day(s).    Jaki Martini, Pharmacy Intern

## 2025-04-16 ENCOUNTER — TELEPHONE (OUTPATIENT)
Facility: MEDICAL CENTER | Age: 75
End: 2025-04-16
Payer: MEDICARE

## 2025-04-16 NOTE — TELEPHONE ENCOUNTER
Hospital follow up call    she states that the post op pain is mostly well controlled, reports some shoulder.  Narcotics are not being utilized. Tylenol is being utilized.    she is showering everyday or every other day.    she states that all incisions are healing well and approximated with no s/s of infection (redness, puss, fever, purulent drainage, or tenderness).    she is using the IS; volume is not improved. Current volume is 1000 ml.    she is walking everyday, distance is increased from the hospital.    she has had a bowel movement since discharge.    she is obtaining daily weights. Current weight is 140 lbs which is less than the first home weights, which she claims were much higher she just doesn't have them with her. she is wearing the compression/DANIEL hose. she denies LE edema, stating her LE edema is now resolved.    she is taking all prescribed meds as directed.  she has no medication questions.    she is taking vitals (HR and BP). She states after she changed batteries in her wrist BP monitor she is no longer high on BP's and has stopped her Norvasc.     BP's: states SBP's have been 120's  HR's: 60's to 70's    she has no additional questions at this time. Follow up education was not needed on anything else. Follow up appointments were reviewed and I ensured they have my number if issues or concerns arise.      Follow up call time was 10 minutes.

## 2025-04-17 ENCOUNTER — TELEPHONE (OUTPATIENT)
Dept: VASCULAR LAB | Facility: MEDICAL CENTER | Age: 75
End: 2025-04-17
Payer: MEDICARE

## 2025-04-17 ENCOUNTER — ANTICOAGULATION MONITORING (OUTPATIENT)
Dept: VASCULAR LAB | Facility: MEDICAL CENTER | Age: 75
End: 2025-04-17
Payer: MEDICARE

## 2025-04-17 DIAGNOSIS — I34.0 SEVERE MITRAL REGURGITATION: ICD-10-CM

## 2025-04-17 LAB — INR PPP: 1.8 (ref 2–3.5)

## 2025-04-17 NOTE — PROGRESS NOTES
Anticoagulation Summary  As of 2025      INR goal:  2.0-3.0   TTR:  --   INR used for dosin.80 (2025)   Warfarin maintenance plan:  5 mg (5 mg x 1) every day   Weekly warfarin total:  35 mg   Plan last modified:  Jaki Martini, Pharmacy Intern (4/10/2025)   Next INR check:  2025   Target end date:  2025    Indications    Severe mitral regurgitation [I34.0]                 Anticoagulation Episode Summary       INR check location:  --    Preferred lab:  --    Send INR reminders to:  --    Comments:  --          Anticoagulation Care Providers       Provider Role Specialty Phone number    [facility] Responsible  [phone]          Anticoagulation Patient Findings  Patient Findings       Negatives:  Signs/symptoms of thrombosis, Signs/symptoms of bleeding, Laboratory test error suspected, Change in health, Change in alcohol use, Change in activity, Upcoming invasive procedure, Emergency department visit, Upcoming dental procedure, Missed doses, Extra doses, Change in medications, Change in diet/appetite, Hospital admission, Bruising, Other complaints            Called and spoke to patient .    INR slightly subtherapeutic  Reason(s) for out of range INR today: Determining dose requirements      Warfarin Plan: Take 2.5 mg Sat, then Continue regimen as listed above.    Next INR in 4 day(s) via [facility]    Darrius Gaspar, FabyD, BCACP

## 2025-04-17 NOTE — TELEPHONE ENCOUNTER
PT Name: Ailin Good     PT Reports INR Value: 1.8    Date of INR Results: 4/17/25    Concerns/Questions Reported: or N/A: None    Callback Number: [phone]     Thank you,  Roslyn SEWELL

## 2025-04-20 DIAGNOSIS — R53.83 FATIGUE, UNSPECIFIED TYPE: ICD-10-CM

## 2025-04-20 DIAGNOSIS — Z98.890 S/P MVR (MITRAL VALVE REPAIR): Primary | ICD-10-CM

## 2025-04-21 ENCOUNTER — TELEPHONE (OUTPATIENT)
Dept: HOME HEALTH SERVICES | Facility: HOME HEALTHCARE | Age: 75
End: 2025-04-21
Payer: MEDICARE

## 2025-04-21 ENCOUNTER — OFF SITE VISIT (OUTPATIENT)
Dept: PALLIATIVE MEDICINE | Facility: HOSPICE | Age: 75
End: 2025-04-21
Payer: MEDICARE

## 2025-04-21 VITALS
TEMPERATURE: 98 F | BODY MASS INDEX: 25.15 KG/M2 | DIASTOLIC BLOOD PRESSURE: 80 MMHG | HEART RATE: 72 BPM | SYSTOLIC BLOOD PRESSURE: 128 MMHG | RESPIRATION RATE: 16 BRPM | WEIGHT: 137.5 LBS | OXYGEN SATURATION: 96 %

## 2025-04-21 DIAGNOSIS — Z98.890 S/P MVR (MITRAL VALVE REPAIR): ICD-10-CM

## 2025-04-21 PROCEDURE — 99349 HOME/RES VST EST MOD MDM 40: CPT | Performed by: NURSE PRACTITIONER

## 2025-04-21 ASSESSMENT — FIBROSIS 4 INDEX: FIB4 SCORE: 0.79

## 2025-04-21 ASSESSMENT — PAIN SCALES - GENERAL: PAINLEVEL_OUTOF10: 2=MINIMAL-SLIGHT

## 2025-04-21 NOTE — PROGRESS NOTES
[facility] Transitional Care Management Home Visit      Ailin Good  74 y.o.  female  MRN [id number]  PCP Jemma Martin P.A.-C.  Location of visit: Home    History of Present Illness:    Hospital Course: Admitted 4/4/25-4/9/25 for planned mitral valve repair for severe mitral valve regurgitation, prolapse. PMH: hypothyroid, hypertension, thrombocythemia. Patient remained sinus conor/ sinus rhythm throughout hospital stay. On Warfarin 5 mg daily at this time. EF 65%     4/14/25: Visit occurred with patient and her sister. Patient reports feeling a bit better everyday. She denies shortness of breath, cough, fevers/chills, chest pain, palpitations. She is not using her DANIEL hose but does have a massager on recline and is elevating regularly throughout the day. She is using IS and splinting with heart pillow. Milka DERAS is coming to check INRs.She had an incident of symptomatic low blood sugar (40) in the hospital with no clear etiology. Blood glucose 100-130s inpatient outside of this event. She has no history of diabetes. She felt similarly after eating a chicken salad sandwich a few days ago but drank orange juice and felt fine again. She reports right shoulder pain 3/10 and pain 1/10 at surgical site. Blood pressure was high yesterday 140s/100 they reported to LJ Bradshaw who had patient take a dose of amlodipine. She has only taken it once as her blood pressures have been back to 130s/60s. Weight is stable at 141.8 lbs today.     Today: Visit occurred with patient and her sister. Patient had an incidence of dizziness  this past weekend. She was able to recover but she stated her dizziness and lightheadedness lasted all day. They spoke with Cardiology and stopped the lasix and potassium. She had not had a repeat incident in the last 2 days. Patient denies shortness of breath, chest pain, palpitations, n/v, lower extremity edema.         Assessment and Plan:    Primary Problem #1: S/p MVR    Plan: Lasix and potassium  are on hold per cardiology. Patient has not been taking amlodipine either. Blood pressures are ranging 130s/70s. Her last INR check 4/17 was 1.8, taking 2.5 4/19, 5 rest of week. Next INR 4/21. Cardiology also ordered CBC and CMP. Lab order relayed to Un[facility] at [facility]. Patient encouraged to keep using IS and heart pillow. Incision is healing, few areas of scab remain. No drainage or signs of infection.     Cardiology 4/29  Cardiac surgical team 5/5  PCP 6/20    TCM f/u scheduled for 5/6     Other major issues not addressed during today's visit: Osteoarthritis, BPPV, hypothyroidism    Pertinent Physical Exam Findings:  Vital Signs: /80 (BP Location: Left arm, Patient Position: Sitting, BP Cuff Size: Adult)   Pulse 72   Temp 36.7 °C (98 °F) (Temporal)   Resp 16   Wt 62.4 kg (137 lb 8 oz)   SpO2 96%    Physical Exam  Constitutional:       Appearance: Normal appearance.   Cardiovascular:      Rate and Rhythm: Normal rate and regular rhythm.      Heart sounds: Normal heart sounds.   Pulmonary:      Effort: Pulmonary effort is normal.      Breath sounds: Normal breath sounds.   Abdominal:      General: Bowel sounds are normal. There is no distension.      Palpations: Abdomen is soft.      Tenderness: There is no abdominal tenderness.   Musculoskeletal:      Right lower leg: No edema.      Left lower leg: No edema.   Skin:     General: Skin is warm and dry.   Neurological:      Mental Status: She is alert and oriented to person, place, and time.   Psychiatric:         Mood and Affect: Mood normal.         Behavior: Behavior normal.         Current Medications:    Current Outpatient Medications:     ondansetron (ZOFRAN ODT) 4 MG TABLET DISPERSIBLE, Dissolve 1 Tablet by mouth every 6 hours as needed for Nausea/Vomiting., Disp: 20 Tablet, Rfl: 0    furosemide (LASIX) 20 MG Tab, Take 1 Tablet by mouth every day., Disp: 30 Tablet, Rfl: 0    potassium chloride SA (KDUR) 20 MEQ Tab CR, Take 1 Tablet by mouth  every day., Disp: 30 Tablet, Rfl: 0    traMADol (ULTRAM) 50 MG Tab, Take 1 Tablet by mouth every 6 hours as needed for Moderate Pain or Severe Pain for up to 14 days., Disp: 56 Tablet, Rfl: 0    warfarin (COUMADIN) 5 MG Tab, Take 1 Tablet by mouth every day., Disp: 30 Tablet, Rfl: 3    acetaminophen (TYLENOL) 500 MG Tab, Take 500 mg by mouth every 6 hours as needed for Mild Pain., Disp: , Rfl:     oxymetazoline (AFRIN) 0.05 % Solution, Administer 2 Sprays into affected nostril(S) 1 time a day as needed for Congestion., Disp: , Rfl:     diphenhydrAMINE (BENADRYL) 25 MG Tab, Take 25 mg by mouth every 6 hours as needed (for sinus congestion)., Disp: , Rfl:     aspirin 81 MG EC tablet, Take 81 mg by mouth every day., Disp: , Rfl:     levothyroxine (SYNTHROID) 100 MCG Tab, Take 100 mcg by mouth every morning on an empty stomach. Indications: Underactive Thyroid, Disp: , Rfl:     Medication Allergies:  Penicillins, Dust mite extract, Pollen extract, and Seasonal    Thank you for allowing me the opportunity to participate in the care of Ailin Good    This is a moderate medical complexity visit, which in addition to above, included, if applicable, medication reconciliation and management, obtaining and reviewing discharge information, reviewing the need for diagnostic tests/treatments and/or follow-up on pending diagnostic tests/treatments, medical education, establishing or re-establishing referrals with community providers and services and assistance with scheduling follow-up visits with providers and services.      ASHTYN OntiverosGe[facility] St[facility]  [address] [address] [address]  P. [phone]  F. [phone]  Available on Voalte

## 2025-04-21 NOTE — PROGRESS NOTES
Patient sister, Helen, called with patient on the line to report that yesterday, the patient had an episode of dizziness/lightheadness, and near syncope, which resolved about 30 minutes after drinking some OJ. She reported BP: 130s/80s and HR 60-70s. She adds that today, the patient has continued to feel fatigued, and has been sitting in her chair nearly all day. She is eating and drinking normally, and denies emesis or diarrhea. She adds that she has been urinating very frequently, denying shortness of breath, weight gain, or lower extremity edema. Again reports her most recent vitals obtained during the call: BP: 132/81 and HR 76. I instructed the patient to continue to take it easy today, and to stop her lasix/kdur. The patient has an outpatient appointment with [facility]  tomorrow per chart review, and I have placed orders for CMP and CBC to be obtained tomorrow. Patient sister, Helen, expressed understanding that if the HH RN cannot draw the labs that they can have [facility]. I advised to call me back with any changes, and gave ED precautions. All questions answered to apparent satisfaction and they are amenable to this plan.

## 2025-04-21 NOTE — TELEPHONE ENCOUNTER
"Helen called stating that she called and spoke with Albert STARR last night and he ordered labs due to Ailin \"almost passing out over the weekend.\" Helen said that it would be to difficult to take Ailin to the lab today. This RN instructed Helen to call [facility] to have someone come to the home to draw the labs today. Linda Davis LJ will see Ailin today at 1330.  "

## 2025-04-22 ENCOUNTER — HOSPITAL ENCOUNTER (OUTPATIENT)
Facility: MEDICAL CENTER | Age: 75
End: 2025-04-22
Payer: MEDICARE

## 2025-04-22 ENCOUNTER — TELEPHONE (OUTPATIENT)
Facility: MEDICAL CENTER | Age: 75
End: 2025-04-22
Payer: MEDICARE

## 2025-04-22 ENCOUNTER — ANTICOAGULATION MONITORING (OUTPATIENT)
Dept: VASCULAR LAB | Facility: MEDICAL CENTER | Age: 75
End: 2025-04-22
Payer: MEDICARE

## 2025-04-22 ENCOUNTER — HOSPITAL ENCOUNTER (OUTPATIENT)
Facility: MEDICAL CENTER | Age: 75
End: 2025-04-22
Attending: INTERNAL MEDICINE
Payer: MEDICARE

## 2025-04-22 DIAGNOSIS — I34.0 SEVERE MITRAL REGURGITATION: ICD-10-CM

## 2025-04-22 LAB
ALBUMIN SERPL BCP-MCNC: 4 G/DL (ref 3.2–4.9)
ALBUMIN/GLOB SERPL: 1.3 G/DL
ALP SERPL-CCNC: 110 U/L (ref 30–99)
ALT SERPL-CCNC: 57 U/L (ref 2–50)
ANION GAP SERPL CALC-SCNC: 10 MMOL/L (ref 7–16)
AST SERPL-CCNC: 44 U/L (ref 12–45)
BASOPHILS # BLD AUTO: 1 % (ref 0–1.8)
BASOPHILS # BLD: 0.06 K/UL (ref 0–0.12)
BILIRUB SERPL-MCNC: <0.2 MG/DL (ref 0.1–1.5)
BUN SERPL-MCNC: 27 MG/DL (ref 8–22)
CALCIUM ALBUM COR SERPL-MCNC: 8.9 MG/DL (ref 8.5–10.5)
CALCIUM SERPL-MCNC: 8.9 MG/DL (ref 8.5–10.5)
CHLORIDE SERPL-SCNC: 103 MMOL/L (ref 96–112)
CO2 SERPL-SCNC: 25 MMOL/L (ref 20–33)
CREAT SERPL-MCNC: 0.68 MG/DL (ref 0.5–1.4)
EOSINOPHIL # BLD AUTO: 0.25 K/UL (ref 0–0.51)
EOSINOPHIL NFR BLD: 4.1 % (ref 0–6.9)
ERYTHROCYTE [DISTWIDTH] IN BLOOD BY AUTOMATED COUNT: 47.5 FL (ref 35.9–50)
GFR SERPLBLD CREATININE-BSD FMLA CKD-EPI: 92 ML/MIN/1.73 M 2
GLOBULIN SER CALC-MCNC: 3 G/DL (ref 1.9–3.5)
GLUCOSE SERPL-MCNC: 102 MG/DL (ref 65–99)
HCT VFR BLD AUTO: 36.6 % (ref 37–47)
HGB BLD-MCNC: 12 G/DL (ref 12–16)
IMM GRANULOCYTES # BLD AUTO: 0.02 K/UL (ref 0–0.11)
IMM GRANULOCYTES NFR BLD AUTO: 0.3 % (ref 0–0.9)
INR PPP: 2 (ref 2–3.5)
LYMPHOCYTES # BLD AUTO: 1.75 K/UL (ref 1–4.8)
LYMPHOCYTES NFR BLD: 28.5 % (ref 22–41)
MCH RBC QN AUTO: 30.3 PG (ref 27–33)
MCHC RBC AUTO-ENTMCNC: 32.8 G/DL (ref 32.2–35.5)
MCV RBC AUTO: 92.4 FL (ref 81.4–97.8)
MONOCYTES # BLD AUTO: 0.58 K/UL (ref 0–0.85)
MONOCYTES NFR BLD AUTO: 9.4 % (ref 0–13.4)
NEUTROPHILS # BLD AUTO: 3.49 K/UL (ref 1.82–7.42)
NEUTROPHILS NFR BLD: 56.7 % (ref 44–72)
NRBC # BLD AUTO: 0 K/UL
NRBC BLD-RTO: 0 /100 WBC (ref 0–0.2)
PLATELET # BLD AUTO: 821 K/UL (ref 164–446)
PMV BLD AUTO: 9.7 FL (ref 9–12.9)
POTASSIUM SERPL-SCNC: 4.3 MMOL/L (ref 3.6–5.5)
PROT SERPL-MCNC: 7 G/DL (ref 6–8.2)
RBC # BLD AUTO: 3.96 M/UL (ref 4.2–5.4)
SODIUM SERPL-SCNC: 138 MMOL/L (ref 135–145)
T4 FREE SERPL-MCNC: 1 NG/DL (ref 0.93–1.7)
TSH SERPL DL<=0.005 MIU/L-ACNC: 12.4 UIU/ML (ref 0.38–5.33)
WBC # BLD AUTO: 6.2 K/UL (ref 4.8–10.8)

## 2025-04-22 PROCEDURE — 80053 COMPREHEN METABOLIC PANEL: CPT

## 2025-04-22 PROCEDURE — 84443 ASSAY THYROID STIM HORMONE: CPT

## 2025-04-22 PROCEDURE — 85025 COMPLETE CBC W/AUTO DIFF WBC: CPT

## 2025-04-22 PROCEDURE — 84439 ASSAY OF FREE THYROXINE: CPT

## 2025-04-22 NOTE — TELEPHONE ENCOUNTER
Dizziness, similar to what occurred in the hospital with the hypoglycemia. It has happened 4 times since being home. States she sees a big white [place] in the middle of her vision when the dizziness occurs and she almost faints.  She states that a glass of orange juice or lifesavers resolves the issue. She states that her thyroid medication has been affected by past surgeries and had high BG post op.    It was suggested she get a wearable glucose monitor and make an appt to see her endocrinologist for input as her dizziness does not seem cardiac related at this time.  She was told to keep a juice box with her at all times to correct the dizziness to prevent a fall.    Albert took her off the lasix and K 4/20 evening.    BP today 160/98, normals BP's in the 130's. She was told to watch it and call me if it continues to be elevated past tonight.    137 lbs on 4/20 , she is a about 2 lb up, 139 lbs. Denies LE edema.    She was told that she needs to stop the potassium along with the lasix as she was unclear.    Call time 12 minutes

## 2025-04-22 NOTE — PROGRESS NOTES
Anticoagulation Summary  As of 2025      INR goal:  2.0-3.0   TTR:  0.0% (2 d)   INR used for dosin.00 (2025)   Warfarin maintenance plan:  5 mg (5 mg x 1) every day   Weekly warfarin total:  35 mg   Plan last modified:  Jaki Martini, Pharmacy Intern (4/10/2025)   Next INR check:  2025   Target end date:  2025    Indications    Severe mitral regurgitation [I34.0]                 Anticoagulation Episode Summary       INR check location:  --    Preferred lab:  --    Send INR reminders to:  --    Comments:  --          Anticoagulation Care Providers       Provider Role Specialty Phone number    [facility] Responsible  [phone]          Anticoagulation Patient Findings  Patient Findings       Negatives:  Signs/symptoms of thrombosis, Signs/symptoms of bleeding, Laboratory test error suspected, Change in health, Change in alcohol use, Change in activity, Upcoming invasive procedure, Emergency department visit, Upcoming dental procedure, Missed doses, Extra doses, Change in medications, Change in diet/appetite, Hospital admission, Bruising, Other complaints          Clinical Outcomes       Negatives:  Major bleeding event, Thromboembolic event, Anticoagulation-related hospital admission, Anticoagulation-related ED visit, Anticoagulation-related fatality           Spoke with patient today regarding therapeutic INR of 2.0.  Patient denies any signs/symptoms of bruising or bleeding or any changes in diet and medications.  Instructed patient to call clinic with any questions or concerns.    Pt is to continue with current warfarin dosing regimen.  Follow up in 2 days, to reduce risk of adverse events related to this high risk medication,  Warfarin.    Perm Peguero, PharmD, BCACP

## 2025-04-24 ENCOUNTER — ANTICOAGULATION MONITORING (OUTPATIENT)
Dept: VASCULAR LAB | Facility: MEDICAL CENTER | Age: 75
End: 2025-04-24
Payer: MEDICARE

## 2025-04-24 DIAGNOSIS — I34.0 SEVERE MITRAL REGURGITATION: ICD-10-CM

## 2025-04-24 LAB — INR PPP: 3 (ref 2–3.5)

## 2025-04-24 NOTE — PROGRESS NOTES
Anticoagulation Summary  As of 4/24/2025      INR goal:  2.0-3.0   TTR:  50.0% (4 d)   INR used for dosing:  3.00 (4/24/2025)   Warfarin maintenance plan:  2.5 mg (5 mg x 0.5) every Thu; 5 mg (5 mg x 1) all other days   Weekly warfarin total:  32.5 mg   Plan last modified:  Darrius Gaspar PharmD (4/24/2025)   Next INR check:  4/29/2025   Target end date:  7/8/2025    Indications    Severe mitral regurgitation [I34.0]                 Anticoagulation Episode Summary       INR check location:  --    Preferred lab:  --    Send INR reminders to:  --    Comments:  --          Anticoagulation Care Providers       Provider Role Specialty Phone number    [facility] Responsible  [phone]          Anticoagulation Patient Findings  Patient Findings       Negatives:  Signs/symptoms of thrombosis, Signs/symptoms of bleeding, Laboratory test error suspected, Change in health, Change in alcohol use, Change in activity, Upcoming invasive procedure, Emergency department visit, Upcoming dental procedure, Missed doses, Extra doses, Change in medications, Change in diet/appetite, Hospital admission, Bruising, Other complaints            Called and spoke to patient .    INR therapeutic  Reason(s) for out of range INR today: N/A      Warfarin Plan: Decrease to 2.5 mg Thu, 5 mg all other days    Next INR in 5 day(s) via [facility]    Darrius Gaspar PharmD, BCACP  
0

## 2025-04-28 ENCOUNTER — TELEPHONE (OUTPATIENT)
Facility: MEDICAL CENTER | Age: 75
End: 2025-04-28
Payer: MEDICARE

## 2025-04-28 NOTE — PROGRESS NOTES
"CHIEF COMPLAINT: Post-op visit     PROCEDURE:   Dr Geller; 4/4/2025  RADICAL MITRAL VALVE REPAIR (P2 quadrangular resection, 36 mm Long flexible annuloplasty band), LEFT ATRIAL APPENDAGE OCCLUSION/CLIPPING (35mm AtriClip) and intraoperative transesophageal echocardiography     HPI: Doing well. Blood pressure better on amlodipine. Dizziness subsided since blood pressure under better control.     /82 (BP Location: Right arm, Patient Position: Sitting, BP Cuff Size: Adult)   Pulse 76   Temp 36.7 °C (98 °F) (Temporal)   Ht 1.575 m (5' 2\")   Wt 63.6 kg (140 lb 3.4 oz)   SpO2 95%     PHYSICAL EXAM:  Physical Exam  HENT:      Head: Normocephalic.   Cardiovascular:      Rate and Rhythm: Normal rate and regular rhythm.   Pulmonary:      Effort: Pulmonary effort is normal. No respiratory distress.      Breath sounds: Normal breath sounds.   Abdominal:      Palpations: Abdomen is soft.   Skin:     General: Skin is warm and dry.      Comments: Sternum Stable  Sternal Incision- C/D/I   Neurological:      Mental Status: She is alert and oriented to person, place, and time.   Psychiatric:         Mood and Affect: Mood and affect normal.         Cognition and Memory: Memory normal.         Judgment: Judgment normal.        PLAN:   Continue coumadin for 3 months or per your Cardiologist's recommendations.  We reviewed post operative sternal precautions, weight limits and driving precautions moving forward.  We reviewed SBE prophylaxis.      Overall, we are very pleased with the patient’s progress and we have instructed the patient to follow-up with us in the future should they have any concerns related to their surgery. Otherwise, we will see the patient on a PRN basis. The patient will continue to follow-up with their Cardiologist and PCP.  The patient has been informed that any further prescription refills should be done through their primary care physician and/or cardiologist.  They acknowledged understanding.  " Thank you for allowing us to participate in the care of this very pleasant patient and please let us know if there is any way we may be of further assistance.

## 2025-04-28 NOTE — TELEPHONE ENCOUNTER
SBP pre amlodipine 157/101    Amlodipine 5 mg this am and is still 156/98. She has not been taking it regularly as the amlodipine is PRN for SBP > 150 per LJ Clark. The script was not filled as she states she already had a script from pre surgery.    They see cardiology tomorrow. She and her sister were told the effects are not necessarily immediate and to trend BP's for now, call if SBP > 180 for any reason and discuss BP control and current med regimen with LJ Salcedo tomorrow.    Call time 6 minutes

## 2025-04-29 ENCOUNTER — TELEPHONE (OUTPATIENT)
Dept: CARDIOLOGY | Facility: MEDICAL CENTER | Age: 75
End: 2025-04-29
Payer: MEDICARE

## 2025-04-29 ENCOUNTER — TELEPHONE (OUTPATIENT)
Dept: VASCULAR LAB | Facility: MEDICAL CENTER | Age: 75
End: 2025-04-29
Payer: MEDICARE

## 2025-04-29 ENCOUNTER — ANTICOAGULATION MONITORING (OUTPATIENT)
Dept: VASCULAR LAB | Facility: MEDICAL CENTER | Age: 75
End: 2025-04-29
Payer: MEDICARE

## 2025-04-29 ENCOUNTER — OFFICE VISIT (OUTPATIENT)
Dept: CARDIOLOGY | Facility: MEDICAL CENTER | Age: 75
End: 2025-04-29
Payer: MEDICARE

## 2025-04-29 VITALS
SYSTOLIC BLOOD PRESSURE: 130 MMHG | WEIGHT: 141 LBS | BODY MASS INDEX: 25.95 KG/M2 | OXYGEN SATURATION: 94 % | HEIGHT: 62 IN | RESPIRATION RATE: 16 BRPM | HEART RATE: 77 BPM | DIASTOLIC BLOOD PRESSURE: 80 MMHG

## 2025-04-29 DIAGNOSIS — I10 ESSENTIAL HYPERTENSION: ICD-10-CM

## 2025-04-29 DIAGNOSIS — Z98.890 S/P MVR (MITRAL VALVE REPAIR): ICD-10-CM

## 2025-04-29 DIAGNOSIS — I34.0 SEVERE MITRAL REGURGITATION: Primary | ICD-10-CM

## 2025-04-29 DIAGNOSIS — I34.0 SEVERE MITRAL REGURGITATION: ICD-10-CM

## 2025-04-29 DIAGNOSIS — E89.0 POSTOPERATIVE HYPOTHYROIDISM: ICD-10-CM

## 2025-04-29 DIAGNOSIS — R42 DIZZINESS: ICD-10-CM

## 2025-04-29 LAB — INR PPP: 3.5 (ref 2–3.5)

## 2025-04-29 PROCEDURE — RXMED WILLOW AMBULATORY MEDICATION CHARGE

## 2025-04-29 PROCEDURE — 99213 OFFICE O/P EST LOW 20 MIN: CPT

## 2025-04-29 RX ORDER — HYDROCHLOROTHIAZIDE 25 MG/1
25 TABLET ORAL DAILY
Qty: 90 TABLET | Refills: 3 | Status: SHIPPED | OUTPATIENT
Start: 2025-04-29

## 2025-04-29 RX ORDER — AMLODIPINE BESYLATE 5 MG/1
5 TABLET ORAL DAILY
Qty: 100 TABLET | Refills: 3 | Status: SHIPPED | OUTPATIENT
Start: 2025-04-29

## 2025-04-29 RX ORDER — AMLODIPINE BESYLATE 5 MG/1
5 TABLET ORAL DAILY
COMMUNITY
End: 2025-04-29 | Stop reason: SDUPTHER

## 2025-04-29 ASSESSMENT — FIBROSIS 4 INDEX: FIB4 SCORE: 0.51

## 2025-04-29 ASSESSMENT — ENCOUNTER SYMPTOMS
PND: 0
SYNCOPE: 0
DYSPNEA ON EXERTION: 0
NEAR-SYNCOPE: 1
LIGHT-HEADEDNESS: 1
HEADACHES: 0
DIZZINESS: 1
ORTHOPNEA: 0
PALPITATIONS: 0
SHORTNESS OF BREATH: 0

## 2025-04-29 NOTE — PROGRESS NOTES
Chief Complaint   Patient presents with    Mitral Valve Prolapse           Hypertension          Subjective:   Ailin Good is a 74 y.o. female who presents today for follow-up.     Patient of Dr. Durham.  Current medical problems include hypertension, hypothyroidism, thrombocythemia, MVP mitral regurgitation. Their last clinic visit was 2/4/2025 with Dr. Durham.    Interval history:  She had repeat echocardiogram performed for monitoring of her MVP with concomitant moderate mitral regurgitation.  Results shows progression in mitral regurgitation.  Noted to have posterior leaflet prolapse with severe eccentric mitral regurgitation.     At her last visit she was referred to [facility] for consult on mitral valve prolapse and severe mitral regurgitation.     She had outpatient consult with Dr. Geller on 3/12/2025 who recommended a mitral valve repair or replacement.  She underwent preop LHC with Dr. Yang which showed normal coronary arteries and aortic root 3.9 cm and mid thoracic ascending aorta 3.8 cm.     Patient was hospitalized from 4/4/2025-4/9/2025 after undergoing a radical mitral valve repair with a 36 mm Long flexible annuloplasty band with left atrial appendage clip.     Today's visit:  Patient presents today for follow-up with her sister.  Patient reports that since 4/20/2025 she was feeling episodes of lightheaded/dizziness and near syncope which improved after drinking orange juice.  She was advised by [facility] to stop Lasix and potassium due to symptoms and had labs drawn.  Labs showed that her TSH was elevated 12.4 and she reports her endocrinologist has adjusted her thyroid medicine but has not had it rechecked yet.  Does feel that the symptoms are similar to when her blood sugar dropped in the hospital postsurgery but has not had follow-up with primary care provider or endocrinology related to concern of hypoglycemia.  Yesterday and today patient has had elevated blood pressures ranging 140-190/.   "She had restarted her amlodipine though she had not been on that prior to surgery just yesterday which has improved her blood pressure minimally better in office today.  She denies shortness of breath, edema, PND, orthopnea, palpitations, chest pain, or syncope.  Patient does report she is still up 7 pounds from when she entered the hospital. The dizziness episodes happen at rest and are not associated with movement. She has not been walking due to being concerned she would get dizzy and nervous to fall.     Cardiovascular Risk Factors:  1. Smoking status: Former smoker  2. Type II Diabetes Mellitus: No  Prediabetic  Lab Results   Component Value Date/Time    HBA1C 5.8 (H) 04/02/2025 11:42 AM    HBA1C 6.3 (H) 02/24/2025 02:16 PM     3. Hypertension: Yes  4. Dyslipidemia: Yes   Cholesterol,Tot   Date Value Ref Range Status   12/17/2024 198 100 - 199 mg/dL Final     LDL   Date Value Ref Range Status   12/17/2024 84 <100 mg/dL Final     HDL   Date Value Ref Range Status   12/17/2024 107 >=40 mg/dL Final     Triglycerides   Date Value Ref Range Status   12/17/2024 36 0 - 149 mg/dL Final       Past Medical History:   Diagnosis Date    Allergy     allergic to trees/grass    Anesthesia     PONV    Arthritis     osteoarthritis    Back pain     Blood dyscrasia     \" too many platelets \"    Breath shortness     upon exertion    Delayed emergence from general anesthesia     Essential (hemorrhagic) thrombocythemia (HCC)     Fatty liver disease, nonalcoholic     pt states she was told by MD    Heart murmur 04/18/2024    was told as a child    Heart valve disease     was told by cardiologist    Hypertension     takes HCTZ    Pain     Polyp of colon 02/20/2019    PONV (postoperative nausea and vomiting)     Postoperative hypothyroidism     thyroidectomy; takes levothyroxine    Psychiatric problem 03/2021    situational depression ( loss of dog)    Urinary incontinence     stress incontinence         Family History   Problem " Relation Age of Onset    Heart Disease Mother     Diabetes Mother     Hypertension Mother     Hyperlipidemia Mother     Arthritis Mother     Cancer Father         pancreatic cancer    Stroke Father     Hypertension Father     Heart Disease Sister     Hypertension Sister     Hyperlipidemia Sister          Social History     Tobacco Use    Smoking status: Former     Current packs/day: 0.00     Average packs/day: 1 pack/day for 35.0 years (35.0 ttl pk-yrs)     Types: Cigarettes     Start date: 1978     Quit date: 2013     Years since quittin.2     Passive exposure: Past    Smokeless tobacco: Never    Tobacco comments:     do not recall dates;  smoked only 1/2 pack   Vaping Use    Vaping status: Never Used   Substance Use Topics    Alcohol use: Yes     Alcohol/week: 4.2 oz     Types: 7 Glasses of wine per week     Comment: glass of wine at dinner    Drug use: Never         Allergies   Allergen Reactions    Penicillins Unspecified     States she was told she was allergic as a child through allergy testing but has since tolerated e.g. ampicillin    Dust Mite Extract Runny Nose          Pollen Extract Runny Nose          Seasonal Runny Nose     Every tree, grass         Current Outpatient Medications   Medication Sig    hydroCHLOROthiazide 25 MG Tab Take 1 Tablet by mouth every day.    amLODIPine (NORVASC) 5 MG Tab Take 1 Tablet by mouth every day.    warfarin (COUMADIN) 5 MG Tab Take 1 Tablet by mouth every day.    acetaminophen (TYLENOL) 500 MG Tab Take 500 mg by mouth every 6 hours as needed for Mild Pain.    oxymetazoline (AFRIN) 0.05 % Solution Administer 2 Sprays into affected nostril(S) 1 time a day as needed for Congestion.    diphenhydrAMINE (BENADRYL) 25 MG Tab Take 25 mg by mouth every 6 hours as needed (for sinus congestion).    aspirin 81 MG EC tablet Take 81 mg by mouth every day.    levothyroxine (SYNTHROID) 100 MCG Tab Take 100 mcg by mouth every morning on an empty stomach. Indications:  "Underactive Thyroid         Review of Systems   Constitutional: Negative for malaise/fatigue.   Cardiovascular:  Positive for near-syncope. Negative for chest pain, dyspnea on exertion, leg swelling, orthopnea, palpitations, paroxysmal nocturnal dyspnea and syncope.   Respiratory:  Negative for shortness of breath.    Neurological:  Positive for dizziness and light-headedness. Negative for headaches.           Objective:   /80 (BP Location: Left arm, Patient Position: Sitting, BP Cuff Size: Adult)   Pulse 77   Resp 16   Ht 1.575 m (5' 2\")   Wt 64 kg (141 lb)   SpO2 94%  Body mass index is 25.79 kg/m².         Physical Exam  Vitals reviewed.   Constitutional:       General: She is not in acute distress.     Appearance: Normal appearance.   HENT:      Head: Normocephalic and atraumatic.   Cardiovascular:      Rate and Rhythm: Normal rate and regular rhythm.      Pulses: Normal pulses.      Heart sounds: No murmur heard.  Pulmonary:      Effort: Pulmonary effort is normal. No respiratory distress.      Breath sounds: Normal breath sounds.      Comments: Surgical incision healing without redness or drainage   Musculoskeletal:      Right lower leg: No edema.      Left lower leg: No edema.   Neurological:      Mental Status: She is alert and oriented to person, place, and time.      Gait: Gait normal.   Psychiatric:         Behavior: Behavior normal.             Lab Results   Component Value Date/Time    SODIUM 138 04/22/2025 01:30 PM    POTASSIUM 4.3 04/22/2025 01:30 PM    CHLORIDE 103 04/22/2025 01:30 PM    CO2 25 04/22/2025 01:30 PM    GLUCOSE 102 (H) 04/22/2025 01:30 PM    BUN 27 (H) 04/22/2025 01:30 PM    CREATININE 0.68 04/22/2025 01:30 PM      Lab Results   Component Value Date/Time    WBC 6.2 04/22/2025 01:30 PM    RBC 3.96 (L) 04/22/2025 01:30 PM    HEMOGLOBIN 12.0 04/22/2025 01:30 PM    HEMATOCRIT 36.6 (L) 04/22/2025 01:30 PM    MCV 92.4 04/22/2025 01:30 PM    MCH 30.3 04/22/2025 01:30 PM    MCHC 32.8 " 04/22/2025 01:30 PM    MPV 9.7 04/22/2025 01:30 PM    NEUTSPOLYS 56.70 04/22/2025 01:30 PM    LYMPHOCYTES 28.50 04/22/2025 01:30 PM    MONOCYTES 9.40 04/22/2025 01:30 PM    EOSINOPHILS 4.10 04/22/2025 01:30 PM    BASOPHILS 1.00 04/22/2025 01:30 PM    ANISOCYTOSIS 2+ (A) 06/15/2021 10:36 AM      Lab Results   Component Value Date/Time    CHOLSTRLTOT 198 12/17/2024 08:44 AM    LDL 84 12/17/2024 08:44 AM     12/17/2024 08:44 AM    TRIGLYCERIDE 36 12/17/2024 08:44 AM       Lab Results   Component Value Date/Time    TROPONINT 25 (H) 09/18/2023 2033     Lab Results   Component Value Date/Time    NTPROBNP 399 (H) 11/17/2023 1009     Coronary Angiogram 3/19/2025  HEMODYNAMICS:  Aortic pressure: 100 /65 mmHg  LVEDP: 19 mmHg  LA pressure: 19mmHg  No significant aortic or mitral gradients on pullback     CORONARY ANGIOGRAPHY:  The left main coronary artery: Normal-appearing large-caliber vessel that bifurcates to LAD and left circumflex  The left anterior descending coronary artery : Normal-appearing large in caliber transapical vessel that gives rise to medium high first diagonal branch and a large second diagonal branch.  The left circumflex coronary artery : Large-caliber normal-appearing vessel that gives rise to large OM  The right coronary artery  : Large-caliber normal-appearing dominant vessel     Supravalvular aortography:  No AI  Aortic root 3.9 cm  Mid thoracic ascending aorta 3.8 cm     IMPRESSION:  1.  Normal-appearing epicardial coronary arteries.  Dominant RCA  2.  Elevated resting LVEDP and LAP 19 mmHg without significant transaortic or transmitral gradient on pullback  3.  Aortic root 3.9 cm, mid thoracic ascending aorta 3.8 cm. No AI  Assessment:   1. Essential hypertension  - hydroCHLOROthiazide 25 MG Tab; Take 1 Tablet by mouth every day.  Dispense: 90 Tablet; Refill: 3  - amLODIPine (NORVASC) 5 MG Tab; Take 1 Tablet by mouth every day.  Dispense: 100 Tablet; Refill: 3  - Comp Metabolic Panel;  Future    2. Severe mitral regurgitation    3. S/P MVR (mitral valve repair)    4. Dizziness    5. Postoperative hypothyroidism        Medical Decision Making:  Today's Assessment / Plan:   Hypertension  Dizziness  - Poor control noted at home. Elevated blood pressure associated with dizziness and near syncope. I do think that elevated TSH also contributing to her symptoms for which she is following up with endocrinology  - continue amlodipine 5 mg daily  -Restart hydrochlorothiazide 25 mg daily, was on prior to surgery but stopped due to low blood pressure   -If blood pressure still not at goal can start on beta blocker pending heart rate or increase amlodipine to 10 mg daily  - goal < 130/80  -continue to monitor blood pressure at home and keep a log  -Did discuss follow up with PCP and endocrinology about symptoms as concern that could be related to low blood sugar or recent thyroid function labs. Patient to ensure eating small meals throughout the day. Encouraged hydration with electrolytes.     Severe mitral regurgitation s/p MVR 4/4/2025   -Doing well postop without any clicking or popping in chest, is having dizziness and near syncope as described above  -continue warfarin for 3 months and then asa lifelong  -Continue BB: contraindicated due to low heart rate in hospital, can trial starting at next follow up pending blood pressure and heart rate  - referral to cardiac rehab placed and discussed with patient  -Continue to follow with CTS for clearance next week  -Continue with anticoagulation clinic for warfarin dosing    Postoperative hypothyroidism  -Patient reports medication adjustment since recent labs. Continue to follow with endocrinology and schedule follow up to discuss symptoms.     Return in about 2 weeks (around 5/13/2025).  Sooner if problems.    SHANNON Horn.

## 2025-04-29 NOTE — PROGRESS NOTES
Anticoagulation Summary  As of 4/29/2025      INR goal:  2.0-3.0   TTR:  22.2% (1.3 wk)   INR used for dosing:  3.50 (4/29/2025)   Warfarin maintenance plan:  2.5 mg (5 mg x 0.5) every Thu; 5 mg (5 mg x 1) all other days   Weekly warfarin total:  32.5 mg   Plan last modified:  Darrius Gaspar PharmD (4/24/2025)   Next INR check:  5/1/2025   Target end date:  7/8/2025    Indications    Severe mitral regurgitation [I34.0]                 Anticoagulation Episode Summary       INR check location:  --    Preferred lab:  --    Send INR reminders to:  --    Comments:  --          Anticoagulation Care Providers       Provider Role Specialty Phone number    [facility] Responsible  [phone]          Anticoagulation Patient Findings  Patient Findings       Negatives:  Signs/symptoms of thrombosis, Signs/symptoms of bleeding, Laboratory test error suspected, Change in health, Change in alcohol use, Change in activity, Upcoming invasive procedure, Emergency department visit, Upcoming dental procedure, Missed doses, Extra doses, Change in medications, Change in diet/appetite, Hospital admission, Bruising, Other complaints            Called and spoke to patient .    INR supratherapeutic  Reason(s) for out of range INR today: Determining dose requirements      Warfarin Plan: Hold today, then Continue regimen as listed above.    Next INR in 2 day(s) via [facility].    Darrius Gaspar PharmD, BCACP

## 2025-04-29 NOTE — TELEPHONE ENCOUNTER
INR Results    Called in by:   Kevin Jarquin [facility]    4/29/25  INR; 3.5    Call back -  Ailin Good - [phone]    Thank you,   Cierra VILLEGAS

## 2025-04-30 ENCOUNTER — PHARMACY VISIT (OUTPATIENT)
Dept: PHARMACY | Facility: MEDICAL CENTER | Age: 75
End: 2025-04-30
Payer: COMMERCIAL

## 2025-05-01 ENCOUNTER — ANTICOAGULATION MONITORING (OUTPATIENT)
Dept: CARDIOLOGY | Facility: MEDICAL CENTER | Age: 75
End: 2025-05-01
Payer: MEDICARE

## 2025-05-01 DIAGNOSIS — I34.0 SEVERE MITRAL REGURGITATION: Primary | ICD-10-CM

## 2025-05-01 LAB — INR PPP: 2.4 (ref 2–3.5)

## 2025-05-01 NOTE — PROGRESS NOTES
Anticoagulation Summary  As of 2025      INR goal:  2.0-3.0   TTR:  28.1% (1.6 wk)   INR used for dosin.40 (2025)   Warfarin maintenance plan:  2.5 mg (5 mg x 0.5) every Sun, Thu; 5 mg (5 mg x 1) all other days   Weekly warfarin total:  30 mg   Plan last modified:  Darrius Gaspar PharmD (2025)   Next INR check:  2025   Target end date:  2025    Indications    Severe mitral regurgitation [I34.0]                 Anticoagulation Episode Summary       INR check location:  --    Preferred lab:  --    Send INR reminders to:  --    Comments:  --          Anticoagulation Care Providers       Provider Role Specialty Phone number    [facility] Responsible  [phone]          Anticoagulation Patient Findings  Patient Findings       Negatives:  Signs/symptoms of thrombosis, Signs/symptoms of bleeding, Laboratory test error suspected, Change in health, Change in alcohol use, Change in activity, Upcoming invasive procedure, Emergency department visit, Upcoming dental procedure, Missed doses, Extra doses, Change in medications, Change in diet/appetite, Hospital admission, Bruising, Other complaints            Called and spoke to patient .    INR therapeutic  Reason(s) for out of range INR today: N/A    Will decrease regimen to prevent INR from trending up significantly.    Warfarin Plan: Decrease to 2.5 mg Sun/Thu, 5 mg all other days    Next INR in 5 day(s) via [facility]    Darrius Gaspar PharmD, BCACP

## 2025-05-05 ENCOUNTER — OFFICE VISIT (OUTPATIENT)
Facility: MEDICAL CENTER | Age: 75
End: 2025-05-05
Payer: MEDICARE

## 2025-05-05 VITALS
HEART RATE: 76 BPM | BODY MASS INDEX: 25.8 KG/M2 | DIASTOLIC BLOOD PRESSURE: 82 MMHG | TEMPERATURE: 98 F | SYSTOLIC BLOOD PRESSURE: 124 MMHG | HEIGHT: 62 IN | OXYGEN SATURATION: 95 % | WEIGHT: 140.21 LBS

## 2025-05-05 DIAGNOSIS — Z09 POSTOP CHECK: ICD-10-CM

## 2025-05-05 PROCEDURE — 99024 POSTOP FOLLOW-UP VISIT: CPT | Performed by: NURSE PRACTITIONER

## 2025-05-05 PROCEDURE — 3074F SYST BP LT 130 MM HG: CPT | Performed by: NURSE PRACTITIONER

## 2025-05-05 PROCEDURE — 3079F DIAST BP 80-89 MM HG: CPT | Performed by: NURSE PRACTITIONER

## 2025-05-05 ASSESSMENT — FIBROSIS 4 INDEX: FIB4 SCORE: 0.51

## 2025-05-06 ENCOUNTER — ANTICOAGULATION MONITORING (OUTPATIENT)
Dept: VASCULAR LAB | Facility: MEDICAL CENTER | Age: 75
End: 2025-05-06
Payer: MEDICARE

## 2025-05-06 ENCOUNTER — TELEPHONE (OUTPATIENT)
Dept: VASCULAR LAB | Facility: MEDICAL CENTER | Age: 75
End: 2025-05-06
Payer: MEDICARE

## 2025-05-06 ENCOUNTER — OFF SITE VISIT (OUTPATIENT)
Dept: PALLIATIVE MEDICINE | Facility: HOSPICE | Age: 75
End: 2025-05-06
Payer: MEDICARE

## 2025-05-06 VITALS
DIASTOLIC BLOOD PRESSURE: 82 MMHG | WEIGHT: 139 LBS | OXYGEN SATURATION: 93 % | BODY MASS INDEX: 25.42 KG/M2 | RESPIRATION RATE: 18 BRPM | HEART RATE: 70 BPM | SYSTOLIC BLOOD PRESSURE: 128 MMHG | TEMPERATURE: 97.1 F

## 2025-05-06 DIAGNOSIS — I10 ESSENTIAL HYPERTENSION: ICD-10-CM

## 2025-05-06 DIAGNOSIS — Z98.890 S/P MVR (MITRAL VALVE REPAIR): ICD-10-CM

## 2025-05-06 DIAGNOSIS — Z98.890 S/P MITRAL VALVE REPAIR: ICD-10-CM

## 2025-05-06 DIAGNOSIS — E89.0 POSTOPERATIVE HYPOTHYROIDISM: ICD-10-CM

## 2025-05-06 DIAGNOSIS — I34.0 SEVERE MITRAL REGURGITATION: ICD-10-CM

## 2025-05-06 LAB — INR PPP: 1.3 (ref 2–3.5)

## 2025-05-06 PROCEDURE — 99349 HOME/RES VST EST MOD MDM 40: CPT | Performed by: NURSE PRACTITIONER

## 2025-05-06 ASSESSMENT — FIBROSIS 4 INDEX: FIB4 SCORE: 0.51

## 2025-05-06 ASSESSMENT — PAIN SCALES - GENERAL: PAINLEVEL_OUTOF10: 1=MINIMAL PAIN

## 2025-05-06 NOTE — PROGRESS NOTES
[facility] Home Visit      Ailin Good  74 y.o.  female  MRN [id number]  PCP Jemma Martin P.A.-C.  Location of visit: Home     History of Present Illness:    Hospital Course:  Admitted 4/4/25-4/9/25 for planned mitral valve repair for severe mitral valve regurgitation, prolapse. PMH: hypothyroid, hypertension, thrombocythemia. Patient remained sinus conor/ sinus rhythm throughout hospital stay. On Warfarin 5 mg daily at this time. EF 65%     4/14/25: Visit occurred with patient and her sister. Patient reports feeling a bit better everyday. She denies shortness of breath, cough, fevers/chills, chest pain, palpitations. She is not using her DANIEL hose but does have a massager on recline and is elevating regularly throughout the day. She is using IS and splinting with heart pillow. Milka DERAS is coming to check INRs.She had an incident of symptomatic low blood sugar (40) in the hospital with no clear etiology. Blood glucose 100-130s inpatient outside of this event. She has no history of diabetes. She felt similarly after eating a chicken salad sandwich a few days ago but drank orange juice and felt fine again. She reports right shoulder pain 3/10 and pain 1/10 at surgical site. Blood pressure was high yesterday 140s/100 they reported to LJ Bradshaw who had patient take a dose of amlodipine. She has only taken it once as her blood pressures have been back to 130s/60s. Weight is stable at 141.8 lbs today.      4/21/25: Visit occurred with patient and her sister. Patient had an incidence of dizziness  this past weekend. She was able to recover but she stated her dizziness and lightheadedness lasted all day. They spoke with Cardiology and stopped the lasix and potassium. She had not had a repeat incident in the last 2 days. Patient denies shortness of breath, chest pain, palpitations, n/v, lower extremity edema.     Today: Patient reports feeling well today. She did have several hypertensive  episodes with blood pressure up to 190/101. She is now on amlodipine 5 mg and HCTZ 25 mg per Cardiology.     Assessment and Plan:    Primary Problem #1: S/p MVR    Plan: Incision is healed. Last INR 1.3 with have recheck 5/8. On further investigation, she did have asparagus and cabbage last night for dinner and that may have altered her INR for today. She didn't realize these food items can contribute to decreasing her INR.     Primary Problem #2: Hypertension    Plan: BP has been stable. No noted side effects. Continue on hydrochlorothiazide and amlodipine.     Primary Problem #3: Hypothyroid    Plan: Patient also had routine blood work that showed hypothyroidism with TSH 12.4 , T4 1.00. Her synthroid was increased to 150 mcg twice and then returned to usual 100 mcg dose. She has not had any bouts of dizziness or near syncope since the synthroid was adjusted. She will continue to f/u with Dr. Santacruz, endocrinology, for this issue.     TCM does not have a f/u appt scheduled however I will be available as needed through end of service period 5/9/25    Other major issues not addressed during today's visit: Osteoarthritis, BPPV, hypothyroidism     Pertinent Physical Exam Findings:  Vital Signs: /82 (BP Location: Left arm, Patient Position: Sitting, BP Cuff Size: Adult)   Pulse 70   Temp 36.2 °C (97.1 °F) (Temporal)   Resp 18   Wt 63 kg (139 lb)   SpO2 93%    Physical Exam  Constitutional:       Appearance: Normal appearance.   Cardiovascular:      Rate and Rhythm: Normal rate and regular rhythm.      Heart sounds: Normal heart sounds.   Pulmonary:      Effort: Pulmonary effort is normal.      Breath sounds: Normal breath sounds.   Abdominal:      General: Bowel sounds are normal.      Palpations: Abdomen is soft.   Musculoskeletal:      Right lower leg: No edema.      Left lower leg: No edema.   Neurological:      Mental Status: She is alert and oriented to person, place, and time.   Psychiatric:          Mood and Affect: Mood normal.         Behavior: Behavior normal.          Current Medications:    Current Outpatient Medications:     hydroCHLOROthiazide 25 MG Tab, Take 1 Tablet by mouth every day., Disp: 90 Tablet, Rfl: 3    amLODIPine (NORVASC) 5 MG Tab, Take 1 Tablet by mouth every day., Disp: 100 Tablet, Rfl: 3    warfarin (COUMADIN) 5 MG Tab, Take 1 Tablet by mouth every day., Disp: 30 Tablet, Rfl: 3    acetaminophen (TYLENOL) 500 MG Tab, Take 500 mg by mouth every 6 hours as needed for Mild Pain., Disp: , Rfl:     oxymetazoline (AFRIN) 0.05 % Solution, Administer 2 Sprays into affected nostril(S) 1 time a day as needed for Congestion., Disp: , Rfl:     diphenhydrAMINE (BENADRYL) 25 MG Tab, Take 25 mg by mouth every 6 hours as needed (for sinus congestion)., Disp: , Rfl:     aspirin 81 MG EC tablet, Take 81 mg by mouth every day., Disp: , Rfl:     levothyroxine (SYNTHROID) 100 MCG Tab, Take 100 mcg by mouth every morning on an empty stomach. Indications: Underactive Thyroid, Disp: , Rfl:     Medication Allergies:  Penicillins, Dust mite extract, Pollen extract, and Seasonal    Thank you for allowing me the opportunity to participate in the care of Ailin Good    This is a moderate medical complexity visit, which in addition to above, included, if applicable, medication reconciliation and management, obtaining and reviewing discharge information, reviewing the need for diagnostic tests/treatments and/or follow-up on pending diagnostic tests/treatments, medical education, establishing or re-establishing referrals with community providers and services and assistance with scheduling follow-up visits with providers and services.      Linda Davis D.N.P.  [facility]  [id number] [address]  P. [phone]  F. [phone]  Available on Voalte

## 2025-05-06 NOTE — PROGRESS NOTES
Anticoagulation Summary  As of 2025      INR goal:  2.0-3.0   TTR:  30.7% (2.3 wk)   INR used for dosin.30 (2025)   Warfarin maintenance plan:  2.5 mg (5 mg x 0.5) every Sun, Thu; 5 mg (5 mg x 1) all other days   Weekly warfarin total:  30 mg   Plan last modified:  Darrius Gaspar PharmD (2025)   Next INR check:  2025   Target end date:  2025    Indications    Severe mitral regurgitation [I34.0]                 Anticoagulation Episode Summary       INR check location:  --    Preferred lab:  --    Send INR reminders to:  --    Comments:  --          Anticoagulation Care Providers       Provider Role Specialty Phone number    [facility] Responsible  [phone]          Anticoagulation Patient Findings  Patient Findings       Negatives:  Signs/symptoms of thrombosis, Signs/symptoms of bleeding, Laboratory test error suspected, Change in health, Change in alcohol use, Change in activity, Upcoming invasive procedure, Emergency department visit, Upcoming dental procedure, Missed doses, Extra doses, Change in medications, Change in diet/appetite, Hospital admission, Bruising, Other complaints          Clinical Outcomes       Negatives:  Major bleeding event, Thromboembolic event, Anticoagulation-related hospital admission, Anticoagulation-related ED visit, Anticoagulation-related fatality          Spoke with patient today regarding subtherapeutic INR of 1.3.  Patient denies any signs/symptoms of bruising or bleeding or any changes in diet and medications.  Instructed patient to call clinic with any questions or concerns.  Denies missed doses or large increase in vitamin K intake.    Pt is to bolus with 7.5mg X 2, then continue with current warfarin dosing regimen.  Follow up in 2 days, to reduce risk of adverse events related to this high risk medication,  Warfarin.    Prem Peguero, PharmD, BCACP

## 2025-05-06 NOTE — TELEPHONE ENCOUNTER
PT Name: Ailin Good    PT Reports INR Value: 1.3     Date of INR Results: 5/6/25 10:20 am     Concerns/Questions Reported: or N/A: Reported by Kevin from [facility]     Callback Number: [phone] (pt)    Thank you,  Re ROGERS

## 2025-05-08 ENCOUNTER — ANTICOAGULATION MONITORING (OUTPATIENT)
Dept: CARDIOLOGY | Facility: MEDICAL CENTER | Age: 75
End: 2025-05-08
Payer: MEDICARE

## 2025-05-08 DIAGNOSIS — I34.0 SEVERE MITRAL REGURGITATION: ICD-10-CM

## 2025-05-08 LAB — INR PPP: 2.3 (ref 2–3.5)

## 2025-05-08 NOTE — PROGRESS NOTES
Anticoagulation Summary  As of 2025      INR goal:  2.0-3.0   TTR:  30.6% (2.6 wk)   INR used for dosin.30 (2025)   Warfarin maintenance plan:  2.5 mg (5 mg x 0.5) every Sun, Thu; 5 mg (5 mg x 1) all other days   Weekly warfarin total:  30 mg   Plan last modified:  Darrius Gaspar PharmD (2025)   Next INR check:  2025   Target end date:  2025    Indications    Severe mitral regurgitation [I34.0]                 Anticoagulation Episode Summary       INR check location:  --    Preferred lab:  --    Send INR reminders to:  --    Comments:  --          Anticoagulation Care Providers       Provider Role Specialty Phone number    [facility] Responsible  [phone]            Refer to Anticoagulation Patient Findings for HPI  Patient Findings       Positives:  Change in diet/appetite (Noted more asparagus & edamame prior to last INR)    Negatives:  Signs/symptoms of thrombosis, Signs/symptoms of bleeding, Laboratory test error suspected, Change in health, Change in alcohol use, Change in activity, Upcoming invasive procedure, Emergency department visit, Upcoming dental procedure, Missed doses, Extra doses, Change in medications, Hospital admission, Bruising, Other complaints          Clinical Outcomes       Negatives:  Major bleeding event, Thromboembolic event, Anticoagulation-related hospital admission, Anticoagulation-related ED visit, Anticoagulation-related fatality            Spoke with patient to report a therapeutic INR.      Pt instructed to continue with current warfarin dosing regimen, confirms dosing.     Will follow up in 4 days via Faby AshbyD, BCACP

## 2025-05-09 ENCOUNTER — TELEPHONE (OUTPATIENT)
Dept: HOME HEALTH SERVICES | Facility: HOME HEALTHCARE | Age: 75
End: 2025-05-09
Payer: MEDICARE

## 2025-05-09 PROCEDURE — RXMED WILLOW AMBULATORY MEDICATION CHARGE: Performed by: NURSE PRACTITIONER

## 2025-05-09 NOTE — TELEPHONE ENCOUNTER
Ailin is discharged from [facility] on 5/9/2025.TRISTON STARR to continue health care.

## 2025-05-12 ENCOUNTER — OFFICE VISIT (OUTPATIENT)
Dept: CARDIOLOGY | Facility: MEDICAL CENTER | Age: 75
End: 2025-05-12
Payer: MEDICARE

## 2025-05-12 ENCOUNTER — PHARMACY VISIT (OUTPATIENT)
Dept: PHARMACY | Facility: MEDICAL CENTER | Age: 75
End: 2025-05-12
Payer: COMMERCIAL

## 2025-05-12 VITALS
HEIGHT: 62 IN | WEIGHT: 140 LBS | OXYGEN SATURATION: 96 % | DIASTOLIC BLOOD PRESSURE: 88 MMHG | HEART RATE: 70 BPM | BODY MASS INDEX: 25.76 KG/M2 | SYSTOLIC BLOOD PRESSURE: 128 MMHG | RESPIRATION RATE: 16 BRPM

## 2025-05-12 DIAGNOSIS — Z98.890 S/P MVR (MITRAL VALVE REPAIR): ICD-10-CM

## 2025-05-12 DIAGNOSIS — I34.0 SEVERE MITRAL REGURGITATION: ICD-10-CM

## 2025-05-12 DIAGNOSIS — I10 ESSENTIAL HYPERTENSION: ICD-10-CM

## 2025-05-12 DIAGNOSIS — E89.0 POSTOPERATIVE HYPOTHYROIDISM: ICD-10-CM

## 2025-05-12 PROCEDURE — 3079F DIAST BP 80-89 MM HG: CPT

## 2025-05-12 PROCEDURE — 3074F SYST BP LT 130 MM HG: CPT

## 2025-05-12 PROCEDURE — 99212 OFFICE O/P EST SF 10 MIN: CPT

## 2025-05-12 PROCEDURE — 99214 OFFICE O/P EST MOD 30 MIN: CPT

## 2025-05-12 RX ORDER — ONDANSETRON 4 MG/1
4 TABLET, ORALLY DISINTEGRATING ORAL
COMMUNITY
End: 2025-05-12

## 2025-05-12 ASSESSMENT — ENCOUNTER SYMPTOMS
LIGHT-HEADEDNESS: 0
PALPITATIONS: 0
SHORTNESS OF BREATH: 0
SYNCOPE: 0
DIZZINESS: 0
DYSPNEA ON EXERTION: 0
NEAR-SYNCOPE: 0
HEADACHES: 0
ORTHOPNEA: 0
PND: 0

## 2025-05-12 ASSESSMENT — FIBROSIS 4 INDEX: FIB4 SCORE: 0.51

## 2025-05-12 NOTE — PROGRESS NOTES
Chief Complaint   Patient presents with    Hypertension     Follow up          Subjective:   Ailin Good is a 74 y.o. female who presents today for follow-up.     Patient of Dr. Durham.  Current medical problems include hypertension, hypothyroidism, thrombocythemia, MVP mitral regurgitation. Their last clinic visit was 4/29/2025 with myself.    Interval history:  She had repeat echocardiogram performed for monitoring of her MVP with concomitant moderate mitral regurgitation.  Results shows progression in mitral regurgitation.  Noted to have posterior leaflet prolapse with severe eccentric mitral regurgitation.     At her last visit she was referred to [facility] for consult on mitral valve prolapse and severe mitral regurgitation.     She had outpatient consult with Dr. Geller on 3/12/2025 who recommended a mitral valve repair or replacement.  She underwent preop LHC with Dr. Yang which showed normal coronary arteries and aortic root 3.9 cm and mid thoracic ascending aorta 3.8 cm.     Patient was hospitalized from 4/4/2025-4/9/2025 after undergoing a radical mitral valve repair with a 36 mm Long flexible annuloplasty band with left atrial appendage clip.     4/29/2025 visit:  Patient presents today for follow-up with her sister.  Patient reports that since 4/20/2025 she was feeling episodes of lightheaded/dizziness and near syncope which improved after drinking orange juice.  She was advised by [facility] to stop Lasix and potassium due to symptoms and had labs drawn.  Labs showed that her TSH was elevated 12.4 and she reports her endocrinologist has adjusted her thyroid medicine but has not had it rechecked yet.  Does feel that the symptoms are similar to when her blood sugar dropped in the hospital postsurgery but has not had follow-up with primary care provider or endocrinology related to concern of hypoglycemia.  Yesterday and today patient has had elevated blood pressures ranging 140-190/.  She had restarted  "her amlodipine though she had not been on that prior to surgery just yesterday which has improved her blood pressure minimally better in office today.  She denies shortness of breath, edema, PND, orthopnea, palpitations, chest pain, or syncope.  Patient does report she is still up 7 pounds from when she entered the hospital. The dizziness episodes happen at rest and are not associated with movement. She has not been walking due to being concerned she would get dizzy and nervous to fall.     Today's visit:  Patient reports since her last follow-up she had her thyroid medications adjusted with her endocrinologist and is feeling much better.  She denies any further episodes of near syncope.  She taking her blood pressure at home and is well-controlled ranging 103-125/66-87 since starting the hydrochlorothiazide. She plans on participating in cardiac rehab but has not yeat scheduled to start yet. I gave the number to call and discuss. She has no chest pain, shortness of breath, edema, orthopnea, PND, dizziness/lightheadedness, or palpitations.    Cardiovascular Risk Factors:  1. Smoking status: Former smoker  2. Type II Diabetes Mellitus: No  Prediabetic  Lab Results   Component Value Date/Time    HBA1C 5.8 (H) 04/02/2025 11:42 AM    HBA1C 6.3 (H) 02/24/2025 02:16 PM     3. Hypertension: Yes  4. Dyslipidemia: Yes   Cholesterol,Tot   Date Value Ref Range Status   12/17/2024 198 100 - 199 mg/dL Final     LDL   Date Value Ref Range Status   12/17/2024 84 <100 mg/dL Final     HDL   Date Value Ref Range Status   12/17/2024 107 >=40 mg/dL Final     Triglycerides   Date Value Ref Range Status   12/17/2024 36 0 - 149 mg/dL Final       Past Medical History:   Diagnosis Date    Allergy     allergic to trees/grass    Anesthesia     PONV    Arthritis     osteoarthritis    Back pain     Blood dyscrasia     \" too many platelets \"    Breath shortness     upon exertion    Delayed emergence from general anesthesia     Essential " (hemorrhagic) thrombocythemia (HCC)     Fatty liver disease, nonalcoholic     pt states she was told by MD    Heart murmur 2024    was told as a child    Heart valve disease     was told by cardiologist    Hypertension     takes HCTZ    Pain     Polyp of colon 2019    PONV (postoperative nausea and vomiting)     Postoperative hypothyroidism     thyroidectomy; takes levothyroxine    Psychiatric problem 2021    situational depression ( loss of dog)    Urinary incontinence     stress incontinence         Family History   Problem Relation Age of Onset    Heart Disease Mother     Diabetes Mother     Hypertension Mother     Hyperlipidemia Mother     Arthritis Mother     Cancer Father         pancreatic cancer    Stroke Father     Hypertension Father     Heart Disease Sister     Hypertension Sister     Hyperlipidemia Sister          Social History     Tobacco Use    Smoking status: Former     Current packs/day: 0.00     Average packs/day: 1 pack/day for 35.0 years (35.0 ttl pk-yrs)     Types: Cigarettes     Start date: 1978     Quit date: 2013     Years since quittin.2     Passive exposure: Past    Smokeless tobacco: Never    Tobacco comments:     do not recall dates;  smoked only 1/2 pack   Vaping Use    Vaping status: Never Used   Substance Use Topics    Alcohol use: Not Currently    Drug use: Never         Allergies   Allergen Reactions    Penicillins Unspecified     States she was told she was allergic as a child through allergy testing but has since tolerated e.g. ampicillin    Dust Mite Extract Runny Nose          Pollen Extract Runny Nose          Seasonal Runny Nose     Every tree, grass         Current Outpatient Medications   Medication Sig    hydroCHLOROthiazide 25 MG Tab Take 1 Tablet by mouth every day.    amLODIPine (NORVASC) 5 MG Tab Take 1 Tablet by mouth every day.    warfarin (COUMADIN) 5 MG Tab Take 1 Tablet by mouth every day.    acetaminophen (TYLENOL) 500 MG Tab Take 500  "mg by mouth every 6 hours as needed for Mild Pain.    oxymetazoline (AFRIN) 0.05 % Solution Administer 2 Sprays into affected nostril(S) 1 time a day as needed for Congestion.    diphenhydrAMINE (BENADRYL) 25 MG Tab Take 25 mg by mouth every 6 hours as needed (for sinus congestion).    aspirin 81 MG EC tablet Take 81 mg by mouth every day.    levothyroxine (SYNTHROID) 100 MCG Tab Take 100 mcg by mouth every morning on an empty stomach. Indications: Underactive Thyroid         Review of Systems   Constitutional: Negative for malaise/fatigue.   Cardiovascular:  Negative for chest pain, dyspnea on exertion, leg swelling, near-syncope, orthopnea, palpitations, paroxysmal nocturnal dyspnea and syncope.   Respiratory:  Negative for shortness of breath.    Neurological:  Negative for dizziness, headaches and light-headedness.           Objective:   /88 (BP Location: Left arm)   Pulse 70   Resp 16   Ht 1.575 m (5' 2\")   Wt 63.5 kg (140 lb)   SpO2 96%  Body mass index is 25.61 kg/m².         Physical Exam  Vitals reviewed.   Constitutional:       General: She is not in acute distress.     Appearance: Normal appearance.   HENT:      Head: Normocephalic and atraumatic.   Cardiovascular:      Rate and Rhythm: Normal rate and regular rhythm.      Pulses: Normal pulses.      Heart sounds: No murmur heard.  Pulmonary:      Effort: Pulmonary effort is normal. No respiratory distress.      Breath sounds: Normal breath sounds.      Comments: Surgical incision healing without redness or drainage   Musculoskeletal:      Right lower leg: No edema.      Left lower leg: No edema.   Neurological:      Mental Status: She is alert and oriented to person, place, and time.      Gait: Gait normal.   Psychiatric:         Behavior: Behavior normal.             Lab Results   Component Value Date/Time    SODIUM 138 04/22/2025 01:30 PM    POTASSIUM 4.3 04/22/2025 01:30 PM    CHLORIDE 103 04/22/2025 01:30 PM    CO2 25 04/22/2025 01:30 PM "    GLUCOSE 102 (H) 04/22/2025 01:30 PM    BUN 27 (H) 04/22/2025 01:30 PM    CREATININE 0.68 04/22/2025 01:30 PM      Lab Results   Component Value Date/Time    WBC 6.2 04/22/2025 01:30 PM    RBC 3.96 (L) 04/22/2025 01:30 PM    HEMOGLOBIN 12.0 04/22/2025 01:30 PM    HEMATOCRIT 36.6 (L) 04/22/2025 01:30 PM    MCV 92.4 04/22/2025 01:30 PM    MCH 30.3 04/22/2025 01:30 PM    MCHC 32.8 04/22/2025 01:30 PM    MPV 9.7 04/22/2025 01:30 PM    NEUTSPOLYS 56.70 04/22/2025 01:30 PM    LYMPHOCYTES 28.50 04/22/2025 01:30 PM    MONOCYTES 9.40 04/22/2025 01:30 PM    EOSINOPHILS 4.10 04/22/2025 01:30 PM    BASOPHILS 1.00 04/22/2025 01:30 PM    ANISOCYTOSIS 2+ (A) 06/15/2021 10:36 AM      Lab Results   Component Value Date/Time    CHOLSTRLTOT 198 12/17/2024 08:44 AM    LDL 84 12/17/2024 08:44 AM     12/17/2024 08:44 AM    TRIGLYCERIDE 36 12/17/2024 08:44 AM       Lab Results   Component Value Date/Time    TROPONINT 25 (H) 09/18/2023 2033     Lab Results   Component Value Date/Time    NTPROBNP 399 (H) 11/17/2023 1009     Coronary Angiogram 3/19/2025  HEMODYNAMICS:  Aortic pressure: 100 /65 mmHg  LVEDP: 19 mmHg  LA pressure: 19mmHg  No significant aortic or mitral gradients on pullback     CORONARY ANGIOGRAPHY:  The left main coronary artery: Normal-appearing large-caliber vessel that bifurcates to LAD and left circumflex  The left anterior descending coronary artery : Normal-appearing large in caliber transapical vessel that gives rise to medium high first diagonal branch and a large second diagonal branch.  The left circumflex coronary artery : Large-caliber normal-appearing vessel that gives rise to large OM  The right coronary artery  : Large-caliber normal-appearing dominant vessel     Supravalvular aortography:  No AI  Aortic root 3.9 cm  Mid thoracic ascending aorta 3.8 cm     IMPRESSION:  1.  Normal-appearing epicardial coronary arteries.  Dominant RCA  2.  Elevated resting LVEDP and LAP 19 mmHg without significant  transaortic or transmitral gradient on pullback  3.  Aortic root 3.9 cm, mid thoracic ascending aorta 3.8 cm. No AI  Assessment:   1. Severe mitral regurgitation    2. S/P MVR (mitral valve repair)    3. Postoperative hypothyroidism    4. Essential hypertension          Medical Decision Making:  Today's Assessment / Plan:   Hypertension  Dizziness  - Good control in office and good control noted at today's visit of home blood pressure. Since her thyroid medication was adjusted her dizziness and near syncope has resolved.  - continue amlodipine 5 mg daily  -continue hydrochlorothiazide 25 mg daily  - goal < 130/80  -continue to monitor blood pressure at home and keep a log    Severe mitral regurgitation s/p MVR 4/4/2025   -Doing well postop without any clicking or popping in chest, is having dizziness and near syncope as described above  -continue warfarin for 3 months and then asa lifelong  -Continue BB: contraindicated due to low heart rate in hospital, can trial starting at next follow up pending blood pressure and heart rate after her thyroid levels are stablized  - referral to cardiac rehab placed and discussed with patient  -Continue with anticoagulation clinic for warfarin dosing    Postoperative hypothyroidism  -Patient reports medication adjustment since recent labs. Continue to follow with endocrinology and schedule follow up to discuss symptoms.     Return in about 3 months (around 8/12/2025) for Dr. Durham.  Sooner if problems.    SHANNON Horn.

## 2025-05-13 ENCOUNTER — TELEPHONE (OUTPATIENT)
Dept: VASCULAR LAB | Facility: MEDICAL CENTER | Age: 75
End: 2025-05-13
Payer: MEDICARE

## 2025-05-13 ENCOUNTER — ANTICOAGULATION MONITORING (OUTPATIENT)
Dept: VASCULAR LAB | Facility: MEDICAL CENTER | Age: 75
End: 2025-05-13
Payer: MEDICARE

## 2025-05-13 DIAGNOSIS — I34.0 SEVERE MITRAL REGURGITATION: ICD-10-CM

## 2025-05-13 LAB — INR PPP: 3.9 (ref 2–3.5)

## 2025-05-13 NOTE — PROGRESS NOTES
OP Anticoagulation Service Note    Date: 5/13/2025    Anticoagulation Summary  As of 5/13/2025      INR goal:  2.0-3.0   TTR:  33.5% (3.3 wk)   INR used for dosing:  3.90 (5/13/2025)   Warfarin maintenance plan:  2.5 mg (5 mg x 0.5) every Sun, Thu; 5 mg (5 mg x 1) all other days   Weekly warfarin total:  30 mg   Plan last modified:  Faby RiveraD (5/1/2025)   Next INR check:  5/16/2025   Target end date:  7/8/2025    Indications    Severe mitral regurgitation [I34.0]                 Anticoagulation Episode Summary       INR check location:  --    Preferred lab:  --    Send INR reminders to:  --    Comments:  --          Anticoagulation Care Providers       Provider Role Specialty Phone number    [facility] Responsible  [phone]          Anticoagulation Patient Findings    ,      Patient's preferred phone number:  Ailin Herbert[address]  [place] [address] [phone]        HPI:   The reason for today's call is to prevent morbidity and mortality from a blood clot and/or stroke and to reduce the risk of bleeding while on a anticoagulant.     Care Team    PCP:  Jemma Martin P.A.-C.  [address] [place] [address] [phone]      Patient Care Team                Jemma Martin P.A.-C. PCP - General, Family Medicine [phone]     [address] [place] [address]    Una Brito M.D. PCP - [facility] Paneled [phone] [address] [place] [address]    Dr. Cholo Colbert, [facility] Medical [facility] Care Manager, Dentistry     [facility] Consulting Physician, Optometry     Haley Decker M.D. (Inactive) Consulting Physician, Dermatology [phone] [address] [place] [address]    [facility]  [phone]     [address] [facility] [address] [place] [address]            Assessment:     INR goal for this PT: 2.0-3.0  INR   Date Value Ref Range Status   05/13/2025 3.90 (A) 2.00 - 3.50 Final       Lab Results   Component Value  Date/Time    BUN 27 (H) 04/22/2025 01:30 PM    CREATININE 0.68 04/22/2025 01:30 PM     Lab Results   Component Value Date/Time    HEMOGLOBIN 12.0 04/22/2025 01:30 PM    HEMATOCRIT 36.6 (L) 04/22/2025 01:30 PM    PLATELETCT 821 (H) 04/22/2025 01:30 PM    ALKPHOSPHAT 110 (H) 04/22/2025 01:30 PM    ASTSGOT 44 04/22/2025 01:30 PM    ALTSGPT 57 (H) 04/22/2025 01:30 PM          Current Outpatient Medications:     hydroCHLOROthiazide, 25 mg, Oral, DAILY    amLODIPine, 5 mg, Oral, DAILY    warfarin, 5 mg, Oral, DAILY    acetaminophen, 500 mg, Oral, Q6HRS PRN    oxymetazoline, 2 Spray, Nasal, QDAY PRN    diphenhydrAMINE, 25 mg, Oral, Q6HRS PRN    aspirin, 81 mg, Oral, DAILY    levothyroxine, 100 mcg, Oral, AM ES    [id number] Stroke Risk Points: N/A   Values used to calculate this score:    Points  Metrics       0        Has Congestive Heart Failure: No       1        Has Hypertension: Yes       1        Age: 74       0        Has Diabetes: No       0        Had Stroke: No                 Had TIA: No                 Had Thromboembolism: No       0        Has Vascular Disease: No       1        Clinically Relevant Sex: Female     No data recorded       Plan:     Hold warfarin today then resume normal weekly warfarin dose    Follow-up:     On date seen above    Additional information discussed with patient:     Asked patient to please call the anticoagulation clinic if they have any signs/symptoms of bleeding and/or thrombosis or any changes to diet or medications.      National recommendations regarding anticoagulation therapy:     The CHEST guidelines recommends frequent INR monitoring at regular intervals (a few days up to a max of 12 weeks) to ensure patients are on the proper dose of warfarin, and patients are not having any complications from therapy.  INRs can dramatically change over a short time period due to diet, medications, and medical conditions.       [facility]  Phone:  [phone]  Fax: [phone]  On call: [phone]  General scheduling/information [phone]  For emergencies please dial 911  Please do not use [email or web address] for urgent matters, call the phone numbers listed above.    This note was created using voice recognition software (Dragon). The accuracy of the dictation is limited by the abilities of the software. I have reviewed the note prior to signing, however some errors in grammar and context are still possible. If you have any questions related to this note please do not hesitate to contact our office.

## 2025-05-14 LAB — COMPONENT P 8504P: NORMAL

## 2025-05-15 PROCEDURE — RXMED WILLOW AMBULATORY MEDICATION CHARGE

## 2025-05-16 ENCOUNTER — ANTICOAGULATION MONITORING (OUTPATIENT)
Dept: VASCULAR LAB | Facility: MEDICAL CENTER | Age: 75
End: 2025-05-16
Payer: MEDICARE

## 2025-05-16 ENCOUNTER — PHARMACY VISIT (OUTPATIENT)
Dept: PHARMACY | Facility: MEDICAL CENTER | Age: 75
End: 2025-05-16
Payer: COMMERCIAL

## 2025-05-16 ENCOUNTER — TELEPHONE (OUTPATIENT)
Dept: VASCULAR LAB | Facility: MEDICAL CENTER | Age: 75
End: 2025-05-16
Payer: MEDICARE

## 2025-05-16 ENCOUNTER — HOSPITAL ENCOUNTER (OUTPATIENT)
Dept: LAB | Facility: MEDICAL CENTER | Age: 75
End: 2025-05-16
Attending: INTERNAL MEDICINE
Payer: MEDICARE

## 2025-05-16 DIAGNOSIS — Z98.890 S/P MITRAL VALVE REPAIR: ICD-10-CM

## 2025-05-16 DIAGNOSIS — I34.0 SEVERE MITRAL REGURGITATION: Primary | ICD-10-CM

## 2025-05-16 LAB — INR PPP: 2.4 (ref 2–3.5)

## 2025-05-16 PROCEDURE — 84439 ASSAY OF FREE THYROXINE: CPT

## 2025-05-16 PROCEDURE — 36415 COLL VENOUS BLD VENIPUNCTURE: CPT

## 2025-05-16 PROCEDURE — 84443 ASSAY THYROID STIM HORMONE: CPT

## 2025-05-16 NOTE — TELEPHONE ENCOUNTER
Called [facility]  to ensure they have plans to visit pt today to obtain INR - they confirm they will be visiting pt today and reporting INR.    Isaías Griffith, PharmD, BCACP

## 2025-05-16 NOTE — PROGRESS NOTES
Anticoagulation Summary  As of 2025      INR goal:  2.0-3.0   TTR:  34.2% (3.7 wk)   INR used for dosin.40 (2025)   Warfarin maintenance plan:  2.5 mg (5 mg x 0.5) every Sun, Thu; 5 mg (5 mg x 1) all other days   Weekly warfarin total:  30 mg   Plan last modified:  Faby RiveraD (2025)   Next INR check:  2025   Target end date:  2025    Indications    Severe mitral regurgitation [I34.0]                 Anticoagulation Episode Summary       INR check location:  --    Preferred lab:  --    Send INR reminders to:  --    Comments:  --          Anticoagulation Care Providers       Provider Role Specialty Phone number    [facility] Responsible  [phone]          Anticoagulation Patient Findings  Patient Findings       Negatives:  Signs/symptoms of thrombosis, Signs/symptoms of bleeding, Laboratory test error suspected, Change in health, Change in alcohol use, Change in activity, Upcoming invasive procedure, Emergency department visit, Upcoming dental procedure, Missed doses, Extra doses, Change in medications, Change in diet/appetite, Hospital admission, Bruising, Other complaints          Clinical Outcomes       Negatives:  Major bleeding event, Thromboembolic event, Anticoagulation-related hospital admission, Anticoagulation-related ED visit, Anticoagulation-related fatality          INR is therapeutic  Reason(s) for out of range INR today: N/A and Determining dose requirements      Called and spoke to patient.    Warfarin Plan: Continue regimen as listed above.    Next INR in 3 day(s) via [facility], PharmD

## 2025-05-16 NOTE — TELEPHONE ENCOUNTER
Please see Anticoagulation Monitoring Encounter dated 05/16/25 for further details.   Denae Batista, PharmD

## 2025-05-16 NOTE — TELEPHONE ENCOUNTER
PT Name: Ailin Good    PT Reports INR Value: 2.4    Date of INR Results: 5/16/25    Concerns/Questions Reported or N/A: Reported by Kevin from [facility]    Callback Number: [phone]    Thank you,  Re ROGERS

## 2025-05-17 LAB
T4 FREE SERPL-MCNC: 1.68 NG/DL (ref 0.93–1.7)
TSH SERPL-ACNC: 0.69 UIU/ML (ref 0.38–5.33)

## 2025-05-19 ENCOUNTER — ANTICOAGULATION MONITORING (OUTPATIENT)
Dept: MEDICAL GROUP | Facility: PHYSICIAN GROUP | Age: 75
End: 2025-05-19
Payer: MEDICARE

## 2025-05-19 ENCOUNTER — TELEPHONE (OUTPATIENT)
Dept: VASCULAR LAB | Facility: MEDICAL CENTER | Age: 75
End: 2025-05-19
Payer: MEDICARE

## 2025-05-19 DIAGNOSIS — I34.0 SEVERE MITRAL REGURGITATION: Primary | ICD-10-CM

## 2025-05-19 LAB — INR PPP: 2.5 (ref 2–3.5)

## 2025-05-19 NOTE — TELEPHONE ENCOUNTER
PT Name: Ailin Good     PT Reports INR Value: 2.5    Date of INR Results: 5.19.26    Concerns/Questions Reported: or N/A: [facility] is releasing the patient from [facility] services on 5/2625 and will need to have office visits for INR checks    Callback Number: [phone]     Thank you,  Roslyn SEWELL

## 2025-05-19 NOTE — PROGRESS NOTES
OP Anticoagulation Service Note    Date: 2025    Anticoagulation Summary  As of 2025      INR goal:  2.0-3.0   TTR:  41.0% (4.1 wk)   INR used for dosin.50 (2025)   Warfarin maintenance plan:  2.5 mg (5 mg x 0.5) every Sun, Thu; 5 mg (5 mg x 1) all other days   Weekly warfarin total:  30 mg   Plan last modified:  Faby RiveraD (2025)   Next INR check:  2025   Target end date:  2025    Indications    Severe mitral regurgitation [I34.0]                 Anticoagulation Episode Summary       INR check location:  --    Preferred lab:  --    Send INR reminders to:  AMB ANTICOAG POOL    Comments:  --          Anticoagulation Care Providers       Provider Role Specialty Phone number    [facility] Responsible  [phone]          Anticoagulation Patient Findings        Patient's preferred phone number:  Ailin Good  [address]  [place] [address] [phone]        HPI:   The reason for today's call is to prevent morbidity and mortality from a blood clot and/or stroke and to reduce the risk of bleeding while on a anticoagulant.     Care Team    PCP:  Jemma Martin P.A.-C.  [address] [place] [address] [phone]      Patient Care Team                Jemma Martin P.A.-C. PCP - General, Family Medicine [phone]     [address] [place] [address]    Una Brito M.D. PCP - [facility] [phone] [address] [place] [address]    Dr. Cholo Colbert, [facility] Care Manager, Dentistry     [facility] Consulting Physician, Optometry     Haley Decker M.D. (Inactive) Consulting Physician, Dermatology [phone] [address] [facility] [place] [address]    [facility]tico[facility] Services  [phone]     [address] [facility] [place] [address]            Assessment:     INR  therapeutic.     Lab Results   Component Value Date/Time    BUN 27 (H) 2025 01:30 PM    CREATININE 0.68 2025 01:30 PM      Lab Results   Component Value Date/Time    HEMOGLOBIN 12.0 04/22/2025 01:30 PM    HEMATOCRIT 36.6 (L) 04/22/2025 01:30 PM    PLATELETCT 821 (H) 04/22/2025 01:30 PM    ALKPHOSPHAT 110 (H) 04/22/2025 01:30 PM    ASTSGOT 44 04/22/2025 01:30 PM    ALTSGPT 57 (H) 04/22/2025 01:30 PM          Current Outpatient Medications:     hydroCHLOROthiazide, 25 mg, Oral, DAILY    amLODIPine, 5 mg, Oral, DAILY    warfarin, 5 mg, Oral, DAILY    acetaminophen, 500 mg, Oral, Q6HRS PRN    oxymetazoline, 2 Spray, Nasal, QDAY PRN    diphenhydrAMINE, 25 mg, Oral, Q6HRS PRN    aspirin, 81 mg, Oral, DAILY    levothyroxine, 100 mcg, Oral, AM ES      [id number] Stroke Risk Points: N/A   Values used to calculate this score:    Points  Metrics       0        Has Congestive Heart Failure: No       1        Has Hypertension: Yes       1        Age: 74       0        Has Diabetes: No       0        Had Stroke: No                 Had TIA: No                 Had Thromboembolism: No       0        Has Vascular Disease: No       1        Clinically Relevant Sex: Female     No data recorded     Plan:     Continue the same warfarin dose, as noted above.   Appears patient may be discharged from [facility] on 5/26/2025.  I sent them an order just in case they were still checking her INR that day.  Informed patient she will need to call us to make an appointment      Follow-up:     As seen above      Additional information discussed with patient:     Asked patient to please call the anticoagulation clinic if they have any signs/symptoms of bleeding and/or thrombosis or any changes to diet or medications.      National recommendations regarding anticoagulation therapy:     The CHEST guidelines recommends frequent INR monitoring at regular intervals (a few days up to a max of 12 weeks) to ensure patients are on the proper dose of warfarin, and patients are not having any complications from therapy.  INRs can dramatically change over a short time  period due to diet, medications, and medical conditions.         [facility]  Phone: [phone]  Fax: [phone]  On call: [phone]  General scheduling/information [phone]  For emergencies please dial 911  Please do not use [employer] for urgent matters, call the phone numbers listed above.    This note was created using voice recognition software (Dragon). The accuracy of the dictation is limited by the abilities of the software. I have reviewed the note prior to signing, however some errors in grammar and context are still possible. If you have any questions related to this note please do not hesitate to contact our office.

## 2025-05-21 ENCOUNTER — TELEPHONE (OUTPATIENT)
Dept: HEALTH INFORMATION MANAGEMENT | Facility: OTHER | Age: 75
End: 2025-05-21
Payer: MEDICARE

## 2025-05-23 ENCOUNTER — OFFICE VISIT (OUTPATIENT)
Dept: MEDICAL GROUP | Age: 75
End: 2025-05-23
Payer: MEDICARE

## 2025-05-23 VITALS
HEART RATE: 78 BPM | OXYGEN SATURATION: 98 % | TEMPERATURE: 97 F | WEIGHT: 142 LBS | BODY MASS INDEX: 26.13 KG/M2 | SYSTOLIC BLOOD PRESSURE: 114 MMHG | HEIGHT: 62 IN | DIASTOLIC BLOOD PRESSURE: 72 MMHG

## 2025-05-23 DIAGNOSIS — R73.01 ELEVATED FASTING GLUCOSE: Primary | ICD-10-CM

## 2025-05-23 DIAGNOSIS — Z98.890 S/P MVR (MITRAL VALVE REPAIR): ICD-10-CM

## 2025-05-23 DIAGNOSIS — I10 ESSENTIAL HYPERTENSION: ICD-10-CM

## 2025-05-23 DIAGNOSIS — Z02.9 ADMINISTRATIVE ENCOUNTER: ICD-10-CM

## 2025-05-23 PROBLEM — I34.0 SEVERE MITRAL REGURGITATION: Status: RESOLVED | Noted: 2023-12-26 | Resolved: 2025-05-23

## 2025-05-23 PROCEDURE — 3074F SYST BP LT 130 MM HG: CPT | Performed by: PHYSICIAN ASSISTANT

## 2025-05-23 PROCEDURE — 99214 OFFICE O/P EST MOD 30 MIN: CPT | Performed by: PHYSICIAN ASSISTANT

## 2025-05-23 PROCEDURE — 3078F DIAST BP <80 MM HG: CPT | Performed by: PHYSICIAN ASSISTANT

## 2025-05-23 RX ORDER — AMLODIPINE BESYLATE 5 MG/1
2.5 TABLET ORAL DAILY
Qty: 100 TABLET | Refills: 3
Start: 2025-05-23

## 2025-05-23 ASSESSMENT — FIBROSIS 4 INDEX: FIB4 SCORE: 0.51

## 2025-05-23 NOTE — PROGRESS NOTES
"cc: Lab review, DMV paperwork    Subjective:     HPI    History of Present Illness  The patient is a 74-year-old female presenting for a medication check and lab review.    She underwent mitral valve repair on 04/04/2025 and is currently on Coumadin, with an upcoming INR test scheduled for Monday. She has been advised to continue Coumadin for another week.  Overall she seems to be doing well status post replacement.  Has followed up with cardiology.      She is also on 5 mg amlodipine and 25 hydrochlorothiazide for hypertension, which was previously uncontrolled with readings as high as 150/107 post-surgery. She reports ankle swelling, since starting the amlodipine, but has not yet discussed this with her cardiologist. Her home blood pressure readings have been consistently low, ranging from 112 to 117 systolic. She has a scheduled appointment with her cardiologist on 07/30/2025.    During her cardiac evaluation her thyroid level was elevated.  Her endocrinologist adjusted her levothyroxine dosage to 100 mcg, which normalized her thyroid function.  However, it is on the lower of her normal  she was taking an additional half tablet on Sunday, has been advised by her endocrinologist to just take 100 mg MCG's of levothyroxine daily      Review of systems:  See above.     Current Medications[1]    Allergies, past medical history, past surgical history, family history, social history reviewed and updated    Objective:     Vitals: /72 (BP Location: Left arm, Patient Position: Sitting, BP Cuff Size: Adult)   Pulse 78   Temp 36.1 °C (97 °F) (Temporal)   Ht 1.575 m (5' 2\")   Wt 64.4 kg (142 lb)   LMP  (LMP Unknown)   SpO2 98%   BMI 25.97 kg/m²   General: Alert, pleasant, NAD  HEENT: Normocephalic. Neck supple.  No carotid bruits   Heart: Regular rate and rhythm.  S1 and S2 normal.  No murmurs appreciated.  Respiratory: Normal respiratory effort.  Clear to auscultation bilaterally.  Skin: Warm, dry, no " alma.  Psych:  Affect/mood is normal, judgement is good, memory is intact, grooming is appropriate.    Assessment/Plan:     iAlin was seen today for follow-up and paperwork.    Diagnoses and all orders for this visit:    Elevated fasting glucose  A1c 5.8 done 2 months ago.  Improving.  Will continue monitor repeat labs again in 6 months.  Continue with lifestyle modifications  -     HEMOGLOBIN A1C; Future    S/P MVR (mitral valve repair)  Postoperatively is doing well, has since followed up with cardiologist will be following up in 6 weeks.  Currently on Coumadin monitored by vascular medicine.  Hopefully be coming off of the warfarin soon    Essential hypertension  -She was hypertensive status post mitral valve repair, was started on amlodipine and hydrochlorothiazide.  She has been ankle swelling since starting the amlodipine.  Blood pressure is running on the lower end of normal.  Advised to decrease amlodipine to 2.5 mg.  Monitor blood pressure if does not improve the swelling advised to follow-up with her cardiologist sooner  -     amLODIPine (NORVASC) 5 MG Tab; Take 0.5 Tablets by mouth every day.    Administrative encounter  DMV paperwork filled out and completed in clinic today      Return in about 7 months (around 12/23/2025) for AWV, Lab Review.       [1]   Current Outpatient Medications:     amLODIPine (NORVASC) 5 MG Tab, Take 0.5 Tablets by mouth every day., Disp: 100 Tablet, Rfl: 3    hydroCHLOROthiazide 25 MG Tab, Take 1 Tablet by mouth every day., Disp: 90 Tablet, Rfl: 3    warfarin (COUMADIN) 5 MG Tab, Take 1 Tablet by mouth every day., Disp: 30 Tablet, Rfl: 3    acetaminophen (TYLENOL) 500 MG Tab, Take 500 mg by mouth every 6 hours as needed for Mild Pain., Disp: , Rfl:     oxymetazoline (AFRIN) 0.05 % Solution, Administer 2 Sprays into affected nostril(S) 1 time a day as needed for Congestion., Disp: , Rfl:     diphenhydrAMINE (BENADRYL) 25 MG Tab, Take 25 mg by mouth every 6 hours as needed  (for sinus congestion)., Disp: , Rfl:     aspirin 81 MG EC tablet, Take 81 mg by mouth every day., Disp: , Rfl:     levothyroxine (SYNTHROID) 100 MCG Tab, Take 100 mcg by mouth every morning on an empty stomach. Indications: Underactive Thyroid, Disp: , Rfl:

## 2025-05-27 ENCOUNTER — APPOINTMENT (OUTPATIENT)
Dept: MEDICAL GROUP | Age: 75
End: 2025-05-27
Payer: MEDICARE

## 2025-05-29 ENCOUNTER — TELEPHONE (OUTPATIENT)
Dept: VASCULAR LAB | Facility: MEDICAL CENTER | Age: 75
End: 2025-05-29
Payer: MEDICARE

## 2025-05-29 NOTE — TELEPHONE ENCOUNTER
Called and LVM for pt. Advised to continue current warfarin regimen.    Darrius Gaspar, PharmD, BCACP

## 2025-05-29 NOTE — TELEPHONE ENCOUNTER
Caller: Ailin oGod    Topic/issue: Milka DERAS came to take Pts vitals only on 05/26 but did not have orders to draw an INR, this was their last visit she will not longer have home  health. I scheduled an initial visit for 06/06 but the PT would like dosing instructions for the mean time, please advise:     Callback Number: [phone]    Thank you,   Shu CARRANZA

## 2025-06-03 ENCOUNTER — OFFICE VISIT (OUTPATIENT)
Dept: MEDICAL GROUP | Age: 75
End: 2025-06-03
Payer: MEDICARE

## 2025-06-03 ENCOUNTER — APPOINTMENT (OUTPATIENT)
Dept: MEDICAL GROUP | Age: 75
End: 2025-06-03
Payer: MEDICARE

## 2025-06-03 VITALS
HEART RATE: 74 BPM | BODY MASS INDEX: 25.95 KG/M2 | HEIGHT: 62 IN | OXYGEN SATURATION: 98 % | SYSTOLIC BLOOD PRESSURE: 120 MMHG | DIASTOLIC BLOOD PRESSURE: 64 MMHG | TEMPERATURE: 97 F | WEIGHT: 141 LBS

## 2025-06-03 DIAGNOSIS — I10 ESSENTIAL HYPERTENSION: ICD-10-CM

## 2025-06-03 DIAGNOSIS — Z98.890 S/P MVR (MITRAL VALVE REPAIR): ICD-10-CM

## 2025-06-03 DIAGNOSIS — J30.2 SEASONAL ALLERGIES: Primary | ICD-10-CM

## 2025-06-03 PROCEDURE — 3078F DIAST BP <80 MM HG: CPT | Performed by: PHYSICIAN ASSISTANT

## 2025-06-03 PROCEDURE — 99214 OFFICE O/P EST MOD 30 MIN: CPT | Performed by: PHYSICIAN ASSISTANT

## 2025-06-03 PROCEDURE — 3074F SYST BP LT 130 MM HG: CPT | Performed by: PHYSICIAN ASSISTANT

## 2025-06-03 RX ORDER — TRIAMCINOLONE ACETONIDE 40 MG/ML
60 INJECTION, SUSPENSION INTRA-ARTICULAR; INTRAMUSCULAR ONCE
Status: COMPLETED | OUTPATIENT
Start: 2025-06-03 | End: 2025-06-03

## 2025-06-03 RX ADMIN — TRIAMCINOLONE ACETONIDE 60 MG: 40 INJECTION, SUSPENSION INTRA-ARTICULAR; INTRAMUSCULAR at 14:32

## 2025-06-03 ASSESSMENT — FIBROSIS 4 INDEX: FIB4 SCORE: 0.51

## 2025-06-03 NOTE — PROGRESS NOTES
"cc: Allergies    Subjective:     HPI    History of Present Illness  The patient is a 74-year-old female presenting with concerns of allergies.    She reports the onset of her allergy symptoms at the beginning of the week, characterized by a sensation of a clogged head, burning eyes, and dizziness. She does not experience sneezing but notes ear congestion. She has been managing her symptoms with over-the-counter Benadryl, which she finds normally effective, however this past week has not provided any benefit. She has also attempted other antihistamines without success. Additionally, she uses salt water for nasal irrigation and has tried Flonase sprays and salt spray, both of which were ineffective. She has previously received Kenalog injections, which were beneficial. She has been on Benadryl for several years, even during her period of allergy injections.  She believes she may have tried Singulair in the past but is uncertain of their efficacy. She has a supply of allergy eye drops at home.    She is currently on Coumadin and is scheduled for an INR check on 06/06/2025. She is supposed to be on Coumadin until 07/04/2025.    She has discontinued amlodipine, resulting in the resolution of her swelling. Her home blood pressure readings have been within normal limits.              Review of systems:  See above.     Current Medications[1]    Allergies, past medical history, past surgical history, family history, social history reviewed and updated    Objective:     Vitals: /64 (BP Location: Left arm, Patient Position: Sitting, BP Cuff Size: Adult)   Pulse 74   Temp 36.1 °C (97 °F) (Temporal)   Ht 1.575 m (5' 2\")   Wt 64 kg (141 lb)   LMP  (LMP Unknown)   SpO2 98%   BMI 25.79 kg/m²   General: Alert, pleasant, NAD  HEENT: Normocephalic. Neck supple.  Slightly injected conjunctiva bilaterally.  TMs gray, retracted bilaterally.  No sinus pressure tenderness.  Erythematous posterior pharynx with cobblestoning.  " No tonsil enlargement or exudate.  Uvula midline.  No thyromegaly or masses palpated. No cervical or supraclavicular lymphadenopathy. No carotid bruits   Heart: Regular rate and rhythm.  S1 and S2 normal.  No murmurs appreciated.  Respiratory: Normal respiratory effort.  Clear to auscultation bilaterally.  Skin: Warm, dry, no rashes.  Extremities: No leg edema.   Psych:  Affect/mood is normal, judgement is good, memory is intact, grooming is appropriate.    Assessment/Plan:     Ailin was seen today for seasonal allergies.    Diagnoses and all orders for this visit:    Seasonal allergies  Her seasonal allergies are fairly severe as of now, Benadryl not providing much added benefit, all other oral antihistamine including Flonase have not provided any benefit.  At this point in time feel Kenalog would be appropriate.  Does not interact with the warfarin although she is getting her INR checked in 3 days.  May consider trial of singular once she is off the warfarin if symptoms are still moderate  -     triamcinolone acetonide (Kenalog-40) injection 60 mg    S/P MVR (mitral valve repair)  INR check scheduled 6/6, hopefully will be off of the warfarin beginning of July    Essential hypertension  Blood pressure still controlled has DC'd amlodipine lower extremity edema has resolved.      Return in about 6 months (around 12/3/2025) for AWV, Lab Review.       [1]   Current Outpatient Medications:     hydroCHLOROthiazide 25 MG Tab, Take 1 Tablet by mouth every day., Disp: 90 Tablet, Rfl: 3    warfarin (COUMADIN) 5 MG Tab, Take 1 Tablet by mouth every day., Disp: 30 Tablet, Rfl: 3    acetaminophen (TYLENOL) 500 MG Tab, Take 500 mg by mouth every 6 hours as needed for Mild Pain., Disp: , Rfl:     oxymetazoline (AFRIN) 0.05 % Solution, Administer 2 Sprays into affected nostril(S) 1 time a day as needed for Congestion., Disp: , Rfl:     diphenhydrAMINE (BENADRYL) 25 MG Tab, Take 25 mg by mouth every 6 hours as needed (for sinus  congestion)., Disp: , Rfl:     aspirin 81 MG EC tablet, Take 81 mg by mouth every day., Disp: , Rfl:     levothyroxine (SYNTHROID) 100 MCG Tab, Take 100 mcg by mouth every morning on an empty stomach. Indications: Underactive Thyroid, Disp: , Rfl:

## 2025-06-06 ENCOUNTER — ANTICOAGULATION VISIT (OUTPATIENT)
Dept: VASCULAR LAB | Facility: MEDICAL CENTER | Age: 75
End: 2025-06-06
Attending: INTERNAL MEDICINE
Payer: MEDICARE

## 2025-06-06 DIAGNOSIS — Z98.890 S/P MVR (MITRAL VALVE REPAIR): Primary | ICD-10-CM

## 2025-06-06 LAB — INR PPP: 2.1 (ref 2–3.5)

## 2025-06-06 PROCEDURE — 99211 OFF/OP EST MAY X REQ PHY/QHP: CPT | Performed by: PHARMACIST

## 2025-06-06 PROCEDURE — 85610 PROTHROMBIN TIME: CPT

## 2025-06-06 NOTE — PROGRESS NOTES
Anticoagulation Summary  As of 2025      INR goal:  2.0-3.0   TTR:  64.0% (1.6 mo)   INR used for dosin.10 (2025)   Warfarin maintenance plan:  2.5 mg (5 mg x 0.5) every Sun, Thu; 5 mg (5 mg x 1) all other days   Weekly warfarin total:  30 mg   Plan last modified:  Faby RiveraD (2025)   Next INR check:  2025   Target end date:  2025    Indications    Severe mitral regurgitation (Resolved) [I34.0]                 Anticoagulation Episode Summary       INR check location:  --    Preferred lab:  --    Send INR reminders to:  AMB ANTICOAG POOL    Comments:  --          Anticoagulation Care Providers       Provider Role Specialty Phone number    [facility] Responsible  [phone]                  Refer to Patient Findings for HPI:  Patient Findings       Negatives:  Signs/symptoms of thrombosis, Signs/symptoms of bleeding, Laboratory test error suspected, Change in health, Change in alcohol use, Change in activity, Upcoming invasive procedure, Emergency department visit, Upcoming dental procedure, Missed doses, Extra doses, Change in medications, Change in diet/appetite, Hospital admission, Bruising, Other complaints          Clinical Outcomes       Negatives:  Major bleeding event, Thromboembolic event, Anticoagulation-related hospital admission, Anticoagulation-related ED visit, Anticoagulation-related fatality            Date Referral Placed: 25      There were no vitals filed for this visit.  (Taken if pt amenable)    Verified current warfarin dosing schedule.    Medications reconciled.  Pt is on ASA 81 mg once daily as antiplatelet therapy for MVR and must be reviewed again on next cards f/u.      A/P   INR is therapeutic    Warfarin dosing recommendation: Instructed pt to continue on with current regimen.    Pt educated to contact our clinic with any changes in medications or s/s of bleeding or thrombosis. Pt is aware to seek immediate medical  attention for falls, head injury or deep cuts.    Request pt to return in 4 week(s). Pt agrees.    Isaías Griffith, FabyD, BCACP

## 2025-06-09 ENCOUNTER — NON-PROVIDER VISIT (OUTPATIENT)
Dept: CARDIOLOGY | Facility: MEDICAL CENTER | Age: 75
End: 2025-06-09
Payer: MEDICARE

## 2025-06-09 DIAGNOSIS — Z98.890 PERSONAL HISTORY OF SURGERY TO HEART AND GREAT VESSELS, PRESENTING HAZARDS TO HEALTH: Primary | ICD-10-CM

## 2025-06-09 PROCEDURE — G0422 INTENS CARDIAC REHAB W/EXERC: HCPCS | Performed by: INTERNAL MEDICINE

## 2025-06-09 PROCEDURE — G0423 INTENS CARDIAC REHAB NO EXER: HCPCS | Mod: 59 | Performed by: INTERNAL MEDICINE

## 2025-06-09 NOTE — PROGRESS NOTES
Patient arrived for initial 1:1 Intensive Cardiac Rehabilitation Consultation and Education session with the Registered Nurse.  A total of 60 minutes was spent with the patient during which time a focused cardiovascular assessment was completed and musculoskeletal limitations were addressed in preparation to safely start the exercise portion of the program.  Education regarding the program was explained including exercise goals, precautions, and target heart rate during exercise.  Nutrition goals were reviewed and patient was introduced to the Pritikin Program.  Stress management goals were discussed and patient concerns and questions were answered at this time. Patient arrived for education at 1130 and visit was concluded at 1230.  Exercise time was from 1230 to 1330.

## 2025-06-10 ENCOUNTER — HOSPITAL ENCOUNTER (OUTPATIENT)
Dept: LAB | Facility: MEDICAL CENTER | Age: 75
End: 2025-06-10
Attending: PHYSICIAN ASSISTANT
Payer: MEDICARE

## 2025-06-10 LAB
T4 FREE SERPL-MCNC: 1.42 NG/DL (ref 0.93–1.7)
TSH SERPL-ACNC: 1.58 UIU/ML (ref 0.38–5.33)

## 2025-06-10 PROCEDURE — 84443 ASSAY THYROID STIM HORMONE: CPT

## 2025-06-10 PROCEDURE — 36415 COLL VENOUS BLD VENIPUNCTURE: CPT

## 2025-06-10 PROCEDURE — 84439 ASSAY OF FREE THYROXINE: CPT

## 2025-06-12 ENCOUNTER — PATIENT MESSAGE (OUTPATIENT)
Dept: CARDIOLOGY | Facility: MEDICAL CENTER | Age: 75
End: 2025-06-12

## 2025-06-12 ENCOUNTER — NON-PROVIDER VISIT (OUTPATIENT)
Dept: CARDIOLOGY | Facility: MEDICAL CENTER | Age: 75
End: 2025-06-12
Payer: MEDICARE

## 2025-06-12 DIAGNOSIS — I10 ESSENTIAL HYPERTENSION: Primary | ICD-10-CM

## 2025-06-12 DIAGNOSIS — Z98.890 PERSONAL HISTORY OF SURGERY TO HEART AND GREAT VESSELS, PRESENTING HAZARDS TO HEALTH: ICD-10-CM

## 2025-06-12 PROCEDURE — G0422 INTENS CARDIAC REHAB W/EXERC: HCPCS | Performed by: INTERNAL MEDICINE

## 2025-06-12 PROCEDURE — G0423 INTENS CARDIAC REHAB NO EXER: HCPCS | Mod: 59 | Performed by: INTERNAL MEDICINE

## 2025-06-12 NOTE — PROGRESS NOTES
Ailin Good attended Intensive Cardiac Rehab today from 1000 to 1200. During her time she exercised and attended class. Her education today was a workshop titled: Dining Out . Patient received handouts and class discussion pertaining to the topic.

## 2025-06-13 RX ORDER — LOSARTAN POTASSIUM 25 MG/1
25 TABLET ORAL DAILY
Qty: 100 TABLET | Refills: 1 | Status: SHIPPED | OUTPATIENT
Start: 2025-06-13 | End: 2026-07-18

## 2025-06-13 NOTE — PATIENT COMMUNICATION
Patient called office to discuss questions sent in [employer] message. Patient reports she has been noticing an increase in swelling in lower extremities for a little over a week. No changes in blood pressure, no other symptoms noted. PCP advised to cut the pill in half or stop taking the amlodipine. Patient has been taking half a pill for approximately 3-4 days, swelling has gone down but is still present.     HL- please review and advise if alternate medication should be recommended. Thank you

## 2025-06-18 ENCOUNTER — NON-PROVIDER VISIT (OUTPATIENT)
Dept: CARDIOLOGY | Facility: MEDICAL CENTER | Age: 75
End: 2025-06-18
Payer: MEDICARE

## 2025-06-18 DIAGNOSIS — Z98.890 PERSONAL HISTORY OF SURGERY TO HEART AND GREAT VESSELS, PRESENTING HAZARDS TO HEALTH: ICD-10-CM

## 2025-06-18 PROCEDURE — G0422 INTENS CARDIAC REHAB W/EXERC: HCPCS | Performed by: INTERNAL MEDICINE

## 2025-06-18 PROCEDURE — G0423 INTENS CARDIAC REHAB NO EXER: HCPCS | Mod: 59 | Performed by: INTERNAL MEDICINE

## 2025-06-18 NOTE — PROGRESS NOTES
Ailin Good attended Intensive Cardiac Rehab today from 1000 to 1200. During her time she exercised and attended class. Her education today was a cooking school titled: Fast Evening Meals. Patient received handouts and class discussion pertaining to the topic.

## 2025-06-19 ENCOUNTER — NON-PROVIDER VISIT (OUTPATIENT)
Dept: CARDIOLOGY | Facility: MEDICAL CENTER | Age: 75
End: 2025-06-19
Payer: MEDICARE

## 2025-06-19 DIAGNOSIS — Z98.890 PERSONAL HISTORY OF SURGERY TO HEART AND GREAT VESSELS, PRESENTING HAZARDS TO HEALTH: ICD-10-CM

## 2025-06-19 PROCEDURE — G0423 INTENS CARDIAC REHAB NO EXER: HCPCS | Mod: 59 | Performed by: INTERNAL MEDICINE

## 2025-06-19 PROCEDURE — G0422 INTENS CARDIAC REHAB W/EXERC: HCPCS | Performed by: INTERNAL MEDICINE

## 2025-06-19 NOTE — PROGRESS NOTES
Ailin Good attended Intensive Cardiac Rehab today from 1000 to 1200. During her time she exercised and attended class. Her education today was a workshop titled: Tom. Patient received handouts and class discussion pertaining to the topic.

## 2025-06-24 ENCOUNTER — NON-PROVIDER VISIT (OUTPATIENT)
Dept: CARDIOLOGY | Facility: MEDICAL CENTER | Age: 75
End: 2025-06-24
Payer: MEDICARE

## 2025-06-24 DIAGNOSIS — Z98.890 PERSONAL HISTORY OF SURGERY TO HEART AND GREAT VESSELS, PRESENTING HAZARDS TO HEALTH: ICD-10-CM

## 2025-06-24 PROCEDURE — G0422 INTENS CARDIAC REHAB W/EXERC: HCPCS | Performed by: INTERNAL MEDICINE

## 2025-06-24 PROCEDURE — G0423 INTENS CARDIAC REHAB NO EXER: HCPCS | Mod: 59 | Performed by: INTERNAL MEDICINE

## 2025-06-24 NOTE — PROGRESS NOTES
Ailin Good attended Intensive Cardiac Rehab today from 1000 to 1200. During her time she exercised and attended class. Her education today was a video titled: Heart Disease Risk Reduction. Patient received handouts and class discussion pertaining to the topic.

## 2025-06-25 ENCOUNTER — NON-PROVIDER VISIT (OUTPATIENT)
Dept: CARDIOLOGY | Facility: MEDICAL CENTER | Age: 75
End: 2025-06-25
Payer: MEDICARE

## 2025-06-25 DIAGNOSIS — Z98.890 PERSONAL HISTORY OF SURGERY TO HEART AND GREAT VESSELS, PRESENTING HAZARDS TO HEALTH: ICD-10-CM

## 2025-06-25 PROCEDURE — G0422 INTENS CARDIAC REHAB W/EXERC: HCPCS | Performed by: INTERNAL MEDICINE

## 2025-06-25 PROCEDURE — G0423 INTENS CARDIAC REHAB NO EXER: HCPCS | Mod: 59 | Performed by: INTERNAL MEDICINE

## 2025-06-25 NOTE — PROGRESS NOTES
Ailin Good attended Intensive Cardiac Rehab today from 1000 to 1200. During her time she exercised and attended class. Her education today was a cooking school titled: [employer]. Patient received handouts and class discussion pertaining to the topic.

## 2025-06-26 ENCOUNTER — NON-PROVIDER VISIT (OUTPATIENT)
Dept: CARDIOLOGY | Facility: MEDICAL CENTER | Age: 75
End: 2025-06-26
Payer: MEDICARE

## 2025-06-26 DIAGNOSIS — Z98.890 PERSONAL HISTORY OF SURGERY TO HEART AND GREAT VESSELS, PRESENTING HAZARDS TO HEALTH: ICD-10-CM

## 2025-06-26 PROCEDURE — G0422 INTENS CARDIAC REHAB W/EXERC: HCPCS | Performed by: INTERNAL MEDICINE

## 2025-06-26 PROCEDURE — G0423 INTENS CARDIAC REHAB NO EXER: HCPCS | Mod: 59 | Performed by: INTERNAL MEDICINE

## 2025-06-26 NOTE — PROGRESS NOTES
Ailin Good attended Intensive Cardiac Rehab today from 1000 to 1200. During her time she exercised and attended class. Her education today was a workshop titled: Mindfullness for Heart Health. Patient received handouts and class discussion pertaining to the topic.

## 2025-06-27 ENCOUNTER — APPOINTMENT (OUTPATIENT)
Dept: MEDICAL GROUP | Age: 75
End: 2025-06-27
Payer: MEDICARE

## 2025-06-30 ENCOUNTER — APPOINTMENT (OUTPATIENT)
Dept: MEDICAL GROUP | Facility: MEDICAL CENTER | Age: 75
End: 2025-06-30
Payer: MEDICARE

## 2025-07-01 ENCOUNTER — NON-PROVIDER VISIT (OUTPATIENT)
Dept: CARDIOLOGY | Facility: MEDICAL CENTER | Age: 75
End: 2025-07-01
Payer: MEDICARE

## 2025-07-01 DIAGNOSIS — Z98.890 PERSONAL HISTORY OF SURGERY TO HEART AND GREAT VESSELS, PRESENTING HAZARDS TO HEALTH: ICD-10-CM

## 2025-07-01 PROCEDURE — G0423 INTENS CARDIAC REHAB NO EXER: HCPCS | Mod: 59 | Performed by: INTERNAL MEDICINE

## 2025-07-01 PROCEDURE — G0422 INTENS CARDIAC REHAB W/EXERC: HCPCS | Performed by: INTERNAL MEDICINE

## 2025-07-01 NOTE — PROGRESS NOTES
Ailin Good attended Intensive Cardiac Rehab today from 1000 to 1200. During her time she exercised and attended class. Her education today was a video titled: Calorie Density. Patient received handouts and class discussion pertaining to the topic.

## 2025-07-02 ENCOUNTER — NON-PROVIDER VISIT (OUTPATIENT)
Dept: CARDIOLOGY | Facility: MEDICAL CENTER | Age: 75
End: 2025-07-02
Payer: MEDICARE

## 2025-07-02 ENCOUNTER — HOSPITAL ENCOUNTER (OUTPATIENT)
Dept: LAB | Facility: MEDICAL CENTER | Age: 75
End: 2025-07-02
Attending: INTERNAL MEDICINE
Payer: MEDICARE

## 2025-07-02 DIAGNOSIS — Z98.890 PERSONAL HISTORY OF SURGERY TO HEART AND GREAT VESSELS, PRESENTING HAZARDS TO HEALTH: ICD-10-CM

## 2025-07-02 PROCEDURE — G0422 INTENS CARDIAC REHAB W/EXERC: HCPCS | Performed by: INTERNAL MEDICINE

## 2025-07-02 PROCEDURE — G0423 INTENS CARDIAC REHAB NO EXER: HCPCS | Mod: 59 | Performed by: INTERNAL MEDICINE

## 2025-07-02 NOTE — PROGRESS NOTES
Ailin Good attended Intensive Cardiac Rehab today from 1000 to 1200. During her time she exercised and attended class. Her education today was a nutrition class titled: Personalize Your Pritikin Plate. Patient received handouts and class discussion pertaining to the topic.

## 2025-07-03 ENCOUNTER — NON-PROVIDER VISIT (OUTPATIENT)
Dept: CARDIOLOGY | Facility: MEDICAL CENTER | Age: 75
End: 2025-07-03
Payer: MEDICARE

## 2025-07-03 DIAGNOSIS — Z98.890 PERSONAL HISTORY OF SURGERY TO HEART AND GREAT VESSELS, PRESENTING HAZARDS TO HEALTH: ICD-10-CM

## 2025-07-03 PROCEDURE — G0422 INTENS CARDIAC REHAB W/EXERC: HCPCS | Performed by: INTERNAL MEDICINE

## 2025-07-03 PROCEDURE — G0423 INTENS CARDIAC REHAB NO EXER: HCPCS | Mod: 59 | Performed by: INTERNAL MEDICINE

## 2025-07-03 NOTE — PROGRESS NOTES
Ailin Good attended Intensive Cardiac Rehab today from 1000 to 1200. During her time she exercised and attended class. Her education today was a nutrition workshop titled: Label Reading. Patient received handouts and class discussion pertaining to the topic.

## 2025-07-08 ENCOUNTER — NON-PROVIDER VISIT (OUTPATIENT)
Dept: CARDIOLOGY | Facility: MEDICAL CENTER | Age: 75
End: 2025-07-08
Payer: MEDICARE

## 2025-07-08 DIAGNOSIS — Z98.890 PERSONAL HISTORY OF SURGERY TO HEART AND GREAT VESSELS, PRESENTING HAZARDS TO HEALTH: ICD-10-CM

## 2025-07-08 PROCEDURE — G0422 INTENS CARDIAC REHAB W/EXERC: HCPCS | Performed by: INTERNAL MEDICINE

## 2025-07-08 PROCEDURE — G0423 INTENS CARDIAC REHAB NO EXER: HCPCS | Mod: 59 | Performed by: INTERNAL MEDICINE

## 2025-07-08 NOTE — PROGRESS NOTES
Ailin Good attended Intensive Cardiac Rehab today from 1000 to 1200. During her time she exercised and attended class. Her education today was a VIDEO titled: BODY COMPOSITION. Patient received handouts and class discussion pertaining to the topic.

## 2025-07-09 ENCOUNTER — NON-PROVIDER VISIT (OUTPATIENT)
Dept: CARDIOLOGY | Facility: MEDICAL CENTER | Age: 75
End: 2025-07-09
Payer: MEDICARE

## 2025-07-09 DIAGNOSIS — Z98.890 PERSONAL HISTORY OF SURGERY TO HEART AND GREAT VESSELS, PRESENTING HAZARDS TO HEALTH: ICD-10-CM

## 2025-07-09 PROCEDURE — G0422 INTENS CARDIAC REHAB W/EXERC: HCPCS | Performed by: INTERNAL MEDICINE

## 2025-07-09 PROCEDURE — G0423 INTENS CARDIAC REHAB NO EXER: HCPCS | Mod: 59 | Performed by: INTERNAL MEDICINE

## 2025-07-09 NOTE — PROGRESS NOTES
Ailin Good attended Intensive Cardiac Rehab today from 1000 to 1200. During her time she exercised and attended class. Her education today was a cooking school titled: Adding Flavor Sodium Free. Patient received handouts and class discussion pertaining to the topic.

## 2025-07-10 ENCOUNTER — NON-PROVIDER VISIT (OUTPATIENT)
Dept: CARDIOLOGY | Facility: MEDICAL CENTER | Age: 75
End: 2025-07-10
Payer: MEDICARE

## 2025-07-10 DIAGNOSIS — Z98.890 PERSONAL HISTORY OF SURGERY TO HEART AND GREAT VESSELS, PRESENTING HAZARDS TO HEALTH: ICD-10-CM

## 2025-07-10 PROCEDURE — G0423 INTENS CARDIAC REHAB NO EXER: HCPCS | Mod: 59 | Performed by: INTERNAL MEDICINE

## 2025-07-10 PROCEDURE — G0422 INTENS CARDIAC REHAB W/EXERC: HCPCS | Performed by: INTERNAL MEDICINE

## 2025-07-10 NOTE — PROGRESS NOTES
Ailin Good attended Intensive Cardiac Rehab today from 1000 to 1200. During her time she exercised and attended class. Her education today was a workshop titled: Exercise Basics. Patient received handouts and class discussion pertaining to the topic.

## 2025-07-15 ENCOUNTER — NON-PROVIDER VISIT (OUTPATIENT)
Dept: CARDIOLOGY | Facility: MEDICAL CENTER | Age: 75
End: 2025-07-15
Payer: MEDICARE

## 2025-07-15 DIAGNOSIS — Z98.890 PERSONAL HISTORY OF SURGERY TO HEART AND GREAT VESSELS, PRESENTING HAZARDS TO HEALTH: ICD-10-CM

## 2025-07-15 PROCEDURE — G0422 INTENS CARDIAC REHAB W/EXERC: HCPCS | Performed by: INTERNAL MEDICINE

## 2025-07-15 PROCEDURE — G0423 INTENS CARDIAC REHAB NO EXER: HCPCS | Mod: 59 | Performed by: INTERNAL MEDICINE

## 2025-07-15 NOTE — PROGRESS NOTES
Ailin Good attended Intensive Cardiac Rehab today from 1000 to 1200. During her time she exercised and attended class. Her education today was a VIDEO titled: LABEL READING. Patient received handouts and class discussion pertaining to the topic.

## 2025-07-16 ENCOUNTER — NON-PROVIDER VISIT (OUTPATIENT)
Dept: CARDIOLOGY | Facility: MEDICAL CENTER | Age: 75
End: 2025-07-16
Payer: MEDICARE

## 2025-07-16 DIAGNOSIS — Z98.890 PERSONAL HISTORY OF SURGERY TO HEART AND GREAT VESSELS, PRESENTING HAZARDS TO HEALTH: ICD-10-CM

## 2025-07-16 PROCEDURE — G0422 INTENS CARDIAC REHAB W/EXERC: HCPCS | Performed by: INTERNAL MEDICINE

## 2025-07-16 PROCEDURE — G0423 INTENS CARDIAC REHAB NO EXER: HCPCS | Mod: 59 | Performed by: INTERNAL MEDICINE

## 2025-07-16 NOTE — PROGRESS NOTES
Ailin Good attended Intensive Cardiac Rehab today from 1000 to 1200. During her time she exercised and attended class. Her education today was a cooking school titled: Fast and Healthy Breakfast. Patient received handouts and class discussion pertaining to the topic.

## 2025-07-17 ENCOUNTER — NON-PROVIDER VISIT (OUTPATIENT)
Dept: CARDIOLOGY | Facility: MEDICAL CENTER | Age: 75
End: 2025-07-17
Payer: MEDICARE

## 2025-07-17 DIAGNOSIS — Z98.890 PERSONAL HISTORY OF SURGERY TO HEART AND GREAT VESSELS, PRESENTING HAZARDS TO HEALTH: ICD-10-CM

## 2025-07-17 PROCEDURE — G0423 INTENS CARDIAC REHAB NO EXER: HCPCS | Mod: 59 | Performed by: INTERNAL MEDICINE

## 2025-07-17 PROCEDURE — G0422 INTENS CARDIAC REHAB W/EXERC: HCPCS | Performed by: INTERNAL MEDICINE

## 2025-07-17 NOTE — PROGRESS NOTES
Ailin Good attended Intensive Cardiac Rehab today from 1000 to 1200. During his time he exercised and attended class.   His education today  was a WORKSHOP titled: FOCUSED GOALS-SUSTAINABLE CHANGE. Patient received handouts and class discussion pertaining to the topic.

## 2025-07-22 ENCOUNTER — NON-PROVIDER VISIT (OUTPATIENT)
Dept: CARDIOLOGY | Facility: MEDICAL CENTER | Age: 75
End: 2025-07-22
Payer: MEDICARE

## 2025-07-22 DIAGNOSIS — Z98.890 PERSONAL HISTORY OF SURGERY TO HEART AND GREAT VESSELS, PRESENTING HAZARDS TO HEALTH: ICD-10-CM

## 2025-07-22 PROCEDURE — G0423 INTENS CARDIAC REHAB NO EXER: HCPCS | Mod: 59 | Performed by: INTERNAL MEDICINE

## 2025-07-22 PROCEDURE — G0422 INTENS CARDIAC REHAB W/EXERC: HCPCS | Performed by: INTERNAL MEDICINE

## 2025-07-22 NOTE — PROGRESS NOTES
Ailin Good attended Intensive Cardiac Rehab today from 1000 to 1200. During her time she exercised and attended class. Her education today was a VIDEO titled: EXERCISE ACTION PLAN.  Patient received handouts and class discussion pertaining to the topic.

## 2025-07-23 ENCOUNTER — NON-PROVIDER VISIT (OUTPATIENT)
Dept: CARDIOLOGY | Facility: MEDICAL CENTER | Age: 75
End: 2025-07-23
Payer: MEDICARE

## 2025-07-23 DIAGNOSIS — Z98.890 PERSONAL HISTORY OF SURGERY TO HEART AND GREAT VESSELS, PRESENTING HAZARDS TO HEALTH: ICD-10-CM

## 2025-07-23 PROCEDURE — G0423 INTENS CARDIAC REHAB NO EXER: HCPCS | Mod: 59 | Performed by: INTERNAL MEDICINE

## 2025-07-23 PROCEDURE — G0422 INTENS CARDIAC REHAB W/EXERC: HCPCS | Performed by: INTERNAL MEDICINE

## 2025-07-23 NOTE — PROGRESS NOTES
Ailin Good attended Intensive Cardiac Rehab today from 1000 to 1200. During her time she exercised and attended class. Her education today was a cooking school titled: [employer]. Patient received handouts and class discussion pertaining to the topic.

## 2025-07-24 ENCOUNTER — NON-PROVIDER VISIT (OUTPATIENT)
Dept: CARDIOLOGY | Facility: MEDICAL CENTER | Age: 75
End: 2025-07-24
Payer: MEDICARE

## 2025-07-24 DIAGNOSIS — Z98.890 PERSONAL HISTORY OF SURGERY TO HEART AND GREAT VESSELS, PRESENTING HAZARDS TO HEALTH: ICD-10-CM

## 2025-07-24 DIAGNOSIS — Z79.01 CHRONIC ANTICOAGULATION: ICD-10-CM

## 2025-07-24 PROCEDURE — G0422 INTENS CARDIAC REHAB W/EXERC: HCPCS | Performed by: INTERNAL MEDICINE

## 2025-07-24 PROCEDURE — G0423 INTENS CARDIAC REHAB NO EXER: HCPCS | Mod: 59 | Performed by: INTERNAL MEDICINE

## 2025-07-24 NOTE — PROGRESS NOTES
Ailin Good attended Intensive Cardiac Rehab today from 1000 to 1200. During her time she exercised and attended class.  Her education today was a Workshop titled Fueling a Healthy Body. Education included handouts, discussion, and questions and answers.

## 2025-07-25 ENCOUNTER — TELEPHONE (OUTPATIENT)
Dept: VASCULAR LAB | Facility: MEDICAL CENTER | Age: 75
End: 2025-07-25
Payer: MEDICARE

## 2025-07-25 NOTE — TELEPHONE ENCOUNTER
Caller: Ailin Good    Topic/issue: Patient received contact from us about scheduling an anticoag appointment. Patient is no longer on Warfarin - states that was just temporary. The only blood thinner she takes now is baby aspirin. No anticoag appointments needed at this time.     Callback Number: [phone]    Thank you,  Re ROGERS

## 2025-07-28 ENCOUNTER — TELEPHONE (OUTPATIENT)
Dept: FAMILY PLANNING/WOMEN'S HEALTH CLINIC | Facility: PHYSICIAN GROUP | Age: 75
End: 2025-07-28
Payer: MEDICARE

## 2025-07-29 ENCOUNTER — NON-PROVIDER VISIT (OUTPATIENT)
Dept: CARDIOLOGY | Facility: MEDICAL CENTER | Age: 75
End: 2025-07-29
Payer: MEDICARE

## 2025-07-29 DIAGNOSIS — Z98.890 PERSONAL HISTORY OF SURGERY TO HEART AND GREAT VESSELS, PRESENTING HAZARDS TO HEALTH: ICD-10-CM

## 2025-07-29 NOTE — PROGRESS NOTES
Ailin Good attended Intensive Cardiac Rehab today from 1000 to 1200. During her time she exercised and attended class.  Her education today was a Video titled Biomechanical Limitations. Education included handouts, discussion, and questions and answers.

## 2025-07-30 ENCOUNTER — HOSPITAL ENCOUNTER (OUTPATIENT)
Facility: MEDICAL CENTER | Age: 75
End: 2025-07-30
Attending: INTERNAL MEDICINE
Payer: MEDICARE

## 2025-07-30 ENCOUNTER — NON-PROVIDER VISIT (OUTPATIENT)
Dept: CARDIOLOGY | Facility: MEDICAL CENTER | Age: 75
End: 2025-07-30
Payer: MEDICARE

## 2025-07-30 ENCOUNTER — OFFICE VISIT (OUTPATIENT)
Facility: MEDICAL CENTER | Age: 75
End: 2025-07-30
Attending: INTERNAL MEDICINE
Payer: MEDICARE

## 2025-07-30 VITALS
BODY MASS INDEX: 25.4 KG/M2 | HEART RATE: 60 BPM | RESPIRATION RATE: 16 BRPM | DIASTOLIC BLOOD PRESSURE: 60 MMHG | WEIGHT: 138 LBS | OXYGEN SATURATION: 96 % | HEIGHT: 62 IN | SYSTOLIC BLOOD PRESSURE: 96 MMHG

## 2025-07-30 DIAGNOSIS — I34.0 SEVERE MITRAL REGURGITATION: Primary | ICD-10-CM

## 2025-07-30 DIAGNOSIS — Z98.890 PERSONAL HISTORY OF SURGERY TO HEART AND GREAT VESSELS, PRESENTING HAZARDS TO HEALTH: ICD-10-CM

## 2025-07-30 DIAGNOSIS — Z98.890 S/P MVR (MITRAL VALVE REPAIR): ICD-10-CM

## 2025-07-30 DIAGNOSIS — I10 ESSENTIAL HYPERTENSION: ICD-10-CM

## 2025-07-30 DIAGNOSIS — I34.1 MITRAL VALVE PROLAPSE: ICD-10-CM

## 2025-07-30 LAB
T4 FREE SERPL-MCNC: 1.48 NG/DL (ref 0.93–1.7)
TSH SERPL DL<=0.005 MIU/L-ACNC: 1.49 UIU/ML (ref 0.38–5.33)

## 2025-07-30 PROCEDURE — 84439 ASSAY OF FREE THYROXINE: CPT

## 2025-07-30 PROCEDURE — 99213 OFFICE O/P EST LOW 20 MIN: CPT | Performed by: INTERNAL MEDICINE

## 2025-07-30 PROCEDURE — 84443 ASSAY THYROID STIM HORMONE: CPT

## 2025-07-30 PROCEDURE — 36415 COLL VENOUS BLD VENIPUNCTURE: CPT

## 2025-07-30 ASSESSMENT — FIBROSIS 4 INDEX: FIB4 SCORE: 0.52

## 2025-07-30 NOTE — PROGRESS NOTES
Ailin Good attended Intensive Cardiac Rehab today from 1000 to 1200. During her time she exercised and attended class. Her education today was a cooking shcool titled: Satisfying Salads and Dressings. Patient received handouts and class discussion pertaining to the topic.

## 2025-07-30 NOTE — PROGRESS NOTES
Cardiology Follow Up Consultation Note    Date of note:   7/30/2025  Primary Care Provider: Jemma Martin P.A.-C.  Referring Provider: No ref. provider found    Patient Name: Ailin Good     YOB: 1950  MRN:              [id number]    Chief Complaint   Patient presents with    Follow-Up     Mitral valve prolapse    Hypertension       History of Present Illness: Ms. Ailin Good is a 75 year old woman with past medical history significant for hypertension, hypothyroidism, thrombocythemia, MVP with mitral severe regurgitation s/p Mitral valve repair who presents to the office for cardiac follow up.    Patient was initially seen in office back in 12/26/2023 to establish care after she was found to have echocardiogram showing moderate mitral regurgitation.  Echocardiogram on October 2020 showed normal LV function with presence of moderate mitral regurgitation and presence of posterior leaflet prolapse.  She had a repeat echocardiogram performed for surveillance and was noted to have progression in mitral regurgitation which severe.  Subsequently referred to CT surgery for valve repair.    In April 2025, she underwent radical mitral valve repair with placement of 36 mm Long annuloplasty band and left atrial appendage clipping.    Interval history:  She presents to the office today for follow-up.  She has been doing well since her surgery.  Continues to go to cardiac rehab and is almost done with her 36 sessions.    He has not experienced any symptoms of chest pain or exertional dyspnea.  No heart failure symptoms.  Denies having lower extremity edema, orthopnea or PND.  Has noticed some low blood pressure with associated lightheadedness.    Cardiovascular Risk Factors:  1. Smoking status: Denies  2. Type II Diabetes Mellitus: Prediabetes   Lab Results   Component Value Date/Time    HBA1C 5.8 (H) 04/02/2025 11:42 AM    HBA1C 6.3 (H) 02/24/2025 02:16 PM     3. Hypertension: Yes  4. Dyslipidemia:  "Denies   Cholesterol,Tot   Date Value Ref Range Status   11/17/2023 189 100 - 199 mg/dL Final     LDL   Date Value Ref Range Status   11/17/2023 90 <100 mg/dL Final     HDL   Date Value Ref Range Status   11/17/2023 91 >=40 mg/dL Final     Triglycerides   Date Value Ref Range Status   11/17/2023 40 0 - 149 mg/dL Final     5. Family history of early Coronary Artery Disease in a first degree relative (Male less than 55 years of age; Female less than 65 years of age): Denies      Review of Systems:  As per HPI. All other systems reviewed and are negative.      Past Medical History:   Diagnosis Date    Allergy     allergic to trees/grass    Anesthesia     PONV    Arthritis     osteoarthritis    Back pain     Blood dyscrasia     \" too many platelets \"    Breath shortness     upon exertion    Delayed emergence from general anesthesia     Essential (hemorrhagic) thrombocythemia (HCC)     Fatty liver disease, nonalcoholic     pt states she was told by MD    Heart murmur 04/18/2024    was told as a child    Heart valve disease     was told by cardiologist    Hypertension     takes HCTZ    Pain     Polyp of colon 02/20/2019    PONV (postoperative nausea and vomiting)     Postoperative hypothyroidism     thyroidectomy; takes levothyroxine    Psychiatric problem 03/2021    situational depression ( loss of dog)    Urinary incontinence     stress incontinence         Past Surgical History:   Procedure Laterality Date    MITRAL VALVE REPAIR  4/4/2025    Procedure: MITRAL VALVE REPAIR, RADICAL;  Surgeon: Estella Geller M.D.;  Location: [facility];  Service: Cardiothoracic    ECHOCARDIOGRAM, TRANSESOPHAGEAL, INTRAOPERATIVE  4/4/2025    Procedure: ECHOCARDIOGRAM, TRANSESOPHAGEAL, INTRAOPERATIVE;  Surgeon: Estella Geller M.D.;  Location: [facility];  Service: Cardiothoracic    CLIPPING, LEFT ATRIAL APPENDAGE  4/4/2025    Procedure: CLIPPING, LEFT ATRIAL APPENDAGE;  Surgeon: Estella Geller M.D.;  Location: SURGERY " [facility];  Service: Cardiothoracic    CRANIOTOMY STEALTH Right 05/02/2024    Procedure: STEALTH RIGHT SUBOCCIPITAL CRANIECTOMY, MENINGIOMA EXCISION, TITANIUM CRANIOPLASTY;  Surgeon: Jaleel Dyer M.D.;  Location: SURGERY [facility];  Service: Neurosurgery    OK INSJ STABLJ DEV W/DCMPRN 1 LVL  09/08/2023    Procedure: INSERTION, INTERSPINOUS PROCESS DEVICE;  Surgeon: Jaleel Dyer M.D.;  Location: SURGERY [facility];  Service: Neurosurgery    LUMBAR LAMINECTOMY DISKECTOMY  09/08/2023    Procedure: POSTERIOR LEFT L4 TRANSPEDICULAR DECOMPRESSION, LEFT L5 LAMINECTOMY, L4-5 PLACEMENT OF INTRALAMINAR DEVICE;  Surgeon: Jaleel Dyer M.D.;  Location: SURGERY [facility];  Service: Neurosurgery    OK DX BONE MARROW ASPIRATIONS Left 03/10/2021    Procedure: ASPIRATION, BONE MARROW -;  Surgeon: Joe Black M.D.;  Location: ENDOSCOPY [facility];  Service: Orthopedics    OK DX BONE MARROW BIOPSIES Left 03/10/2021    Procedure: BIOPSY, BONE MARROW, USING NEEDLE OR TROCAR-DR. PERRY;  Surgeon: Joe Black M.D.;  Location: ENDOSCOPY [facility];  Service: Orthopedics    THYROIDECTOMY  2016    TONSILLECTOMY  1969    NO PERTINENT PAST SURGICAL HISTORY      OTHER NEUROLOGICAL SURG      TUBAL COAGULATION LAPAROSCOPIC BILATERAL           Current Outpatient Medications   Medication Sig Dispense Refill    hydroCHLOROthiazide 25 MG Tab Take 1 Tablet by mouth every day. 90 Tablet 3    acetaminophen (TYLENOL) 500 MG Tab Take 500 mg by mouth every 6 hours as needed for Mild Pain.      oxymetazoline (AFRIN) 0.05 % Solution Administer 2 Sprays into affected nostril(S) 1 time a day as needed for Congestion.      diphenhydrAMINE (BENADRYL) 25 MG Tab Take 25 mg by mouth every 6 hours as needed (for sinus congestion).      aspirin 81 MG EC tablet Take 81 mg by mouth every day.      levothyroxine (SYNTHROID) 100 MCG Tab Take 100 mcg by mouth every morning on an empty stomach. Indications: Underactive Thyroid       No current  facility-administered medications for this visit.         Allergies   Allergen Reactions    Penicillins Unspecified     States she was told she was allergic as a child through allergy testing but has since tolerated e.g. ampicillin    Dust Mite Extract Runny Nose          Pollen Extract Runny Nose          Seasonal Runny Nose     Every tree, grass         Family History   Problem Relation Age of Onset    Heart Disease Mother     Diabetes Mother     Hypertension Mother     Hyperlipidemia Mother     Arthritis Mother     Cancer Father         pancreatic cancer    Stroke Father     Hypertension Father     Heart Disease Sister     Hypertension Sister     Hyperlipidemia Sister          Social History     Socioeconomic History    Marital status:      Spouse name: Not on file    Number of children: Not on file    Years of education: Not on file    Highest education level: Associate degree: occupational, technical, or vocational program   Occupational History    Not on file   Tobacco Use    Smoking status: Former     Current packs/day: 0.00     Average packs/day: 1 pack/day for 35.0 years (35.0 ttl pk-yrs)     Types: Cigarettes     Start date: 1978     Quit date: 2013     Years since quittin.5     Passive exposure: Past    Smokeless tobacco: Never    Tobacco comments:     do not recall dates;  smoked only 1/2 pack   Vaping Use    Vaping status: Never Used   Substance and Sexual Activity    Alcohol use: Not Currently    Drug use: Never    Sexual activity: Not Currently     Partners: Male   Other Topics Concern    Not on file   Social History Narrative    Not on file     Social Drivers of Health     Financial Resource Strain: Low Risk  (10/9/2023)    Overall Financial Resource Strain (CARDIA)     Difficulty of Paying Living Expenses: Not very hard   Food Insecurity: No Food Insecurity (2025)    Hunger Vital Sign     Worried About Running Out of Food in the Last Year: Never true     Ran Out of Food in  "the Last Year: Never true   Transportation Needs: No Transportation Needs (4/4/2025)    PRAPARE - Transportation     Lack of Transportation (Medical): No     Lack of Transportation (Non-Medical): No   Physical Activity: Sufficiently Active (10/9/2023)    Exercise Vital Sign     Days of Exercise per Week: 2 days     Minutes of Exercise per Session: 80 min   Stress: Stress Concern Present (10/9/2023)    [employer] - Occupational Stress Questionnaire     Feeling of Stress : Rather much   Social Connections: Socially Isolated (10/9/2023)    Social Connection and Isolation Panel [NHANES]     Frequency of Communication with Friends and Family: More than three times a week     Frequency of Social Gatherings with Friends and Family: More than three times a week     Attends Faith Services: Never     Active Member of Clubs or Organizations: No     Attends Club or Organization Meetings: Never     Marital Status:    Intimate Partner Violence: Not At Risk (4/4/2025)    Humiliation, Afraid, Rape, and Kick questionnaire     Fear of Current or Ex-Partner: No     Emotionally Abused: No     Physically Abused: No     Sexually Abused: No   Housing Stability: Low Risk  (4/4/2025)    Housing Stability Vital Sign     Unable to Pay for Housing in the Last Year: No     Number of Times Moved in the Last Year: 0     Homeless in the Last Year: No         Physical Exam:  Ambulatory Vitals  BP 96/60 (BP Location: Left arm, Patient Position: Sitting, BP Cuff Size: Adult)   Pulse 60   Resp 16   Ht 1.575 m (5' 2\")   Wt 62.6 kg (138 lb)   SpO2 96%    Oxygen Therapy:  Pulse Oximetry: 96 %  BP Readings from Last 4 Encounters:   07/30/25 96/60   06/03/25 120/64   05/23/25 114/72   05/12/25 128/88       Weight/BMI: Body mass index is 25.24 kg/m².  Wt Readings from Last 4 Encounters:   07/30/25 62.6 kg (138 lb)   06/03/25 64 kg (141 lb)   05/23/25 64.4 kg (142 lb)   05/12/25 63.5 kg (140 lb)         General: " Not in acute distress  HEENT: OP clear   Neck:  No carotid bruits, No JVD appreciated  CVS:  RRR, Normal S1, S2. No murmurs  Resp: Normal respiratory effort, lungs CTA bilaterally.  Abdomen: Soft, non-distended, non-tender to palpation  Skin: No obvious rashes, no cyanosis  Neurological: Alert and oriented x3, moves all extremities, no focal neurologic deficits  Psychiatric: Appropriate affect  Extremities:   Extremities warm, pulses intact, no significant lower extremity edema      Lab Data Review:  Lab Results   Component Value Date/Time    CHOLSTRLTOT 198 12/17/2024 08:44 AM    LDL 84 12/17/2024 08:44 AM     12/17/2024 08:44 AM    TRIGLYCERIDE 36 12/17/2024 08:44 AM       Lab Results   Component Value Date/Time    SODIUM 138 04/22/2025 01:30 PM    POTASSIUM 4.3 04/22/2025 01:30 PM    CHLORIDE 103 04/22/2025 01:30 PM    CO2 25 04/22/2025 01:30 PM    GLUCOSE 102 (H) 04/22/2025 01:30 PM    BUN 27 (H) 04/22/2025 01:30 PM    CREATININE 0.68 04/22/2025 01:30 PM     Lab Results   Component Value Date/Time    ALKPHOSPHAT 110 (H) 04/22/2025 01:30 PM    ASTSGOT 44 04/22/2025 01:30 PM    ALTSGPT 57 (H) 04/22/2025 01:30 PM    TBILIRUBIN <0.2 04/22/2025 01:30 PM      Lab Results   Component Value Date/Time    WBC 6.2 04/22/2025 01:30 PM    HEMOGLOBIN 12.0 04/22/2025 01:30 PM     Lab Results   Component Value Date/Time    HBA1C 5.8 (H) 04/02/2025 11:42 AM    HBA1C 6.3 (H) 02/24/2025 02:16 PM         Cardiac Imaging and Procedures Review:    EKG dated 9/18/2023:   My personal interpretation is sinus rhythm, PVC     Echo dated 10/11/2023:   Normal left ventricular size, thickness, and systolic function.  Normal right ventricular size and systolic function.  Normal left atrial size.  Prolapse of the posterior mitral leaflet.  Moderate mitral regurgitation.  Mild tricuspid regurgitation.  No pericardial effusion.     No prior study is available for comparison.     Echo dated 1/17/2025:  Echocardiogram results personally  reviewed and interpreted by me which shows normal left trickle systolic function with a EF of 65%.  There is posterior mitral valve leaflet prolapse resulting in severe mitral regurgitation with eccentric, anteriorly directed jet.      Assessment & Plan     1. Severe mitral regurgitation  EC-ECHOCARDIOGRAM COMPLETE W/O CONT      2. Mitral valve prolapse  EC-ECHOCARDIOGRAM COMPLETE W/O CONT      3. S/P MVR (mitral valve repair)  EC-ECHOCARDIOGRAM COMPLETE W/O CONT      4. Essential hypertension              Medical Decision Making:  Ms. Ailin Good is a 75 year old woman with past medical history significant for hypertension, hypothyroidism, thrombocythemia, MVP with mitral severe regurgitation s/p Mitral valve repair who presents to the office for cardiac follow up.    1. Severe mitral regurgitation (Primary)  2. Mitral valve prolapse  3. S/P MVR (mitral valve repair)  - S/p successful mitral valve repair.  Completed 3 months of Coumadin.  Continue aspirin 81 mg daily lifelong.  - We will plan on repeat echocardiogram to ensure appropriate valve function.    4. Essential hypertension  -Blood pressure low in office today at 96/60 mmHg.  Also endorses having intermittent lightheadedness.  We can stop losartan altogether.  Continue HCTZ 25 mg daily.      () Today's E/M visit is associated with medical care services that serve as the continuing focal point for all needed health care services and/or with medical care services that  are part of ongoing care related to a patient's single, serious condition, or a complex condition: This includes  furnishing services to patients on an ongoing basis that result in care that is personalized  to the patient. The services result in a comprehensive, longitudinal, and continuous  relationship with the patient and involve delivery of team-based care that is accessible, coordinated with other practitioners and providers, and integrated with the broader health care landscape.        It was a pleasure seeing Ms. Ailin Good in the office today. Return in about 6 months (around 1/30/2026). Patient is aware to call the cardiology clinic with any questions or concerns.      Clay Durham MD, [facility]  Cardiologist, [facility], [facility]  [address] [address]  Phone: [phone]  Fax: [phone]    Please note that this dictation was created using voice recognition software. I have made every reasonable attempt to correct obvious errors, but it is possible there are errors of grammar and possibly content that I did not discover before finalizing the note.

## 2025-07-31 ENCOUNTER — NON-PROVIDER VISIT (OUTPATIENT)
Dept: CARDIOLOGY | Facility: MEDICAL CENTER | Age: 75
End: 2025-07-31
Payer: MEDICARE

## 2025-07-31 DIAGNOSIS — Z98.890 PERSONAL HISTORY OF SURGERY TO HEART AND GREAT VESSELS, PRESENTING HAZARDS TO HEALTH: ICD-10-CM

## 2025-07-31 NOTE — PROGRESS NOTES
Ailin Good attended Intensive Cardiac Rehab today from 100 to 1200. During her time she exercised and attended class.  Her education today was a Workshop titled Exercise Biomechanics. Education included handouts, discussion, and questions and answers.

## 2025-08-05 ENCOUNTER — NON-PROVIDER VISIT (OUTPATIENT)
Dept: CARDIOLOGY | Facility: MEDICAL CENTER | Age: 75
End: 2025-08-05
Payer: MEDICARE

## 2025-08-05 DIAGNOSIS — Z98.890 PERSONAL HISTORY OF SURGERY TO HEART AND GREAT VESSELS, PRESENTING HAZARDS TO HEALTH: ICD-10-CM

## 2025-08-05 PROCEDURE — G0422 INTENS CARDIAC REHAB W/EXERC: HCPCS | Performed by: INTERNAL MEDICINE

## 2025-08-05 PROCEDURE — G0423 INTENS CARDIAC REHAB NO EXER: HCPCS | Mod: 59 | Performed by: INTERNAL MEDICINE

## 2025-08-06 ENCOUNTER — NON-PROVIDER VISIT (OUTPATIENT)
Dept: CARDIOLOGY | Facility: MEDICAL CENTER | Age: 75
End: 2025-08-06
Payer: MEDICARE

## 2025-08-06 DIAGNOSIS — Z98.890 PERSONAL HISTORY OF SURGERY TO HEART AND GREAT VESSELS, PRESENTING HAZARDS TO HEALTH: ICD-10-CM

## 2025-08-06 PROCEDURE — G0422 INTENS CARDIAC REHAB W/EXERC: HCPCS | Performed by: INTERNAL MEDICINE

## 2025-08-06 PROCEDURE — G0423 INTENS CARDIAC REHAB NO EXER: HCPCS | Mod: 59 | Performed by: INTERNAL MEDICINE

## 2025-08-07 ENCOUNTER — NON-PROVIDER VISIT (OUTPATIENT)
Dept: CARDIOLOGY | Facility: MEDICAL CENTER | Age: 75
End: 2025-08-07
Payer: MEDICARE

## 2025-08-07 ENCOUNTER — TELEPHONE (OUTPATIENT)
Dept: MEDICAL GROUP | Age: 75
End: 2025-08-07

## 2025-08-07 DIAGNOSIS — E89.0 POSTOPERATIVE HYPOTHYROIDISM: Primary | ICD-10-CM

## 2025-08-07 DIAGNOSIS — Z98.890 PERSONAL HISTORY OF SURGERY TO HEART AND GREAT VESSELS, PRESENTING HAZARDS TO HEALTH: ICD-10-CM

## 2025-08-07 PROCEDURE — G0422 INTENS CARDIAC REHAB W/EXERC: HCPCS | Performed by: INTERNAL MEDICINE

## 2025-08-07 PROCEDURE — G0423 INTENS CARDIAC REHAB NO EXER: HCPCS | Mod: 59 | Performed by: INTERNAL MEDICINE

## 2025-08-08 PROCEDURE — RXMED WILLOW AMBULATORY MEDICATION CHARGE

## 2025-08-12 ENCOUNTER — NON-PROVIDER VISIT (OUTPATIENT)
Dept: CARDIOLOGY | Facility: MEDICAL CENTER | Age: 75
End: 2025-08-12
Payer: MEDICARE

## 2025-08-12 DIAGNOSIS — Z98.890 PERSONAL HISTORY OF SURGERY TO HEART AND GREAT VESSELS, PRESENTING HAZARDS TO HEALTH: ICD-10-CM

## 2025-08-12 PROCEDURE — G0423 INTENS CARDIAC REHAB NO EXER: HCPCS | Mod: 59 | Performed by: INTERNAL MEDICINE

## 2025-08-12 PROCEDURE — G0422 INTENS CARDIAC REHAB W/EXERC: HCPCS | Performed by: INTERNAL MEDICINE

## 2025-08-13 ENCOUNTER — NON-PROVIDER VISIT (OUTPATIENT)
Dept: CARDIOLOGY | Facility: MEDICAL CENTER | Age: 75
End: 2025-08-13
Payer: MEDICARE

## 2025-08-13 DIAGNOSIS — Z98.890 PERSONAL HISTORY OF SURGERY TO HEART AND GREAT VESSELS, PRESENTING HAZARDS TO HEALTH: ICD-10-CM

## 2025-08-13 PROCEDURE — G0422 INTENS CARDIAC REHAB W/EXERC: HCPCS | Performed by: INTERNAL MEDICINE

## 2025-08-13 PROCEDURE — G0423 INTENS CARDIAC REHAB NO EXER: HCPCS | Mod: 59 | Performed by: INTERNAL MEDICINE

## 2025-08-14 ENCOUNTER — NON-PROVIDER VISIT (OUTPATIENT)
Dept: CARDIOLOGY | Facility: MEDICAL CENTER | Age: 75
End: 2025-08-14
Payer: MEDICARE

## 2025-08-14 ENCOUNTER — PHARMACY VISIT (OUTPATIENT)
Dept: PHARMACY | Facility: MEDICAL CENTER | Age: 75
End: 2025-08-14
Payer: COMMERCIAL

## 2025-08-14 DIAGNOSIS — Z98.890 PERSONAL HISTORY OF SURGERY TO HEART AND GREAT VESSELS, PRESENTING HAZARDS TO HEALTH: ICD-10-CM

## 2025-08-14 PROCEDURE — G0423 INTENS CARDIAC REHAB NO EXER: HCPCS | Mod: 59 | Performed by: INTERNAL MEDICINE

## 2025-08-14 PROCEDURE — G0422 INTENS CARDIAC REHAB W/EXERC: HCPCS | Performed by: INTERNAL MEDICINE

## 2025-08-14 PROCEDURE — RXMED WILLOW AMBULATORY MEDICATION CHARGE: Performed by: INTERNAL MEDICINE

## 2025-08-19 ENCOUNTER — NON-PROVIDER VISIT (OUTPATIENT)
Dept: CARDIOLOGY | Facility: MEDICAL CENTER | Age: 75
End: 2025-08-19
Payer: MEDICARE

## 2025-08-19 DIAGNOSIS — Z98.890 PERSONAL HISTORY OF SURGERY TO HEART AND GREAT VESSELS, PRESENTING HAZARDS TO HEALTH: ICD-10-CM

## 2025-08-19 PROCEDURE — G0422 INTENS CARDIAC REHAB W/EXERC: HCPCS | Performed by: INTERNAL MEDICINE

## 2025-08-19 PROCEDURE — G0423 INTENS CARDIAC REHAB NO EXER: HCPCS | Mod: 59 | Performed by: INTERNAL MEDICINE

## 2025-08-20 ENCOUNTER — NON-PROVIDER VISIT (OUTPATIENT)
Dept: CARDIOLOGY | Facility: MEDICAL CENTER | Age: 75
End: 2025-08-20
Payer: MEDICARE

## 2025-08-20 DIAGNOSIS — Z98.890 PERSONAL HISTORY OF SURGERY TO HEART AND GREAT VESSELS, PRESENTING HAZARDS TO HEALTH: ICD-10-CM

## 2025-08-20 PROCEDURE — G0422 INTENS CARDIAC REHAB W/EXERC: HCPCS | Performed by: INTERNAL MEDICINE

## 2025-08-20 PROCEDURE — G0423 INTENS CARDIAC REHAB NO EXER: HCPCS | Mod: 59 | Performed by: INTERNAL MEDICINE

## 2025-08-21 ENCOUNTER — NON-PROVIDER VISIT (OUTPATIENT)
Dept: CARDIOLOGY | Facility: MEDICAL CENTER | Age: 75
End: 2025-08-21
Payer: MEDICARE

## 2025-08-21 DIAGNOSIS — Z98.890 PERSONAL HISTORY OF SURGERY TO HEART AND GREAT VESSELS, PRESENTING HAZARDS TO HEALTH: ICD-10-CM

## 2025-08-21 PROCEDURE — G0423 INTENS CARDIAC REHAB NO EXER: HCPCS | Mod: 59 | Performed by: INTERNAL MEDICINE

## 2025-08-21 PROCEDURE — G0422 INTENS CARDIAC REHAB W/EXERC: HCPCS | Performed by: INTERNAL MEDICINE

## 2025-08-25 ENCOUNTER — ANCILLARY PROCEDURE (OUTPATIENT)
Dept: CARDIOLOGY | Facility: MEDICAL CENTER | Age: 75
End: 2025-08-25
Attending: INTERNAL MEDICINE
Payer: MEDICARE

## 2025-08-25 DIAGNOSIS — I34.0 SEVERE MITRAL REGURGITATION: ICD-10-CM

## 2025-08-25 DIAGNOSIS — Z98.890 S/P MVR (MITRAL VALVE REPAIR): ICD-10-CM

## 2025-08-25 DIAGNOSIS — I34.1 MITRAL VALVE PROLAPSE: ICD-10-CM

## 2025-08-25 PROCEDURE — 93306 TTE W/DOPPLER COMPLETE: CPT

## 2025-08-26 ENCOUNTER — NON-PROVIDER VISIT (OUTPATIENT)
Dept: CARDIOLOGY | Facility: MEDICAL CENTER | Age: 75
End: 2025-08-26
Payer: MEDICARE

## 2025-08-26 DIAGNOSIS — Z98.890 PERSONAL HISTORY OF SURGERY TO HEART AND GREAT VESSELS, PRESENTING HAZARDS TO HEALTH: ICD-10-CM

## 2025-08-27 ENCOUNTER — RESULTS FOLLOW-UP (OUTPATIENT)
Dept: CARDIOLOGY | Facility: MEDICAL CENTER | Age: 75
End: 2025-08-27

## 2025-08-27 ENCOUNTER — NON-PROVIDER VISIT (OUTPATIENT)
Dept: CARDIOLOGY | Facility: MEDICAL CENTER | Age: 75
End: 2025-08-27
Payer: MEDICARE

## 2025-08-27 DIAGNOSIS — Z98.890 PERSONAL HISTORY OF SURGERY TO HEART AND GREAT VESSELS, PRESENTING HAZARDS TO HEALTH: ICD-10-CM

## 2025-08-27 LAB
LV EJECT FRACT MOD 2C 99903: 54.55
LV EJECT FRACT MOD 4C 99902: 53.5
LV EJECT FRACT MOD BP 99901: 53.72

## 2025-08-27 PROCEDURE — 93306 TTE W/DOPPLER COMPLETE: CPT | Mod: 26 | Performed by: INTERNAL MEDICINE

## 2025-08-28 ENCOUNTER — NON-PROVIDER VISIT (OUTPATIENT)
Dept: CARDIOLOGY | Facility: MEDICAL CENTER | Age: 75
End: 2025-08-28
Payer: MEDICARE

## 2025-08-28 DIAGNOSIS — Z98.890 PERSONAL HISTORY OF SURGERY TO HEART AND GREAT VESSELS, PRESENTING HAZARDS TO HEALTH: ICD-10-CM

## 2025-09-04 ENCOUNTER — APPOINTMENT (OUTPATIENT)
Dept: MEDICAL GROUP | Age: 75
End: 2025-09-04
Payer: MEDICARE

## (undated) DEVICE — COVER PROBE STERILE CONE (12EA/CA)

## (undated) DEVICE — TOOL MR8 14CM MATCH HD SYM-TRI 3MM DIAMETER (1/EA)

## (undated) DEVICE — GLOVE BIOGEL INDICATOR SZ 7SURGICAL PF LTX - (50/BX 4BX/CA)

## (undated) DEVICE — APPLICATOR COTTON TIP 6 IN - STERILE (2EA/PK 100PK/BX)

## (undated) DEVICE — CANNULA O2 COMFORT SOFT EAR ADULT 7 FT TUBING (50/CA)

## (undated) DEVICE — CLOSURE WOUND 1/4 X 4 (STERI - STRIP) (50/BX 4BX/CA)

## (undated) DEVICE — LACTATED RINGERS INJ. 500 ML - (24EA/CA)

## (undated) DEVICE — SPHERE NAVIGATION STEALTH (5EA/TY 12TY/PK)

## (undated) DEVICE — PIN HEAD MAYFIELD DISP. (3EA/PK 12PK/BX)

## (undated) DEVICE — D-5-W INJ. 500 ML - (24EA/CA)

## (undated) DEVICE — INSERT STEALTH #5 - (10/BX)

## (undated) DEVICE — CLEANER ELECTRO-SURGICAL TIP - (25/BX 4BX/CA)

## (undated) DEVICE — CANISTER SUCTION 3000ML MECHANICAL FILTER AUTO SHUTOFF MEDI-VAC NONSTERILE LF DISP  (40EA/CA)

## (undated) DEVICE — SUTURE 4-0 ETHIBOND RB-1 EXCEL (12/BX)

## (undated) DEVICE — TUBING C&T SET FLYING LEADS DRAIN TUBING (10EA/BX)

## (undated) DEVICE — SENSOR OXIMETER ADULT SPO2 RD SET (20EA/BX)

## (undated) DEVICE — SUTURE 4-0 PROLENE V-7 D/A (36PK/BX)

## (undated) DEVICE — SUTURE 4-0 30CM STRATAFIX SPIRAL PS-2 (12EA/BX)

## (undated) DEVICE — SODIUM CHL IRRIGATION 0.9% 1000ML (12EA/CA)

## (undated) DEVICE — TUBE CONNECT SUCTION CLEAR 120 X 1/4" (50EA/CA)"

## (undated) DEVICE — ARMBOARD SMALL IV 9 INLONG - (25EA/CA)

## (undated) DEVICE — GOWN WARMING STANDARD FLEX - (30/CA)

## (undated) DEVICE — SET TUBING PNEUMOCLEAR HIGH FLOW SMOKE EVACUATION (10EA/BX)

## (undated) DEVICE — BLANKET UNDERBODY ADULT - (10/CA)

## (undated) DEVICE — COVER CAMERA LENS LIGHT HANDLE STUBBY DISPOSABLE (20EA/BX)

## (undated) DEVICE — KIT INTROPERCUTANEOUS SHEATH - 8.5 FR W/MAX BARRIER AND BIOPATCH (5/CA)

## (undated) DEVICE — ELECTRODE DUAL RETURN W/ CORD - (50/PK)

## (undated) DEVICE — DRAPE LAPAROTOMY T SHEET - (12EA/CA)

## (undated) DEVICE — GLOVE BIOGEL ECLIPSE  PF LATEX SIZE 6.5 (50PR/BX)

## (undated) DEVICE — BANDAID SHEER STRIP 3/4 IN (100EA/BX 12BX/CA)

## (undated) DEVICE — PACK NEURO - (2EA/CA)

## (undated) DEVICE — ELECTRODE 850 FOAM ADHESIVE - HYDROGEL RADIOTRNSPRNT (50/PK)

## (undated) DEVICE — SENSOR OXIMETER EQUANOX ADVANCE LARGE DISPOSABLE 8004CA 7600 SYSTEM (MUST ORDER IN QTYS OF 20)

## (undated) DEVICE — ELECTRODE RADIOLUCNT SOLID GEL DEFIB PADS (12EA/CA)

## (undated) DEVICE — GLOVE BIOGEL SZ 7 SURGICAL PF LTX - (50PR/BX 4BX/CA)

## (undated) DEVICE — WIRE STEEL 5-0 B&S 20 OHS - 5/PK 12PK/BX ITEM. D5329 OR D6625 CAN BE USED AS A SUB

## (undated) DEVICE — TUBING CLEARLINK DUO-VENT - C-FLO (48EA/CA)

## (undated) DEVICE — FEATHERFLOW SOFT SPECIALTY TUBING WITH AIRFLOW REGULATOR TOBRA (10EA/BX)

## (undated) DEVICE — SCALP CLIP RANEY 20-1037 (10EA/PK 20PK/CA)

## (undated) DEVICE — BLADE SURGICAL #15 - (50/BX 3BX/CA)

## (undated) DEVICE — KIT SURGIFLO W/OUT THROMBIN - (6EA/CA)

## (undated) DEVICE — ARMREST CRADLE FOAM - (2PR/PK 12PR/CA)

## (undated) DEVICE — SUCTION INSTRUMENT YANKAUER BULBOUS TIP W/O VENT (50EA/CA)

## (undated) DEVICE — HEADREST PRONEVIEW LARGE - (10/CA)

## (undated) DEVICE — SPONGE GAUZESTER 4 X 4 4PLY - (128PK/CA)

## (undated) DEVICE — MIDAS LUBRICATOR DIFFUSER PACK (4EA/CA)

## (undated) DEVICE — DRAPE SLUSH WARMER DISC (24EA/CA)

## (undated) DEVICE — BALL COTTON NEURO 3/4 INCH - (5/PK50PK/CA)

## (undated) DEVICE — SUTURE NONABSORBABLE ETHIBOND EXCEL 2-0 V-5 2 ARM BRAID GREEN WHITE (6EA/BX)

## (undated) DEVICE — DERMABOND ADVANCED - (12EA/BX)

## (undated) DEVICE — TRAY SURESTEP FOLEY TEMP SENSING 16FR SNAP SECURE(10EA/CA) ORDER #18764 FOR TEMP FOLEY ONLY

## (undated) DEVICE — GLOVE SZ 7.5 BIOGEL PI MICRO - PF LF (50PR/BX)

## (undated) DEVICE — SET EXTENSION WITH 2 PORTS (48EA/CA) ***PART #2C8610 IS A SUBSTITUTE*****

## (undated) DEVICE — HEMOSTAT SURG ABSORBABLE - 4 X 8 IN SURGICEL (24EA/CA)

## (undated) DEVICE — FIBRILLAR SURGICEL 4X4 - 10/CA

## (undated) DEVICE — CHLORAPREP 26 ML APPLICATOR - ORANGE TINT(25/CA)

## (undated) DEVICE — SYRINGE 3 CC 22 GA X 1-1/2 - NDL SAFETY (50/BX 8BX/CA)

## (undated) DEVICE — SET BIFURCATED BLOOD - (48EA/CS)

## (undated) DEVICE — CORETEMP DRAPE FORM-FITTED EASY DROPANDGO DRAPE FOR USE ON THE CORETEMP FLUID MANAGEMENT 56IN X 56IN

## (undated) DEVICE — SET LEADWIRE 5 LEAD BEDSIDE DISPOSABLE ECG (1SET OF 5/EA)

## (undated) DEVICE — TRAY SURESTEP FOLEY TEMP SENSING 16FR (10EA/CA) ORDER  #18764 FOR TEMP FOLEY ONLY

## (undated) DEVICE — COVER MAYO STAND X-LG - (22EA/CA)

## (undated) DEVICE — PACK CV DRAPING/BASIN 2PART - (1/CA)

## (undated) DEVICE — SLEEVE VASO CALF MED - (10PR/CA)

## (undated) DEVICE — STOPCOCK MALE 4-WAY - (50/CA)

## (undated) DEVICE — DRAPE MICROSCOPE ARMATEC 120IN X 46IN (10EA/CA)

## (undated) DEVICE — SUTURE 2-0 VICRYL PLUS CT-1 - 8 X 18 INCH(12/BX)

## (undated) DEVICE — PATTIES SURG X-RAYCOTTONOID - 1/2 X 3 IN (200/CA)

## (undated) DEVICE — KIT EVACUATER 3 SPRING PVC LF 1/8 DRAIN SIZE (10EA/CA)"

## (undated) DEVICE — BAG RESUSCITATION DISPOSABLE - WITH MASK (10 EA/CA)

## (undated) DEVICE — SODIUM CHL. INJ. 0.9% 500ML (24EA/CA 50CA/PF)

## (undated) DEVICE — GLOVE BIOGEL PI INDICATOR SZ 6.0 SURGICAL PF LF -(200PR/CA)

## (undated) DEVICE — LACTATED RINGERS INJ 1000 ML - (14EA/CA 60CA/PF)

## (undated) DEVICE — DRESSING XEROFORM 1X8 - (50/BX 4BX/CA)

## (undated) DEVICE — GLOVE BIOGEL SZ 6.5 SURGICAL PF LTX (50PR/BX 4BX/CA)

## (undated) DEVICE — TOOL MR8 2.3CM F2/7CM TAPER (1/EA)

## (undated) DEVICE — DRAPE T CRANIOTOMY W/POUCH - (9/CA)

## (undated) DEVICE — GLOVE BIOGEL PI ORTHO SZ 7.5 PF LF (40PR/BX)

## (undated) DEVICE — SUTURE 3-0 SILK SH C/R 18 IN - (12/BX)

## (undated) DEVICE — SURGIFOAM (SIZE 100) - (6EA/CA)

## (undated) DEVICE — SUTURE GENERAL

## (undated) DEVICE — DRAPE MICROSCOPE X-LONG (10EA/CA)

## (undated) DEVICE — PATTIES SURG NEURO X-RAY 1/2X1/2 (10EA/PK 20PK/CS)

## (undated) DEVICE — SUTURE PERMAHAND 2-0 NA 18 SILK BLACK 17 X 18IN 2-0 NON NEEDLE (6EA/BX)

## (undated) DEVICE — GLOVE BIOGEL SZ 7.5 SURGICAL PF LTX - (50PR/BX 4BX/CA)

## (undated) DEVICE — GLOVE SIZE 6.5 SURGEON ACCELERATOR FREE GREEN (50PR/BX)

## (undated) DEVICE — TOOL MR8 9CM MATCH HEAD 3MM DIAMETER (1/EA)

## (undated) DEVICE — PACK JACKSON TABLE KIT W/OUT - HR (6EA/CA)

## (undated) DEVICE — STAPLER SKIN DISP - (6/BX 10BX/CA) VISISTAT

## (undated) DEVICE — Device

## (undated) DEVICE — CANISTER SUCTION 3000ML MECHANICAL FILTER AUTO SHUTOFF MEDI-VAC NONSTERILE LF DISP (40EA/CA)

## (undated) DEVICE — SYRINGE EAR/NOSE 3 OZ STERILE (50/CA

## (undated) DEVICE — SUTURE 0 VICRYL PLUS CT-1 - 8 X 18 INCH (12/BX)

## (undated) DEVICE — SYS DLV COST CLS RM TEMP - INJECTATE (CO-SET II) (10EA/CA)

## (undated) DEVICE — CATHETER THERMALDILUTION SWAN - (5EA/CA)

## (undated) DEVICE — GLOVE BIOGEL PI INDICATOR SZ 7.5 SURGICAL PF LF -(50/BX 4BX/CA)

## (undated) DEVICE — SUTURE OHS

## (undated) DEVICE — ORGANIZER SUTURE GABBAY-FRAT - ER STERILE (3/SET 4ST/BX)

## (undated) DEVICE — SOD. CHL. INJ. 0.9% 1000 ML - (14EA/CA 60CA/PF)

## (undated) DEVICE — SET FLUID WARMING STANDARD FLOW - (10/CA)

## (undated) DEVICE — SUTURE 4-0 NUROLON CR/8 TF - (12/BX) ETHICON

## (undated) DEVICE — GLOVE BIOGEL PI INDICATOR SZ 6.5 SURGICAL PF LF - (50/BX 4BX/CA)

## (undated) DEVICE — TUBE CHEST 32FR. STRAIGHT - (10EA/CA)

## (undated) DEVICE — TOOL MR8 9CM ACORN 7.5MM DIAMETER (1/EA)

## (undated) DEVICE — BAG SPONGE COUNT 10.25 X 32 - BLUE (250/CA)

## (undated) DEVICE — TRANSDUCER BIFURCATED MONITORING KIT (10EA/CA)

## (undated) DEVICE — KIT RADIAL ARTERY 20GA W/MAX BARRIER AND BIOPATCH (5EA/CA) #10740 IS FOR THE SET RADIAL ARTERIAL

## (undated) DEVICE — TUBE CHEST 32FR. RIGHT ANGLED (10EA/CA)

## (undated) DEVICE — GLOVE BIOGEL ECLIPSE PF LATEX SIZE 8.0 (50PR/BX)

## (undated) DEVICE — BLADE CLIPPER FITS 2501 CLIPPER (BLUE)  (20EA/CA)

## (undated) DEVICE — GLOVE BIOGEL INDICATOR SZ 8 SURGICAL PF LTX - (50/BX 4BX/CA)

## (undated) DEVICE — SYSTEM CHEST DRAIN ADULT/PEDS W/AUTO TRANSFUSION CAPABILITY SAHARA (6EA/CA)

## (undated) DEVICE — PERFORATER DISP TIP DGR-0

## (undated) DEVICE — PTFE PLED STER - (250/CA)

## (undated) DEVICE — COVER LIGHT HANDLE ALC PLUS DISP (18EA/BX)

## (undated) DEVICE — DEVICE CLOSURE FLEXIBLE SHAFT ATRICLIP L35 MM (1EA)

## (undated) DEVICE — BONE WAX (12PK/BX)

## (undated) DEVICE — SUTURE 3-0 VICRYL PLUS RB-1 - 8 X 18 INCH (12/BX)

## (undated) DEVICE — MICRODRIP PRIMARY VENTED 60 (48EA/CA) THIS WAS PART #2C8428 WHICH WAS DISCONTINUED

## (undated) DEVICE — TOWELS CLOTH SURGICAL - (4/PK 20PK/CA)

## (undated) DEVICE — CONTAINER SPECIMEN BAG OR - STERILE 4 OZ W/LID (100EA/CA)

## (undated) DEVICE — CASSETTE IRRIGATION SONOPET IQ SUCTION (4EA/PK)

## (undated) DEVICE — GLOVE SIZE 6.5  SURGEON ACCELERATOR FREE GREEN (50PR/BX)

## (undated) DEVICE — GOWN SURGEONS X-LARGE - DISP. (30/CA)

## (undated) DEVICE — PAD LAP STERILE 18 X 18 - (5/PK 40PK/CA)

## (undated) DEVICE — SUTURE 4-0 PROLENE RB-1 D/A 36 (36PK/BX)"

## (undated) DEVICE — TRAY MULTI-LUMEN 7FR PRESSURE W/MAX BARRIER AND BIOPATCH - (5/CA)

## (undated) DEVICE — LEAD PACING TEMP MYO - (12/BX)

## (undated) DEVICE — SLEEVE VASO DVT COMPRESSION CALF MED - (10PR/CA)

## (undated) DEVICE — DRAPE MAYO STAND - (30/CA)

## (undated) DEVICE — FORCEPS IRRIGATING 9 X 0.5MM (5EA/BX)

## (undated) DEVICE — BLADE STERNUM SAW SURGICAL 32.0 X 6.4 MM STERILE (1/EA)

## (undated) DEVICE — SYRINGE 6 CC 20 GA X 1 1/2 - NDL SAFETY  (50/BX)

## (undated) DEVICE — BLANKET WARMING FULL BODY - (10/CA)

## (undated) DEVICE — GLOVE BIOGEL PI ORTHO SZ 6 SURGICAL PF LF (40PR/BX)

## (undated) DEVICE — STERI STRIP COMPOUND BENZOIN - TINCTURE 0.6ML WITH APPLICATOR (40EA/BX)